# Patient Record
Sex: FEMALE | Race: WHITE | NOT HISPANIC OR LATINO | Employment: FULL TIME | URBAN - METROPOLITAN AREA
[De-identification: names, ages, dates, MRNs, and addresses within clinical notes are randomized per-mention and may not be internally consistent; named-entity substitution may affect disease eponyms.]

---

## 2017-01-05 ENCOUNTER — OFFICE VISIT (OUTPATIENT)
Dept: FAMILY MEDICINE | Facility: CLINIC | Age: 47
End: 2017-01-05
Payer: COMMERCIAL

## 2017-01-05 VITALS
TEMPERATURE: 98.9 F | DIASTOLIC BLOOD PRESSURE: 72 MMHG | SYSTOLIC BLOOD PRESSURE: 104 MMHG | HEIGHT: 63 IN | BODY MASS INDEX: 34.59 KG/M2 | WEIGHT: 195.2 LBS | HEART RATE: 80 BPM

## 2017-01-05 DIAGNOSIS — R11.0 NAUSEA: Primary | ICD-10-CM

## 2017-01-05 DIAGNOSIS — R53.83 FATIGUE, UNSPECIFIED TYPE: ICD-10-CM

## 2017-01-05 LAB
ALBUMIN SERPL-MCNC: 4.1 G/DL (ref 3.4–5)
ALP SERPL-CCNC: 54 U/L (ref 40–150)
ALT SERPL W P-5'-P-CCNC: 20 U/L (ref 0–50)
ANION GAP SERPL CALCULATED.3IONS-SCNC: 7 MMOL/L (ref 3–14)
AST SERPL W P-5'-P-CCNC: 16 U/L (ref 0–45)
BASOPHILS # BLD AUTO: 0 10E9/L (ref 0–0.2)
BASOPHILS NFR BLD AUTO: 0.5 %
BILIRUB SERPL-MCNC: 0.6 MG/DL (ref 0.2–1.3)
BUN SERPL-MCNC: 14 MG/DL (ref 7–30)
CALCIUM SERPL-MCNC: 8.8 MG/DL (ref 8.5–10.1)
CHLORIDE SERPL-SCNC: 102 MMOL/L (ref 94–109)
CO2 SERPL-SCNC: 28 MMOL/L (ref 20–32)
CREAT SERPL-MCNC: 0.77 MG/DL (ref 0.52–1.04)
DEPRECATED S PYO AG THROAT QL EIA: NORMAL
DIFFERENTIAL METHOD BLD: NORMAL
EOSINOPHIL # BLD AUTO: 0.1 10E9/L (ref 0–0.7)
EOSINOPHIL NFR BLD AUTO: 1.8 %
ERYTHROCYTE [DISTWIDTH] IN BLOOD BY AUTOMATED COUNT: 12.1 % (ref 10–15)
GFR SERPL CREATININE-BSD FRML MDRD: 82 ML/MIN/1.7M2
GLUCOSE SERPL-MCNC: 82 MG/DL (ref 70–99)
HCT VFR BLD AUTO: 40.2 % (ref 35–47)
HGB BLD-MCNC: 14 G/DL (ref 11.7–15.7)
LIPASE SERPL-CCNC: 138 U/L (ref 73–393)
LYMPHOCYTES # BLD AUTO: 1.2 10E9/L (ref 0.8–5.3)
LYMPHOCYTES NFR BLD AUTO: 20.2 %
MCH RBC QN AUTO: 33 PG (ref 26.5–33)
MCHC RBC AUTO-ENTMCNC: 34.8 G/DL (ref 31.5–36.5)
MCV RBC AUTO: 95 FL (ref 78–100)
MICRO REPORT STATUS: NORMAL
MONOCYTES # BLD AUTO: 0.6 10E9/L (ref 0–1.3)
MONOCYTES NFR BLD AUTO: 10.6 %
NEUTROPHILS # BLD AUTO: 3.8 10E9/L (ref 1.6–8.3)
NEUTROPHILS NFR BLD AUTO: 66.9 %
PLATELET # BLD AUTO: 279 10E9/L (ref 150–450)
POTASSIUM SERPL-SCNC: 4 MMOL/L (ref 3.4–5.3)
PROT SERPL-MCNC: 7.6 G/DL (ref 6.8–8.8)
RBC # BLD AUTO: 4.24 10E12/L (ref 3.8–5.2)
SODIUM SERPL-SCNC: 137 MMOL/L (ref 133–144)
SPECIMEN SOURCE: NORMAL
TSH SERPL DL<=0.005 MIU/L-ACNC: 1.59 MU/L (ref 0.4–4)
WBC # BLD AUTO: 5.7 10E9/L (ref 4–11)

## 2017-01-05 PROCEDURE — 83690 ASSAY OF LIPASE: CPT | Mod: 90 | Performed by: FAMILY MEDICINE

## 2017-01-05 PROCEDURE — 87081 CULTURE SCREEN ONLY: CPT | Mod: 90 | Performed by: FAMILY MEDICINE

## 2017-01-05 PROCEDURE — 85025 COMPLETE CBC W/AUTO DIFF WBC: CPT | Performed by: FAMILY MEDICINE

## 2017-01-05 PROCEDURE — 84443 ASSAY THYROID STIM HORMONE: CPT | Mod: 90 | Performed by: FAMILY MEDICINE

## 2017-01-05 PROCEDURE — 80053 COMPREHEN METABOLIC PANEL: CPT | Mod: 90 | Performed by: FAMILY MEDICINE

## 2017-01-05 PROCEDURE — 87880 STREP A ASSAY W/OPTIC: CPT | Performed by: FAMILY MEDICINE

## 2017-01-05 PROCEDURE — 36415 COLL VENOUS BLD VENIPUNCTURE: CPT | Performed by: FAMILY MEDICINE

## 2017-01-05 PROCEDURE — 99000 SPECIMEN HANDLING OFFICE-LAB: CPT | Performed by: FAMILY MEDICINE

## 2017-01-05 PROCEDURE — 99214 OFFICE O/P EST MOD 30 MIN: CPT | Performed by: FAMILY MEDICINE

## 2017-01-05 NOTE — Clinical Note
My Depression Action Plan  Name: Vanesa Owen   Date of Birth 1970  Date: 1/5/2017    My doctor: Crystal Goel   My clinic: [address] [address] [phone]          GREEN    ZONE   Good Control    What it looks like:     Things are going generally well. You have normal up s and down s. You may even feel depressed from time to time, but bad moods usually last less than a day.   What you need to do:  1. Continue to care for yourself (see self care plan)  2. Check your depression survival kit and update it as needed  3. Follow your physician s recommendations including any medication.  4. Do not stop taking medication unless you consult with your physician first.           YELLOW         ZONE Getting Worse    What it looks like:     Depression is starting to interfere with your life.     It may be hard to get out of bed; you may be starting to isolate yourself from others.    Symptoms of depression are starting to last most all day and this has happened for several days.     You may have suicidal thoughts but they are not constant.   What you need to do:     1. Call your care team, your response to treatment will improve if you keep your care team informed of your progress. Yellow periods are signs an adjustment may need to be made.     2. Continue your self-care, even if you have to fake it!    3. Talk to someone in your support network    4. Open up your depression survival kit           RED    ZONE Medical Alert - Get Help    What it looks like:     Depression is seriously interfering with your life.     You may experience these or other symptoms: You can t get out of bed most days, can t work or engage in other necessary activities, you have trouble taking care of basic hygiene, or basic responsibilities, thoughts of suicide or death that will not go away, self-injurious behavior.     What you need to do:  1. Call your care team  and request a same-day appointment. If they are not available (weekends or after hours) call your local crisis line, emergency room or 911.      Electronically signed by: Mely Rivera, January 5, 2017    Depression Self Care Plan / Survival Kit    Self-Care for Depression  Here s the deal. Your body and mind are really not as separate as most people think.  What you do and think affects how you feel and how you feel influences what you do and think. This means if you do things that people who feel good do, it will help you feel better.  Sometimes this is all it takes.  There is also a place for medication and therapy depending on how severe your depression is, so be sure to consult with your medical provider and/ or Behavioral Health Consultant if your symptoms are worsening or not improving.     In order to better manage my stress, I will:    Exercise  Get some form of exercise, every day. This will help reduce pain and release endorphins, the  feel good  chemicals in your brain. This is almost as good as taking antidepressants!  This is not the same as joining a gym and then never going! (they count on that by the way ) It can be as simple as just going for a walk or doing some gardening, anything that will get you moving.      Hygiene   Maintain good hygiene (Get out of bed in the morning, Make your bed, Brush your teeth, Take a shower, and Get dressed like you were going to work, even if you are unemployed).  If your clothes don't fit try to get ones that do.    Diet  I will strive to eat foods that are good for me, drink plenty of water, and avoid excessive sugar, caffeine, alcohol, and other mood-altering substances.  Some foods that are helpful in depression are: complex carbohydrates, B vitamins, flaxseed, fish or fish oil, fresh fruits and vegetables.    Psychotherapy  I agree to participate in Individual Therapy (if recommended).    Medication  If prescribed medications, I agree to take them.  Missing  doses can result in serious side effects.  I understand that drinking alcohol, or other illicit drug use, may cause potential side effects.  I will not stop my medication abruptly without first discussing it with my provider.    Staying Connected With Others  I will stay in touch with my friends, family members, and my primary care provider/team.    Use your imagination  Be creative.  We all have a creative side; it doesn t matter if it s oil painting, sand castles, or mud pies! This will also kick up the endorphins.    Witness Beauty  (AKA stop and smell the roses) Take a look outside, even in mid-winter. Notice colors, textures. Watch the squirrels and birds.     Service to others  Be of service to others.  There is always someone else in need.  By helping others we can  get out of ourselves  and remember the really important things.  This also provides opportunities for practicing all the other parts of the program.    Humor  Laugh and be silly!  Adjust your TV habits for less news and crime-drama and more comedy.    Control your stress  Try breathing deep, massage therapy, biofeedback, and meditation. Find time to relax each day.     My support system    Clinic Contact:  Phone number:    Contact 1:  Phone number:    Contact 2:  Phone number:    Jain/:  Phone number:    Therapist:  Phone number:    Local crisis center:    Phone number:    Other community support:  Phone number:

## 2017-01-05 NOTE — MR AVS SNAPSHOT
After Visit Summary   1/5/2017    Vanesa Owen    MRN: [id number]           Patient Information     Date Of Birth          1970        Visit Information        Provider Department      1/5/2017 1:20 PM Crystal Goel, DO [facility]        Today's Diagnoses     Nausea    -  1     Fatigue, unspecified type           Care Instructions    I suspect this is a viral illness.  We'll do some blood work today and I'll you know results when available.    I think you could try some over the counter medication like Pepto to see if that is helpful.    Let me know early next week how you ar feeling.                Follow-ups after your visit        Who to contact     Normal or non-critical lab and imaging results will be communicated to you by [employer], letter or phone within 4 business days after the clinic has received the results. If you do not hear from us within 7 days, please contact the clinic through [employer] or phone. If you have a critical or abnormal lab result, we will notify you by phone as soon as possible.  Submit refill requests through [employer] or call your pharmacy and they will forward the refill request to us. Please allow 3 business days for your refill to be completed.          If you need to speak with a  for additional information , please call: [phone]           Additional Information About Your Visit        [employer] Information     [employer] gives you secure access to your electronic health record. If you see a primary care provider, you can also send messages to your care team and make appointments. If you have questions, please call your primary care clinic.  If you do not have a primary care provider, please call [phone] and they will assist you.        Care EveryWhere ID     This is your Care EveryWhere ID. This could be used by other organizations to access your [place] medical records  [id number]        Your Vitals Were     Pulse  "Temperature Height BMI (Body Mass Index) Breastfeeding?       80 98.9  F (37.2  C) (Tympanic) 5' 3.25\" (1.607 m) 34.29 kg/m2 No        Blood Pressure from Last 3 Encounters:   01/05/17 104/72   08/30/16 122/84   08/23/16 98/68    Weight from Last 3 Encounters:   01/05/17 195 lb 3.2 oz (88.542 kg)   08/30/16 209 lb (94.802 kg)   08/23/16 207 lb (93.895 kg)              We Performed the Following     Beta strep group A culture     CBC with platelets and differential     Comprehensive metabolic panel     Lipase     Strep, Rapid Screen     TSH with free T4 reflex          Today's Medication Changes          These changes are accurate as of: 1/5/17  1:59 PM.  If you have any questions, ask your nurse or doctor.               These medicines have changed or have updated prescriptions.        Dose/Directions    omeprazole 40 MG capsule   Commonly known as:  priLOSEC   This may have changed:    - when to take this  - reasons to take this  - additional instructions   Used for:  Gastric hyperacidity, Esophageal reflux        Dose:  40 mg   Take 1 capsule (40 mg) by mouth daily Take 30-60 minutes before a meal.   Quantity:  90 capsule   Refills:  3       valACYclovir 500 MG tablet   Commonly known as:  VALTREX   This may have changed:  additional instructions   Used for:  Herpes simplex virus infection        Dose:  500 mg   Take 1 tablet (500 mg) by mouth daily   Quantity:  90 tablet   Refills:  3                Primary Care Provider Office Phone # Fax #    Crystal Goel [phone]       [address] [place] [address]        Thank you!     Thank you for choosing [facility]  for your care. Our goal is always to provide you with excellent care. Hearing back from our patients is one way we can continue to improve our services. Please take a few minutes to complete the written survey that you may receive in the mail after your visit with us. Thank you!           "   Your Updated Medication List - Protect others around you: Learn how to safely use, store and throw away your medicines at www.disposemymeds.org.          This list is accurate as of: 1/5/17  1:59 PM.  Always use your most recent med list.                   Brand Name Dispense Instructions for use    fluticasone 50 MCG/ACT spray    FLONASE    1 Package    Spray 2 sprays in nostril daily       MIRENA (52 MG) 20 MCG/24HR IUD   Generic drug:  levonorgestrel      1 each by Intrauterine route once       Multi-vitamin Tabs tablet   Generic drug:  multivitamin, therapeutic with minerals      1 TABLET DAILY       nystatin-triamcinolone cream    MYCOLOG II    30 g    Apply topically 2 times daily       omeprazole 40 MG capsule    priLOSEC    90 capsule    Take 1 capsule (40 mg) by mouth daily Take 30-60 minutes before a meal.       sertraline 100 MG tablet    ZOLOFT    180 tablet    Take 2 tablets (200 mg) by mouth daily       triamcinolone 0.1 % cream    KENALOG    30 g    Apply topically 3 times daily       valACYclovir 500 MG tablet    VALTREX    90 tablet    Take 1 tablet (500 mg) by mouth daily

## 2017-01-05 NOTE — PROGRESS NOTES
"  SUBJECTIVE:                                                    Vanesa Owen is a 46 year old female who presents to clinic today for the following health issues:    * nausea, loss of appetite, fatigue for the last 6 days.  She would like to be tested for strep, her son was positive 2 weeks ago.    Has not had any vomiting.  No diarrhea, stool has been normal.  Just feels tired and like her stomach is \"rumbling\".  Has been eating protein shakes and some bland food and seems to be tolerating that well.  Has no increase in her symptoms after eating.  No abd pain.    No real URI symptoms, no sore throat.  Concerned that her symptoms have been going on 5-6 days.    Has felt ill enough to miss work but has continued to try to keep up with her exercise.    No fevers or chills.  No rash.      Problem list and histories reviewed & adjusted, as indicated.  Additional history: as documented    ROS: Remainder of Constitutional, CV, Respiratory, GI,  negative with exception of that mentioned above    PE:  VS as above   Gen:  WN/WD/WH female in NAD   HEENT:  NC/AT, conjunctiva wnl, TM's wnl linda pearly gray   with good light reflex, oropharynx clear without    exudate/erythema   Neck:  supple, no LAD appreciated   Heart:  RRR without murmur, nl S1, S2, no rubs or gallops   Lungs CTA linda without rales/ronchi/wheezes   Abd: soft, postive bowel sounds, NT/ND, no HSM, no rebounding/guarding/ridigity    A/P:      ICD-10-CM    1. Nausea R11.0 Strep, Rapid Screen     Beta strep group A culture     Comprehensive metabolic panel     Lipase     TSH with free T4 reflex     CBC with platelets and differential   2. Fatigue, unspecified type R53.83 Comprehensive metabolic panel     Lipase     TSH with free T4 reflex     CBC with platelets and differential     Unclear etiology but suspect this will be self limited given the lack of objective findings in history or exam.  Possibly a viral etiology.  Offered watchful waiting and labs if " not improved by next week, pt prefers to do blood work today.    Patient Instructions   I suspect this is a viral illness.  We'll do some blood work today and I'll you know results when available.    I think you could try some over the counter medication like Pepto to see if that is helpful.    Let me know early next week how you ar feeling.              Reviewed reasons to seek additional care.

## 2017-01-05 NOTE — PATIENT INSTRUCTIONS
I suspect this is a viral illness.  We'll do some blood work today and I'll you know results when available.    I think you could try some over the counter medication like Pepto to see if that is helpful.    Let me know early next week how you ar feeling.

## 2017-01-06 ASSESSMENT — PATIENT HEALTH QUESTIONNAIRE - PHQ9: SUM OF ALL RESPONSES TO PHQ QUESTIONS 1-9: 5

## 2017-01-07 LAB
BACTERIA SPEC CULT: NORMAL
MICRO REPORT STATUS: NORMAL
SPECIMEN SOURCE: NORMAL

## 2017-03-23 ENCOUNTER — OFFICE VISIT (OUTPATIENT)
Dept: FAMILY MEDICINE | Facility: CLINIC | Age: 47
End: 2017-03-23
Payer: COMMERCIAL

## 2017-03-23 VITALS
TEMPERATURE: 98.9 F | SYSTOLIC BLOOD PRESSURE: 102 MMHG | WEIGHT: 196.8 LBS | OXYGEN SATURATION: 98 % | BODY MASS INDEX: 34.87 KG/M2 | DIASTOLIC BLOOD PRESSURE: 69 MMHG | HEIGHT: 63 IN | HEART RATE: 99 BPM

## 2017-03-23 DIAGNOSIS — J06.9 VIRAL UPPER RESPIRATORY TRACT INFECTION: ICD-10-CM

## 2017-03-23 DIAGNOSIS — J34.89 SINUS PRESSURE: ICD-10-CM

## 2017-03-23 DIAGNOSIS — Z20.828 EXPOSURE TO INFLUENZA: ICD-10-CM

## 2017-03-23 DIAGNOSIS — R07.0 THROAT PAIN: Primary | ICD-10-CM

## 2017-03-23 LAB
DEPRECATED S PYO AG THROAT QL EIA: NORMAL
FLUAV+FLUBV AG SPEC QL: NEGATIVE
FLUAV+FLUBV AG SPEC QL: NORMAL
MICRO REPORT STATUS: NORMAL
SPECIMEN SOURCE: NORMAL
SPECIMEN SOURCE: NORMAL

## 2017-03-23 PROCEDURE — 87880 STREP A ASSAY W/OPTIC: CPT | Performed by: PHYSICIAN ASSISTANT

## 2017-03-23 PROCEDURE — 99213 OFFICE O/P EST LOW 20 MIN: CPT | Performed by: PHYSICIAN ASSISTANT

## 2017-03-23 PROCEDURE — 87081 CULTURE SCREEN ONLY: CPT | Performed by: PHYSICIAN ASSISTANT

## 2017-03-23 PROCEDURE — 87804 INFLUENZA ASSAY W/OPTIC: CPT | Performed by: PHYSICIAN ASSISTANT

## 2017-03-23 RX ORDER — PREDNISONE 20 MG/1
TABLET ORAL
Qty: 11 TABLET | Refills: 0 | Status: SHIPPED | OUTPATIENT
Start: 2017-03-23 | End: 2017-04-18

## 2017-03-23 NOTE — PROGRESS NOTES
"  SUBJECTIVE:  Vanesa Owen is a 47 year old female who presents with the following concerns;              Symptoms: cc Present Absent Comment   Fever/Chills  x  Chills    Fatigue  x     Muscle Aches  x     Eye Irritation   x    Sneezing  x     Nasal Andres/Drg  x  Congested    Sinus Pressure/Pain  x     Loss of smell  x     Dental pain   x    Sore Throat  x  Coming and going    Swollen Glands   x    Ear Pain/Fullness   x    Cough   x    Wheeze   x    Chest Pain   x    Shortness of breath   x    Rash   x    Other  x  headache     Symptom duration:  x1 week   Sympom severity:  moderate   Treatments tried:  claritin d, flonase, water   Contacts:  strep        Medications updated and reviewed.  Past, family and surgical history is updated and reviewed in the record.    ROS:  Other than noted above, general, HEENT, respiratory, cardiac and gastrointestinal systems are negative.    OBJECTIVE:  /69  Pulse 99  Temp 98.9  F (37.2  C) (Oral)  Ht 5' 3.25\" (1.607 m)  Wt 196 lb 12.8 oz (89.3 kg)  SpO2 98%  BMI 34.59 kg/m2  GENERAL: Pleasant and interactive. No acute distress.  HEENT: Mild injection of conjunctiva. TMs clear. Oropharynx moist and clear.   NECK: supple and free of adenopathy or masses, the thyroid is normal without enlargement or nodules.  CHEST:  clear, no wheezing or rales. Normal symmetric air entry throughout both lung fields. No chest wall deformities or tenderness.  HEART:  S1 and S2 normal, no murmurs, clicks, gallops or rubs. Regular rate and rhythm.  SKIN:  Only benign skin findings. No unusual rashes or suspicious skin lesions noted. Nails appear normal.    RST - neg  Influenza - neg    Assessment:    Encounter Diagnoses   Name Primary?     Throat pain Yes     Exposure to influenza      Sinus pressure      Viral upper respiratory tract infection      Plan:   Orders Placed This Encounter     predniSONE (DELTASONE) 20 MG tablet         Supportive therapy also discussed. Follow up if " symptoms fail to improve or worsen.      The patient was in agreement with the plan today and had no questions or concerns prior to leaving the clinic.     Antoinette Frank PA-C

## 2017-03-23 NOTE — LETTER
[facility]  [address] [address] [facility] [place] [address]  Phone: [phone]    March 23, 2017        Vanesa Owen  [address] [place] [address]          To whom it may concern:    RE: Vanesa Owen    Patient was seen and treated today at our clinic and missed work. I anticipate her return to work on Monday, 3/27/17. She may return sooner if feeling better.    Please contact me for questions or concerns.      Sincerely,          Antoinette Frank PA-C

## 2017-03-23 NOTE — NURSING NOTE
"Chief Complaint   Patient presents with     Sinus Problem       Initial /69  Pulse 99  Temp 98.9  F (37.2  C) (Oral)  Ht 5' 3.25\" (1.607 m)  Wt 196 lb 12.8 oz (89.3 kg)  SpO2 98%  BMI 34.59 kg/m2 Estimated body mass index is 34.59 kg/(m^2) as calculated from the following:    Height as of this encounter: 5' 3.25\" (1.607 m).    Weight as of this encounter: 196 lb 12.8 oz (89.3 kg).  Medication Reconciliation: complete   Teri Bryant MA      "

## 2017-03-23 NOTE — PATIENT INSTRUCTIONS
For the next 2 days take the Claritin-D twice daily.  Start the prednisone.  Increase your water intake in order to keep the secretions/mucous in your upper respiratory tract thin. Get plenty of rest and wash your hands well. Follow up if symptoms fail to improve or worsen.

## 2017-03-25 LAB
BACTERIA SPEC CULT: NORMAL
MICRO REPORT STATUS: NORMAL
SPECIMEN SOURCE: NORMAL

## 2017-04-11 ENCOUNTER — TELEPHONE (OUTPATIENT)
Dept: FAMILY MEDICINE | Facility: CLINIC | Age: 47
End: 2017-04-11

## 2017-04-11 NOTE — TELEPHONE ENCOUNTER
Reason for Call:  Other call back    Detailed comments: She has an appt next Tuesday but she is having a lot on anxiety and on edge.  She is wondering if she should wait that ling?  Please advise.    Phone Number Patient can be reached at: Cell number on file:    Telephone Information:   Mobile [phone]       Best Time: any    Can we leave a detailed message on this number? YES    Call taken on 4/11/2017 at 1:28 PM by Lindsay Mendosa

## 2017-04-18 ENCOUNTER — OFFICE VISIT (OUTPATIENT)
Dept: FAMILY MEDICINE | Facility: CLINIC | Age: 47
End: 2017-04-18
Payer: COMMERCIAL

## 2017-04-18 VITALS
WEIGHT: 200.6 LBS | SYSTOLIC BLOOD PRESSURE: 114 MMHG | BODY MASS INDEX: 35.54 KG/M2 | DIASTOLIC BLOOD PRESSURE: 78 MMHG | TEMPERATURE: 98.7 F | HEIGHT: 63 IN | HEART RATE: 80 BPM

## 2017-04-18 DIAGNOSIS — F33.1 MAJOR DEPRESSIVE DISORDER, RECURRENT EPISODE, MODERATE (H): Primary | ICD-10-CM

## 2017-04-18 PROCEDURE — 99213 OFFICE O/P EST LOW 20 MIN: CPT | Performed by: FAMILY MEDICINE

## 2017-04-18 RX ORDER — BUPROPION HYDROCHLORIDE 150 MG/1
150 TABLET ORAL EVERY MORNING
COMMUNITY
End: 2017-05-18

## 2017-04-18 ASSESSMENT — ANXIETY QUESTIONNAIRES
5. BEING SO RESTLESS THAT IT IS HARD TO SIT STILL: NOT AT ALL
7. FEELING AFRAID AS IF SOMETHING AWFUL MIGHT HAPPEN: SEVERAL DAYS
3. WORRYING TOO MUCH ABOUT DIFFERENT THINGS: SEVERAL DAYS
1. FEELING NERVOUS, ANXIOUS, OR ON EDGE: SEVERAL DAYS
GAD7 TOTAL SCORE: 6
2. NOT BEING ABLE TO STOP OR CONTROL WORRYING: SEVERAL DAYS
6. BECOMING EASILY ANNOYED OR IRRITABLE: SEVERAL DAYS

## 2017-04-18 ASSESSMENT — PATIENT HEALTH QUESTIONNAIRE - PHQ9: 5. POOR APPETITE OR OVEREATING: SEVERAL DAYS

## 2017-04-18 NOTE — PATIENT INSTRUCTIONS
We'll start the wellbutrin for you for mood.  You can do this in addition to the sertraline.    I would recommend visiting with a counselor as well.  You should be getting a call in 1-2 days to get scheduled.    Let me know how things are going in the next 4 weeks or so.

## 2017-04-18 NOTE — NURSING NOTE
"Initial /78 (BP Location: Right arm, Cuff Size: Adult Large)  Pulse 80  Temp 98.7  F (37.1  C) (Tympanic)  Ht 5' 3\" (1.6 m)  Wt 200 lb 9.6 oz (91 kg)  Breastfeeding? No  BMI 35.53 kg/m2 Estimated body mass index is 35.53 kg/(m^2) as calculated from the following:    Height as of this encounter: 5' 3\" (1.6 m).    Weight as of this encounter: 200 lb 9.6 oz (91 kg). .      "

## 2017-04-18 NOTE — PROGRESS NOTES
SUBJECTIVE:                                                    Vanesa Owen is a 47 year old female who presents to clinic today for the following health issues:      Depression and Anxiety Follow-Up    Status since last visit: Worsened 2-3 weeks    Other associated symptoms:None    Complicating factors:     Significant life event: Yes-  Work and family stress     Current substance abuse: None    PHQ-9 SCORE 7/18/2016 1/5/2017 4/18/2017   Total Score - - -   Total Score MyChart - - -   Total Score 0 5 9     VIKASH-7 SCORE 4/18/2017   Total Score 6        PHQ-9  English      PHQ-9   Any Language     GAD7       Amount of exercise or physical activity: 2-3 days/week for an average of 30-45 minutes    Problems taking medications regularly: No    Medication side effects: none    Diet: regular (no restrictions)    Has been struglging more with mood over the last few months.  Feels like work is stressful and has been more tearful and more fatigued.    Dad broke his knee, in a wheelchair.  Work not improving with staffing and support.    Sleep has not been great at night but then feels like she wants to sleep all day.    Still exercising at least 2 times per week but would like it to be more    Problem list and histories reviewed & adjusted, as indicated.  Additional history: as documented    Reviewed and updated as needed this visit by clinical staff  Tobacco  Allergies  Meds  Med Hx  Surg Hx  Fam Hx  Soc Hx      Reviewed and updated as needed this visit by Provider  Tobacco  Med Hx  Surg Hx  Fam Hx  Soc Hx        ROS: Remainder of Constitutional, CV, Respiratory, GI,  negative with exception of that mentioned above    PE:  VS as above   Gen:  WN/WD/WH female in NAD   Psych: Alert and oriented times 3; coherent speech,normal   rate and volume, able to articulate logical thoughts, able   to abstract reason, no tangential thoughts, no hallucinations   or delusions  Her affect is mildly depressed    A/p:       ICD-10-CM    1. Major depressive disorder, recurrent episode, moderate (H) F33.1 MENTAL HEALTH REFERRAL     Patient Instructions   We'll start the wellbutrin for you for mood.  You can do this in addition to the sertraline.    I would recommend visiting with a counselor as well.  You should be getting a call in 1-2 days to get scheduled.    Let me know how things are going in the next 4 weeks or so.

## 2017-04-18 NOTE — MR AVS SNAPSHOT
After Visit Summary   4/18/2017    Vanesa Owen    MRN: [id number]           Patient Information     Date Of Birth          1970        Visit Information        Provider Department      4/18/2017 10:20 AM Crystal Goel DO [facility]        Today's Diagnoses     Major depressive disorder, recurrent episode, moderate (H)    -  1      Care Instructions    We'll start the wellbutrin for you for mood.  You can do this in addition to the sertraline.    I would recommend visiting with a counselor as well.  You should be getting a call in 1-2 days to get scheduled.    Let me know how things are going in the next 4 weeks or so.        Follow-ups after your visit        Additional Services     MENTAL HEALTH REFERRAL       Your provider has referred you to: [facility]: [facility] Access ([facility]) -  [phone]    All scheduling is subject to the client's specific insurance plan & benefits, provider/location availability, and provider clinical specialities.  Please arrive 15 minutes early for your first appointment and bring your completed paperwork.    Please be aware that coverage of these services is subject to the terms and limitations of your health insurance plan.  Call member services at your health plan with any benefit or coverage questions.                  Who to contact     Normal or non-critical lab and imaging results will be communicated to you by MyChart, letter or phone within 4 business days after the clinic has received the results. If you do not hear from us within 7 days, please contact the clinic through MyChart or phone. If you have a critical or abnormal lab result, we will notify you by phone as soon as possible.  Submit refill requests through [employer] or call your pharmacy and they will forward the refill request to us. Please allow 3 business days for your refill to be completed.          If you need to speak with a  for additional  "information , please call: [phone]           Additional Information About Your Visit        MyChart Information     imbookin ([employer]ee gives you secure access to your electronic health record. If you see a primary care provider, you can also send messages to your care team and make appointments. If you have questions, please call your primary care clinic.  If you do not have a primary care provider, please call [phone] and they will assist you.        Care EveryWhere ID     This is your Care EveryWhere ID. This could be used by other organizations to access your [place] medical records  [id number]        Your Vitals Were     Pulse Temperature Height Breastfeeding? BMI (Body Mass Index)       80 98.7  F (37.1  C) (Tympanic) 5' 3\" (1.6 m) No 35.53 kg/m2        Blood Pressure from Last 3 Encounters:   04/18/17 114/78   03/23/17 102/69   01/05/17 104/72    Weight from Last 3 Encounters:   04/18/17 200 lb 9.6 oz (91 kg)   03/23/17 196 lb 12.8 oz (89.3 kg)   01/05/17 195 lb 3.2 oz (88.5 kg)              We Performed the Following     MENTAL HEALTH REFERRAL          Today's Medication Changes          These changes are accurate as of: 4/18/17 11:15 AM.  If you have any questions, ask your nurse or doctor.               These medicines have changed or have updated prescriptions.        Dose/Directions    omeprazole 40 MG capsule   Commonly known as:  priLOSEC   This may have changed:    - when to take this  - reasons to take this  - additional instructions   Used for:  Gastric hyperacidity, Esophageal reflux        Dose:  40 mg   Take 1 capsule (40 mg) by mouth daily Take 30-60 minutes before a meal.   Quantity:  90 capsule   Refills:  3                Primary Care Provider Office Phone # Fax #    Crystal Goel [phone]       [address] [place] [address]        Thank you!     Thank you for choosing [facility]  for your care. Our goal is always to provide " you with excellent care. Hearing back from our patients is one way we can continue to improve our services. Please take a few minutes to complete the written survey that you may receive in the mail after your visit with us. Thank you!             Your Updated Medication List - Protect others around you: Learn how to safely use, store and throw away your medicines at www.disposemymeds.org.          This list is accurate as of: 4/18/17 11:15 AM.  Always use your most recent med list.                   Brand Name Dispense Instructions for use    fluticasone 50 MCG/ACT spray    FLONASE    1 Bottle    Spray 2 sprays in nostril daily       MIRENA (52 MG) 20 MCG/24HR IUD   Generic drug:  levonorgestrel      1 each by Intrauterine route once       Multi-vitamin Tabs tablet   Generic drug:  multivitamin, therapeutic with minerals      1 TABLET DAILY       nystatin-triamcinolone cream    MYCOLOG II    30 g    Apply topically 2 times daily       omeprazole 40 MG capsule    priLOSEC    90 capsule    Take 1 capsule (40 mg) by mouth daily Take 30-60 minutes before a meal.       sertraline 100 MG tablet    ZOLOFT    180 tablet    Take 2 tablets (200 mg) by mouth daily       triamcinolone 0.1 % cream    KENALOG    30 g    Apply topically 3 times daily       valACYclovir 500 MG tablet    VALTREX    90 tablet    Take 1 tablet (500 mg) by mouth daily

## 2017-04-19 ASSESSMENT — PATIENT HEALTH QUESTIONNAIRE - PHQ9: SUM OF ALL RESPONSES TO PHQ QUESTIONS 1-9: 9

## 2017-04-19 ASSESSMENT — ANXIETY QUESTIONNAIRES: GAD7 TOTAL SCORE: 6

## 2017-05-26 ENCOUNTER — MYC MEDICAL ADVICE (OUTPATIENT)
Dept: FAMILY MEDICINE | Facility: CLINIC | Age: 47
End: 2017-05-26

## 2017-05-26 DIAGNOSIS — L98.9 SKIN LESION: Primary | ICD-10-CM

## 2017-07-23 ENCOUNTER — MYC MEDICAL ADVICE (OUTPATIENT)
Dept: FAMILY MEDICINE | Facility: CLINIC | Age: 47
End: 2017-07-23

## 2017-07-23 ENCOUNTER — OFFICE VISIT (OUTPATIENT)
Dept: URGENT CARE | Facility: URGENT CARE | Age: 47
End: 2017-07-23
Payer: COMMERCIAL

## 2017-07-23 VITALS
BODY MASS INDEX: 35.16 KG/M2 | SYSTOLIC BLOOD PRESSURE: 108 MMHG | DIASTOLIC BLOOD PRESSURE: 76 MMHG | HEART RATE: 75 BPM | TEMPERATURE: 98.3 F | OXYGEN SATURATION: 98 % | WEIGHT: 198.5 LBS

## 2017-07-23 DIAGNOSIS — J01.90 ACUTE SINUSITIS WITH SYMPTOMS > 10 DAYS: Primary | ICD-10-CM

## 2017-07-23 DIAGNOSIS — R07.0 THROAT PAIN: ICD-10-CM

## 2017-07-23 LAB
DEPRECATED S PYO AG THROAT QL EIA: NORMAL
HETEROPH AB SER QL: NEGATIVE
MICRO REPORT STATUS: NORMAL
SPECIMEN SOURCE: NORMAL

## 2017-07-23 PROCEDURE — 36415 COLL VENOUS BLD VENIPUNCTURE: CPT | Performed by: PHYSICIAN ASSISTANT

## 2017-07-23 PROCEDURE — 99213 OFFICE O/P EST LOW 20 MIN: CPT | Performed by: PHYSICIAN ASSISTANT

## 2017-07-23 PROCEDURE — 87081 CULTURE SCREEN ONLY: CPT | Performed by: PHYSICIAN ASSISTANT

## 2017-07-23 PROCEDURE — 87880 STREP A ASSAY W/OPTIC: CPT | Performed by: PHYSICIAN ASSISTANT

## 2017-07-23 PROCEDURE — 86308 HETEROPHILE ANTIBODY SCREEN: CPT | Performed by: PHYSICIAN ASSISTANT

## 2017-07-23 RX ORDER — AMOXICILLIN 500 MG/1
500 CAPSULE ORAL 3 TIMES DAILY
Qty: 30 CAPSULE | Refills: 0 | Status: SHIPPED | OUTPATIENT
Start: 2017-07-23 | End: 2017-08-02

## 2017-07-23 NOTE — PROGRESS NOTES
SUBJECTIVE:                                                      HPI  Vanesa Owen is a 47 year old female who presents to clinic today for the following health issues:  ENT Symptoms           Symptoms: cc Present Absent Comment   Fever/Chills  x  Low grade fevers   Fatigue  x     Muscle Aches   x    Eye Irritation   x    Sneezing   x    Nasal Andres/Drg  x     Sinus Pressure/Pain  x  HA   Loss of smell   x    Dental pain   x    Sore Throat  x     Swollen Glands  x     Ear Pain/Fullness   x    Cough   x    Wheeze   x    Chest Pain   x    Shortness of breath   x    Rash   x    Other   x      Symptom duration:  >1week   Symptom severity:  mild- moderate    Treatments tried:  Advil, Claritin D   Contacts:  son and daughter        Reviewed PMH.  Patient Active Problem List   Diagnosis     Moderate major depression (H)     Presence of intrauterine contraceptive device     Vitamin D deficiency     Lumbar back pain     genital herpes, mainly cervical.      Gastric hyperacidity     CARDIOVASCULAR SCREENING; LDL GOAL LESS THAN 160     HCH     Obesity     Current Outpatient Prescriptions   Medication Sig Dispense Refill     buPROPion (WELLBUTRIN XL) 150 MG 24 hr tablet Take 1 tablet (150 mg) by mouth every morning 90 tablet 1     sertraline (ZOLOFT) 100 MG tablet Take 2 tablets (200 mg) by mouth daily 180 tablet 3     omeprazole (PRILOSEC) 40 MG capsule Take 1 capsule (40 mg) by mouth daily Take 30-60 minutes before a meal. (Patient taking differently: Take 40 mg by mouth daily as needed Take 30-60 minutes before a meal.) 90 capsule 3     levonorgestrel (MIRENA) 20 MCG/24HR IUD 1 each by Intrauterine route once       MULTI-VITAMIN OR TABS 1 TABLET DAILY       fluticasone (FLONASE) 50 MCG/ACT spray Spray 2 sprays in nostril daily (Patient not taking: Reported on 7/23/2017) 1 Bottle 0     triamcinolone (KENALOG) 0.1 % cream Apply topically 3 times daily (Patient not taking: Reported on 7/23/2017) 30 g 0     valACYclovir  (VALTREX) 500 MG tablet Take 1 tablet (500 mg) by mouth daily (Patient not taking: Reported on 4/18/2017) 90 tablet 3     nystatin-triamcinolone (MYCOLOG II) cream Apply topically 2 times daily (Patient not taking: Reported on 7/23/2017) 30 g 1     Allergies   Allergen Reactions     Sulfa Drugs Rash       ROS  All other ROS are negative.      Physical Exam   Constitutional: She is oriented to person, place, and time and well-developed, well-nourished, and in no distress. No distress.   HENT:   Head: Normocephalic and atraumatic.   Nose: Mucosal edema and rhinorrhea present. No nasal deformity or septal deviation.  No foreign bodies. Right sinus exhibits maxillary sinus tenderness. Right sinus exhibits no frontal sinus tenderness. Left sinus exhibits maxillary sinus tenderness. Left sinus exhibits no frontal sinus tenderness.   Mouth/Throat: Uvula is midline and mucous membranes are normal. No trismus in the jaw. No oropharyngeal exudate, posterior oropharyngeal edema, posterior oropharyngeal erythema or tonsillar abscesses.   TMs are intact without any erythema or bulging bilaterally.  Airway is patent.   Eyes: Conjunctivae and EOM are normal. Pupils are equal, round, and reactive to light. No scleral icterus.   Neck: Normal range of motion. Neck supple. No thyromegaly present.   Cardiovascular: Normal rate, regular rhythm, normal heart sounds and intact distal pulses.  Exam reveals no gallop and no friction rub.    No murmur heard.  Pulmonary/Chest: Effort normal and breath sounds normal. No respiratory distress. She has no wheezes. She has no rales.   Abdominal: Soft. Normal appearance, normal aorta and bowel sounds are normal. She exhibits no mass. There is no hepatosplenomegaly. There is no tenderness. There is no rebound and no guarding. No hernia.   Lymphadenopathy:        Head (right side): Tonsillar adenopathy present.        Head (left side): Tonsillar adenopathy present.     She has no cervical adenopathy.    Neurological: She is alert and oriented to person, place, and time.   Skin: Skin is warm and dry. No rash noted.   Psychiatric: Mood, memory, affect and judgment normal.   Nursing note and vitals reviewed.        Assessment/Plan:  Acute sinusitis with symptoms > 10 days:  Will treat with amoxicillin [id number].  Continue with allergy meds as well.  Recommend treatment with rest, fluids and chicken soup. Tylenol/ibuprofen prn fever/pain.  Recheck in clinic if symptoms worsen or if symptoms do not improve.    -     amoxicillin (AMOXIL) 500 MG capsule; Take 1 capsule (500 mg) by mouth 3 times daily for 10 days    Throat pain:  RST and mono are negative, will send for throat culture.  Recommend tylenol/ibuprofen prn pain/fever, warm salt water gargles, lozenges or cough drops.   -     Strep, Rapid Screen  -     Beta strep group A culture  -     Mononucleosis screen          Sandie Queen PA-C

## 2017-07-23 NOTE — MR AVS SNAPSHOT
After Visit Summary   7/23/2017    Vanesa Owen    MRN: [id number]           Patient Information     Date Of Birth          1970        Visit Information        Provider Department      7/23/2017 10:30 AM Sandie Queen PA-C [facility]        Today's Diagnoses     Acute sinusitis with symptoms > 10 days    -  1    Throat pain           Follow-ups after your visit        Who to contact     If you have questions or need follow up information about today's clinic visit or your schedule please contact [facility] directly at [phone].  Normal or non-critical lab and imaging results will be communicated to you by [employer], letter or phone within 4 business days after the clinic has received the results. If you do not hear from us within 7 days, please contact the clinic through [employer] or phone. If you have a critical or abnormal lab result, we will notify you by phone as soon as possible.  Submit refill requests through [employer] or call your pharmacy and they will forward the refill request to us. Please allow 3 business days for your refill to be completed.          Additional Information About Your Visit        MyChart Information     [employer] gives you secure access to your electronic health record. If you see a primary care provider, you can also send messages to your care team and make appointments. If you have questions, please call your primary care clinic.  If you do not have a primary care provider, please call [phone] and they will assist you.        Care EveryWhere ID     This is your Care EveryWhere ID. This could be used by other organizations to access your [place] medical records  [id number]        Your Vitals Were     Pulse Temperature Pulse Oximetry BMI (Body Mass Index)          75 98.3  F (36.8  C) (Oral) 98% 35.16 kg/m2         Blood Pressure from Last 3 Encounters:   07/23/17 108/76   04/18/17 114/78   03/23/17 102/69    Weight from Last 3  Encounters:   07/23/17 198 lb 8 oz (90 kg)   04/18/17 200 lb 9.6 oz (91 kg)   03/23/17 196 lb 12.8 oz (89.3 kg)              We Performed the Following     Beta strep group A culture     Mononucleosis screen     Strep, Rapid Screen          Today's Medication Changes          These changes are accurate as of: 7/23/17 11:30 AM.  If you have any questions, ask your nurse or doctor.               Start taking these medicines.        Dose/Directions    amoxicillin 500 MG capsule   Commonly known as:  AMOXIL   Used for:  Acute sinusitis with symptoms > 10 days   Started by:  Sandie Queen PA-C        Dose:  500 mg   Take 1 capsule (500 mg) by mouth 3 times daily for 10 days   Quantity:  30 capsule   Refills:  0         These medicines have changed or have updated prescriptions.        Dose/Directions    omeprazole 40 MG capsule   Commonly known as:  priLOSEC   This may have changed:    - when to take this  - reasons to take this  - additional instructions   Used for:  Gastric hyperacidity, Esophageal reflux        Dose:  40 mg   Take 1 capsule (40 mg) by mouth daily Take 30-60 minutes before a meal.   Quantity:  90 capsule   Refills:  3            Where to get your medicines      These medications were sent to [id number] [place] - [address]  [address] [place] [address]     Phone:  [phone]     amoxicillin 500 MG capsule                Primary Care Provider Office Phone # Fax #    Crystal Baez DO [place] [phone]       [place] [address] [place] [address]        Equal Access to Services     [facility] Center[facility]: Hadii aad ku hadasho Soomaali, waaxda luqadaha, qaybta kaalmada adeegyada, waxay kiley hayroni english. So [facility] [phone].    ATENCIÓN: Si habla español, tiene a waddell disposición servicios gratuitos de asistencia lingüística. Llame al [phone].    We comply with applicable federal civil rights laws and [place] laws. We do not  discriminate on the basis of race, color, national origin, age, disability sex, sexual orientation or gender identity.            Thank you!     [facility]. Our goal is always to provide you with excellent care. Hearing back from our patients is one way we can continue to improve our services. Please take a few minutes to complete the written survey that you may receive in the mail after your visit with us. Thank you!             Your Updated Medication List - Protect others around you: Learn how to safely use, store and throw away your medicines at www.disposemymeds.org.          This list is accurate as of: 7/23/17 11:30 AM.  Always use your most recent med list.                   Brand Name Dispense Instructions for use Diagnosis    amoxicillin 500 MG capsule    AMOXIL    30 capsule    Take 1 capsule (500 mg) by mouth 3 times daily for 10 days    Acute sinusitis with symptoms > 10 days       buPROPion 150 MG 24 hr tablet    WELLBUTRIN XL    90 tablet    Take 1 tablet (150 mg) by mouth every morning    Major depressive disorder, recurrent episode, moderate (H)       fluticasone 50 MCG/ACT spray    FLONASE    1 Bottle    Spray 2 sprays in nostril daily    Dysfunction of eustachian tube, left       MIRENA (52 MG) 20 MCG/24HR IUD   Generic drug:  levonorgestrel      1 each by Intrauterine route once        Multi-vitamin Tabs tablet   Generic drug:  multivitamin, therapeutic with minerals      1 TABLET DAILY        nystatin-triamcinolone cream    MYCOLOG II    30 g    Apply topically 2 times daily    Perineal irritation       omeprazole 40 MG capsule    priLOSEC    90 capsule    Take 1 capsule (40 mg) by mouth daily Take 30-60 minutes before a meal.    Gastric hyperacidity, Esophageal reflux       sertraline 100 MG tablet    ZOLOFT    180 tablet    Take 2 tablets (200 mg) by mouth daily    Dysthymia       triamcinolone 0.1 % cream    KENALOG    30 g    Apply  topically 3 times daily    Rash       valACYclovir 500 MG tablet    VALTREX    90 tablet    Take 1 tablet (500 mg) by mouth daily    Herpes simplex virus infection

## 2017-07-24 LAB
BACTERIA SPEC CULT: NORMAL
MICRO REPORT STATUS: NORMAL
SPECIMEN SOURCE: NORMAL

## 2017-08-30 ENCOUNTER — OFFICE VISIT (OUTPATIENT)
Dept: FAMILY MEDICINE | Facility: CLINIC | Age: 47
End: 2017-08-30
Payer: COMMERCIAL

## 2017-08-30 VITALS
HEART RATE: 95 BPM | TEMPERATURE: 99.4 F | HEIGHT: 63 IN | DIASTOLIC BLOOD PRESSURE: 81 MMHG | SYSTOLIC BLOOD PRESSURE: 119 MMHG | OXYGEN SATURATION: 98 % | BODY MASS INDEX: 35.61 KG/M2 | WEIGHT: 201 LBS

## 2017-08-30 DIAGNOSIS — R35.0 URINARY FREQUENCY: ICD-10-CM

## 2017-08-30 DIAGNOSIS — Z97.5 IUD (INTRAUTERINE DEVICE) IN PLACE: ICD-10-CM

## 2017-08-30 DIAGNOSIS — R30.0 DYSURIA: Primary | ICD-10-CM

## 2017-08-30 LAB
ALBUMIN UR-MCNC: NEGATIVE MG/DL
APPEARANCE UR: CLEAR
BACTERIA SPEC CULT: NORMAL
BETA HCG QUAL IFA URINE: NEGATIVE
BILIRUB UR QL STRIP: NEGATIVE
COLOR UR AUTO: YELLOW
GLUCOSE UR STRIP-MCNC: NEGATIVE MG/DL
HGB UR QL STRIP: NEGATIVE
KETONES UR STRIP-MCNC: NEGATIVE MG/DL
LEUKOCYTE ESTERASE UR QL STRIP: NEGATIVE
NITRATE UR QL: NEGATIVE
PH UR STRIP: 5.5 PH (ref 5–7)
SOURCE: NORMAL
SP GR UR STRIP: <=1.005 (ref 1–1.03)
SPECIMEN SOURCE: NORMAL
UROBILINOGEN UR STRIP-ACNC: 0.2 EU/DL (ref 0.2–1)

## 2017-08-30 PROCEDURE — 87086 URINE CULTURE/COLONY COUNT: CPT | Performed by: FAMILY MEDICINE

## 2017-08-30 PROCEDURE — 81003 URINALYSIS AUTO W/O SCOPE: CPT | Performed by: FAMILY MEDICINE

## 2017-08-30 PROCEDURE — 84703 CHORIONIC GONADOTROPIN ASSAY: CPT | Performed by: FAMILY MEDICINE

## 2017-08-30 PROCEDURE — 99213 OFFICE O/P EST LOW 20 MIN: CPT | Performed by: FAMILY MEDICINE

## 2017-08-30 ASSESSMENT — ANXIETY QUESTIONNAIRES

## 2017-08-30 ASSESSMENT — PATIENT HEALTH QUESTIONNAIRE - PHQ9
SUM OF ALL RESPONSES TO PHQ QUESTIONS 1-9: 0
5. POOR APPETITE OR OVEREATING: NOT AT ALL

## 2017-08-30 NOTE — NURSING NOTE
"Chief Complaint   Patient presents with     UTI       Initial /81  Pulse 95  Temp 99.4  F (37.4  C) (Tympanic)  Ht 5' 3\" (1.6 m)  Wt 201 lb (91.2 kg)  SpO2 98%  Breastfeeding? No  BMI 35.61 kg/m2 Estimated body mass index is 35.61 kg/(m^2) as calculated from the following:    Height as of this encounter: 5' 3\" (1.6 m).    Weight as of this encounter: 201 lb (91.2 kg).  Medication Reconciliation: complete     Hm: Pt is aware she is due for mammogram- mammogram order was extended though November.     Janette Horne MA      "

## 2017-08-30 NOTE — PROGRESS NOTES
SUBJECTIVE:   Vanesa Owen is a 47 year old female who presents to clinic today for the following health issues:      URINARY TRACT SYMPTOMS    Onset: this morning    Description:   Painful urination (Dysuria): YES- burning  Blood in urine (Hematuria): no   Delay in urine (Hesitency): YES- unable to fully empty bladder, and having frequency.    Intensity: moderate    Progression of Symptoms:  worsening    Accompanying Signs & Symptoms:  Fever/chills: YES- 99.4 current  Flank pain YES- ache   Nausea and vomiting: YES- nausea  Any vaginal symptoms: burning   Abdominal/Pelvic Pain: no     History:   History of frequent UTI's: YES  History of kidney stones: no   Sexually Active: YES  Possibility of pregnancy: No    Precipitating factors:   none    Therapies Tried and outcome: none      Has a mirena IUD in place. Denies having any vaginal discharge or bleeding.     Problem list and histories reviewed & adjusted, as indicated.  Additional history: as documented    Patient Active Problem List   Diagnosis     Moderate major depression (H)     Presence of intrauterine contraceptive device     Vitamin D deficiency     Lumbar back pain     genital herpes, mainly cervical.      Gastric hyperacidity     CARDIOVASCULAR SCREENING; LDL GOAL LESS THAN 160     HCH     Obesity     Past Surgical History:   Procedure Laterality Date     EXCIS VAGINAL CYST/TUMOR       SLING TRANSVAGINAL N/A 8/23/2016    Procedure: SLING TRANSVAGINAL;  Surgeon: Nam Sommers MD;  Location: [facility] OR     TONSILLECTOMY & ADENOIDECTOMY         Social History   Substance Use Topics     Smoking status: Never Smoker     Smokeless tobacco: Never Used     Alcohol use No     Family History   Problem Relation Age of Onset     DIABETES Maternal Grandmother      Type II      Hypertension Maternal Grandmother      Alcohol/Drug Maternal Grandmother      Asthma Mother      Depression Mother      Depression Father 58     CEREBROVASCULAR DISEASE Father       "Alcohol/Drug Maternal Grandfather      Circulatory Paternal Grandmother      Asthma Brother      Breast Cancer Maternal Aunt          Current Outpatient Prescriptions   Medication Sig Dispense Refill     phenazopyridine (AZO) 97.5 MG tablet Take 2 tablets (195 mg) by mouth 3 times daily 12 tablet 0     buPROPion (WELLBUTRIN XL) 150 MG 24 hr tablet Take 1 tablet (150 mg) by mouth every morning 90 tablet 1     fluticasone (FLONASE) 50 MCG/ACT spray Spray 2 sprays in nostril daily 1 Bottle 0     sertraline (ZOLOFT) 100 MG tablet Take 2 tablets (200 mg) by mouth daily 180 tablet 3     levonorgestrel (MIRENA) 20 MCG/24HR IUD 1 each by Intrauterine route once       MULTI-VITAMIN OR TABS 1 TABLET DAILY       triamcinolone (KENALOG) 0.1 % cream Apply topically 3 times daily (Patient not taking: Reported on 7/23/2017) 30 g 0     omeprazole (PRILOSEC) 40 MG capsule Take 1 capsule (40 mg) by mouth daily Take 30-60 minutes before a meal. (Patient not taking: Reported on 8/30/2017) 90 capsule 3     valACYclovir (VALTREX) 500 MG tablet Take 1 tablet (500 mg) by mouth daily (Patient not taking: Reported on 4/18/2017) 90 tablet 3     nystatin-triamcinolone (MYCOLOG II) cream Apply topically 2 times daily (Patient not taking: Reported on 8/30/2017) 30 g 1     Allergies   Allergen Reactions     Sulfa Drugs Rash         Reviewed and updated as needed this visit by clinical staff     Reviewed and updated as needed this visit by Provider         ROS:  Constitutional, HEENT, cardiovascular, pulmonary, gi and gu systems are negative, except as otherwise noted.      OBJECTIVE:   /81  Pulse 95  Temp 99.4  F (37.4  C) (Tympanic)  Ht 5' 3\" (1.6 m)  Wt 201 lb (91.2 kg)  SpO2 98%  Breastfeeding? No  BMI 35.61 kg/m2  Body mass index is 35.61 kg/(m^2).  GENERAL: healthy, alert and no distress  ABDOMEN: soft, nontender, no hepatosplenomegaly, no masses and bowel sounds normal    Diagnostic Test Results:  Reviewed and discussed with " patient prior to discharge.  Results for orders placed or performed in visit on 08/30/17   UA reflex to Microscopic and Culture   Result Value Ref Range    Color Urine Yellow     Appearance Urine Clear     Glucose Urine Negative NEG^Negative mg/dL    Bilirubin Urine Negative NEG^Negative    Ketones Urine Negative NEG^Negative mg/dL    Specific Gravity Urine <=1.005 1.003 - 1.035    Blood Urine Negative NEG^Negative    pH Urine 5.5 5.0 - 7.0 pH    Protein Albumin Urine Negative NEG^Negative mg/dL    Urobilinogen Urine 0.2 0.2 - 1.0 EU/dL    Nitrite Urine Negative NEG^Negative    Leukocyte Esterase Urine Negative NEG^Negative    Source Midstream Urine    Beta HCG qual IFA urine   Result Value Ref Range    Beta HCG Qual IFA Urine Negative NEG^Negative      Urine Culture Aerobic Bacterial   Result Value Ref Range    Specimen Description Midstream Urine     Culture Micro Test canceled - Lab  error          ASSESSMENT/PLAN:     Vanesa was seen today for uti.    Diagnoses and all orders for this visit:    Dysuria  -     UA reflex to Microscopic and Culture  -     Urine Culture Aerobic Bacterial  -     phenazopyridine (AZO) 97.5 MG tablet; Take 2 tablets (195 mg) by mouth 3 times daily  -     Urine Culture Aerobic Bacterial    Urinary frequency  -     Beta HCG qual IFA urine  -     Urine Culture Aerobic Bacterial    IUD (intrauterine device) in place, mirena      Will notify patient with urine culture results, in the interim instructed to increase fluid intake and take phenazopyridine as needed for the dysuria.        Follow up if symptoms fail to improve or worsen.      The patient was in agreement with the plan today and had no questions or concerns prior to leaving the clinic.      Chandrika Alcantar MD  [facility]

## 2017-08-30 NOTE — MR AVS SNAPSHOT
"              After Visit Summary   8/30/2017    Vanesa Owen    MRN: [id number]           Patient Information     Date Of Birth          1970        Visit Information        Provider Department      8/30/2017 3:00 PM Chandrika Alcantar MD [facility]        Today's Diagnoses     Dysuria    -  1    Urinary frequency        IUD (intrauterine device) in place          Care Instructions      Dysuria     Painful urination (dysuria) is often caused by a problem in the urinary tract.   Dysuria is pain felt during urination. It is often described as a burning. Learn more about this problem and how it can be treated.  What causes dysuria?  Possible causes include:    Infection with a bacteria or virus such as a urinary tract infection (UTI or a sexually transmitted infection (STI)    Sensitivity or allergy to chemicals such as those found in lotions and other products    Prostate or bladder problems    Radiation therapy to the pelvic area  How is dysuria diagnosed?  Your healthcare provider will examine you. He or she will ask about your symptoms and health. After talking with you and doing a physical exam, your healthcare provider may know what is causing your dysuria. He or she will usually request  a sample of your urine. Tests of your urine, or a \"urinalysis,\" are done. A urinalysis may include:    Looking at the urine sample (visual exam)    Checking for substances (chemical exam)    Looking at a small amount under a microscope (microscopic exam)  Some parts of the urinalysis may be done in the provider's office and some in a lab. And, the urine sample may be checked for bacteria and yeast (urine culture). Your healthcare provider will tell you more about these tests if they are needed.  How is dysuria treated?  Treatment depends on the cause. If you have a bacterial infection, you may need antibiotics. You may be given medicines to make it easier for you to urinate and help relieve pain. Your " healthcare provider can tell you more about your treatment options. Untreated, symptoms may get worse.  When to call your healthcare provider  Call the healthcare provider right away if you have any of the following:    Fever of 100.4 F (38 C) or higher     No improvement after three days of treatment    Trouble urinating because of pain    New or increased discharge from the vagina or penis    Rash or joint pain    Increased back or abdominal pain    Enlarged painful lymph nodes (lumps) in the groin   Date Last Reviewed: 1/1/2017 2000-2017 [employer]. [address] [place] [address]. All rights reserved. This information is not intended as a substitute for professional medical care. Always follow your healthcare professional's instructions.                Follow-ups after your visit        Follow-up notes from your care team     Return if symptoms worsen or fail to improve.      Who to contact     Normal or non-critical lab and imaging results will be communicated to you by [employer], letter or phone within 4 business days after the clinic has received the results. If you do not hear from us within 7 days, please contact the clinic through [employer] or phone. If you have a critical or abnormal lab result, we will notify you by phone as soon as possible.  Submit refill requests through [employer] or call your pharmacy and they will forward the refill request to us. Please allow 3 business days for your refill to be completed.          If you need to speak with a  for additional information , please call: [phone]             Additional Information About Your Visit        [employer] Information     [employer] gives you secure access to your electronic health record. If you see a primary care provider, you can also send messages to your care team and make appointments. If you have questions, please call your primary care clinic.  If you do not have a primary care provider, please  "call [phone] and they will assist you.        Care EveryWhere ID     This is your Care EveryWhere ID. This could be used by other organizations to access your [facility] medical records  [id number]        Your Vitals Were     Pulse Temperature Height Pulse Oximetry Breastfeeding? BMI (Body Mass Index)    95 99.4  F (37.4  C) (Tympanic) 5' 3\" (1.6 m) 98% No 35.61 kg/m2       Blood Pressure from Last 3 Encounters:   08/30/17 119/81   07/23/17 108/76   04/18/17 114/78    Weight from Last 3 Encounters:   08/30/17 201 lb (91.2 kg)   07/23/17 198 lb 8 oz (90 kg)   04/18/17 200 lb 9.6 oz (91 kg)              We Performed the Following     Beta HCG qual IFA urine     UA reflex to Microscopic and Culture     Urine Culture Aerobic Bacterial          Today's Medication Changes          These changes are accurate as of: 8/30/17  3:32 PM.  If you have any questions, ask your nurse or doctor.               Start taking these medicines.        Dose/Directions    phenazopyridine 97.5 MG tablet   Commonly known as:  AZO   Used for:  Dysuria   Started by:  Chandrika Alcantar MD        Dose:  195 mg   Take 2 tablets (195 mg) by mouth 3 times daily   Quantity:  12 tablet   Refills:  0            Where to get your medicines      These medications were sent to [facility] - [address]  [address] [address] [address]     Phone:  [phone]     phenazopyridine 97.5 MG tablet                Primary Care Provider Office Phone # Fax #    Crystal Sasha Goel [phone] [address] [place] [address]        Equal Access to Services     [facility] Center[facility]URJIT AH: Hadii karl Garcia, walarryda luqadaha, qaybta kaalmada david solorio. So [facility] [phone].    ATENCIÓN: Si habla español, tiene a waddell disposición servicios gratuitos de asistencia lingüística. Llame al [phone].    We comply with applicable federal civil rights laws " and [place] laws. We do not discriminate on the basis of race, color, national origin, age, disability sex, sexual orientation or gender identity.            Thank you!     Thank you for choosing [facility]  for your care. Our goal is always to provide you with excellent care. Hearing back from our patients is one way we can continue to improve our services. Please take a few minutes to complete the written survey that you may receive in the mail after your visit with us. Thank you!             Your Updated Medication List - Protect others around you: Learn how to safely use, store and throw away your medicines at www.disposemymeds.org.          This list is accurate as of: 8/30/17  3:32 PM.  Always use your most recent med list.                   Brand Name Dispense Instructions for use Diagnosis    buPROPion 150 MG 24 hr tablet    WELLBUTRIN XL    90 tablet    Take 1 tablet (150 mg) by mouth every morning    Major depressive disorder, recurrent episode, moderate (H)       fluticasone 50 MCG/ACT spray    FLONASE    1 Bottle    Spray 2 sprays in nostril daily    Dysfunction of eustachian tube, left       MIRENA (52 MG) 20 MCG/24HR IUD   Generic drug:  levonorgestrel      1 each by Intrauterine route once        Multi-vitamin Tabs tablet   Generic drug:  multivitamin, therapeutic with minerals      1 TABLET DAILY        nystatin-triamcinolone cream    MYCOLOG II    30 g    Apply topically 2 times daily    Perineal irritation       omeprazole 40 MG capsule    priLOSEC    90 capsule    Take 1 capsule (40 mg) by mouth daily Take 30-60 minutes before a meal.    Gastric hyperacidity, Esophageal reflux       phenazopyridine 97.5 MG tablet    AZO    12 tablet    Take 2 tablets (195 mg) by mouth 3 times daily    Dysuria       sertraline 100 MG tablet    ZOLOFT    180 tablet    Take 2 tablets (200 mg) by mouth daily    Dysthymia       triamcinolone 0.1 % cream    KENALOG    30 g    Apply topically 3 times daily     Rash       valACYclovir 500 MG tablet    VALTREX    90 tablet    Take 1 tablet (500 mg) by mouth daily    Herpes simplex virus infection

## 2017-08-30 NOTE — PATIENT INSTRUCTIONS

## 2017-08-31 LAB
BACTERIA SPEC CULT: NORMAL
SPECIMEN SOURCE: NORMAL

## 2017-08-31 ASSESSMENT — ANXIETY QUESTIONNAIRES: GAD7 TOTAL SCORE: 0

## 2017-09-30 ENCOUNTER — HEALTH MAINTENANCE LETTER (OUTPATIENT)
Age: 47
End: 2017-09-30

## 2017-10-16 ENCOUNTER — MYC MEDICAL ADVICE (OUTPATIENT)
Dept: FAMILY MEDICINE | Facility: CLINIC | Age: 47
End: 2017-10-16

## 2017-10-16 DIAGNOSIS — F32.1 MODERATE MAJOR DEPRESSION (H): Primary | ICD-10-CM

## 2017-10-16 DIAGNOSIS — F34.1 DYSTHYMIA: ICD-10-CM

## 2017-10-16 RX ORDER — SERTRALINE HYDROCHLORIDE 100 MG/1
200 TABLET, FILM COATED ORAL DAILY
Qty: 180 TABLET | Refills: 1 | Status: SHIPPED | OUTPATIENT
Start: 2017-10-16 | End: 2018-04-30

## 2017-10-16 NOTE — TELEPHONE ENCOUNTER
Zoloft     Last Written Prescription Date: 7/18/16  Last Fill Quantity: 180, # refills: 3  Last Office Visit with [facility] primary care provider:  8/30/17       Last PHQ-9 score on record=   PHQ-9 SCORE 8/30/2017   Total Score -   Total Score MyChart -   Total Score 0     Prescription approved per [facility] Refill Protocol.      Maci SERRANO RN

## 2017-11-06 ENCOUNTER — OFFICE VISIT (OUTPATIENT)
Dept: FAMILY MEDICINE | Facility: CLINIC | Age: 47
End: 2017-11-06
Payer: COMMERCIAL

## 2017-11-06 VITALS
TEMPERATURE: 98.8 F | DIASTOLIC BLOOD PRESSURE: 78 MMHG | WEIGHT: 204 LBS | HEIGHT: 63 IN | SYSTOLIC BLOOD PRESSURE: 120 MMHG | HEART RATE: 100 BPM | BODY MASS INDEX: 36.14 KG/M2

## 2017-11-06 DIAGNOSIS — M70.62 TROCHANTERIC BURSITIS OF LEFT HIP: Primary | ICD-10-CM

## 2017-11-06 DIAGNOSIS — M54.50 ACUTE LEFT-SIDED LOW BACK PAIN WITHOUT SCIATICA: ICD-10-CM

## 2017-11-06 PROCEDURE — 99213 OFFICE O/P EST LOW 20 MIN: CPT | Performed by: FAMILY MEDICINE

## 2017-11-06 RX ORDER — NAPROXEN 500 MG/1
500 TABLET ORAL 2 TIMES DAILY PRN
Qty: 30 TABLET | Refills: 0 | Status: SHIPPED | OUTPATIENT
Start: 2017-11-06 | End: 2018-06-25

## 2017-11-06 NOTE — PROGRESS NOTES
SUBJECTIVE:   Vanesa Owen is a 47 year old female who presents to clinic today for the following health issues:    Back Pain       Duration: 5 months, has gotten worse        Specific cause: none    Description:   Location of pain: low back left  Character of pain: sharp and intermittent  Pain radiation:radiates into the left buttocks, radiates into the left leg and radiates into the left foot  New numbness or weakness in legs, not attributed to pain:  no     Intensity: Currently 4/10, At its worst 6/10, moderate    History:   Pain interferes with job: No  History of back problems: recurrent self limited episodes of low back pain in the past  Any previous MRI or X-rays: None  Sees a specialist for back pain:  No  Therapies tried without relief: chiropractor, massage, NSAIDs and rest    Alleviating factors:   Improved by: none      Precipitating factors:  Worsened by: Laying on the left side and going up stairs    Functional and Psychosocial Screen (Marcell STarT Back):                Accompanying Signs & Symptoms:  Risk of Fracture:  None  Risk of Cauda Equina:  None  Risk of Infection:  None  Risk of Cancer:  None  Risk of Ankylosing Spondylitis:  Onset at age <35, male, AND morning back stiffness. no     SUBJECTIVE  Vanesa presents with a 5 month history of pain in her left lower back and left hip with shooting pain down the left leg down to the left big toe. The shooting pain is brought on by laying on the left side and is painful to the point that she wakes up in the middle of the night. She has been doing a drumming class for exercise which requires a semi-squatting position for a core workout. She's tried ibuprofen and a tens unit which has only given temporary relief.     Problem list and histories reviewed & adjusted, as indicated.  Additional history: as documented    Reviewed and updated as needed this visit by clinical staff  Tobacco  Allergies  Meds  Med Hx  Surg Hx  Fam Hx  Soc Hx       Reviewed and updated as needed this visit by Provider  Tobacco  Med Hx  Surg Hx  Fam Hx  Soc Hx        ROS: Remainder of Constitutional, CV, Respiratory, GI,  negative with exception of that mentioned above      PHYSICAL EXAM  General: well appearing woman, no acute distress  CV: RRR, no murmurs, normal S1 and S2  Lungs: Clear to auscultation bilaterally  MSK: Left lower  with palpation. NO tenderness with palpation along the spine. Full lumbar and lower extremity range of motion. Lower extremity strength equal bilaterally. No tenderness associated with sitting or supine straight leg raises. No tenderness associated with lumbar flexion or extension.   Pt is tender on palpation of L greater trochanter      ASSESSMENT/PLAN      ICD-10-CM    1. Trochanteric bursitis of left hip M70.62 naproxen (NAPROSYN) 500 MG tablet     [facility] PT, HAND, AND CHIROPRACTIC REFERRAL   2. Acute left-sided low back pain without sciatica M54.5 naproxen (NAPROSYN) 500 MG tablet     [facility] PT, HAND, AND CHIROPRACTIC REFERRAL       Sabina is a 46 yo woman who presents with 5 month history of left lower back and leg pain of a shooting quality. This is exacerbated by laying on the left side. Her presentation is most likely due to a trochanteric bursitis with perhaps a radicular pain component.    -PT referral    -If not better in 6 weeks or if weakness is present, consider imaging   -Start Naproxen prn    Patient Instructions   You can try the naproxen in place of ibuprofen as needed for pain.  Also heat or ice.    I would recommend therapy, please get in as soon as you are able.    If you are not seeing improvement or if things worsen please let me know.

## 2017-11-06 NOTE — NURSING NOTE
"Initial /78  Pulse 100  Temp 98.8  F (37.1  C) (Tympanic)  Ht 5' 3\" (1.6 m)  Wt 204 lb (92.5 kg)  Breastfeeding? No  BMI 36.14 kg/m2 Estimated body mass index is 36.14 kg/(m^2) as calculated from the following:    Height as of this encounter: 5' 3\" (1.6 m).    Weight as of this encounter: 204 lb (92.5 kg). .      "

## 2017-11-06 NOTE — MR AVS SNAPSHOT
After Visit Summary   11/6/2017    Vanesa Owen    MRN: [id number]           Patient Information     Date Of Birth          1970        Visit Information        Provider Department      11/6/2017 1:40 PM Crystal Goel DO [facility]        Today's Diagnoses     Trochanteric bursitis of left hip    -  1    Acute left-sided low back pain without sciatica          Care Instructions    You can try the naproxen in place of ibuprofen as needed for pain.  Also heat or ice.    I would recommend therapy, please get in as soon as you are able.    If you are not seeing improvement or if things worsen please let me know.          Follow-ups after your visit        Additional Services     [facility] PT, HAND, AND CHIROPRACTIC REFERRAL       **This order will print in the [facility] Scheduling Office**    Physical Therapy, Hand Therapy and Chiropractic Care are available through:    *[facility]  *[facility]  *[facility]    Call one number to schedule at any of the above locations: [phone].    Your provider has referred you to: Physical Therapy at [facility] or [facility]    Indication/Reason for Referral: Hip Pain and Low Back Pain  Onset of Illness: months  Therapy Orders: Evaluate and Treat  Special Programs: None  Special Request: None    Marcell Faith      Additional Comments for the Therapist or Chiropractor:     Please be aware that coverage of these services is subject to the terms and limitations of your health insurance plan.  Call member services at your health plan with any benefit or coverage questions.      Please bring the following to your appointment:    *Your personal calendar for scheduling future appointments  *Comfortable clothing                  Who to contact     Normal or non-critical lab and imaging results will be communicated to you by MyChart, letter or phone within 4 business days after the clinic has received the  "results. If you do not hear from us within 7 days, please contact the clinic through [employer] or phone. If you have a critical or abnormal lab result, we will notify you by phone as soon as possible.  Submit refill requests through [employer] or call your pharmacy and they will forward the refill request to us. Please allow 3 business days for your refill to be completed.          If you need to speak with a  for additional information , please call: [phone]           Additional Information About Your Visit        [employer] Information     [employer] gives you secure access to your electronic health record. If you see a primary care provider, you can also send messages to your care team and make appointments. If you have questions, please call your primary care clinic.  If you do not have a primary care provider, please call [phone] and they will assist you.        Care EveryWhere ID     This is your Care EveryWhere ID. This could be used by other organizations to access your [facility] medical records  [id number]        Your Vitals Were     Pulse Temperature Height Breastfeeding? BMI (Body Mass Index)       100 98.8  F (37.1  C) (Tympanic) 5' 3\" (1.6 m) No 36.14 kg/m2        Blood Pressure from Last 3 Encounters:   11/06/17 120/78   08/30/17 119/81   07/23/17 108/76    Weight from Last 3 Encounters:   11/06/17 204 lb (92.5 kg)   08/30/17 201 lb (91.2 kg)   07/23/17 198 lb 8 oz (90 kg)              We Performed the Following     [facility] PT, HAND, AND CHIROPRACTIC REFERRAL          Today's Medication Changes          These changes are accurate as of: 11/6/17  2:38 PM.  If you have any questions, ask your nurse or doctor.               Start taking these medicines.        Dose/Directions    naproxen 500 MG tablet   Commonly known as:  NAPROSYN   Used for:  Acute left-sided low back pain without sciatica, Trochanteric bursitis of left hip   Started by:  Crystal Goel DO        Dose:  500 mg   Take 1 " tablet (500 mg) by mouth 2 times daily as needed for moderate pain   Quantity:  30 tablet   Refills:  0         These medicines have changed or have updated prescriptions.        Dose/Directions    omeprazole 40 MG capsule   Commonly known as:  priLOSEC   This may have changed:    - when to take this  - reasons to take this  - additional instructions   Used for:  Gastric hyperacidity, Esophageal reflux        Dose:  40 mg   Take 1 capsule (40 mg) by mouth daily Take 30-60 minutes before a meal.   Quantity:  90 capsule   Refills:  3            Where to get your medicines      These medications were sent to [facility] [address]  [address], [place] [facility] [address]     Phone:  [phone]     naproxen 500 MG tablet                Primary Care Provider Office Phone # Fax #    Crystal Baez DO [place] [phone] [address]   [place] [address]        Equal Access to Services     BHANU SIEGEL : Hadii karl cruz Socoy, waaxda luqadaha, qaybta kaalmada adeegyada, david alvarez . So [facility] [phone].    ATENCIÓN: Si habla español, tiene a waddell disposición servicios gratuitos de asistencia lingüística. Llame al [phone].    We comply with applicable federal civil rights laws and [place] laws. We do not discriminate on the basis of race, color, national origin, age, disability, sex, sexual orientation, or gender identity.            Thank you!     Thank you for choosing [facility]  for your care. Our goal is always to provide you with excellent care. Hearing back from our patients is one way we can continue to improve our services. Please take a few minutes to complete the written survey that you may receive in the mail after your visit with us. Thank you!             Your Updated Medication List - Protect others around you: Learn how to safely use, store and throw away your medicines at  www.disposemymeds.org.          This list is accurate as of: 11/6/17  2:38 PM.  Always use your most recent med list.                   Brand Name Dispense Instructions for use Diagnosis    buPROPion 150 MG 24 hr tablet    WELLBUTRIN XL    90 tablet    Take 1 tablet (150 mg) by mouth every morning    Major depressive disorder, recurrent episode, moderate (H)       fluticasone 50 MCG/ACT spray    FLONASE    1 Bottle    Spray 2 sprays in nostril daily    Dysfunction of Eustachian tube, left       MIRENA (52 MG) 20 MCG/24HR IUD   Generic drug:  levonorgestrel      1 each by Intrauterine route once        Multi-vitamin Tabs tablet   Generic drug:  multivitamin, therapeutic with minerals      1 TABLET DAILY        naproxen 500 MG tablet    NAPROSYN    30 tablet    Take 1 tablet (500 mg) by mouth 2 times daily as needed for moderate pain    Acute left-sided low back pain without sciatica, Trochanteric bursitis of left hip       nystatin-triamcinolone cream    MYCOLOG II    30 g    Apply topically 2 times daily    Perineal irritation       omeprazole 40 MG capsule    priLOSEC    90 capsule    Take 1 capsule (40 mg) by mouth daily Take 30-60 minutes before a meal.    Gastric hyperacidity, Esophageal reflux       sertraline 100 MG tablet    ZOLOFT    180 tablet    Take 2 tablets (200 mg) by mouth daily    Dysthymia, Moderate major depression (H)       triamcinolone 0.1 % cream    KENALOG    30 g    Apply topically 3 times daily    Rash       valACYclovir 500 MG tablet    VALTREX    90 tablet    Take 1 tablet (500 mg) by mouth daily    Herpes simplex virus infection

## 2017-11-06 NOTE — PATIENT INSTRUCTIONS
You can try the naproxen in place of ibuprofen as needed for pain.  Also heat or ice.    I would recommend therapy, please get in as soon as you are able.    If you are not seeing improvement or if things worsen please let me know.

## 2017-11-15 ENCOUNTER — THERAPY VISIT (OUTPATIENT)
Dept: PHYSICAL THERAPY | Facility: CLINIC | Age: 47
End: 2017-11-15
Payer: COMMERCIAL

## 2017-11-15 DIAGNOSIS — M25.552 HIP PAIN, LEFT: Primary | ICD-10-CM

## 2017-11-15 PROCEDURE — 97110 THERAPEUTIC EXERCISES: CPT | Mod: GP | Performed by: PHYSICAL THERAPIST

## 2017-11-15 PROCEDURE — 97161 PT EVAL LOW COMPLEX 20 MIN: CPT | Mod: GP | Performed by: PHYSICAL THERAPIST

## 2017-11-15 NOTE — PROGRESS NOTES
Subjective:    Patient is a 47 year old female presenting with rehab back hpi.   Vanesa Owen is a 47 year old female with a lumbar condition.  Condition occurred with:  Repetition/overuse.  Condition occurred: at home.  This is a new condition  Vanesa reports today with complaints of L leg pain. She states that this has been an issue for the past few months. She has had pain down into her R big toe. She states that most of the pain is along the lateral aspect of her L leg. She reports that the pain is sharp and happens randomly sometimes at night and it wakes her. She reports she has had some back pain and has been having more pain around the lateral aspect of her knee. .    Patient reports pain:  Mid lumbar spine and SI joint left.  Radiates to:  Lower leg left, thigh left, knee left and gluteals left.  Pain is described as shooting, sharp, stabbing and aching and is intermittent and reported as 8/10 and 4/10.  Associated symptoms:  Loss of balance, loss of motion/stiffness and loss of motion. Pain is the same all the time.  Symptoms are exacerbated by lifting, sitting, standing, carrying, walking, twisting, bending and lying down and relieved by rest.  Since onset symptoms are gradually improving.  Special testing: none.  Previous treatment: none.  Improvement with previous treatment: none.  General health as reported by patient is good.  Pertinent medical history includes:  Overweight, depression and migraines.  Medical allergies: yes.  Other surgeries include:  Other.  Current medications:  Anti-depressants and pain medication.  Current occupation is RN.  Patient is working in normal job without restrictions.  Primary job tasks include:  Prolonged sitting, repetitive tasks, prolonged standing and lifting.    Barriers include:  None as reported by the patient.    Red flags:  Pain at rest/night and calf pain, swelling, warmth.                        Objective:  HIP:    PROM:   L  R   Flexion wnl wnl    Extension wnl wnl   Abduction wnl wnl   IR wnl wnl   ER wnl wnl     Strength:   L R   HIP     Flex 5/5 5/5   Ext 4/5 4/5   Abd 4-/5 pain 4/5   KNEE     Flex 5/5 5/5   Ext 5/5 5/5     Special tests:   L R   Sara's + -   Gumaro - -   DORIAN - -   FADIR - -     Other HS tightness    Palpation: very tender along L sacral border, L hip TFL, lat quad and ITB and L lat knee    Functional: pain with prolonged WB activities    Gait: wnl trandelenberg on L with increase in pain on L lat leg    (-) slump and SLR    System    Physical Exam    General     ROS    Assessment/Plan:      Patient is a 47 year old female with lumbar hip and leg complaints.    Patient has the following significant findings with corresponding treatment plan.                Diagnosis 1:  L leg pain  Pain -  hot/cold therapy, US, manual therapy, self management, education, directional preference exercise and home program  Decreased ROM/flexibility - manual therapy, therapeutic exercise, therapeutic activity and home program  Decreased joint mobility - manual therapy, therapeutic exercise, therapeutic activity and home program  Decreased strength - therapeutic exercise, therapeutic activities and home program  Impaired balance - neuro re-education, gait training, therapeutic activities and home program  Impaired gait - gait training and home program  Impaired muscle performance - neuro re-education and home program  Decreased function - therapeutic activities and home program    Therapy Evaluation Codes:   1) History comprised of:   Personal factors that impact the plan of care:      Time since onset of symptoms and Work status.    Comorbidity factors that impact the plan of care are:      Migraines/headaches, Numbness/tingling, Osteoarthritis and Overweight.     Medications impacting care: Anti-inflammatory and Pain.  2) Examination of Body Systems comprised of:   Body structures and functions that impact the plan of care:      Hip.   Activity  limitations that impact the plan of care are:      Driving, Lifting, Reading/Computer work, Running, Sitting, Squatting/kneeling, Standing, Walking, Working and Sleeping.  3) Clinical presentation characteristics are:   Stable/Uncomplicated.  4) Decision-Making    Low complexity using standardized patient assessment instrument and/or measureable assessment of functional outcome.  Cumulative Therapy Evaluation is: Low complexity.    Previous and current functional limitations:  (See Goal Flow Sheet for this information)    Short term and Long term goals: (See Goal Flow Sheet for this information)     Communication ability:  Patient appears to be able to clearly communicate and understand verbal and written communication and follow directions correctly.  Treatment Explanation - The following has been discussed with the patient:   RX ordered/plan of care  Anticipated outcomes  Possible risks and side effects  This patient would benefit from PT intervention to resume normal activities.   Rehab potential is good.    Frequency:  1 X week, once daily  Duration:  for 8 weeks  Discharge Plan:  Achieve all LTG.  Independent in home treatment program.  Reach maximal therapeutic benefit.    Please refer to the daily flowsheet for treatment today, total treatment time and time spent performing 1:1 timed codes.

## 2017-11-24 ENCOUNTER — THERAPY VISIT (OUTPATIENT)
Dept: PHYSICAL THERAPY | Facility: CLINIC | Age: 47
End: 2017-11-24
Payer: COMMERCIAL

## 2017-11-24 DIAGNOSIS — M25.552 HIP PAIN, LEFT: ICD-10-CM

## 2017-11-24 PROCEDURE — 97110 THERAPEUTIC EXERCISES: CPT | Mod: GP | Performed by: PHYSICAL THERAPY ASSISTANT

## 2017-11-24 PROCEDURE — 97140 MANUAL THERAPY 1/> REGIONS: CPT | Mod: GP | Performed by: PHYSICAL THERAPY ASSISTANT

## 2017-11-25 ENCOUNTER — MYC REFILL (OUTPATIENT)
Dept: FAMILY MEDICINE | Facility: CLINIC | Age: 47
End: 2017-11-25

## 2017-11-25 DIAGNOSIS — F33.1 MAJOR DEPRESSIVE DISORDER, RECURRENT EPISODE, MODERATE (H): ICD-10-CM

## 2017-11-27 RX ORDER — BUPROPION HYDROCHLORIDE 150 MG/1
150 TABLET ORAL EVERY MORNING
Qty: 90 TABLET | Refills: 0 | Status: SHIPPED | OUTPATIENT
Start: 2017-11-27 | End: 2018-01-25

## 2017-11-27 NOTE — TELEPHONE ENCOUNTER
Message from [employer] [facility] [employer] [facility]:  Original authorizing provider: DO Vanesa Lewis would like a refill of the following medications:  buPROPion (WELLBUTRIN XL) 150 MG 24 hr tablet [Crystal Goel DO]    Preferred pharmacy: [facility] [place] - [address]    Comment:  Can I please get a refill Dr. Goel. I will be out this Monday 11/27. Thank you Vanesa Owen

## 2017-11-27 NOTE — TELEPHONE ENCOUNTER
Prescription approved per [facility] Refill Protocol.  Marcy Kruse RN

## 2017-11-30 ENCOUNTER — THERAPY VISIT (OUTPATIENT)
Dept: PHYSICAL THERAPY | Facility: CLINIC | Age: 47
End: 2017-11-30
Payer: COMMERCIAL

## 2017-11-30 DIAGNOSIS — M25.552 HIP PAIN, LEFT: ICD-10-CM

## 2017-11-30 PROCEDURE — 97110 THERAPEUTIC EXERCISES: CPT | Mod: GP | Performed by: PHYSICAL THERAPIST

## 2017-11-30 PROCEDURE — 97140 MANUAL THERAPY 1/> REGIONS: CPT | Mod: GP | Performed by: PHYSICAL THERAPIST

## 2017-12-04 ENCOUNTER — THERAPY VISIT (OUTPATIENT)
Dept: PHYSICAL THERAPY | Facility: CLINIC | Age: 47
End: 2017-12-04
Payer: COMMERCIAL

## 2017-12-04 DIAGNOSIS — M25.552 HIP PAIN, LEFT: ICD-10-CM

## 2017-12-04 PROCEDURE — 97110 THERAPEUTIC EXERCISES: CPT | Mod: GP | Performed by: PHYSICAL THERAPY ASSISTANT

## 2017-12-19 DIAGNOSIS — H69.93 DYSFUNCTION OF BOTH EUSTACHIAN TUBES: Primary | ICD-10-CM

## 2017-12-19 RX ORDER — AMOXICILLIN 875 MG
875 TABLET ORAL 2 TIMES DAILY
Qty: 20 TABLET | Refills: 0 | Status: SHIPPED | OUTPATIENT
Start: 2017-12-19 | End: 2017-12-27

## 2017-12-19 RX ORDER — PREDNISONE 20 MG/1
20 TABLET ORAL 2 TIMES DAILY
Qty: 10 TABLET | Refills: 0 | Status: SHIPPED | OUTPATIENT
Start: 2017-12-19 | End: 2018-06-25

## 2017-12-27 ENCOUNTER — TELEPHONE (OUTPATIENT)
Dept: FAMILY MEDICINE | Facility: CLINIC | Age: 47
End: 2017-12-27

## 2017-12-27 ENCOUNTER — THERAPY VISIT (OUTPATIENT)
Dept: PHYSICAL THERAPY | Facility: CLINIC | Age: 47
End: 2017-12-27
Payer: COMMERCIAL

## 2017-12-27 DIAGNOSIS — R30.0 DYSURIA: Primary | ICD-10-CM

## 2017-12-27 DIAGNOSIS — M25.552 HIP PAIN, LEFT: ICD-10-CM

## 2017-12-27 DIAGNOSIS — R30.0 DYSURIA: ICD-10-CM

## 2017-12-27 LAB
ALBUMIN UR-MCNC: NEGATIVE MG/DL
APPEARANCE UR: CLEAR
BILIRUB UR QL STRIP: NEGATIVE
COLOR UR AUTO: YELLOW
GLUCOSE UR STRIP-MCNC: NEGATIVE MG/DL
HGB UR QL STRIP: NEGATIVE
KETONES UR STRIP-MCNC: NEGATIVE MG/DL
LEUKOCYTE ESTERASE UR QL STRIP: NEGATIVE
NITRATE UR QL: NEGATIVE
PH UR STRIP: 6.5 PH (ref 5–7)
SOURCE: NORMAL
SP GR UR STRIP: 1.02 (ref 1–1.03)
UROBILINOGEN UR STRIP-ACNC: 0.2 EU/DL (ref 0.2–1)

## 2017-12-27 PROCEDURE — 97110 THERAPEUTIC EXERCISES: CPT | Mod: GP | Performed by: PHYSICAL THERAPY ASSISTANT

## 2017-12-27 PROCEDURE — 97140 MANUAL THERAPY 1/> REGIONS: CPT | Mod: GP | Performed by: PHYSICAL THERAPY ASSISTANT

## 2017-12-27 PROCEDURE — 81003 URINALYSIS AUTO W/O SCOPE: CPT | Performed by: FAMILY MEDICINE

## 2017-12-27 NOTE — TELEPHONE ENCOUNTER
5 PM Patient  Present after PT appt  Requesting UA for possible UTI     URINARY TRACT SYMPTOMS      Duration: x 2 days     Description  frequency and urgency    Intensity:  mild    Accompanying signs and symptoms:  Fever/chills: YES- 99.0  Flank pain no   Nausea and vomiting: no   Vaginal symptoms: none  Abdominal/Pelvic Pain: no     History  History of frequent UTI's: YES  History of kidney stones: no   Sexually Active: YES  Possibility of pregnancy: No  iud    Precipitating or alleviating factors: None    Therapies tried and outcome: none         Her Mother had stroke 2 weeks ago -pretty bad one -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

## 2017-12-27 NOTE — LETTER
[facility]  [address] [place] [id number]  Phone: [phone]    December 28, 2017        Vanesa Owen  [address] [facility]  [place] [address]          To whom it may concern:    RE: Vanesa Owen    Patient was seen  yesterday  at our clinic,she became worse last night  And now has a fever.     Patient may return to work once her temperature is normal for 24 hours     Please contact me for questions or concerns.      Sincerely,        Crystal Goel, DO/pmh

## 2017-12-28 NOTE — TELEPHONE ENCOUNTER
ML on cellphone  UA negative to obs sx and report changes, also to increase water   [facility]  Clinic  RN/Sameer Espinal

## 2017-12-28 NOTE — TELEPHONE ENCOUNTER
Patient  Called last night she became ill, N/V all night, was not able to go to work today, temp still low grade, needs work slip,   Is aware of  UA - negative,   IMP viral illness   advised home care , tx sx, good hydration and call as need, has had illness in home, no FLU  Note sent through [employer] for work  CHERYL Ca  Clinic  RN/Sameer Espinal

## 2018-01-25 DIAGNOSIS — F33.1 MAJOR DEPRESSIVE DISORDER, RECURRENT EPISODE, MODERATE (H): ICD-10-CM

## 2018-01-26 NOTE — TELEPHONE ENCOUNTER
"Requested Prescriptions   Pending Prescriptions Disp Refills     buPROPion (WELLBUTRIN XL) 150 MG 24 hr tablet [Pharmacy Med Name: BUPROPION HCL ER (XL) 150MG TB24] 90 tablet 0     Sig: TAKE ONE TABLET BY MOUTH EVERY MORNING    SSRIs Protocol Passed    1/25/2018  5:09 PM       Passed - PHQ-9 score less than 5 in past 6 months    The PHQ-9 criteria is meant to fail. It requires a PHQ-9 score review         Passed - Medication is Bupropion    If the medication is Bupropion (Wellbutrin), and the patient is taking for smoking cessation; OK to refill.         Passed - Patient is age 18 or older       Passed - No active pregnancy on record       Passed - No positive pregnancy test in last 12 months       Passed - Recent (6 mo) or future visit with authorizing provider's specialty    Patient had office visit in the last 6 months or has a visit in the next 30 days with authorizing provider.  See \"Patient Info\" tab in inbasket, or \"Choose Columns\" in Meds & Orders section of the refill encounter.            Last Written Prescription Date:  11/27/17  Last Fill Quantity: 90,  # refills: 0   Last Office Visit with [facility] provider:  11/6/17   Future Office Visit:       "

## 2018-01-30 RX ORDER — BUPROPION HYDROCHLORIDE 150 MG/1
TABLET ORAL
Qty: 90 TABLET | Refills: 0 | Status: SHIPPED | OUTPATIENT
Start: 2018-01-30 | End: 2018-06-15

## 2018-01-30 NOTE — TELEPHONE ENCOUNTER
Prescription approved per [facility] Refill Protocol or patient Primary care provider (PCP)  CHERYL Wilson RN/Sameer Espinal

## 2018-04-29 ENCOUNTER — MYC MEDICAL ADVICE (OUTPATIENT)
Dept: FAMILY MEDICINE | Facility: CLINIC | Age: 48
End: 2018-04-29

## 2018-04-29 DIAGNOSIS — F34.1 DYSTHYMIA: ICD-10-CM

## 2018-04-29 DIAGNOSIS — F32.1 MODERATE MAJOR DEPRESSION (H): ICD-10-CM

## 2018-04-30 RX ORDER — SERTRALINE HYDROCHLORIDE 100 MG/1
200 TABLET, FILM COATED ORAL DAILY
Qty: 180 TABLET | Refills: 0 | Status: SHIPPED | OUTPATIENT
Start: 2018-04-30 | End: 2018-08-02

## 2018-06-14 ENCOUNTER — OFFICE VISIT (OUTPATIENT)
Dept: OBGYN | Facility: CLINIC | Age: 48
End: 2018-06-14
Payer: COMMERCIAL

## 2018-06-14 VITALS
WEIGHT: 206 LBS | RESPIRATION RATE: 18 BRPM | SYSTOLIC BLOOD PRESSURE: 104 MMHG | HEART RATE: 99 BPM | DIASTOLIC BLOOD PRESSURE: 68 MMHG | HEIGHT: 63 IN | BODY MASS INDEX: 36.5 KG/M2 | TEMPERATURE: 98.7 F

## 2018-06-14 DIAGNOSIS — Z30.430 ENCOUNTER FOR IUD INSERTION: Primary | ICD-10-CM

## 2018-06-14 DIAGNOSIS — Z30.432 ENCOUNTER FOR IUD REMOVAL: ICD-10-CM

## 2018-06-14 DIAGNOSIS — Z97.5 PRESENCE OF INTRAUTERINE CONTRACEPTIVE DEVICE: ICD-10-CM

## 2018-06-14 PROCEDURE — 58300 INSERT INTRAUTERINE DEVICE: CPT | Performed by: ADVANCED PRACTICE MIDWIFE

## 2018-06-14 PROCEDURE — 58301 REMOVE INTRAUTERINE DEVICE: CPT | Performed by: ADVANCED PRACTICE MIDWIFE

## 2018-06-14 NOTE — MR AVS SNAPSHOT
"              After Visit Summary   6/14/2018    Vanesa Owen    MRN: [id number]           Patient Information     Date Of Birth          1970        Visit Information        Provider Department      6/14/2018 1:00 PM Gabi Sandhu APRN CNM; [facility]        Today's Diagnoses     Encounter for IUD insertion    -  1    Encounter for IUD removal        Presence of intrauterine contraceptive device           Follow-ups after your visit        Who to contact     If you have questions or need follow up information about today's clinic visit or your schedule please contact [facility] directly at [phone].  Normal or non-critical lab and imaging results will be communicated to you by doUdealhart, letter or phone within 4 business days after the clinic has received the results. If you do not hear from us within 7 days, please contact the clinic through doUdealhart or phone. If you have a critical or abnormal lab result, we will notify you by phone as soon as possible.  Submit refill requests through [email or web address] or call your pharmacy and they will forward the refill request to us. Please allow 3 business days for your refill to be completed.          Additional Information About Your Visit        MyChart Information     [email or web address] gives you secure access to your electronic health record. If you see a primary care provider, you can also send messages to your care team and make appointments. If you have questions, please call your primary care clinic.  If you do not have a primary care provider, please call [phone] and they will assist you.        Care EveryWhere ID     This is your Care EveryWhere ID. This could be used by other organizations to access your [place] medical records  [id number]        Your Vitals Were     Pulse Temperature Respirations Height BMI (Body Mass Index)       99 98.7  F (37.1  C) (Tympanic) 18 5' 3\" (1.6 m) 36.49 kg/m2        Blood Pressure " from Last 3 Encounters:   06/14/18 104/68   11/06/17 120/78   08/30/17 119/81    Weight from Last 3 Encounters:   06/14/18 206 lb (93.4 kg)   11/06/17 204 lb (92.5 kg)   08/30/17 201 lb (91.2 kg)              We Performed the Following     HC LEVONORGESTREL IU 52MG 5 YR     INSERTION INTRAUTERINE DEVICE     REMOVE INTRAUTERINE DEVICE          Today's Medication Changes          These changes are accurate as of 6/14/18  1:29 PM.  If you have any questions, ask your nurse or doctor.               These medicines have changed or have updated prescriptions.        Dose/Directions    * MIRENA (52 MG) 20 MCG/24HR IUD   This may have changed:  Another medication with the same name was added. Make sure you understand how and when to take each.   Generic drug:  levonorgestrel   Changed by:  Gabi Sandhu APRN CNM        Dose:  1 each   1 each by Intrauterine route once   Refills:  0       * levonorgestrel 20 MCG/24HR IUD   Commonly known as:  MIRENA (52 MG)   This may have changed:  You were already taking a medication with the same name, and this prescription was added. Make sure you understand how and when to take each.   Used for:  Encounter for IUD insertion   Changed by:  Gabi Sandhu APRN CNM        Dose:  1 each   1 each (20 mcg) by Intrauterine route once for 1 dose   Quantity:  1 each   Refills:  0       omeprazole 40 MG capsule   Commonly known as:  priLOSEC   This may have changed:    - when to take this  - reasons to take this  - additional instructions   Used for:  Gastric hyperacidity, Esophageal reflux        Dose:  40 mg   Take 1 capsule (40 mg) by mouth daily Take 30-60 minutes before a meal.   Quantity:  90 capsule   Refills:  3       * Notice:  This list has 2 medication(s) that are the same as other medications prescribed for you. Read the directions carefully, and ask your doctor or other care provider to review them with you.         Where to get your medicines      Some of these will  need a paper prescription and others can be bought over the counter.  Ask your nurse if you have questions.     You don't need a prescription for these medications     levonorgestrel 20 MCG/24HR IUD                Primary Care Provider Office Phone # Fax #    Crystal Goel [phone] [address] [place] [address]        Equal Access to Services     [facility]: Hadii aad ku hadasho Soomaali, waaxda luqadaha, qaybta kaalmada adeegyada, waxay idiin hayaan adeeg kharash la'aan ah. So [facility] [phone].    ATENCIÓN: Si habla español, tiene a waddell disposición servicios gratuitos de asistencia lingüística. Ll[facility] [phone].    We comply with applicable federal civil rights laws and [place] laws. We do not discriminate on the basis of race, color, national origin, age, disability, sex, sexual orientation, or gender identity.            Thank you!     Thank you for choosing [facility]  for your care. Our goal is always to provide you with excellent care. Hearing back from our patients is one way we can continue to improve our services. Please take a few minutes to complete the written survey that you may receive in the mail after your visit with us. Thank you!             Your Updated Medication List - Protect others around you: Learn how to safely use, store and throw away your medicines at www.disposemymeds.org.          This list is accurate as of 6/14/18  1:29 PM.  Always use your most recent med list.                   Brand Name Dispense Instructions for use Diagnosis    buPROPion 150 MG 24 hr tablet    WELLBUTRIN XL    90 tablet    TAKE ONE TABLET BY MOUTH EVERY MORNING    Major depressive disorder, recurrent episode, moderate (H)       fluticasone 50 MCG/ACT spray    FLONASE    1 Bottle    Spray 2 sprays in nostril daily    Dysfunction of Eustachian tube, left       * MIRENA (52 MG) 20 MCG/24HR IUD   Generic drug:  levonorgestrel      1 each by Intrauterine route  once        * levonorgestrel 20 MCG/24HR IUD    MIRENA (52 MG)    1 each    1 each (20 mcg) by Intrauterine route once for 1 dose    Encounter for IUD insertion       Multi-vitamin Tabs tablet   Generic drug:  multivitamin, therapeutic with minerals      1 TABLET DAILY        naproxen 500 MG tablet    NAPROSYN    30 tablet    Take 1 tablet (500 mg) by mouth 2 times daily as needed for moderate pain    Acute left-sided low back pain without sciatica, Trochanteric bursitis of left hip       nystatin-triamcinolone cream    MYCOLOG II    30 g    Apply topically 2 times daily    Perineal irritation       omeprazole 40 MG capsule    priLOSEC    90 capsule    Take 1 capsule (40 mg) by mouth daily Take 30-60 minutes before a meal.    Gastric hyperacidity, Esophageal reflux       predniSONE 20 MG tablet    DELTASONE    10 tablet    Take 1 tablet (20 mg) by mouth 2 times daily    Dysfunction of both eustachian tubes       sertraline 100 MG tablet    ZOLOFT    180 tablet    Take 2 tablets (200 mg) by mouth daily    Dysthymia, Moderate major depression (H)       triamcinolone 0.1 % cream    KENALOG    30 g    Apply topically 3 times daily    Rash       valACYclovir 500 MG tablet    VALTREX    90 tablet    Take 1 tablet (500 mg) by mouth daily    Herpes simplex virus infection       * Notice:  This list has 2 medication(s) that are the same as other medications prescribed for you. Read the directions carefully, and ask your doctor or other care provider to review them with you.

## 2018-06-14 NOTE — PROGRESS NOTES
Chief Complaint/ History of Present Illness:Vanesa Owen is a 48 year old  female.   No LMP recorded. Patient is not currently having periods (Reason: IUD)..  Today's pregnancy test negative.  She is here for an IUD insertion.  Patient has been given written information.  I have reviewed the risks of the IUD including pregnancy, PID, life threatening infection, perforation, expulsion, cramping, changes in bleeding and ovarian cysts. Benefits of the IUD and alternative family planning methods have been discussed.  Patients questions are answered.  Patient has verbalized understanding of risks and benefits and has signed the consent form.    Allergies   Allergen Reactions     Sulfa Drugs Rash     Current Outpatient Prescriptions   Medication Sig Dispense Refill     buPROPion (WELLBUTRIN XL) 150 MG 24 hr tablet TAKE ONE TABLET BY MOUTH EVERY MORNING 90 tablet 0     fluticasone (FLONASE) 50 MCG/ACT spray Spray 2 sprays in nostril daily 1 Bottle 0     levonorgestrel (MIRENA) 20 MCG/24HR IUD 1 each by Intrauterine route once       MULTI-VITAMIN OR TABS 1 TABLET DAILY       naproxen (NAPROSYN) 500 MG tablet Take 1 tablet (500 mg) by mouth 2 times daily as needed for moderate pain (Patient not taking: Reported on 6/14/2018) 30 tablet 0     nystatin-triamcinolone (MYCOLOG II) cream Apply topically 2 times daily (Patient not taking: Reported on 8/30/2017) 30 g 1     omeprazole (PRILOSEC) 40 MG capsule Take 1 capsule (40 mg) by mouth daily Take 30-60 minutes before a meal. (Patient taking differently: Take 40 mg by mouth daily as needed Take 30-60 minutes before a meal.) 90 capsule 3     predniSONE (DELTASONE) 20 MG tablet Take 1 tablet (20 mg) by mouth 2 times daily (Patient not taking: Reported on 6/14/2018) 10 tablet 0     sertraline (ZOLOFT) 100 MG tablet Take 2 tablets (200 mg) by mouth daily 180 tablet 0     triamcinolone (KENALOG) 0.1 % cream Apply topically 3 times daily (Patient not taking: Reported on  7/23/2017) 30 g 0     valACYclovir (VALTREX) 500 MG tablet Take 1 tablet (500 mg) by mouth daily (Patient not taking: Reported on 4/18/2017) 90 tablet 3      Past Medical History:   Diagnosis Date     Anxiety      Depressive disorder      Herpes simplex with other ophthalmic complications      UTI (urinary tract infection)      Past Surgical History:   Procedure Laterality Date     EXCIS VAGINAL CYST/TUMOR       SLING TRANSVAGINAL N/A 8/23/2016    Procedure: SLING TRANSVAGINAL;  Surgeon: Nam Sommers MD;  Location: [facility] OR     TONSILLECTOMY & ADENOIDECTOMY         REVIEW OF SYSTEMS  CONSTITUTIONAL: Denies fever, chills and night sweats  BREASTS:  Denies discharge and lumps.    HEART/LUNGS: Denies dyspnea, wheezing, coughing and chest pain.  HEMATOLOGIC: Denies tendency to bruise and history of blood clots.  GENITOURINARY:  Denies urgency, dysuria and hematuria.  NEUROLOGIC:  Denies seizures, weakness and fainting.  PSYCHIATRIC: Negative for changes in mood or affect      EXAM:  There were no vitals taken for this visit.    Abdomen: soft, nontender, without hepatosplenomegaly or masses  : normal cervix, adnexae, and uterus without masses or discharge  IUD type: Mirena  Lot # [id number]    IUD removed today. Patient in lithotomy position. Speculum used to visualize strings of IUD. Ring forceps used to grab remove IUD without complications. Patient tolerated procedure well. Minimal to no bleeding during procedure. Discussed with patient possibility of having some spotting or bleeding with the IUD removal.     Procedure:  Uterus assessed for position and is Anteverted.  Speculum inserted.  Betadine prep of cervix done.  Tenaculum applied at 11 and 1 o'clock position and gentle traction was applied to elongate the cervical canal.  Uterus sounded to 8 cm's.  Cervical dilators were not used.   IUD inserted in the usual fashion without problems, ie: resistance, severe protracted pain or excessive bleeding.  Tenaculum  was removed with scant bleeding from the tenaculum site that was managed with some direct pressure using Alston swabs.  Strings trimmed to 3 cm's.  Patient tolerated the procedure well without any prolonged pain or syncopy.      ASSESSMENT/ PLAN:  (Z30.432) Encounter for IUD removal  (primary encounter diagnosis)    (Z30.430) Encounter for IUD insertion    GC/Chlamydia Screening:  PATIENT DECLINED      Instructions given to patient regarding checking IUD strings, returning to the clinic if pain or inability to check strings and/or irregular bleeding.    Pt to RTC in 4-6 weeks for IUD check.    Gabi Sandhu CNM

## 2018-06-15 DIAGNOSIS — F33.1 MAJOR DEPRESSIVE DISORDER, RECURRENT EPISODE, MODERATE (H): ICD-10-CM

## 2018-06-15 ASSESSMENT — PATIENT HEALTH QUESTIONNAIRE - PHQ9: SUM OF ALL RESPONSES TO PHQ QUESTIONS 1-9: 0

## 2018-06-18 RX ORDER — BUPROPION HYDROCHLORIDE 150 MG/1
TABLET ORAL
Qty: 90 TABLET | Refills: 0 | Status: SHIPPED | OUTPATIENT
Start: 2018-06-18 | End: 2018-09-19

## 2018-06-18 NOTE — TELEPHONE ENCOUNTER
"Requested Prescriptions   Pending Prescriptions Disp Refills     buPROPion (WELLBUTRIN XL) 150 MG 24 hr tablet [Pharmacy Med Name: BUPROPION HCL ER (XL) 150MG TB24] 90 tablet 0    Last Written Prescription Date:  01/30/2018 #90 x 0  Last filled 03/09/2018  Last office visit: 11/6/2017 SARA Goel   Future Office Visit: None   Sig: TAKE ONE TABLET BY MOUTH EVERY MORNING    SSRIs Protocol Failed    6/15/2018  4:58 PM  PHQ-9 SCORE 4/18/2017 8/30/2017 6/14/2018   Total Score - - -   Total Score MyChart - - -   Total Score 9 0 0       VIKASH-7 SCORE 4/18/2017 8/30/2017   Total Score 6 0              Failed - Recent (6 mo) or future (30 days) visit within the authorizing provider's specialty    Patient had office visit in the last 6 months or has a visit in the next 30 days with authorizing provider or within the authorizing provider's specialty.  See \"Patient Info\" tab in inbasket, or \"Choose Columns\" in Meds & Orders section of the refill encounter.           Passed - PHQ-9 score less than 5 in past 6 months    Please review last PHQ-9 score.          Passed - Medication is Bupropion    If the medication is Bupropion (Wellbutrin), and the patient is taking for smoking cessation; OK to refill.         Passed - Patient is age 18 or older       Passed - No active pregnancy on record       Passed - No positive pregnancy test in last 12 months          "

## 2018-06-25 ENCOUNTER — RADIANT APPOINTMENT (OUTPATIENT)
Dept: GENERAL RADIOLOGY | Facility: CLINIC | Age: 48
End: 2018-06-25
Attending: PHYSICIAN ASSISTANT
Payer: COMMERCIAL

## 2018-06-25 ENCOUNTER — OFFICE VISIT (OUTPATIENT)
Dept: FAMILY MEDICINE | Facility: CLINIC | Age: 48
End: 2018-06-25
Payer: COMMERCIAL

## 2018-06-25 VITALS
HEART RATE: 103 BPM | SYSTOLIC BLOOD PRESSURE: 130 MMHG | WEIGHT: 206 LBS | OXYGEN SATURATION: 100 % | DIASTOLIC BLOOD PRESSURE: 82 MMHG | TEMPERATURE: 99.1 F | RESPIRATION RATE: 18 BRPM | BODY MASS INDEX: 36.5 KG/M2 | HEIGHT: 63 IN

## 2018-06-25 DIAGNOSIS — J01.01 ACUTE RECURRENT MAXILLARY SINUSITIS: ICD-10-CM

## 2018-06-25 DIAGNOSIS — G44.209 ACUTE NON INTRACTABLE TENSION-TYPE HEADACHE: ICD-10-CM

## 2018-06-25 DIAGNOSIS — J30.81 ACUTE ALLERGIC RHINITIS DUE TO ANIMAL HAIR AND DANDER: ICD-10-CM

## 2018-06-25 DIAGNOSIS — M79.604 LOWER EXTREMITY PAIN, BILATERAL: ICD-10-CM

## 2018-06-25 DIAGNOSIS — M79.604 LOWER EXTREMITY PAIN, BILATERAL: Primary | ICD-10-CM

## 2018-06-25 DIAGNOSIS — M79.605 LOWER EXTREMITY PAIN, BILATERAL: ICD-10-CM

## 2018-06-25 DIAGNOSIS — M79.605 LOWER EXTREMITY PAIN, BILATERAL: Primary | ICD-10-CM

## 2018-06-25 PROCEDURE — 72100 X-RAY EXAM L-S SPINE 2/3 VWS: CPT | Mod: FY

## 2018-06-25 PROCEDURE — 99214 OFFICE O/P EST MOD 30 MIN: CPT | Performed by: PHYSICIAN ASSISTANT

## 2018-06-25 PROCEDURE — 70210 X-RAY EXAM OF SINUSES: CPT | Mod: FY

## 2018-06-25 RX ORDER — PREDNISONE 20 MG/1
20 TABLET ORAL 2 TIMES DAILY
Qty: 10 TABLET | Refills: 0 | Status: SHIPPED | OUTPATIENT
Start: 2018-06-25 | End: 2018-09-19

## 2018-06-25 RX ORDER — AZITHROMYCIN 250 MG/1
TABLET, FILM COATED ORAL
Qty: 6 TABLET | Refills: 0 | Status: SHIPPED | OUTPATIENT
Start: 2018-06-25 | End: 2018-09-19

## 2018-06-25 RX ORDER — GABAPENTIN 300 MG/1
CAPSULE ORAL
Qty: 90 CAPSULE | Refills: 0 | Status: SHIPPED | OUTPATIENT
Start: 2018-06-25 | End: 2018-09-19

## 2018-06-25 NOTE — PROGRESS NOTES
SUBJECTIVE:   Vanesa Owen is a 48 year old female who presents to clinic today for the following health issues:      RESPIRATORY SYMPTOMS      Duration: 3 days    Description  nasal congestion, facial pain/pressure, fever and chills    Severity: moderate    Accompanying signs and symptoms: None    History (predisposing factors):  none    Precipitating or alleviating factors: None    Therapies tried and outcome:  rest and fluids acetaminophen OTC NSAID    Some neck pain. Frontal pain.    Issues with chronic low back pain   Noting radicular pain into both lower extremities , especially at noc.. No paresthesias.       Problem list and histories reviewed & adjusted, as indicated.  Additional history: as documented    BP Readings from Last 3 Encounters:   06/25/18 130/82   06/14/18 104/68   11/06/17 120/78    Wt Readings from Last 3 Encounters:   06/25/18 206 lb (93.4 kg)   06/14/18 206 lb (93.4 kg)   11/06/17 204 lb (92.5 kg)                    Reviewed and updated as needed this visit by clinical staff  Tobacco  Allergies  Meds  Med Hx  Surg Hx  Fam Hx  Soc Hx      Reviewed and updated as needed this visit by Provider         All other systems negative except as outline above  OBJECTIVE:  ENT exam reveals - bilateral TM fluid noted, neck without nodes, throat normal without erythema or exudate, maxillary and frontal sinus tender, post nasal drip noted, nasal mucosa congested and nasal mucosa pale and congested.  CHEST:chest clear to IPPA, no tachypnea, retractions or cyanosis and S1, S2 normal, no murmur, no gallop, rate regular.  Lumbosacral spine area reveals no local tenderness or mass.  Full and painless lumbosacral range of motion is noted. Straight leg raise is negative at 90 degrees on both sides. DTR's, motor strength and sensation normal, including heel and toe gait.  Peripheral pulses are palpable. Hips and knees have full range of motion without pain. No abdominal tenderness, mass or  organomegaly.  No pain with palpation her her trochanteric bursa. Negative eduar's tests bilaterally.   Bilateral trigger points in her neck.     Vanesa was seen today for sinus problem.    Diagnoses and all orders for this visit:    Lower extremity pain, bilateral  -     XR Lumbar Spine 2/3 Views; Future  -     gabapentin (NEURONTIN) 300 MG capsule; Take 1 tablet at bedtime    Acute allergic rhinitis due to animal hair and dander  -     XR Sinus 1/2 Views; Future    Acute non intractable tension-type headache  -     XR Sinus 1/2 Views; Future    Acute recurrent maxillary sinusitis  -     predniSONE (DELTASONE) 20 MG tablet; Take 1 tablet (20 mg) by mouth 2 times daily  -     azithromycin (ZITHROMAX) 250 MG tablet; Two tablets first day, then one tablet daily for four days.    Other orders  -     DEPRESSION ACTION PLAN (DAP)      Advised supportive and symptomatic treatment.  Follow up with Provider - if condition persists or worsens.

## 2018-06-25 NOTE — MR AVS SNAPSHOT
"              After Visit Summary   6/25/2018    Vanesa Owen    MRN: [id number]           Patient Information     Date Of Birth          1970        Visit Information        Provider Department      6/25/2018 4:20 PM Darrick Kay PA-C [facility] [place]        Today's Diagnoses     Lower extremity pain, bilateral    -  1    Acute allergic rhinitis due to animal hair and dander        Acute non intractable tension-type headache        Acute recurrent maxillary sinusitis           Follow-ups after your visit        Your next 10 appointments already scheduled     Jul 26, 2018 11:15 AM CDT   MA SCREENING DIGITAL BILATERAL with WYMA2   [facility] ([facility])    [address] [facility] [place] [address]   [phone]           Do not use any powder, lotion or deodorant under your arms or on your breast. If you do, we will ask you to remove it before your exam.  Wear comfortable, two-piece clothing.  If you have any allergies, tell your care team.  Bring any previous mammograms from other facilities or have them mailed to the breast center. Three-dimensional (3D) mammograms are available at [place] locations in [facility], [place], [place], [facility], [place], [place], [place], and [place]. [facility] locations include [place] and [facility] in [place]. Benefits of 3D mammograms include: - Improved rate of cancer detection - Decreases your chance of having to go back for more tests, which means fewer: - \"False-positive\" results (This means that there is an abnormal area but it isn't cancer.) - Invasive testing procedures, such as a biopsy or surgery - Can provide clearer images of the breast if you have dense breast tissue. 3D mammography is an optional exam that anyone can have with a 2D mammogram. It doesn't replace or take the place of a 2D mammogram. 2D mammograms remain an effective screening test for all women.  Not all insurance " "companies cover the cost of a 3D mammogram. Check with your insurance.              Who to contact     Normal or non-critical lab and imaging results will be communicated to you by [employer], letter or phone within 4 business days after the clinic has received the results. If you do not hear from us within 7 days, please contact the clinic through [employer] or phone. If you have a critical or abnormal lab result, we will notify you by phone as soon as possible.  Submit refill requests through [employer] or call your pharmacy and they will forward the refill request to us. Please allow 3 business days for your refill to be completed.          If you need to speak with a  for additional information , please call: [phone]             Additional Information About Your Visit        [employer] Information     [employer] gives you secure access to your electronic health record. If you see a primary care provider, you can also send messages to your care team and make appointments. If you have questions, please call your primary care clinic.  If you do not have a primary care provider, please call [phone] and they will assist you.        Care EveryWhere ID     This is your Care EveryWhere ID. This could be used by other organizations to access your [place] medical records  [id number]        Your Vitals Were     Pulse Temperature Respirations Height Pulse Oximetry BMI (Body Mass Index)    103 99.1  F (37.3  C) (Tympanic) 18 5' 3\" (1.6 m) 100% 36.49 kg/m2       Blood Pressure from Last 3 Encounters:   06/25/18 130/82   06/14/18 104/68   11/06/17 120/78    Weight from Last 3 Encounters:   06/25/18 206 lb (93.4 kg)   06/14/18 206 lb (93.4 kg)   11/06/17 204 lb (92.5 kg)              We Performed the Following     DEPRESSION ACTION PLAN (DAP)          Today's Medication Changes          These changes are accurate as of 6/25/18  5:22 PM.  If you have any questions, ask your nurse or doctor.               Start " taking these medicines.        Dose/Directions    azithromycin 250 MG tablet   Commonly known as:  ZITHROMAX   Used for:  Acute recurrent maxillary sinusitis   Started by:  Darrick Kay PA-C        Two tablets first day, then one tablet daily for four days.   Quantity:  6 tablet   Refills:  0       gabapentin 300 MG capsule   Commonly known as:  NEURONTIN   Used for:  Lower extremity pain, bilateral   Started by:  Darrick Kay PA-C        Take 1 tablet at bedtime   Quantity:  90 capsule   Refills:  0         These medicines have changed or have updated prescriptions.        Dose/Directions    omeprazole 40 MG capsule   Commonly known as:  priLOSEC   This may have changed:    - when to take this  - reasons to take this  - additional instructions   Used for:  Gastric hyperacidity, Esophageal reflux        Dose:  40 mg   Take 1 capsule (40 mg) by mouth daily Take 30-60 minutes before a meal.   Quantity:  90 capsule   Refills:  3         Stop taking these medicines if you haven't already. Please contact your care team if you have questions.     naproxen 500 MG tablet   Commonly known as:  NAPROSYN   Stopped by:  Darrick Kay PA-C           nystatin-triamcinolone cream   Commonly known as:  MYCOLOG II   Stopped by:  Darrick Kay PA-C           valACYclovir 500 MG tablet   Commonly known as:  VALTREX   Stopped by:  Darrick Kay PA-C                Where to get your medicines      These medications were sent to [facility] [place]  [place] - [address]  [address] [address] [facility] [address]     Phone:  [phone]     azithromycin 250 MG tablet    gabapentin 300 MG capsule    predniSONE 20 MG tablet                Primary Care Provider Office Phone # Fax #    Crystal Goel [phone] [address] [place] [address]        Equal Access to Services     BHANU SIEGEL AH: Serafin Garcia, pauline gross, vlad sloan  david solorioumair hollyzeeshan realaan ah. Gloria [facility] [phone].    ATENCIÓN: Si nerila veronica, tiene a waddell disposición servicios gratuitos de asistencia lingüística. Leonardo al [phone].    We comply with applicable federal civil rights laws and [place] laws. We do not discriminate on the basis of race, color, national origin, age, disability, sex, sexual orientation, or gender identity.            Thank you!     Thank you for choosing [facility]  for your care. Our goal is always to provide you with excellent care. Hearing back from our patients is one way we can continue to improve our services. Please take a few minutes to complete the written survey that you may receive in the mail after your visit with us. Thank you!             Your Updated Medication List - Protect others around you: Learn how to safely use, store and throw away your medicines at www.disposemymeds.org.          This list is accurate as of 6/25/18  5:22 PM.  Always use your most recent med list.                   Brand Name Dispense Instructions for use Diagnosis    azithromycin 250 MG tablet    ZITHROMAX    6 tablet    Two tablets first day, then one tablet daily for four days.    Acute recurrent maxillary sinusitis       buPROPion 150 MG 24 hr tablet    WELLBUTRIN XL    90 tablet    TAKE ONE TABLET BY MOUTH EVERY MORNING    Major depressive disorder, recurrent episode, moderate (H)       fluticasone 50 MCG/ACT spray    FLONASE    1 Bottle    Spray 2 sprays in nostril daily    Dysfunction of Eustachian tube, left       gabapentin 300 MG capsule    NEURONTIN    90 capsule    Take 1 tablet at bedtime    Lower extremity pain, bilateral       MIRENA (52 MG) 20 MCG/24HR IUD   Generic drug:  levonorgestrel      1 each by Intrauterine route once        Multi-vitamin Tabs tablet   Generic drug:  multivitamin, therapeutic with minerals      1 TABLET DAILY        omeprazole 40 MG capsule    priLOSEC    90 capsule    Take 1  capsule (40 mg) by mouth daily Take 30-60 minutes before a meal.    Gastric hyperacidity, Esophageal reflux       predniSONE 20 MG tablet    DELTASONE    10 tablet    Take 1 tablet (20 mg) by mouth 2 times daily    Acute recurrent maxillary sinusitis       sertraline 100 MG tablet    ZOLOFT    180 tablet    Take 2 tablets (200 mg) by mouth daily    Dysthymia, Moderate major depression (H)       triamcinolone 0.1 % cream    KENALOG    30 g    Apply topically 3 times daily    Rash

## 2018-06-25 NOTE — LETTER
My Depression Action Plan  Name: Vanesa Owen   Date of Birth 1970  Date: 6/25/2018    My doctor: Crystal Goel   My clinic: [place] [id number] [facility]  [place] [id number] [phone]          GREEN    ZONE   Good Control    What it looks like:     Things are going generally well. You have normal up s and down s. You may even feel depressed from time to time, but bad moods usually last less than a day.   What you need to do:  1. Continue to care for yourself (see self care plan)  2. Check your depression survival kit and update it as needed  3. Follow your physician s recommendations including any medication.  4. Do not stop taking medication unless you consult with your physician first.           YELLOW         ZONE Getting Worse    What it looks like:     Depression is starting to interfere with your life.     It may be hard to get out of bed; you may be starting to isolate yourself from others.    Symptoms of depression are starting to last most all day and this has happened for several days.     You may have suicidal thoughts but they are not constant.   What you need to do:     1. Call your care team, your response to treatment will improve if you keep your care team informed of your progress. Yellow periods are signs an adjustment may need to be made.     2. Continue your self-care, even if you have to fake it!    3. Talk to someone in your support network    4. Open up your depression survival kit           RED    ZONE Medical Alert - Get Help    What it looks like:     Depression is seriously interfering with your life.     You may experience these or other symptoms: You can t get out of bed most days, can t work or engage in other necessary activities, you have trouble taking care of basic hygiene, or basic responsibilities, thoughts of suicide or death that will not go away, self-injurious behavior.     What you need to do:  1. Call your care team and  request a same-day appointment. If they are not available (weekends or after hours) call your local crisis line, emergency room or 911.            Depression Self Care Plan / Survival Kit    Self-Care for Depression  Here s the deal. Your body and mind are really not as separate as most people think.  What you do and think affects how you feel and how you feel influences what you do and think. This means if you do things that people who feel good do, it will help you feel better.  Sometimes this is all it takes.  There is also a place for medication and therapy depending on how severe your depression is, so be sure to consult with your medical provider and/ or Behavioral Health Consultant if your symptoms are worsening or not improving.     In order to better manage my stress, I will:    Exercise  Get some form of exercise, every day. This will help reduce pain and release endorphins, the  feel good  chemicals in your brain. This is almost as good as taking antidepressants!  This is not the same as joining a gym and then never going! (they count on that by the way ) It can be as simple as just going for a walk or doing some gardening, anything that will get you moving.      Hygiene   Maintain good hygiene (Get out of bed in the morning, Make your bed, Brush your teeth, Take a shower, and Get dressed like you were going to work, even if you are unemployed).  If your clothes don't fit try to get ones that do.    Diet  I will strive to eat foods that are good for me, drink plenty of water, and avoid excessive sugar, caffeine, alcohol, and other mood-altering substances.  Some foods that are helpful in depression are: complex carbohydrates, B vitamins, flaxseed, fish or fish oil, fresh fruits and vegetables.    Psychotherapy  I agree to participate in Individual Therapy (if recommended).    Medication  If prescribed medications, I agree to take them.  Missing doses can result in serious side effects.  I understand that  drinking alcohol, or other illicit drug use, may cause potential side effects.  I will not stop my medication abruptly without first discussing it with my provider.    Staying Connected With Others  I will stay in touch with my friends, family members, and my primary care provider/team.    Use your imagination  Be creative.  We all have a creative side; it doesn t matter if it s oil painting, sand castles, or mud pies! This will also kick up the endorphins.    Witness Beauty  (AKA stop and smell the roses) Take a look outside, even in mid-winter. Notice colors, textures. Watch the squirrels and birds.     Service to others  Be of service to others.  There is always someone else in need.  By helping others we can  get out of ourselves  and remember the really important things.  This also provides opportunities for practicing all the other parts of the program.    Humor  Laugh and be silly!  Adjust your TV habits for less news and crime-drama and more comedy.    Control your stress  Try breathing deep, massage therapy, biofeedback, and meditation. Find time to relax each day.     My support system    Clinic Contact:  Phone number:    Contact 1:  Phone number:    Contact 2:  Phone number:    Synagogue/:  Phone number:    Therapist:  Phone number:    Local crisis center:    Phone number:    Other community support:  Phone number:

## 2018-07-26 ENCOUNTER — HOSPITAL ENCOUNTER (OUTPATIENT)
Dept: MAMMOGRAPHY | Facility: CLINIC | Age: 48
Discharge: HOME | End: 2018-07-26
Attending: FAMILY MEDICINE | Admitting: FAMILY MEDICINE
Payer: COMMERCIAL

## 2018-07-26 DIAGNOSIS — Z12.31 VISIT FOR SCREENING MAMMOGRAM: ICD-10-CM

## 2018-07-26 PROCEDURE — 77067 SCR MAMMO BI INCL CAD: CPT

## 2018-08-02 DIAGNOSIS — F34.1 DYSTHYMIA: ICD-10-CM

## 2018-08-02 DIAGNOSIS — F32.1 MODERATE MAJOR DEPRESSION (H): ICD-10-CM

## 2018-08-02 NOTE — TELEPHONE ENCOUNTER
"Requested Prescriptions   Pending Prescriptions Disp Refills     sertraline (ZOLOFT) 100 MG tablet [Pharmacy Med Name: SERTRALINE HCL 100MG TABS] 180 tablet 0    Last Written Prescription Date:  04/30/2018 #180 x 0  Last filled 04/30/2018  Last office visit: 11/01/2017 SARA Goel   Future Office Visit:  None   Sig: TAKE TWO TABLETS BY MOUTH EVERY DAY    SSRIs Protocol Passed    8/2/2018  4:16 PM       Passed - PHQ-9 score less than 5 in past 6 months    Please review last PHQ-9 score.   PHQ-9 SCORE 4/18/2017 8/30/2017 6/14/2018   Total Score - - -   Total Score MyChart - - -   Total Score 9 0 0       VIKASH-7 SCORE 4/18/2017 8/30/2017   Total Score 6 0                Passed - Patient is age 18 or older       Passed - No active pregnancy on record       Passed - No positive pregnancy test in last 12 months       Passed - Recent (6 mo) or future (30 days) visit within the authorizing provider's specialty    Patient had office visit in the last 6 months or has a visit in the next 30 days with authorizing provider or within the authorizing provider's specialty.  See \"Patient Info\" tab in inbasket, or \"Choose Columns\" in Meds & Orders section of the refill encounter.              "

## 2018-08-03 RX ORDER — SERTRALINE HYDROCHLORIDE 100 MG/1
TABLET, FILM COATED ORAL
Qty: 180 TABLET | Refills: 0 | Status: SHIPPED | OUTPATIENT
Start: 2018-08-03 | End: 2018-09-19

## 2018-08-03 NOTE — TELEPHONE ENCOUNTER
Prescription approved per [facility] Refill Protocol.  Cherry CARR RN

## 2018-09-17 ENCOUNTER — MYC MEDICAL ADVICE (OUTPATIENT)
Dept: FAMILY MEDICINE | Facility: CLINIC | Age: 48
End: 2018-09-17

## 2018-09-19 ENCOUNTER — OFFICE VISIT (OUTPATIENT)
Dept: FAMILY MEDICINE | Facility: CLINIC | Age: 48
End: 2018-09-19
Payer: COMMERCIAL

## 2018-09-19 ENCOUNTER — RADIANT APPOINTMENT (OUTPATIENT)
Dept: GENERAL RADIOLOGY | Facility: CLINIC | Age: 48
End: 2018-09-19
Attending: PHYSICIAN ASSISTANT
Payer: COMMERCIAL

## 2018-09-19 VITALS
BODY MASS INDEX: 36.5 KG/M2 | TEMPERATURE: 98.1 F | OXYGEN SATURATION: 96 % | HEART RATE: 86 BPM | WEIGHT: 206 LBS | SYSTOLIC BLOOD PRESSURE: 116 MMHG | DIASTOLIC BLOOD PRESSURE: 79 MMHG | RESPIRATION RATE: 18 BRPM | HEIGHT: 63 IN

## 2018-09-19 DIAGNOSIS — M25.561 ACUTE PAIN OF RIGHT KNEE: ICD-10-CM

## 2018-09-19 DIAGNOSIS — F34.1 DYSTHYMIA: ICD-10-CM

## 2018-09-19 DIAGNOSIS — F33.1 MAJOR DEPRESSIVE DISORDER, RECURRENT EPISODE, MODERATE (H): ICD-10-CM

## 2018-09-19 DIAGNOSIS — E66.01 MORBID OBESITY (H): ICD-10-CM

## 2018-09-19 DIAGNOSIS — H69.92 DYSFUNCTION OF EUSTACHIAN TUBE, LEFT: ICD-10-CM

## 2018-09-19 DIAGNOSIS — M25.561 ACUTE PAIN OF RIGHT KNEE: Primary | ICD-10-CM

## 2018-09-19 DIAGNOSIS — K21.9 GASTROESOPHAGEAL REFLUX DISEASE WITHOUT ESOPHAGITIS: ICD-10-CM

## 2018-09-19 DIAGNOSIS — R21 RASH: ICD-10-CM

## 2018-09-19 DIAGNOSIS — F32.1 MODERATE MAJOR DEPRESSION (H): ICD-10-CM

## 2018-09-19 DIAGNOSIS — K31.89 GASTRIC HYPERACIDITY: ICD-10-CM

## 2018-09-19 PROCEDURE — 99214 OFFICE O/P EST MOD 30 MIN: CPT | Mod: 25 | Performed by: PHYSICIAN ASSISTANT

## 2018-09-19 PROCEDURE — 73560 X-RAY EXAM OF KNEE 1 OR 2: CPT | Mod: RT

## 2018-09-19 PROCEDURE — 20610 DRAIN/INJ JOINT/BURSA W/O US: CPT | Performed by: PHYSICIAN ASSISTANT

## 2018-09-19 RX ORDER — OMEPRAZOLE 40 MG/1
40 CAPSULE, DELAYED RELEASE ORAL DAILY
Qty: 90 CAPSULE | Refills: 3 | Status: SHIPPED | OUTPATIENT
Start: 2018-09-19 | End: 2019-11-04

## 2018-09-19 RX ORDER — TRIAMCINOLONE ACETONIDE 1 MG/G
CREAM TOPICAL 3 TIMES DAILY
Qty: 30 G | Refills: 1 | Status: SHIPPED | OUTPATIENT
Start: 2018-09-19 | End: 2020-02-07

## 2018-09-19 RX ORDER — BUPROPION HYDROCHLORIDE 150 MG/1
150 TABLET ORAL EVERY MORNING
Qty: 10 TABLET | Refills: 0 | Status: SHIPPED | OUTPATIENT
Start: 2018-09-19 | End: 2019-11-04

## 2018-09-19 RX ORDER — SERTRALINE HYDROCHLORIDE 100 MG/1
TABLET, FILM COATED ORAL
Qty: 180 TABLET | Refills: 3 | Status: SHIPPED | OUTPATIENT
Start: 2018-09-19 | End: 2019-11-04

## 2018-09-19 RX ORDER — FLUTICASONE PROPIONATE 50 MCG
2 SPRAY, SUSPENSION (ML) NASAL DAILY
Qty: 3 BOTTLE | Refills: 11 | Status: SHIPPED | OUTPATIENT
Start: 2018-09-19 | End: 2020-05-13

## 2018-09-19 NOTE — MR AVS SNAPSHOT
After Visit Summary   9/19/2018    Vanesa Owen    MRN: [id number]           Patient Information     Date Of Birth          1970        Visit Information        Provider Department      9/19/2018 11:20 AM Darrick Kay PA-C [facility]        Today's Diagnoses     Acute pain of right knee    -  1    Major depressive disorder, recurrent episode, moderate (H)        Gastric hyperacidity        Dysfunction of Eustachian tube, left        Dysthymia        Moderate major depression (H)        Rash        Gastroesophageal reflux disease without esophagitis        Morbid obesity (H)           Follow-ups after your visit        Your next 10 appointments already scheduled     Oct 08, 2018  4:00 PM CDT   [facility]   [facility]    [address] [facility] [address] [phone]              Who to contact     Normal or non-critical lab and imaging results will be communicated to you by [employer], letter or phone within 4 business days after the clinic has received the results. If you do not hear from us within 7 days, please contact the clinic through [employer] or phone. If you have a critical or abnormal lab result, we will notify you by phone as soon as possible.  Submit refill requests through [employer] or call your pharmacy and they will forward the refill request to us. Please allow 3 business days for your refill to be completed.          If you need to speak with a  for additional information , please call: [phone]             Additional Information About Your Visit        [employer] Information     [employer] gives you secure access to your electronic health record. If you see a primary care provider, you can also send messages to your care team and make appointments. If you have questions, please call your primary care clinic.  If you do not have a primary care provider, please call  "[phone] and they will assist you.        Care EveryWhere ID     This is your Care EveryWhere ID. This could be used by other organizations to access your [place] medical records  [id number]        Your Vitals Were     Pulse Temperature Respirations Height Pulse Oximetry BMI (Body Mass Index)    86 98.1  F (36.7  C) (Tympanic) 18 5' 3\" (1.6 m) 96% 36.49 kg/m2       Blood Pressure from Last 3 Encounters:   09/19/18 116/79   06/25/18 130/82   06/14/18 104/68    Weight from Last 3 Encounters:   09/19/18 206 lb (93.4 kg)   06/25/18 206 lb (93.4 kg)   06/14/18 206 lb (93.4 kg)              We Performed the Following     DEPO MEDROL 40 MG     DRAIN/INJECT LARGE JOINT/BURSA          Today's Medication Changes          These changes are accurate as of 9/19/18 12:14 PM.  If you have any questions, ask your nurse or doctor.               These medicines have changed or have updated prescriptions.        Dose/Directions    buPROPion 150 MG 24 hr tablet   Commonly known as:  WELLBUTRIN XL   This may have changed:  See the new instructions.   Used for:  Major depressive disorder, recurrent episode, moderate (H)   Changed by:  Darrick Kay PA-C        Dose:  150 mg   Take 1 tablet (150 mg) by mouth every morning   Quantity:  10 tablet   Refills:  0       omeprazole 40 MG capsule   Commonly known as:  priLOSEC   This may have changed:    - when to take this  - reasons to take this  - additional instructions   Used for:  Gastric hyperacidity        Dose:  40 mg   Take 1 capsule (40 mg) by mouth daily Take 30-60 minutes before a meal.   Quantity:  90 capsule   Refills:  3         Stop taking these medicines if you haven't already. Please contact your care team if you have questions.     azithromycin 250 MG tablet   Commonly known as:  ZITHROMAX   Stopped by:  Darrick Kay PA-C           gabapentin 300 MG capsule   Commonly known as:  NEURONTIN   Stopped by:  Darrick Kay PA-C           predniSONE 20 MG " tablet   Commonly known as:  DELTASONE   Stopped by:  Darrick Kay PA-C                Where to get your medicines      These medications were sent to [facility] - [address] [facility]  [address] [address] [place] [address]     Phone:  [phone]     buPROPion 150 MG 24 hr tablet    fluticasone 50 MCG/ACT spray    omeprazole 40 MG capsule    sertraline 100 MG tablet    triamcinolone 0.1 % cream                Primary Care Provider Office Phone # Fax #    Crystal Baez Amando  [phone] [address] [place] [address]        Equal Access to Services     [facility]: Hadii karl dawkins hadasho Socoy, waaxda luqadaha, qaybta kaalmada adeegyada, david alvarez . So [facility] [phone].    ATENCIÓN: Si habla español, tiene a waddell disposición servicios gratuitos de asistencia lingüística. [facility] [phone].    We comply with applicable federal civil rights laws and [place] laws. We do not discriminate on the basis of race, color, national origin, age, disability, sex, sexual orientation, or gender identity.            Thank you!     Thank you for choosing [facility]  for your care. Our goal is always to provide you with excellent care. Hearing back from our patients is one way we can continue to improve our services. Please take a few minutes to complete the written survey that you may receive in the mail after your visit with us. Thank you!             Your Updated Medication List - Protect others around you: Learn how to safely use, store and throw away your medicines at www.disposemymeds.org.          This list is accurate as of 9/19/18 12:14 PM.  Always use your most recent med list.                   Brand Name Dispense Instructions for use Diagnosis    buPROPion 150 MG 24 hr tablet    WELLBUTRIN XL    10 tablet    Take 1 tablet (150 mg) by mouth every morning    Major depressive disorder, recurrent episode,  moderate (H)       fluticasone 50 MCG/ACT spray    FLONASE    3 Bottle    Spray 2 sprays in nostril daily    Dysfunction of Eustachian tube, left       MIRENA (52 MG) 20 MCG/24HR IUD   Generic drug:  levonorgestrel      1 each by Intrauterine route once        Multi-vitamin Tabs tablet   Generic drug:  multivitamin, therapeutic with minerals      1 TABLET DAILY        omeprazole 40 MG capsule    priLOSEC    90 capsule    Take 1 capsule (40 mg) by mouth daily Take 30-60 minutes before a meal.    Gastric hyperacidity       sertraline 100 MG tablet    ZOLOFT    180 tablet    TAKE TWO TABLETS BY MOUTH EVERY DAY    Dysthymia, Moderate major depression (H)       triamcinolone 0.1 % cream    KENALOG    30 g    Apply topically 3 times daily    Rash

## 2018-09-19 NOTE — PROGRESS NOTES
SUBJECTIVE:   Vanesa Owen is a 48 year old female who presents to clinic today for the following health issues:      Depression Followup    Status since last visit: Stable refill medication today please     See PHQ-9 for current symptoms.  Other associated symptoms: None    Complicating factors:   Significant life event:  No   Current substance abuse:  None  Anxiety or Panic symptoms:  No    PHQ-9 4/18/2017 8/30/2017 6/14/2018   Total Score 9 0 0   Q9: Suicide Ideation Not at all Not at all Not at all     In the past two weeks have you had thoughts of suicide or self-harm?  No.    Do you have concerns about your personal safety or the safety of others?   No  PHQ-9  English  PHQ-9   Any Language  Suicide Assessment Five-step Evaluation and Treatment (SAFE-T)    Amount of exercise or physical activity: 2-3 days/week for an average of 30-45 minutes    Problems taking medications regularly: No    Medication side effects: none    Diet: low salt and low fat/cholesterol    Patient wants to stop her wellbutrin. Overall doing well.     Right anterolateral knee pain x 6 mos. No injury. Occasional catching. Slight swelling.     Problem list and histories reviewed & adjusted, as indicated.  Additional history: as documented    BP Readings from Last 3 Encounters:   09/19/18 116/79   06/25/18 130/82   06/14/18 104/68    Wt Readings from Last 3 Encounters:   09/19/18 206 lb (93.4 kg)   06/25/18 206 lb (93.4 kg)   06/14/18 206 lb (93.4 kg)                    Reviewed and updated as needed this visit by clinical staff  Tobacco  Allergies  Meds  Med Hx  Surg Hx  Fam Hx  Soc Hx      Reviewed and updated as needed this visit by Provider         All other systems negative except as outline above  OBJECTIVE:  Eye exam - right eye normal lid, conjunctiva, cornea, pupil and fundus, left eye normal lid, conjunctiva, cornea, pupil and fundus.  Thyroid not palpable, not enlarged, no nodules detected.  CHEST:chest clear to IPPA,  no tachypnea, retractions or cyanosis and S1, S2 normal, no murmur, no gallop, rate regular.  KNEE: The injured knee reveals soft tissue tenderness over the lateral joint line, negative drawer sign, collateral ligaments intact, negative Osmin sign.  The risks, benefits and potential complications (including but not limited to, bleeding, infection, pain, scar, damage to adjacent structures, atrophy or necrosis of soft tissue, skin blanching, failure to relieve symptoms) of injection were discussed with the patient. Questions were addressed and answered.The patient elected to proceed. Written informed consent was obtained. The correct procedural site was identified and confirmed. A Right Knee intraarticular injection was performed using 2mL Depo Medrol 40mg per mL and 4mL (0.25% marcaine) of local anesthetic after sterile prep, to the correct procedural site. Sterile bandaid applied. This was tolerated well by the patient. No apparent complications. Did also discuss that if diabetic, recommend close monitoring of blood sugars over the next week as cortisone injections can temporarily elevate blood sugars.        Vanesa was seen today for depression.    Diagnoses and all orders for this visit:    Acute pain of right knee  -     XR Knee Right 1/2 Views; Future    Major depressive disorder, recurrent episode, moderate (H)  -     buPROPion (WELLBUTRIN XL) 150 MG 24 hr tablet; Take 1 tablet (150 mg) by mouth every morning    Gastric hyperacidity  -     omeprazole (PRILOSEC) 40 MG capsule; Take 1 capsule (40 mg) by mouth daily Take 30-60 minutes before a meal.    Dysfunction of Eustachian tube, left  -     fluticasone (FLONASE) 50 MCG/ACT spray; Spray 2 sprays in nostril daily    Dysthymia  -     sertraline (ZOLOFT) 100 MG tablet; TAKE TWO TABLETS BY MOUTH EVERY DAY    Moderate major depression (H)  -     sertraline (ZOLOFT) 100 MG tablet; TAKE TWO TABLETS BY MOUTH EVERY DAY    Rash  -     triamcinolone (KENALOG) 0.1 %  cream; Apply topically 3 times daily    Gastroesophageal reflux disease without esophagitis      Advised supportive and symptomatic treatment.  Follow up with Provider - if condition persists or worsens.   work on lifestyle modification

## 2018-10-02 ENCOUNTER — TELEPHONE (OUTPATIENT)
Dept: FAMILY MEDICINE | Facility: CLINIC | Age: 48
End: 2018-10-02

## 2018-10-03 ENCOUNTER — OFFICE VISIT (OUTPATIENT)
Dept: FAMILY MEDICINE | Facility: CLINIC | Age: 48
End: 2018-10-03
Payer: COMMERCIAL

## 2018-10-03 VITALS
OXYGEN SATURATION: 94 % | RESPIRATION RATE: 16 BRPM | DIASTOLIC BLOOD PRESSURE: 88 MMHG | SYSTOLIC BLOOD PRESSURE: 128 MMHG | TEMPERATURE: 98.1 F | WEIGHT: 206 LBS | HEART RATE: 91 BPM | BODY MASS INDEX: 36.49 KG/M2

## 2018-10-03 DIAGNOSIS — R07.0 THROAT PAIN: ICD-10-CM

## 2018-10-03 DIAGNOSIS — J01.01 ACUTE RECURRENT MAXILLARY SINUSITIS: Primary | ICD-10-CM

## 2018-10-03 LAB
DEPRECATED S PYO AG THROAT QL EIA: NORMAL
SPECIMEN SOURCE: NORMAL

## 2018-10-03 PROCEDURE — 87081 CULTURE SCREEN ONLY: CPT | Performed by: PHYSICIAN ASSISTANT

## 2018-10-03 PROCEDURE — 87880 STREP A ASSAY W/OPTIC: CPT | Performed by: PHYSICIAN ASSISTANT

## 2018-10-03 PROCEDURE — 99213 OFFICE O/P EST LOW 20 MIN: CPT | Performed by: PHYSICIAN ASSISTANT

## 2018-10-03 RX ORDER — PREDNISONE 20 MG/1
20 TABLET ORAL 2 TIMES DAILY
Qty: 10 TABLET | Refills: 0 | Status: SHIPPED | OUTPATIENT
Start: 2018-10-03 | End: 2018-11-08

## 2018-10-03 RX ORDER — AZITHROMYCIN 250 MG/1
TABLET, FILM COATED ORAL
Qty: 6 TABLET | Refills: 0 | Status: SHIPPED | OUTPATIENT
Start: 2018-10-03 | End: 2018-11-08

## 2018-10-03 NOTE — MR AVS SNAPSHOT
After Visit Summary   10/3/2018    Vanesa Owen    MRN: [id number]           Patient Information     Date Of Birth          1970        Visit Information        Provider Department      10/3/2018 9:40 AM Darrick Kay PA-C [facility]        Today's Diagnoses     Acute recurrent maxillary sinusitis    -  1    Throat pain           Follow-ups after your visit        Your next 10 appointments already scheduled     Oct 08, 2018  4:00 PM CDT   [facility]    [address] [facility] [address] [phone]              Who to contact     Normal or non-critical lab and imaging results will be communicated to you by My-Appshart, letter or phone within 4 business days after the clinic has received the results. If you do not hear from us within 7 days, please contact the clinic through MyChart or phone. If you have a critical or abnormal lab result, we will notify you by phone as soon as possible.  Submit refill requests through [employer] or call your pharmacy and they will forward the refill request to us. Please allow 3 business days for your refill to be completed.          If you need to speak with a  for additional information , please call: [phone]             Additional Information About Your Visit        [employer] Information     [employer] gives you secure access to your electronic health record. If you see a primary care provider, you can also send messages to your care team and make appointments. If you have questions, please call your primary care clinic.  If you do not have a primary care provider, please call [phone] and they will assist you.        Care EveryWhere ID     This is your Care EveryWhere ID. This could be used by other organizations to access your [facility] medical records  [id number]        Your Vitals Were     Pulse Temperature Respirations Pulse  Oximetry BMI (Body Mass Index)       91 98.1  F (36.7  C) (Tympanic) 16 94% 36.49 kg/m2        Blood Pressure from Last 3 Encounters:   10/03/18 128/88   09/19/18 116/79   06/25/18 130/82    Weight from Last 3 Encounters:   10/03/18 206 lb (93.4 kg)   09/19/18 206 lb (93.4 kg)   06/25/18 206 lb (93.4 kg)              We Performed the Following     Strep, Rapid Screen          Today's Medication Changes          These changes are accurate as of 10/3/18  9:49 AM.  If you have any questions, ask your nurse or doctor.               Start taking these medicines.        Dose/Directions    azithromycin 250 MG tablet   Commonly known as:  ZITHROMAX   Used for:  Acute recurrent maxillary sinusitis   Started by:  Darrick Kay PA-C        Two tablets first day, then one tablet daily for four days.   Quantity:  6 tablet   Refills:  0       predniSONE 20 MG tablet   Commonly known as:  DELTASONE   Used for:  Acute recurrent maxillary sinusitis   Started by:  Darrick Kay PA-C        Dose:  20 mg   Take 1 tablet (20 mg) by mouth 2 times daily   Quantity:  10 tablet   Refills:  0            Where to get your medicines      These medications were sent to [facility] [place] [address] [facility] [place]  [address] [facility] [place] [address]     Phone:  [phone]     azithromycin 250 MG tablet    predniSONE 20 MG tablet                Primary Care Provider Office Phone # Fax #    Crystal Sasha Goel [phone] [address] [place] [address]        Equal Access to Services     [facility]: Hadii karl ku hadasho Soomaali, waaxda luqadaha, qaybta kaalmada adeegyaboris, david idimaria luz english. So [facility] [phone].    ATENCIÓN: Si habla español, tiene a waddell disposición servicios gratuitos de asistencia lingüística. Llame al [phone].    We comply with applicable federal civil rights laws and [place] laws. We do not discriminate on the basis of race, color,  national origin, age, disability, sex, sexual orientation, or gender identity.            Thank you!     Thank you for choosing [facility]  for your care. Our goal is always to provide you with excellent care. Hearing back from our patients is one way we can continue to improve our services. Please take a few minutes to complete the written survey that you may receive in the mail after your visit with us. Thank you!             Your Updated Medication List - Protect others around you: Learn how to safely use, store and throw away your medicines at www.disposemymeds.org.          This list is accurate as of 10/3/18  9:49 AM.  Always use your most recent med list.                   Brand Name Dispense Instructions for use Diagnosis    azithromycin 250 MG tablet    ZITHROMAX    6 tablet    Two tablets first day, then one tablet daily for four days.    Acute recurrent maxillary sinusitis       buPROPion 150 MG 24 hr tablet    WELLBUTRIN XL    10 tablet    Take 1 tablet (150 mg) by mouth every morning    Major depressive disorder, recurrent episode, moderate (H)       fluticasone 50 MCG/ACT spray    FLONASE    3 Bottle    Spray 2 sprays in nostril daily    Dysfunction of Eustachian tube, left       MIRENA (52 MG) 20 MCG/24HR IUD   Generic drug:  levonorgestrel      1 each by Intrauterine route once        Multi-vitamin Tabs tablet   Generic drug:  multivitamin, therapeutic with minerals      1 TABLET DAILY        omeprazole 40 MG capsule    priLOSEC    90 capsule    Take 1 capsule (40 mg) by mouth daily Take 30-60 minutes before a meal.    Gastric hyperacidity       predniSONE 20 MG tablet    DELTASONE    10 tablet    Take 1 tablet (20 mg) by mouth 2 times daily    Acute recurrent maxillary sinusitis       sertraline 100 MG tablet    ZOLOFT    180 tablet    TAKE TWO TABLETS BY MOUTH EVERY DAY    Dysthymia, Moderate major depression (H)       triamcinolone 0.1 % cream    KENALOG    30 g    Apply topically 3  times daily    Rash

## 2018-10-03 NOTE — TELEPHONE ENCOUNTER
Reason for call:  Other   Patient called regarding (reason for call): appointment  Additional comments: Patient has had sore throat, congestion and wants to check for strep, sinus infection or both. Can Darrick Kay work her in for an appointment tomorrow? She's had these symptoms for a week now.    Phone number to reach patient:  Cell number on file:    Telephone Information:   Mobile [phone]       Best Time:  Any time    Can we leave a detailed message on this number?  YES     Ginger LUCIO  Central Scheduler

## 2018-10-03 NOTE — TELEPHONE ENCOUNTER
Spoke with adama Sherman to double book patient at 940 am. Call to patient informed of appointment

## 2018-10-03 NOTE — PROGRESS NOTES
SUBJECTIVE:   Vanesa Owen is a 48 year old female who presents to clinic today for the following health issues:      RESPIRATORY SYMPTOMS      Duration: over the past week    Description  nasal congestion, rhinorrhea, sore throat, facial pain/pressure, cough, ear pain bilateral, headache, fatigue/malaise, hoarse voice and myalgias    Severity: moderate    Accompanying signs and symptoms: None    History (predisposing factors):  none    Precipitating or alleviating factors: None    Therapies tried and outcome:  none          Problem list and histories reviewed & adjusted, as indicated.  Additional history: as documented    BP Readings from Last 3 Encounters:   10/03/18 128/88   09/19/18 116/79   06/25/18 130/82    Wt Readings from Last 3 Encounters:   10/03/18 206 lb (93.4 kg)   09/19/18 206 lb (93.4 kg)   06/25/18 206 lb (93.4 kg)                    Reviewed and updated as needed this visit by clinical staff  Tobacco  Allergies  Meds       Reviewed and updated as needed this visit by Provider         All other systems negative except as outline above  OBJECTIVE:  Eye exam - right eye normal lid, conjunctiva, cornea, pupil and fundus, left eye normal lid, conjunctiva, cornea, pupil and fundus.  ENT exam reveals - bilateral TM fluid noted, neck without nodes, throat normal without erythema or exudate, left maxillary sinus tender, post nasal drip noted, nasal mucosa congested and nasal mucosa pale and congested.  CHEST:chest clear to IPPA, no tachypnea, retractions or cyanosis and S1, S2 normal, no murmur, no gallop, rate regular.    Vanesa was seen today for ent problem.    Diagnoses and all orders for this visit:    Acute recurrent maxillary sinusitis  -     azithromycin (ZITHROMAX) 250 MG tablet; Two tablets first day, then one tablet daily for four days.  -     predniSONE (DELTASONE) 20 MG tablet; Take 1 tablet (20 mg) by mouth 2 times daily    Throat pain  -     Strep, Rapid Screen      Advised  supportive and symptomatic treatment.  Follow up with Provider - if condition persists or worsens.

## 2018-10-04 LAB
BACTERIA SPEC CULT: NORMAL
SPECIMEN SOURCE: NORMAL

## 2018-10-08 ENCOUNTER — OFFICE VISIT (OUTPATIENT)
Dept: ORTHOPEDICS | Facility: CLINIC | Age: 48
End: 2018-10-08
Payer: COMMERCIAL

## 2018-10-08 ENCOUNTER — RADIANT APPOINTMENT (OUTPATIENT)
Dept: GENERAL RADIOLOGY | Facility: CLINIC | Age: 48
End: 2018-10-08
Attending: PEDIATRICS
Payer: COMMERCIAL

## 2018-10-08 VITALS
BODY MASS INDEX: 35.44 KG/M2 | WEIGHT: 200 LBS | DIASTOLIC BLOOD PRESSURE: 88 MMHG | SYSTOLIC BLOOD PRESSURE: 124 MMHG | HEIGHT: 63 IN

## 2018-10-08 DIAGNOSIS — S99.911A INJURY OF RIGHT ANKLE, INITIAL ENCOUNTER: Primary | ICD-10-CM

## 2018-10-08 DIAGNOSIS — S99.911A INJURY OF RIGHT ANKLE, INITIAL ENCOUNTER: ICD-10-CM

## 2018-10-08 DIAGNOSIS — S93.401A SPRAIN OF RIGHT ANKLE, UNSPECIFIED LIGAMENT, INITIAL ENCOUNTER: ICD-10-CM

## 2018-10-08 PROCEDURE — 99214 OFFICE O/P EST MOD 30 MIN: CPT | Performed by: PEDIATRICS

## 2018-10-08 PROCEDURE — 73610 X-RAY EXAM OF ANKLE: CPT | Mod: RT | Performed by: PEDIATRICS

## 2018-10-08 PROCEDURE — 73610 X-RAY EXAM OF ANKLE: CPT | Mod: RT

## 2018-10-08 NOTE — MR AVS SNAPSHOT
After Visit Summary   10/8/2018    Vanesa Owen    MRN: [id number]           Patient Information     Date Of Birth          1970        Visit Information        Provider Department      10/8/2018 2:40 PM Manfred Bass DO [facility] [place]        Today's Diagnoses     Injury of right ankle, initial encounter    -  1      Care Instructions      Use of CAM boot and crutches for ambulation.  Follow up in 7-10 days for repeat evaluation  Topical Treatments: Ice  Over the counter medication: Acetaminophen (Tylenol) 1000mg every 6 hours with food (Maximum of 3000mg/day)  Ibuprofen (Advil) maximum of 800mg three times a day with food              Follow-ups after your visit        Who to contact     If you have questions or need follow up information about today's clinic visit or your schedule please contact [facility] [place] directly at [phone].  Normal or non-critical lab and imaging results will be communicated to you by D1Ghart, letter or phone within 4 business days after the clinic has received the results. If you do not hear from us within 7 days, please contact the clinic through D1Ghart or phone. If you have a critical or abnormal lab result, we will notify you by phone as soon as possible.  Submit refill requests through WinView or call your pharmacy and they will forward the refill request to us. Please allow 3 business days for your refill to be completed.          Additional Information About Your Visit        MyChart Information     WinView gives you secure access to your electronic health record. If you see a primary care provider, you can also send messages to your care team and make appointments. If you have questions, please call your primary care clinic.  If you do not have a primary care provider, please call [phone] and they will assist you.        Care EveryWhere ID     This is your Care EveryWhere ID. This  "could be used by other organizations to access your [facility] medical records  [id number]        Your Vitals Were     Height BMI (Body Mass Index)                5' 3\" (1.6 m) 35.43 kg/m2           Blood Pressure from Last 3 Encounters:   10/08/18 124/88   10/03/18 128/88   09/19/18 116/79    Weight from Last 3 Encounters:   10/08/18 200 lb (90.7 kg)   10/03/18 206 lb (93.4 kg)   09/19/18 206 lb (93.4 kg)               Primary Care Provider Office Phone # Fax #    Crystal GoelDO [phone] [address] [place] [address]        Equal Access to Services     BHANU SIEGEL : Hadii karl hogano Socoy, waaxda luqadaha, qaybta kaalmada adeegyada, david alvarez . So [facility] [phone].    ATENCIÓN: Si habla español, tiene a waddell disposición servicios gratuitos de asistencia lingüística. [facility] [phone].    We comply with applicable federal civil rights laws and [place] laws. We do not discriminate on the basis of race, color, national origin, age, disability, sex, sexual orientation, or gender identity.            Thank you!     Thank you for choosing [facility]  for your care. Our goal is always to provide you with excellent care. Hearing back from our patients is one way we can continue to improve our services. Please take a few minutes to complete the written survey that you may receive in the mail after your visit with us. Thank you!             Your Updated Medication List - Protect others around you: Learn how to safely use, store and throw away your medicines at www.disposemymeds.org.          This list is accurate as of 10/8/18  4:17 PM.  Always use your most recent med list.                   Brand Name Dispense Instructions for use Diagnosis    azithromycin 250 MG tablet    ZITHROMAX    6 tablet    Two tablets first day, then one tablet daily for four days.    Acute recurrent maxillary sinusitis       buPROPion 150 " MG 24 hr tablet    WELLBUTRIN XL    10 tablet    Take 1 tablet (150 mg) by mouth every morning    Major depressive disorder, recurrent episode, moderate (H)       fluticasone 50 MCG/ACT spray    FLONASE    3 Bottle    Spray 2 sprays in nostril daily    Dysfunction of Eustachian tube, left       MIRENA (52 MG) 20 MCG/24HR IUD   Generic drug:  levonorgestrel      1 each by Intrauterine route once        Multi-vitamin Tabs tablet   Generic drug:  multivitamin, therapeutic with minerals      1 TABLET DAILY        omeprazole 40 MG capsule    priLOSEC    90 capsule    Take 1 capsule (40 mg) by mouth daily Take 30-60 minutes before a meal.    Gastric hyperacidity       predniSONE 20 MG tablet    DELTASONE    10 tablet    Take 1 tablet (20 mg) by mouth 2 times daily    Acute recurrent maxillary sinusitis       sertraline 100 MG tablet    ZOLOFT    180 tablet    TAKE TWO TABLETS BY MOUTH EVERY DAY    Dysthymia, Moderate major depression (H)       triamcinolone 0.1 % cream    KENALOG    30 g    Apply topically 3 times daily    Rash

## 2018-10-08 NOTE — LETTER
10/8/2018         RE: Vanesa Owen  [address] [facility]  [place] [address]        Dear Colleague,    Thank you for referring your patient, Vanesa Owen, to the [facility] [place]. Please see a copy of my visit note below.    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  Patient tripped over a branch just about 1 hour ago while at the [employer] parking lot. She states the injury occurred just as she was about to enter the door to the store. [place] and heard her ankle snap/crack. Immediate lateral pain and swelling. She denies ever having problems with this ankle in the past.     Injury: inversion ankle injury     Location of Pain: right lateral ankle   Duration of Pain: 1 hour  Rating of Pain at worst: 8/10  Rating of Pain Currently: 2-3/10 while seated   Symptoms are better with: Rest  Symptoms are worse with: weight bearing   Additional Features:   Positive: swelling   Negative: bruising, popping, grinding, catching, locking, instability, paresthesias, numbness, weakness, pain in other joints and systemic symptoms  Other evaluation and/or treatments so far consists of: Nothing  Prior History of related problems: HX of plantar fascitis     Social History: NICU nurse     Review of Systems  Musculoskeletal: as above  Remainder of review of systems is negative including constitutional, CV, pulmonary, GI, Skin and Neurologic except as noted in HPI or medical history.    This document serves as a record of the services and decisions personally performed and made by Manfred Bass DO, CAQ. It was created on his behalf by Antoinette Narvaez, a trained medical scribe. The creation of this document is based the provider's statements to the medical scribe.  Antoinette Narvaez October 8, 2018 3:53 PM       Past Medical History:   Diagnosis Date     Anxiety      Depressive  "disorder      Herpes simplex with other ophthalmic complications      UTI (urinary tract infection)      Past Surgical History:   Procedure Laterality Date     EXCIS VAGINAL CYST/TUMOR       SLING TRANSVAGINAL N/A 8/23/2016    Procedure: SLING TRANSVAGINAL;  Surgeon: Nam Sommers MD;  Location: [facility]     TONSILLECTOMY & ADENOIDECTOMY       Family History   Problem Relation Age of Onset     Diabetes Maternal Grandmother      Type II      Hypertension Maternal Grandmother      Alcohol/Drug Maternal Grandmother      Asthma Mother      Depression Mother      Depression Father 58     Cerebrovascular Disease Father      Alcohol/Drug Maternal Grandfather      Circulatory Paternal Grandmother      Asthma Brother      Breast Cancer Maternal Aunt      Social History     Social History     Marital status:      Spouse name: N/A     Number of children: N/A     Years of education: N/A     Occupational History     Not on file.     Social History Main Topics     Smoking status: Never Smoker     Smokeless tobacco: Never Used     Alcohol use No     Drug use: No     Sexual activity: Yes     Partners: Male     Birth control/ protection: IUD      Comment: mirena     Other Topics Concern     Parent/Sibling W/ Cabg, Mi Or Angioplasty Before 65f 55m? No     Social History Narrative       Objective  /88  Ht 1.6 m (5' 3\")  Wt 90.7 kg (200 lb)  BMI 35.43 kg/m2    GENERAL APPEARANCE: healthy, alert and no distress   GAIT: wheelchair  SKIN: no suspicious lesions or rashes  NEURO: Normal strength and tone, mentation intact and speech normal  PSYCH:  mentation appears normal and affect normal/bright  HEENT: no scleral icterus  CV: no extremity edema  RESP: nonlabored breathing    Right Ankle Exam:    Inspection:       no visible ecchymosis       edema noted in lateral ankle--over lateral malleolus    Foot inspection:       no deformity noted    ROM:        limited dorsiflexion        limited inversion with pain       " limited eversion with pain  Min limitation PF  Mod limitation otherwise    Tender:       lateral malleolus       lateral ankle ligaments  Mild medial malleolus    Non-Tender:       remainder of foot and ankle    Skin:       well perfused       capillary refill brisk    Special Tests:  Deferred with pain    Gait:       unable to bear weight due to pain    Sensation:  Grossly intact to light touch      Radiology:  Visualized radiographs of Right Ankle, and reviewed the images with the patient.  Impression: Three views of the right ankle demonstrate moderate lateral ankle soft tissue swelling. There is no clear acute osseous abnormality. Mortise appears intact. There is a plantar and posterior calcaneal spurring noted on lateral view.    Assessment:  1. Injury of right ankle, initial encounter    2. Sprain of right ankle, unspecified ligament, initial encounter        Plan:  Discussed the assessment with the patient. Consistent with sprain; x-ray without clear fracture, though occult osseous injury not excluded. Routine icing, elevation discussed. Discussed support options; boot provided. Crutches prn.    Radiologic images reviewed and discussed with patient today.   We discussed the following: symptom treatment, activity modification/rest, rehab, immobilization, medication, potential for improvement with time and support for the affected area. Following discussion, plan:  Provided work note for patient and will be fitted with a CAM boot today.   Over the counter medication: Patient's preferred OTC medication as directed on packaging.    Follow up: 7-10 days, sooner prn. If improving, likely wean to other support and start rehab. If persistent pain, bony tenderness, other concerns, repeat films of ankle at recheck.  Questions answered. The patient indicates understanding of these issues and agrees with the plan.    Manfred Bass, , CAQ        Disclaimer: This note consists of symbols derived from keyboarding,  dictation and/or voice recognition software. As a result, there may be errors in the script that have gone undetected. Please consider this when interpreting information found in this chart.    Again, thank you for allowing me to participate in the care of your patient.        Sincerely,        Manfred Bass, DO

## 2018-10-08 NOTE — PATIENT INSTRUCTIONS
Use of CAM boot and crutches for ambulation.  Follow up in 7-10 days for repeat evaluation  Topical Treatments: Ice  Over the counter medication: Acetaminophen (Tylenol) 1000mg every 6 hours with food (Maximum of 3000mg/day)  Ibuprofen (Advil) maximum of 800mg three times a day with food

## 2018-10-08 NOTE — PROGRESS NOTES
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  Patient tripped over a branch just about 1 hour ago while at the [employer] parking lot. She states the injury occurred just as she was about to enter the door to the store. [place] and heard her ankle snap/crack. Immediate lateral pain and swelling. She denies ever having problems with this ankle in the past.     Injury: inversion ankle injury     Location of Pain: right lateral ankle   Duration of Pain: 1 hour  Rating of Pain at worst: 8/10  Rating of Pain Currently: 2-3/10 while seated   Symptoms are better with: Rest  Symptoms are worse with: weight bearing   Additional Features:   Positive: swelling   Negative: bruising, popping, grinding, catching, locking, instability, paresthesias, numbness, weakness, pain in other joints and systemic symptoms  Other evaluation and/or treatments so far consists of: Nothing  Prior History of related problems: HX of plantar fascitis     Social History: NICU nurse     Review of Systems  Musculoskeletal: as above  Remainder of review of systems is negative including constitutional, CV, pulmonary, GI, Skin and Neurologic except as noted in HPI or medical history.    This document serves as a record of the services and decisions personally performed and made by Manfred Bass DO, CAQ. It was created on his behalf by Antoinette Narvaez, a trained medical scribe. The creation of this document is based the provider's statements to the medical scribe.  Antoinette Narvaez October 8, 2018 3:53 PM       Past Medical History:   Diagnosis Date     Anxiety      Depressive disorder      Herpes simplex with other ophthalmic complications      UTI (urinary tract infection)      Past Surgical History:   Procedure Laterality Date     EXCIS VAGINAL CYST/TUMOR       SLING TRANSVAGINAL N/A 8/23/2016    Procedure: SLING TRANSVAGINAL;  Surgeon:  "Nam Sommers MD;  Location: [facility] OR     TONSILLECTOMY & ADENOIDECTOMY       Family History   Problem Relation Age of Onset     Diabetes Maternal Grandmother      Type II      Hypertension Maternal Grandmother      Alcohol/Drug Maternal Grandmother      Asthma Mother      Depression Mother      Depression Father 58     Cerebrovascular Disease Father      Alcohol/Drug Maternal Grandfather      Circulatory Paternal Grandmother      Asthma Brother      Breast Cancer Maternal Aunt      Social History     Social History     Marital status:      Spouse name: N/A     Number of children: N/A     Years of education: N/A     Occupational History     Not on file.     Social History Main Topics     Smoking status: Never Smoker     Smokeless tobacco: Never Used     Alcohol use No     Drug use: No     Sexual activity: Yes     Partners: Male     Birth control/ protection: IUD      Comment: mirena     Other Topics Concern     Parent/Sibling W/ Cabg, Mi Or Angioplasty Before 65f 55m? No     Social History Narrative       Objective  /88  Ht 1.6 m (5' 3\")  Wt 90.7 kg (200 lb)  BMI 35.43 kg/m2    GENERAL APPEARANCE: healthy, alert and no distress   GAIT: wheelchair  SKIN: no suspicious lesions or rashes  NEURO: Normal strength and tone, mentation intact and speech normal  PSYCH:  mentation appears normal and affect normal/bright  HEENT: no scleral icterus  CV: no extremity edema  RESP: nonlabored breathing    Right Ankle Exam:    Inspection:       no visible ecchymosis       edema noted in lateral ankle--over lateral malleolus    Foot inspection:       no deformity noted    ROM:        limited dorsiflexion        limited inversion with pain       limited eversion with pain  Min limitation PF  Mod limitation otherwise    Tender:       lateral malleolus       lateral ankle ligaments  Mild medial malleolus    Non-Tender:       remainder of foot and ankle    Skin:       well perfused       capillary refill " brisk    Special Tests:  Deferred with pain    Gait:       unable to bear weight due to pain    Sensation:  Grossly intact to light touch      Radiology:  Visualized radiographs of Right Ankle, and reviewed the images with the patient.  Impression: Three views of the right ankle demonstrate moderate lateral ankle soft tissue swelling. There is no clear acute osseous abnormality. Mortise appears intact. There is a plantar and posterior calcaneal spurring noted on lateral view.    Assessment:  1. Injury of right ankle, initial encounter    2. Sprain of right ankle, unspecified ligament, initial encounter        Plan:  Discussed the assessment with the patient. Consistent with sprain; x-ray without clear fracture, though occult osseous injury not excluded. Routine icing, elevation discussed. Discussed support options; boot provided. Crutches prn.    Radiologic images reviewed and discussed with patient today.   We discussed the following: symptom treatment, activity modification/rest, rehab, immobilization, medication, potential for improvement with time and support for the affected area. Following discussion, plan:  Provided work note for patient and will be fitted with a CAM boot today.   Over the counter medication: Patient's preferred OTC medication as directed on packaging.    Follow up: 7-10 days, sooner prn. If improving, likely wean to other support and start rehab. If persistent pain, bony tenderness, other concerns, repeat films of ankle at recheck.  Questions answered. The patient indicates understanding of these issues and agrees with the plan.    Manfred Bass DO, CAQ        Disclaimer: This note consists of symbols derived from keyboarding, dictation and/or voice recognition software. As a result, there may be errors in the script that have gone undetected. Please consider this when interpreting information found in this chart.

## 2018-10-08 NOTE — LETTER
[facility] [place]  [address]  [place] [address]  Phone: [phone]  Fax: [phone]      October 8, 2018      RE: Vanesa Owen  [address] [facility]  [place] [address]        To whom it may concern:    Vanesa Owen is under my professional care. The employee is UNABLE to return to work until Wednesday, 10/10/18.    When the patient returns to work, the following restrictions apply:  Walk/Stand: avoid. Advise seated work is best. Anticipate use of ankle support. Ambulatory aid such as crutches.  Lift, carry, push, and pull: none if on feet.  Advise no driving with use of right lower extremity cam walker. If unable to find transportation to/from work, may be unable to return to work at this time.  Anticipate recheck 7-10 days if any persistent symptoms.      Sincerely,          Manfred WADDELL.

## 2018-10-17 ENCOUNTER — RADIANT APPOINTMENT (OUTPATIENT)
Dept: GENERAL RADIOLOGY | Facility: CLINIC | Age: 48
End: 2018-10-17
Attending: PEDIATRICS
Payer: COMMERCIAL

## 2018-10-17 ENCOUNTER — OFFICE VISIT (OUTPATIENT)
Dept: ORTHOPEDICS | Facility: CLINIC | Age: 48
End: 2018-10-17
Payer: COMMERCIAL

## 2018-10-17 VITALS
DIASTOLIC BLOOD PRESSURE: 72 MMHG | SYSTOLIC BLOOD PRESSURE: 106 MMHG | HEIGHT: 63 IN | BODY MASS INDEX: 36.86 KG/M2 | WEIGHT: 208 LBS

## 2018-10-17 DIAGNOSIS — S99.911D INJURY OF RIGHT ANKLE, SUBSEQUENT ENCOUNTER: Primary | ICD-10-CM

## 2018-10-17 DIAGNOSIS — S93.401D SPRAIN OF RIGHT ANKLE, UNSPECIFIED LIGAMENT, SUBSEQUENT ENCOUNTER: ICD-10-CM

## 2018-10-17 DIAGNOSIS — S99.911D INJURY OF RIGHT ANKLE, SUBSEQUENT ENCOUNTER: ICD-10-CM

## 2018-10-17 PROCEDURE — 73610 X-RAY EXAM OF ANKLE: CPT | Mod: RT | Performed by: PEDIATRICS

## 2018-10-17 PROCEDURE — 99213 OFFICE O/P EST LOW 20 MIN: CPT | Performed by: PEDIATRICS

## 2018-10-17 NOTE — PROGRESS NOTES
"Sports Medicine Clinic Visit    PCP: Crystal Goel    Vanesa Owen is a 48 year old female who is seen in f/u up for    Injury of right ankle, subsequent encounter  Sprain of right ankle, unspecified ligament, subsequent encounter. Since last visit on 10/8/2018 patient has had improvement in her ankle.  Continues to have swelling.  Has been using the boot.   Did attempt to wb without the boot yesterday and did feel some pulling of her ankle.      **  Improved since last visit. No pain at rest. Aggravated by stairs without boot. Diffuse ankle pain.       Review of Systems  All other systems reviewed and are negative unless noted above.    Past Medical History:   Diagnosis Date     Anxiety      Depressive disorder      Herpes simplex with other ophthalmic complications      UTI (urinary tract infection)      Past Surgical History:   Procedure Laterality Date     EXCIS VAGINAL CYST/TUMOR       SLING TRANSVAGINAL N/A 8/23/2016    Procedure: SLING TRANSVAGINAL;  Surgeon: Nam Sommers MD;  Location: [facility]     TONSILLECTOMY & ADENOIDECTOMY       This document serves as a record of the services and decisions personally performed and made by DO SANDEEP Hammond. It was created on their behalf by Kenroy Muniz, a trained medical scribe. The creation of this document is based the provider's statements to the medical scribe.  Kenroy Muniz October 17, 2018 1:15 PM     Objective  /72  Ht 5' 3\" (1.6 m)  Wt 208 lb (94.3 kg)  BMI 36.85 kg/m2    GENERAL APPEARANCE: overweight, alert and no distress   GAIT: CAM boot  SKIN: no suspicious lesions or rashes  NEURO: Normal strength and tone, mentation intact and speech normal  PSYCH:  mentation appears normal and affect normal/bright  HEENT: no scleral icterus  CV: no extremity edema  RESP: nonlabored breathing      Right Ankle Exam:    Inspection:       ecchymosis noted laterally, forefoot, and medially       Diffuse swelling around ankle, improving    Foot " inspection:       no deformity noted    ROM:        limited inversion        limited eversion        Dorsiflexion moderately limited, past neutral       Shoe off plantarflexion symmetric       Digit motion fully intact    Tender:       lateral malleolus       Diffusely over lateral ankle ligaments       medial malleolus    Non-Tender:       remainder of foot and ankle    Skin:       well perfused       capillary refill brisk    Special Tests:       neg (-) anterior drawer        positive (+) talar tilt with lateral pain       neg (-) external rotation testing     Gait:       CAM walker; able to bear weight fully out of boot, some discomfort    Sensation:  Grossly intact to light touch    Regional pulses:  Normal            Radiology  Visualized radiographs of right ankle taken 10/17/2018, and reviewed the images with the patient.  Impression: Three views of the right ankle demonstrate no clear acute osseous abnormality. Mortise appears intact. Soft tissue swelling remains at the ankle. Otherwise, there has been no significant change when compared to prior films from 10/8/18.      Assessment:  1. Injury of right ankle, subsequent encounter    2. Sprain of right ankle, unspecified ligament, subsequent encounter        Plan:  Discussed the assessment with the patient. Improving. Wean boot, into brace. Start PT. Discussed potential for additional imaging, not required currently, monitor course with PT.    Radiologic images reviewed and discussed with patient today  Topical Treatments: Ice as needed   Elevate prn swelling.  Over the counter medication: Patient's preferred OTC medication as needed   Activity Modification: as discussed  Rehab: Physical Therapy: Referral placed  Ankle brace given. Reduce CAM walker usage.    Wean boot.  Discussed potential letter for work.    Form completed. Work restrictions.  Follow up: 3 weeks, or as needed  Questions answered. The patient indicates understanding of these issues and  agrees with the plan.    Manfred Bass, , CAQ          Disclaimer: This note consists of symbols derived from keyboarding, dictation and/or voice recognition software. As a result, there may be errors in the script that have gone undetected. Please consider this when interpreting information found in this chart.    The information in this document, created by the medical scribe for me, accurately reflects the services I personally performed and the decisions made by me. I have reviewed and approved this document for accuracy prior to leaving the patient care area.

## 2018-10-17 NOTE — LETTER
"    10/17/2018         RE: Vanesa Owen  [address]  [place] [address]        Dear Colleague,    Thank you for referring your patient, Vanesa Owen, to the [facility] [place]. Please see a copy of my visit note below.    Sports Medicine Clinic Visit    PCP: Crystal Goel    Vanesa Owen is a 48 year old female who is seen in f/u up for    Injury of right ankle, subsequent encounter  Sprain of right ankle, unspecified ligament, subsequent encounter. Since last visit on 10/8/2018 patient has had improvement in her ankle.  Continues to have swelling.  Has been using the boot.   Did attempt to wb without the boot yesterday and did feel some pulling of her ankle.      **  Improved since last visit. No pain at rest. Aggravated by stairs without boot. Diffuse ankle pain.       Review of Systems  All other systems reviewed and are negative unless noted above.    Past Medical History:   Diagnosis Date     Anxiety      Depressive disorder      Herpes simplex with other ophthalmic complications      UTI (urinary tract infection)      Past Surgical History:   Procedure Laterality Date     EXCIS VAGINAL CYST/TUMOR       SLING TRANSVAGINAL N/A 8/23/2016    Procedure: SLING TRANSVAGINAL;  Surgeon: Nam Sommers MD;  Location: [facility] OR     TONSILLECTOMY & ADENOIDECTOMY       This document serves as a record of the services and decisions personally performed and made by DO SANDEEP Hammond. It was created on their behalf by Kenroy Muniz, a trained medical scribe. The creation of this document is based the provider's statements to the medical scribe.  Kenroy Muniz October 17, 2018 1:15 PM     Objective  /72  Ht 5' 3\" (1.6 m)  Wt 208 lb (94.3 kg)  BMI 36.85 kg/m2    GENERAL APPEARANCE: overweight, alert and no distress   GAIT: CAM boot  SKIN: no suspicious lesions or rashes  NEURO: Normal strength and tone, mentation intact and speech normal  PSYCH:  mentation " appears normal and affect normal/bright  HEENT: no scleral icterus  CV: no extremity edema  RESP: nonlabored breathing      Right Ankle Exam:    Inspection:       ecchymosis noted laterally, forefoot, and medially       Diffuse swelling around ankle, improving    Foot inspection:       no deformity noted    ROM:        limited inversion        limited eversion        Dorsiflexion moderately limited, past neutral       Shoe off plantarflexion symmetric       Digit motion fully intact    Tender:       lateral malleolus       Diffusely over lateral ankle ligaments       medial malleolus    Non-Tender:       remainder of foot and ankle    Skin:       well perfused       capillary refill brisk    Special Tests:       neg (-) anterior drawer        positive (+) talar tilt with lateral pain       neg (-) external rotation testing     Gait:       CAM walker; able to bear weight fully out of boot, some discomfort    Sensation:  Grossly intact to light touch    Regional pulses:  Normal            Radiology  Visualized radiographs of right ankle taken 10/17/2018, and reviewed the images with the patient.  Impression: Three views of the right ankle demonstrate no clear acute osseous abnormality. Mortise appears intact. Soft tissue swelling remains at the ankle. Otherwise, there has been no significant change when compared to prior films from 10/8/18.      Assessment:  1. Injury of right ankle, subsequent encounter    2. Sprain of right ankle, unspecified ligament, subsequent encounter        Plan:  Discussed the assessment with the patient. Improving. Wean boot, into brace. Start PT. Discussed potential for additional imaging, not required currently, monitor course with PT.    Radiologic images reviewed and discussed with patient today  Topical Treatments: Ice as needed   Elevate prn swelling.  Over the counter medication: Patient's preferred OTC medication as needed   Activity Modification: as discussed  Rehab: Physical  Therapy: Referral placed  Ankle brace given. Reduce CAM walker usage.    Wean boot.  Discussed potential letter for work.    Form completed. Work restrictions.  Follow up: 3 weeks, or as needed  Questions answered. The patient indicates understanding of these issues and agrees with the plan.    Manfred Bass DO, CAQ          Disclaimer: This note consists of symbols derived from keyboarding, dictation and/or voice recognition software. As a result, there may be errors in the script that have gone undetected. Please consider this when interpreting information found in this chart.    The information in this document, created by the medical scribe for me, accurately reflects the services I personally performed and the decisions made by me. I have reviewed and approved this document for accuracy prior to leaving the patient care area.    Again, thank you for allowing me to participate in the care of your patient.        Sincerely,        Manfred Bass DO

## 2018-10-17 NOTE — MR AVS SNAPSHOT
After Visit Summary   10/17/2018    Vanesa Owen    MRN: [id number]           Patient Information     Date Of Birth          1970        Visit Information        Provider Department      10/17/2018 1:00 PM Manfred Bass,  [facility] [place]        Today's Diagnoses     Injury of right ankle, subsequent encounter    -  1    Sprain of right ankle, unspecified ligament, subsequent encounter          Care Instructions    *Reduce CAM walker usage with brace    *Letter written for work          Follow-ups after your visit        Additional Services     [facility] PT, HAND, AND CHIROPRACTIC REFERRAL       Physical Therapy, Hand Therapy and Chiropractic Care are available through:  *[facility]  *Hand Therapy (Occupational Therapy or Physical Therapy)  *[facility]    Call one number to schedule at any of the above locations: [phone].    Physical therapy, Hand therapy and/or Chiropractic care has been recommended by your physician as an excellent treatment option to reduce pain and help people return to normal activities, including sports.  Therapy and/or chiropractic care services are a great complement or alternative to expensive and invasive surgery, injections, or long-term use of prescription medications. The primary goal is to identify the underlying problem and provide you the tools to manage your condition on your own.     Please be aware that coverage of these services is subject to the terms and limitations of your health insurance plan.  Call member services at your health plan with any benefit or coverage questions.      Please bring the following to your appointment:  *Your personal calendar for scheduling future appointments  *Comfortable clothing                  Follow-up notes from your care team     Return in about 3 weeks (around 11/7/2018), or if symptoms worsen or fail to improve.      Future tests  "that were ordered for you today     Open Future Orders        Priority Expected Expires Ordered    [facility] PT, HAND, AND CHIROPRACTIC REFERRAL Routine  10/17/2019 10/17/2018            Who to contact     If you have questions or need follow up information about today's clinic visit or your schedule please contact [facility] directly at [phone].  Normal or non-critical lab and imaging results will be communicated to you by MyChart, letter or phone within 4 business days after the clinic has received the results. If you do not hear from us within 7 days, please contact the clinic through [facility] or phone. If you have a critical or abnormal lab result, we will notify you by phone as soon as possible.  Submit refill requests through [address] or call your pharmacy and they will forward the refill request to us. Please allow 3 business days for your refill to be completed.          Additional Information About Your Visit        [facility] Information     [address] gives you secure access to your electronic health record. If you see a primary care provider, you can also send messages to your care team and make appointments. If you have questions, please call your primary care clinic.  If you do not have a primary care provider, please call [phone] and they will assist you.        Care EveryWhere ID     This is your Care EveryWhere ID. This could be used by other organizations to access your [facility] medical records  [id number]        Your Vitals Were     Height BMI (Body Mass Index)                5' 3\" (1.6 m) 36.85 kg/m2           Blood Pressure from Last 3 Encounters:   10/17/18 106/72   10/08/18 124/88   10/03/18 128/88    Weight from Last 3 Encounters:   10/17/18 208 lb (94.3 kg)   10/08/18 200 lb (90.7 kg)   10/03/18 206 lb (93.4 kg)               Primary Care Provider Office Phone # Fax #    Crystal Goel [phone] [address] [place] [address]   "      Equal Access to Services     [facility]: Hadii aad ku hadjoserex Harryali, walarryda luqadaha, qaybta kamaria edavid montaño. So [facility] [phone].    ATENCIÓN: Si habla español, tiene a waddell disposición servicios gratuitos de asistencia lingüística. Melissaame al [phone].    We comply with applicable federal civil rights laws and [place] laws. We do not discriminate on the basis of race, color, national origin, age, disability, sex, sexual orientation, or gender identity.            Thank you!     Thank you for choosing [facility]  for your care. Our goal is always to provide you with excellent care. Hearing back from our patients is one way we can continue to improve our services. Please take a few minutes to complete the written survey that you may receive in the mail after your visit with us. Thank you!             Your Updated Medication List - Protect others around you: Learn how to safely use, store and throw away your medicines at www.disposemymeds.org.          This list is accurate as of 10/17/18  1:53 PM.  Always use your most recent med list.                   Brand Name Dispense Instructions for use Diagnosis    azithromycin 250 MG tablet    ZITHROMAX    6 tablet    Two tablets first day, then one tablet daily for four days.    Acute recurrent maxillary sinusitis       buPROPion 150 MG 24 hr tablet    WELLBUTRIN XL    10 tablet    Take 1 tablet (150 mg) by mouth every morning    Major depressive disorder, recurrent episode, moderate (H)       fluticasone 50 MCG/ACT spray    FLONASE    3 Bottle    Spray 2 sprays in nostril daily    Dysfunction of Eustachian tube, left       MIRENA (52 MG) 20 MCG/24HR IUD   Generic drug:  levonorgestrel      1 each by Intrauterine route once        Multi-vitamin Tabs tablet   Generic drug:  multivitamin, therapeutic with minerals      1 TABLET DAILY        omeprazole 40 MG capsule    priLOSEC    90  capsule    Take 1 capsule (40 mg) by mouth daily Take 30-60 minutes before a meal.    Gastric hyperacidity       order for DME     1 Device    Equipment being ordered: Crutches, CAM boot, small, tall    Injury of right ankle, initial encounter       predniSONE 20 MG tablet    DELTASONE    10 tablet    Take 1 tablet (20 mg) by mouth 2 times daily    Acute recurrent maxillary sinusitis       sertraline 100 MG tablet    ZOLOFT    180 tablet    TAKE TWO TABLETS BY MOUTH EVERY DAY    Dysthymia, Moderate major depression (H)       triamcinolone 0.1 % cream    KENALOG    30 g    Apply topically 3 times daily    Rash

## 2018-10-23 ENCOUNTER — THERAPY VISIT (OUTPATIENT)
Dept: PHYSICAL THERAPY | Facility: CLINIC | Age: 48
End: 2018-10-23
Payer: COMMERCIAL

## 2018-10-23 DIAGNOSIS — S93.401D SPRAIN OF RIGHT ANKLE, UNSPECIFIED LIGAMENT, SUBSEQUENT ENCOUNTER: ICD-10-CM

## 2018-10-23 DIAGNOSIS — S99.911D INJURY OF RIGHT ANKLE, SUBSEQUENT ENCOUNTER: ICD-10-CM

## 2018-10-23 DIAGNOSIS — M25.571 PAIN IN JOINT, ANKLE AND FOOT, RIGHT: Primary | ICD-10-CM

## 2018-10-23 PROCEDURE — 97161 PT EVAL LOW COMPLEX 20 MIN: CPT | Mod: GP | Performed by: PHYSICAL THERAPIST

## 2018-10-23 PROCEDURE — 97110 THERAPEUTIC EXERCISES: CPT | Mod: GP | Performed by: PHYSICAL THERAPIST

## 2018-10-23 NOTE — PROGRESS NOTES
"[facility] Initial Evaluation  Subjective:  Patient is a 48 year old female presenting with rehab left ankle/foot hpi. The history is provided by the patient.   Vanesa Owen is a 48 year old female with a right ankle condition.  Condition occurred with:  A fall/slip (Rolled ankle on 10/8/18).  Condition occurred: in the community.  This is a new condition  Pt inversion sprain of R ankle on 10/8/18. X-ray showed no fractures. Currently wearing ankle brace to protect ankle, was wearing walking boot for the first few weeks. Pain located on lateal ankle and notes \"pulling\" when going up/down steps and walking. .    Patient reports pain:  Lateral.    Pain is described as aching and sharp and is constant and reported as 3/10.  Associated symptoms:  Loss of motion/stiffness, loss of strength and edema.   Symptoms are exacerbated by ascending stairs, descending stairs, walking and standing (Standin hour to sxs, walking: limiting distances lately, stairs: 3/10) and relieved by ice, NSAID's and bracing/immobilizing.  Since onset symptoms are gradually improving.  Special tests:  X-ray.      General health as reported by patient is good.  Pertinent medical history includes:  Depression, incontinence, migraines/headaches, numbness/tingling and overweight.  Medical allergies: yes (Sulfas).  Surgical history: Bladder sling placement.  Current medications:  Anti-depressants and pain medication.  Current occupation is RN in NICU.  Patient is working in normal job with restrictions (Light duty).  Primary job tasks include:  Prolonged standing and repetitive tasks.                                Objective:  System    Ankle/Foot Evaluation  ROM:    AROM:    Dorsiflexion:  Left:   12  Right:   0  Plantarflexion:  Left:  60    Right:  40            Strength is normal (Pain with inversion testing on R LE).        EDEMA:   Left ankle edema present at: 49 cmForefoot left ankle: 49 cm.  Right ankle edema " present at:  51 cm      Figure 8 left: 49 cmFigure 8 right: 51 cm    FUNCTIONAL TESTS: Functional test ankle: Stands on toes and heels.                                                                  General Evaluation:              Sensation:  normal            Balance:  Balance wnl general: 5 sec R LE single leg stance, 30 sec L LE single leg stance.                                                       ROS    Assessment/Plan:    Patient is a 48 year old female with right side wrist/hand complaints.    Patient has the following significant findings with corresponding treatment plan.                Diagnosis 1:  R ankle pain  Pain -  hot/cold therapy, US, electric stimulation, mechanical traction, manual therapy, splint/taping/bracing/orthotics, self management, education, directional preference exercise and home program  Decreased ROM/flexibility - manual therapy, therapeutic exercise and home program  Decreased joint mobility - manual therapy, therapeutic exercise and home program  Decreased strength - therapeutic exercise, therapeutic activities and home program  Impaired balance - neuro re-education, therapeutic activities and home program  Edema - vasopneumatics, electric stimulation, cold therapy, cryocuff and self management/home program  Impaired gait - gait training and home program  Impaired muscle performance - neuro re-education and home program  Decreased function - therapeutic activities and home program    Therapy Evaluation Codes:   1) History comprised of:   Personal factors that impact the plan of care:      None.    Comorbidity factors that impact the plan of care are:      Depression, Migraines/headaches, Numbness/tingling and Overweight.     Medications impacting care: Anti-depressant and Pain.  2) Examination of Body Systems comprised of:   Body structures and functions that impact the plan of care:      Ankle.   Activity limitations that impact the plan of care are:      Cooking, Lifting,  Squatting/kneeling, Stairs, Standing and Walking.  3) Clinical presentation characteristics are:   Stable/Uncomplicated.  4) Decision-Making    Low complexity using standardized patient assessment instrument and/or measureable assessment of functional outcome.  Cumulative Therapy Evaluation is: Low complexity.    Previous and current functional limitations:  (See Goal Flow Sheet for this information)    Short term and Long term goals: (See Goal Flow Sheet for this information)     Communication ability:  Patient appears to be able to clearly communicate and understand verbal and written communication and follow directions correctly.  Treatment Explanation - The following has been discussed with the patient:   RX ordered/plan of care  Anticipated outcomes  Possible risks and side effects  This patient would benefit from PT intervention to resume normal activities.   Rehab potential is excellent.    Frequency:  1 X week, once daily  Duration:  for 6 weeks  Discharge Plan:  Achieve all LTG.  Independent in home treatment program.  Reach maximal therapeutic benefit.    Please refer to the daily flowsheet for treatment today, total treatment time and time spent performing 1:1 timed codes.

## 2018-10-30 ENCOUNTER — MYC MEDICAL ADVICE (OUTPATIENT)
Dept: ORTHOPEDICS | Facility: CLINIC | Age: 48
End: 2018-10-30

## 2018-10-30 ENCOUNTER — THERAPY VISIT (OUTPATIENT)
Dept: PHYSICAL THERAPY | Facility: CLINIC | Age: 48
End: 2018-10-30
Payer: COMMERCIAL

## 2018-10-30 DIAGNOSIS — M25.571 PAIN IN JOINT, ANKLE AND FOOT, RIGHT: ICD-10-CM

## 2018-10-30 PROCEDURE — 97110 THERAPEUTIC EXERCISES: CPT | Mod: GP | Performed by: PHYSICAL THERAPY ASSISTANT

## 2018-10-30 PROCEDURE — 97112 NEUROMUSCULAR REEDUCATION: CPT | Mod: GP | Performed by: PHYSICAL THERAPY ASSISTANT

## 2018-11-06 ENCOUNTER — THERAPY VISIT (OUTPATIENT)
Dept: PHYSICAL THERAPY | Facility: CLINIC | Age: 48
End: 2018-11-06
Payer: COMMERCIAL

## 2018-11-06 DIAGNOSIS — M25.571 PAIN IN JOINT, ANKLE AND FOOT, RIGHT: ICD-10-CM

## 2018-11-06 PROCEDURE — 97110 THERAPEUTIC EXERCISES: CPT | Mod: GP | Performed by: PHYSICAL THERAPY ASSISTANT

## 2018-11-06 PROCEDURE — 97112 NEUROMUSCULAR REEDUCATION: CPT | Mod: GP | Performed by: PHYSICAL THERAPY ASSISTANT

## 2018-11-08 ENCOUNTER — OFFICE VISIT (OUTPATIENT)
Dept: FAMILY MEDICINE | Facility: CLINIC | Age: 48
End: 2018-11-08
Payer: COMMERCIAL

## 2018-11-08 VITALS
HEART RATE: 117 BPM | OXYGEN SATURATION: 97 % | DIASTOLIC BLOOD PRESSURE: 77 MMHG | SYSTOLIC BLOOD PRESSURE: 117 MMHG | WEIGHT: 208 LBS | RESPIRATION RATE: 16 BRPM | BODY MASS INDEX: 36.85 KG/M2 | TEMPERATURE: 98.3 F

## 2018-11-08 DIAGNOSIS — J01.01 ACUTE RECURRENT MAXILLARY SINUSITIS: Primary | ICD-10-CM

## 2018-11-08 PROCEDURE — 99213 OFFICE O/P EST LOW 20 MIN: CPT | Performed by: PHYSICIAN ASSISTANT

## 2018-11-08 RX ORDER — PREDNISONE 20 MG/1
20 TABLET ORAL 2 TIMES DAILY
Qty: 10 TABLET | Refills: 0 | Status: SHIPPED | OUTPATIENT
Start: 2018-11-08 | End: 2019-11-04

## 2018-11-08 RX ORDER — AZITHROMYCIN 250 MG/1
TABLET, FILM COATED ORAL
Qty: 6 TABLET | Refills: 0 | Status: SHIPPED | OUTPATIENT
Start: 2018-11-08 | End: 2019-11-04

## 2018-11-08 NOTE — PROGRESS NOTES
SUBJECTIVE:   Vanesa Owen is a 48 year old female who presents to clinic today for the following health issues:      RESPIRATORY SYMPTOMS      Duration: 1 week    Description  nasal congestion, facial pain/pressure, fever, chills, fatigue/malaise and nausea    Severity: moderate    Accompanying signs and symptoms: None    History (predisposing factors):  none    Precipitating or alleviating factors: None    Therapies tried and outcome:  Rest and fluids, otc meds          Problem list and histories reviewed & adjusted, as indicated.  Additional history: as documented    BP Readings from Last 3 Encounters:   11/08/18 117/77   10/17/18 106/72   10/08/18 124/88    Wt Readings from Last 3 Encounters:   11/08/18 208 lb (94.3 kg)   10/17/18 208 lb (94.3 kg)   10/08/18 200 lb (90.7 kg)                    Reviewed and updated as needed this visit by clinical staff  Tobacco  Allergies  Meds       Reviewed and updated as needed this visit by Provider         All other systems negative except as outline above  OBJECTIVE:  Eye exam - right eye normal lid, conjunctiva, cornea, pupil and fundus, left eye normal lid, conjunctiva, cornea, pupil and fundus.  ENT exam reveals - bilateral TM fluid noted, neck without nodes, throat normal without erythema or exudate, maxillary sinus tender, post nasal drip noted, nasal mucosa congested and nasal mucosa pale and congested.  CHEST:chest clear to IPPA, no tachypnea, retractions or cyanosis and S1, S2 normal, no murmur, no gallop, rate regular.    Vanesa was seen today for sinus problem.    Diagnoses and all orders for this visit:    Acute recurrent maxillary sinusitis  -     azithromycin (ZITHROMAX) 250 MG tablet; Two tablets first day, then one tablet daily for four days.  -     predniSONE (DELTASONE) 20 MG tablet; Take 1 tablet (20 mg) by mouth 2 times daily      Advised supportive and symptomatic treatment.  Follow up with Provider - if condition persists or worsens.

## 2018-11-08 NOTE — MR AVS SNAPSHOT
After Visit Summary   11/8/2018    Vanesa Owen    MRN: [id number]           Patient Information     Date Of Birth          1970        Visit Information        Provider Department      11/8/2018 11:20 AM Darrick Kay PA-C [facility]        Today's Diagnoses     Acute recurrent maxillary sinusitis    -  1       Follow-ups after your visit        Your next 10 appointments already scheduled     Nov 13, 2018  4:25 PM CST   [facility] Extremity with Em Vanegas, PTA   [facility]  )    [address] [address]   [phone]            Nov 21, 2018  4:25 PM CST   TERI Extremity with Em Vanegas, PTA   [facility] ([facility]  )    [address] [address] [phone]              Who to contact     Normal or non-critical lab and imaging results will be communicated to you by [employer], letter or phone within 4 business days after the clinic has received the results. If you do not hear from us within 7 days, please contact the clinic through [employer] or phone. If you have a critical or abnormal lab result, we will notify you by phone as soon as possible.  Submit refill requests through [employer] or call your pharmacy and they will forward the refill request to us. Please allow 3 business days for your refill to be completed.          If you need to speak with a  for additional information , please call: [phone]             Additional Information About Your Visit        VIAP Information     VIAP gives you secure access to your electronic health record. If you see a primary care provider, you can also send messages to your care team and make appointments. If you have questions, please call your primary care clinic.  If you do not have a primary care provider, please call [phone] and they will assist you.        Care EveryWhere ID     This is your Care  EveryWhere ID. This could be used by other organizations to access your [facility] medical records  [id number]        Your Vitals Were     Pulse Temperature Respirations Pulse Oximetry BMI (Body Mass Index)       117 98.3  F (36.8  C) (Tympanic) 16 97% 36.85 kg/m2        Blood Pressure from Last 3 Encounters:   11/08/18 117/77   10/17/18 106/72   10/08/18 124/88    Weight from Last 3 Encounters:   11/08/18 208 lb (94.3 kg)   10/17/18 208 lb (94.3 kg)   10/08/18 200 lb (90.7 kg)              Today, you had the following     No orders found for display         Where to get your medicines      These medications were sent to [facility] - [address]  [address] [place] [address]     Phone:  [phone]     azithromycin 250 MG tablet    predniSONE 20 MG tablet          Primary Care Provider Office Phone # Fax #    Crystal Sandovalfabiana Goel [phone] [address] [place] [address]        Equal Access to Services     [facility]: Hadii aad ku hadasho Somaria cali, waaxda luqadaha, qaybta kaalmada adenina, david alvarez . [facility] [phone].    ATENCIÓN: Si habla español, tiene a waddell disposición servicios gratuitos de asistencia lingüística. [facility] [phone].    We comply with applicable federal civil rights laws and [place] laws. We do not discriminate on the basis of race, color, national origin, age, disability, sex, sexual orientation, or gender identity.            Thank you!     Thank you for choosing [facility]  for your care. Our goal is always to provide you with excellent care. Hearing back from our patients is one way we can continue to improve our services. Please take a few minutes to complete the written survey that you may receive in the mail after your visit with us. Thank you!             Your Updated Medication List - Protect others around you: Learn how to safely use, store and throw  away your medicines at www.disposemymeds.org.          This list is accurate as of 11/8/18 11:42 AM.  Always use your most recent med list.                   Brand Name Dispense Instructions for use Diagnosis    azithromycin 250 MG tablet    ZITHROMAX    6 tablet    Two tablets first day, then one tablet daily for four days.    Acute recurrent maxillary sinusitis       buPROPion 150 MG 24 hr tablet    WELLBUTRIN XL    10 tablet    Take 1 tablet (150 mg) by mouth every morning    Major depressive disorder, recurrent episode, moderate (H)       fluticasone 50 MCG/ACT spray    FLONASE    3 Bottle    Spray 2 sprays in nostril daily    Dysfunction of Eustachian tube, left       MIRENA (52 MG) 20 MCG/24HR IUD   Generic drug:  levonorgestrel      1 each by Intrauterine route once        Multi-vitamin Tabs tablet   Generic drug:  multivitamin, therapeutic with minerals      1 TABLET DAILY        omeprazole 40 MG capsule    priLOSEC    90 capsule    Take 1 capsule (40 mg) by mouth daily Take 30-60 minutes before a meal.    Gastric hyperacidity       order for DME     1 Device    Equipment being ordered: Crutches, CAM boot, small, tall    Injury of right ankle, initial encounter       order for DME     1 Device    Equipment being ordered: lace up ankle brace, small    Injury of right ankle, subsequent encounter, Sprain of right ankle, unspecified ligament, subsequent encounter       predniSONE 20 MG tablet    DELTASONE    10 tablet    Take 1 tablet (20 mg) by mouth 2 times daily    Acute recurrent maxillary sinusitis       sertraline 100 MG tablet    ZOLOFT    180 tablet    TAKE TWO TABLETS BY MOUTH EVERY DAY    Dysthymia, Moderate major depression (H)       triamcinolone 0.1 % cream    KENALOG    30 g    Apply topically 3 times daily    Rash

## 2018-11-13 ENCOUNTER — THERAPY VISIT (OUTPATIENT)
Dept: PHYSICAL THERAPY | Facility: CLINIC | Age: 48
End: 2018-11-13
Payer: COMMERCIAL

## 2018-11-13 DIAGNOSIS — M25.571 PAIN IN JOINT, ANKLE AND FOOT, RIGHT: ICD-10-CM

## 2018-11-13 PROCEDURE — 97110 THERAPEUTIC EXERCISES: CPT | Mod: GP | Performed by: PHYSICAL THERAPY ASSISTANT

## 2018-11-13 PROCEDURE — 97112 NEUROMUSCULAR REEDUCATION: CPT | Mod: GP | Performed by: PHYSICAL THERAPY ASSISTANT

## 2018-11-21 ENCOUNTER — THERAPY VISIT (OUTPATIENT)
Dept: PHYSICAL THERAPY | Facility: CLINIC | Age: 48
End: 2018-11-21
Payer: COMMERCIAL

## 2018-11-21 DIAGNOSIS — M25.571 PAIN IN JOINT, ANKLE AND FOOT, RIGHT: ICD-10-CM

## 2018-11-21 PROCEDURE — 97112 NEUROMUSCULAR REEDUCATION: CPT | Mod: GP | Performed by: PHYSICAL THERAPY ASSISTANT

## 2018-11-21 PROCEDURE — 97110 THERAPEUTIC EXERCISES: CPT | Mod: GP | Performed by: PHYSICAL THERAPY ASSISTANT

## 2019-02-15 DIAGNOSIS — R30.0 DYSURIA: ICD-10-CM

## 2019-02-15 LAB
ALBUMIN UR-MCNC: NEGATIVE MG/DL
APPEARANCE UR: CLEAR
BACTERIA #/AREA URNS HPF: ABNORMAL /HPF
BILIRUB UR QL STRIP: NEGATIVE
COLOR UR AUTO: YELLOW
GLUCOSE UR STRIP-MCNC: NEGATIVE MG/DL
HGB UR QL STRIP: NEGATIVE
KETONES UR STRIP-MCNC: NEGATIVE MG/DL
LEUKOCYTE ESTERASE UR QL STRIP: ABNORMAL
NITRATE UR QL: NEGATIVE
PH UR STRIP: 7 PH (ref 5–7)
RBC #/AREA URNS AUTO: ABNORMAL /HPF
SOURCE: ABNORMAL
SP GR UR STRIP: 1.01 (ref 1–1.03)
UROBILINOGEN UR STRIP-ACNC: 0.2 EU/DL (ref 0.2–1)
WBC #/AREA URNS AUTO: ABNORMAL /HPF

## 2019-02-15 PROCEDURE — 81001 URINALYSIS AUTO W/SCOPE: CPT | Performed by: PHYSICIAN ASSISTANT

## 2019-07-15 ENCOUNTER — E-VISIT (OUTPATIENT)
Dept: FAMILY MEDICINE | Facility: CLINIC | Age: 49
End: 2019-07-15
Payer: COMMERCIAL

## 2019-07-15 ENCOUNTER — TELEPHONE (OUTPATIENT)
Dept: FAMILY MEDICINE | Facility: CLINIC | Age: 49
End: 2019-07-15

## 2019-07-15 DIAGNOSIS — J01.00 ACUTE NON-RECURRENT MAXILLARY SINUSITIS: Primary | ICD-10-CM

## 2019-07-15 PROCEDURE — 99444 ZZC PHYSICIAN ONLINE EVALUATION & MANAGEMENT SERVICE: CPT | Performed by: PHYSICIAN ASSISTANT

## 2019-07-15 RX ORDER — PREDNISONE 20 MG/1
20 TABLET ORAL 2 TIMES DAILY
Qty: 10 TABLET | Refills: 0 | Status: SHIPPED | OUTPATIENT
Start: 2019-07-15 | End: 2019-11-04

## 2019-07-15 RX ORDER — AZITHROMYCIN 250 MG/1
TABLET, FILM COATED ORAL
Qty: 6 TABLET | Refills: 0 | Status: SHIPPED | OUTPATIENT
Start: 2019-07-15 | End: 2019-11-04

## 2019-07-15 NOTE — TELEPHONE ENCOUNTER
"Patient called on son and adivsed she was also having symptoms of sinus infection.   Per patient this is a \"chornic\" ongoing issue and patient feels all the symptoms that she normally does when it is a sinus infection.     RN advised E-visit - patient verbalized understanding and will complete E-visit.     SlideBatch message sent to patient and patients son via SlideBatch advising E-visit.     No further questions.     Nessa Black RN, BSN, PHN    "

## 2019-08-07 DIAGNOSIS — M79.605 LOWER EXTREMITY PAIN, BILATERAL: ICD-10-CM

## 2019-08-07 DIAGNOSIS — M79.604 LOWER EXTREMITY PAIN, BILATERAL: ICD-10-CM

## 2019-08-07 NOTE — TELEPHONE ENCOUNTER
GABAPENTIN 300MG CAPS      Last Written Prescription Date:  05/08/19  Last Fill Quantity: 90,   # refills: 0  Last Office Visit: 11/08/18  LEANDER Kay  Future Office visit:       Routing refill request to provider for review/approval because:  Drug not on the [facility], [facility] or [facility] refill protocol or controlled substance

## 2019-08-11 RX ORDER — GABAPENTIN 300 MG/1
CAPSULE ORAL
Qty: 90 CAPSULE | Refills: 0 | Status: SHIPPED | OUTPATIENT
Start: 2019-08-11 | End: 2019-11-04

## 2019-11-03 ENCOUNTER — TELEPHONE (OUTPATIENT)
Dept: FAMILY MEDICINE | Facility: CLINIC | Age: 49
End: 2019-11-03

## 2019-11-03 NOTE — TELEPHONE ENCOUNTER
Reason for Call:  Other appointment    Detailed comments: Patient is calling to request an appointment with Darrick Kay for Monday. Patient states she may have bronchitis and already missed a week from work. Please follow up with patient.     Phone Number Patient can be reached at: Cell number on file:    Telephone Information:   Mobile [phone]       Best Time: anytime     Can we leave a detailed message on this number? YES    Call taken on 11/3/2019 at 11:27 AM by Rosy Mccann

## 2019-11-04 ENCOUNTER — HEALTH MAINTENANCE LETTER (OUTPATIENT)
Age: 49
End: 2019-11-04

## 2019-11-04 ENCOUNTER — OFFICE VISIT (OUTPATIENT)
Dept: FAMILY MEDICINE | Facility: CLINIC | Age: 49
End: 2019-11-04
Payer: COMMERCIAL

## 2019-11-04 VITALS
TEMPERATURE: 99.3 F | HEART RATE: 95 BPM | OXYGEN SATURATION: 97 % | WEIGHT: 208 LBS | DIASTOLIC BLOOD PRESSURE: 82 MMHG | HEIGHT: 63 IN | SYSTOLIC BLOOD PRESSURE: 118 MMHG | RESPIRATION RATE: 19 BRPM | BODY MASS INDEX: 36.86 KG/M2

## 2019-11-04 DIAGNOSIS — Z12.31 ENCOUNTER FOR SCREENING MAMMOGRAM FOR BREAST CANCER: ICD-10-CM

## 2019-11-04 DIAGNOSIS — J01.00 ACUTE NON-RECURRENT MAXILLARY SINUSITIS: Primary | ICD-10-CM

## 2019-11-04 DIAGNOSIS — F34.1 DYSTHYMIA: ICD-10-CM

## 2019-11-04 DIAGNOSIS — K31.89 GASTRIC HYPERACIDITY: ICD-10-CM

## 2019-11-04 DIAGNOSIS — F33.1 MAJOR DEPRESSIVE DISORDER, RECURRENT EPISODE, MODERATE (H): ICD-10-CM

## 2019-11-04 DIAGNOSIS — M79.605 LOWER EXTREMITY PAIN, BILATERAL: ICD-10-CM

## 2019-11-04 DIAGNOSIS — M79.604 LOWER EXTREMITY PAIN, BILATERAL: ICD-10-CM

## 2019-11-04 DIAGNOSIS — R05.9 COUGH: ICD-10-CM

## 2019-11-04 DIAGNOSIS — F32.1 MODERATE MAJOR DEPRESSION (H): ICD-10-CM

## 2019-11-04 PROCEDURE — 99214 OFFICE O/P EST MOD 30 MIN: CPT | Performed by: PHYSICIAN ASSISTANT

## 2019-11-04 RX ORDER — SERTRALINE HYDROCHLORIDE 100 MG/1
TABLET, FILM COATED ORAL
Qty: 180 TABLET | Refills: 3 | Status: SHIPPED | OUTPATIENT
Start: 2019-11-04 | End: 2020-11-06

## 2019-11-04 RX ORDER — GABAPENTIN 300 MG/1
CAPSULE ORAL
Qty: 90 CAPSULE | Refills: 1 | Status: SHIPPED | OUTPATIENT
Start: 2019-11-04 | End: 2020-05-12

## 2019-11-04 RX ORDER — CODEINE PHOSPHATE AND GUAIFENESIN 10; 100 MG/5ML; MG/5ML
2 SOLUTION ORAL
Qty: 120 ML | Refills: 0 | Status: SHIPPED | OUTPATIENT
Start: 2019-11-04 | End: 2019-11-18

## 2019-11-04 RX ORDER — PREDNISONE 20 MG/1
20 TABLET ORAL 2 TIMES DAILY
Qty: 10 TABLET | Refills: 0 | Status: SHIPPED | OUTPATIENT
Start: 2019-11-04 | End: 2019-11-09

## 2019-11-04 RX ORDER — OMEPRAZOLE 40 MG/1
40 CAPSULE, DELAYED RELEASE ORAL DAILY
Qty: 90 CAPSULE | Refills: 3 | Status: SHIPPED | OUTPATIENT
Start: 2019-11-04 | End: 2020-11-06

## 2019-11-04 RX ORDER — BUPROPION HYDROCHLORIDE 150 MG/1
150 TABLET ORAL EVERY MORNING
Qty: 90 TABLET | Refills: 1 | Status: SHIPPED | OUTPATIENT
Start: 2019-11-04 | End: 2020-01-28

## 2019-11-04 RX ORDER — AZITHROMYCIN 250 MG/1
TABLET, FILM COATED ORAL
Qty: 6 TABLET | Refills: 0 | Status: SHIPPED | OUTPATIENT
Start: 2019-11-04 | End: 2019-11-18

## 2019-11-04 ASSESSMENT — PATIENT HEALTH QUESTIONNAIRE - PHQ9
SUM OF ALL RESPONSES TO PHQ QUESTIONS 1-9: 0
5. POOR APPETITE OR OVEREATING: NOT AT ALL

## 2019-11-04 ASSESSMENT — ANXIETY QUESTIONNAIRES
5. BEING SO RESTLESS THAT IT IS HARD TO SIT STILL: NOT AT ALL
7. FEELING AFRAID AS IF SOMETHING AWFUL MIGHT HAPPEN: NOT AT ALL
GAD7 TOTAL SCORE: 0
2. NOT BEING ABLE TO STOP OR CONTROL WORRYING: NOT AT ALL
3. WORRYING TOO MUCH ABOUT DIFFERENT THINGS: NOT AT ALL
1. FEELING NERVOUS, ANXIOUS, OR ON EDGE: NOT AT ALL
IF YOU CHECKED OFF ANY PROBLEMS ON THIS QUESTIONNAIRE, HOW DIFFICULT HAVE THESE PROBLEMS MADE IT FOR YOU TO DO YOUR WORK, TAKE CARE OF THINGS AT HOME, OR GET ALONG WITH OTHER PEOPLE: NOT DIFFICULT AT ALL
6. BECOMING EASILY ANNOYED OR IRRITABLE: NOT AT ALL

## 2019-11-04 ASSESSMENT — MIFFLIN-ST. JEOR: SCORE: 1547.61

## 2019-11-04 NOTE — PROGRESS NOTES
Subjective     Vanesa Owen is a 49 year old female who presents to clinic today for the following health issues:    HPI   RESPIRATORY SYMPTOMS      Duration: 8 days    Description  nasal congestion, facial pain/pressure, cough, wheezing, headache and fatigue/malaise    Severity: moderate    Accompanying signs and symptoms: None    History (predisposing factors):  none    Precipitating or alleviating factors: None    Therapies tried and outcome:  Zinc and thera-flu with no relief    Noting facial pain. Nocturnal coughing.       Recheck of depression and anxiety. Doing well.     Patient Active Problem List   Diagnosis     Moderate major depression (H)     Presence of intrauterine contraceptive device     Vitamin D deficiency     Lumbar back pain     genital herpes, mainly cervical.      Gastric hyperacidity     CARDIOVASCULAR SCREENING; LDL GOAL LESS THAN 160     HCH     Obesity     Hip pain, left     Gastroesophageal reflux disease without esophagitis     Obesity (BMI 35.0-39.9) with comorbidity (H)     Pain in joint, ankle and foot, right     Past Surgical History:   Procedure Laterality Date     EXCIS VAGINAL CYST/TUMOR       SLING TRANSVAGINAL N/A 8/23/2016    Procedure: SLING TRANSVAGINAL;  Surgeon: Nam Sommers MD;  Location: [facility]     TONSILLECTOMY & ADENOIDECTOMY         Social History     Tobacco Use     Smoking status: Never Smoker     Smokeless tobacco: Never Used   Substance Use Topics     Alcohol use: No     Family History   Problem Relation Age of Onset     Diabetes Maternal Grandmother         Type II      Hypertension Maternal Grandmother      Alcohol/Drug Maternal Grandmother      Asthma Mother      Depression Mother      Depression Father 58     Cerebrovascular Disease Father      Alcohol/Drug Maternal Grandfather      Circulatory Paternal Grandmother      Asthma Brother      Breast Cancer Maternal Aunt          Current Outpatient Medications   Medication Sig Dispense Refill      "azithromycin (ZITHROMAX) 250 MG tablet Two tablets first day, then one tablet daily for four days. 6 tablet 0     buPROPion (WELLBUTRIN XL) 150 MG 24 hr tablet Take 1 tablet (150 mg) by mouth every morning 90 tablet 1     fluticasone (FLONASE) 50 MCG/ACT spray Spray 2 sprays in nostril daily 3 Bottle 11     gabapentin (NEURONTIN) 300 MG capsule TAKE ONE CAPSULE BY MOUTH EVERY NIGHT AT BEDTIME 90 capsule 1     guaiFENesin-codeine (ROBITUSSIN AC) 100-10 MG/5ML solution Take 10 mLs by mouth nightly as needed 120 mL 0     levonorgestrel (MIRENA) 20 MCG/24HR IUD 1 each by Intrauterine route once       MULTI-VITAMIN OR TABS 1 TABLET DAILY       omeprazole (PRILOSEC) 40 MG DR capsule Take 1 capsule (40 mg) by mouth daily Take 30-60 minutes before a meal. 90 capsule 3     order for DME Equipment being ordered: lace up ankle brace, small 1 Device 0     order for DME Equipment being ordered: Crutches, CAM boot, small, tall 1 Device 0     predniSONE (DELTASONE) 20 MG tablet Take 1 tablet (20 mg) by mouth 2 times daily for 5 days 10 tablet 0     sertraline (ZOLOFT) 100 MG tablet TAKE TWO TABLETS BY MOUTH EVERY  tablet 3     triamcinolone (KENALOG) 0.1 % cream Apply topically 3 times daily 30 g 1     Allergies   Allergen Reactions     Sulfa Drugs Rash     BP Readings from Last 3 Encounters:   11/04/19 118/82   11/08/18 117/77   10/17/18 106/72    Wt Readings from Last 3 Encounters:   11/04/19 94.3 kg (208 lb)   11/08/18 94.3 kg (208 lb)   10/17/18 94.3 kg (208 lb)                      Reviewed and updated as needed this visit by Provider         Review of Systems   ROS COMP: Constitutional, HEENT, cardiovascular, pulmonary, GI, , musculoskeletal, neuro, skin, endocrine and psych systems are negative, except as otherwise noted.      Objective    /82   Pulse 95   Temp 99.3  F (37.4  C) (Tympanic)   Resp 19   Ht 1.6 m (5' 3\")   Wt 94.3 kg (208 lb)   SpO2 97%   BMI 36.85 kg/m    Body mass index is 36.85 " kg/m .  Physical Exam     Eye exam - right eye normal lid, conjunctiva, cornea, pupil and fundus, left eye normal lid, conjunctiva, cornea, pupil and fundus.  Thyroid not palpable, not enlarged, no nodules detected.  CHEST:chest clear to IPPA, no tachypnea, retractions or cyanosis and S1, S2 normal, no murmur, no gallop, rate regular.  ENT exam reveals - right TM fluid noted, neck without nodes, throat normal without erythema or exudate, maxillary sinus tender, post nasal drip noted, nasal mucosa congested and nasal mucosa pale and congested.      Vanesa was seen today for cough.    Diagnoses and all orders for this visit:    Acute non-recurrent maxillary sinusitis  -     azithromycin (ZITHROMAX) 250 MG tablet; Two tablets first day, then one tablet daily for four days.  -     predniSONE (DELTASONE) 20 MG tablet; Take 1 tablet (20 mg) by mouth 2 times daily for 5 days    Major depressive disorder, recurrent episode, moderate (H)  -     buPROPion (WELLBUTRIN XL) 150 MG 24 hr tablet; Take 1 tablet (150 mg) by mouth every morning    Lower extremity pain, bilateral  -     gabapentin (NEURONTIN) 300 MG capsule; TAKE ONE CAPSULE BY MOUTH EVERY NIGHT AT BEDTIME    Dysthymia  -     sertraline (ZOLOFT) 100 MG tablet; TAKE TWO TABLETS BY MOUTH EVERY DAY    Moderate major depression (H)  -     sertraline (ZOLOFT) 100 MG tablet; TAKE TWO TABLETS BY MOUTH EVERY DAY    Gastric hyperacidity  -     omeprazole (PRILOSEC) 40 MG DR capsule; Take 1 capsule (40 mg) by mouth daily Take 30-60 minutes before a meal.    Encounter for screening mammogram for breast cancer  -     *MA Screening Digital Bilateral; Future    Cough  -     guaiFENesin-codeine (ROBITUSSIN AC) 100-10 MG/5ML solution; Take 10 mLs by mouth nightly as needed      work on lifestyle modification  Advised supportive and symptomatic treatment.  Follow up with Provider - if condition persists or worsens.

## 2019-11-05 ASSESSMENT — ANXIETY QUESTIONNAIRES: GAD7 TOTAL SCORE: 0

## 2019-11-18 ENCOUNTER — OFFICE VISIT (OUTPATIENT)
Dept: FAMILY MEDICINE | Facility: CLINIC | Age: 49
End: 2019-11-18
Payer: COMMERCIAL

## 2019-11-18 ENCOUNTER — ANCILLARY PROCEDURE (OUTPATIENT)
Dept: GENERAL RADIOLOGY | Facility: CLINIC | Age: 49
End: 2019-11-18
Attending: PHYSICIAN ASSISTANT
Payer: COMMERCIAL

## 2019-11-18 VITALS
HEIGHT: 63 IN | OXYGEN SATURATION: 97 % | TEMPERATURE: 99 F | WEIGHT: 208 LBS | DIASTOLIC BLOOD PRESSURE: 80 MMHG | SYSTOLIC BLOOD PRESSURE: 126 MMHG | BODY MASS INDEX: 36.86 KG/M2 | RESPIRATION RATE: 18 BRPM | HEART RATE: 109 BPM

## 2019-11-18 DIAGNOSIS — R05.8 POST-VIRAL COUGH SYNDROME: Primary | ICD-10-CM

## 2019-11-18 PROCEDURE — 99213 OFFICE O/P EST LOW 20 MIN: CPT | Performed by: PHYSICIAN ASSISTANT

## 2019-11-18 PROCEDURE — 71046 X-RAY EXAM CHEST 2 VIEWS: CPT

## 2019-11-18 RX ORDER — PREDNISONE 20 MG/1
20 TABLET ORAL 2 TIMES DAILY
Qty: 10 TABLET | Refills: 0 | Status: SHIPPED | OUTPATIENT
Start: 2019-11-18 | End: 2019-11-23

## 2019-11-18 RX ORDER — BENZONATATE 200 MG/1
200 CAPSULE ORAL 3 TIMES DAILY PRN
Qty: 30 CAPSULE | Refills: 1 | Status: SHIPPED | OUTPATIENT
Start: 2019-11-18 | End: 2020-02-07

## 2019-11-18 RX ORDER — CODEINE PHOSPHATE AND GUAIFENESIN 10; 100 MG/5ML; MG/5ML
2 SOLUTION ORAL
Qty: 120 ML | Refills: 0 | Status: SHIPPED | OUTPATIENT
Start: 2019-11-18 | End: 2020-01-28

## 2019-11-18 ASSESSMENT — MIFFLIN-ST. JEOR: SCORE: 1547.61

## 2019-11-18 NOTE — PROGRESS NOTES
Subjective     Vanesa Owen is a 49 year old female who presents to clinic today for the following health issues:    HPI   RESPIRATORY SYMPTOMS      Duration: 1 month    Description  nasal congestion and cough    Severity: moderate    Accompanying signs and symptoms: None    History (predisposing factors):  none    Precipitating or alleviating factors: None    Therapies tried and outcome:  zithromax and robitussin with no relief        Patient Active Problem List   Diagnosis     Moderate major depression (H)     Presence of intrauterine contraceptive device     Vitamin D deficiency     Lumbar back pain     genital herpes, mainly cervical.      Gastric hyperacidity     CARDIOVASCULAR SCREENING; LDL GOAL LESS THAN 160     HCH     Obesity     Hip pain, left     Gastroesophageal reflux disease without esophagitis     Obesity (BMI 35.0-39.9) with comorbidity (H)     Pain in joint, ankle and foot, right     Past Surgical History:   Procedure Laterality Date     EXCIS VAGINAL CYST/TUMOR       SLING TRANSVAGINAL N/A 8/23/2016    Procedure: SLING TRANSVAGINAL;  Surgeon: Nam Sommers MD;  Location: [facility]     TONSILLECTOMY & ADENOIDECTOMY         Social History     Tobacco Use     Smoking status: Never Smoker     Smokeless tobacco: Never Used   Substance Use Topics     Alcohol use: No     Family History   Problem Relation Age of Onset     Diabetes Maternal Grandmother         Type II      Hypertension Maternal Grandmother      Alcohol/Drug Maternal Grandmother      Asthma Mother      Depression Mother      Depression Father 58     Cerebrovascular Disease Father      Alcohol/Drug Maternal Grandfather      Circulatory Paternal Grandmother      Asthma Brother      Breast Cancer Maternal Aunt          Current Outpatient Medications   Medication Sig Dispense Refill     azithromycin (ZITHROMAX) 250 MG tablet Two tablets first day, then one tablet daily for four days. 6 tablet 0     buPROPion (WELLBUTRIN XL) 150 MG 24  "hr tablet Take 1 tablet (150 mg) by mouth every morning 90 tablet 1     fluticasone (FLONASE) 50 MCG/ACT spray Spray 2 sprays in nostril daily 3 Bottle 11     gabapentin (NEURONTIN) 300 MG capsule TAKE ONE CAPSULE BY MOUTH EVERY NIGHT AT BEDTIME 90 capsule 1     guaiFENesin-codeine (ROBITUSSIN AC) 100-10 MG/5ML solution Take 10 mLs by mouth nightly as needed 120 mL 0     levonorgestrel (MIRENA) 20 MCG/24HR IUD 1 each by Intrauterine route once       MULTI-VITAMIN OR TABS 1 TABLET DAILY       omeprazole (PRILOSEC) 40 MG DR capsule Take 1 capsule (40 mg) by mouth daily Take 30-60 minutes before a meal. 90 capsule 3     order for DME Equipment being ordered: lace up ankle brace, small 1 Device 0     order for DME Equipment being ordered: Crutches, CAM boot, small, tall 1 Device 0     sertraline (ZOLOFT) 100 MG tablet TAKE TWO TABLETS BY MOUTH EVERY  tablet 3     triamcinolone (KENALOG) 0.1 % cream Apply topically 3 times daily 30 g 1     Allergies   Allergen Reactions     Sulfa Drugs Rash     BP Readings from Last 3 Encounters:   11/18/19 126/80   11/04/19 118/82   11/08/18 117/77    Wt Readings from Last 3 Encounters:   11/18/19 94.3 kg (208 lb)   11/04/19 94.3 kg (208 lb)   11/08/18 94.3 kg (208 lb)                      Reviewed and updated as needed this visit by Provider         Review of Systems   ROS COMP: Constitutional, HEENT, cardiovascular, pulmonary, GI, , musculoskeletal, neuro, skin, endocrine and psych systems are negative, except as otherwise noted.      Objective    /80   Pulse 109   Temp 99  F (37.2  C) (Tympanic)   Resp 18   Ht 1.6 m (5' 3\")   Wt 94.3 kg (208 lb)   SpO2 97%   BMI 36.85 kg/m    Body mass index is 36.85 kg/m .  Physical Exam   ENT exam reveals - bilateral TM fluid noted, neck without nodes, throat normal without erythema or exudate, sinuses nontender, post nasal drip noted, nasal mucosa congested and nasal mucosa pale and congested.  CHEST:chest clear to IPPA, no " tachypnea, retractions or cyanosis and S1, S2 normal, no murmur, no gallop, rate regular.      Vanesa was seen today for cough.    Diagnoses and all orders for this visit:    Post-viral cough syndrome  -     guaiFENesin-codeine (ROBITUSSIN AC) 100-10 MG/5ML solution; Take 10 mLs by mouth nightly as needed  -     benzonatate (TESSALON) 200 MG capsule; Take 1 capsule (200 mg) by mouth 3 times daily as needed for cough  -     predniSONE (DELTASONE) 20 MG tablet; Take 1 tablet (20 mg) by mouth 2 times daily for 5 days  -     XR Chest 2 Views      Advised supportive and symptomatic treatment.  Follow up with Provider - if condition persists or worsens.

## 2019-11-22 ENCOUNTER — ANCILLARY PROCEDURE (OUTPATIENT)
Dept: MAMMOGRAPHY | Facility: CLINIC | Age: 49
End: 2019-11-22
Attending: PHYSICIAN ASSISTANT
Payer: COMMERCIAL

## 2019-11-22 DIAGNOSIS — Z12.31 ENCOUNTER FOR SCREENING MAMMOGRAM FOR BREAST CANCER: ICD-10-CM

## 2019-11-22 PROCEDURE — 77067 SCR MAMMO BI INCL CAD: CPT | Performed by: RADIOLOGY

## 2019-12-16 ENCOUNTER — TRANSFERRED RECORDS (OUTPATIENT)
Dept: HEALTH INFORMATION MANAGEMENT | Facility: CLINIC | Age: 49
End: 2019-12-16

## 2019-12-27 ENCOUNTER — TRANSFERRED RECORDS (OUTPATIENT)
Dept: HEALTH INFORMATION MANAGEMENT | Facility: CLINIC | Age: 49
End: 2019-12-27

## 2020-01-28 ENCOUNTER — OFFICE VISIT (OUTPATIENT)
Dept: FAMILY MEDICINE | Facility: CLINIC | Age: 50
End: 2020-01-28
Payer: COMMERCIAL

## 2020-01-28 VITALS
OXYGEN SATURATION: 96 % | HEART RATE: 88 BPM | WEIGHT: 220 LBS | SYSTOLIC BLOOD PRESSURE: 124 MMHG | TEMPERATURE: 98 F | BODY MASS INDEX: 38.97 KG/M2 | RESPIRATION RATE: 16 BRPM | DIASTOLIC BLOOD PRESSURE: 79 MMHG

## 2020-01-28 DIAGNOSIS — R30.0 DYSURIA: ICD-10-CM

## 2020-01-28 DIAGNOSIS — T36.95XA ANTIBIOTIC-INDUCED YEAST INFECTION: Primary | ICD-10-CM

## 2020-01-28 DIAGNOSIS — B37.9 ANTIBIOTIC-INDUCED YEAST INFECTION: Primary | ICD-10-CM

## 2020-01-28 LAB
ALBUMIN UR-MCNC: NEGATIVE MG/DL
APPEARANCE UR: CLEAR
BILIRUB UR QL STRIP: NEGATIVE
COLOR UR AUTO: YELLOW
GLUCOSE UR STRIP-MCNC: NEGATIVE MG/DL
HGB UR QL STRIP: NEGATIVE
KETONES UR STRIP-MCNC: NEGATIVE MG/DL
LEUKOCYTE ESTERASE UR QL STRIP: NEGATIVE
NITRATE UR QL: NEGATIVE
PH UR STRIP: 7 PH (ref 5–7)
SOURCE: NORMAL
SP GR UR STRIP: 1.02 (ref 1–1.03)
UROBILINOGEN UR STRIP-ACNC: 0.2 EU/DL (ref 0.2–1)

## 2020-01-28 PROCEDURE — 99213 OFFICE O/P EST LOW 20 MIN: CPT | Performed by: FAMILY MEDICINE

## 2020-01-28 PROCEDURE — 81003 URINALYSIS AUTO W/O SCOPE: CPT | Performed by: FAMILY MEDICINE

## 2020-01-28 RX ORDER — TRIAMCINOLONE ACETONIDE 1 MG/G
CREAM TOPICAL 3 TIMES DAILY
Qty: 30 G | Refills: 1 | Status: CANCELLED | OUTPATIENT
Start: 2020-01-28

## 2020-01-28 RX ORDER — FLUCONAZOLE 150 MG/1
150 TABLET ORAL ONCE
Qty: 1 TABLET | Refills: 0 | Status: SHIPPED | OUTPATIENT
Start: 2020-01-28 | End: 2020-07-22

## 2020-01-28 NOTE — PROGRESS NOTES
Subjective     Vanesa Owen is a 49 year old female who presents to clinic today for the following health issues:    HPI   URINARY TRACT SYMPTOMS      Duration: 2-3 weeks    Description  urgency, back pain, vaginal discharge and some abdominal pain    Intensity:  mild    Accompanying signs and symptoms:  Fever/chills: no   Flank pain no   Nausea and vomiting: YES- nausea and bloating  Vaginal symptoms: discharge  Abdominal/Pelvic Pain: no     History  History of frequent UTI's: YES- many years ago  History of kidney stones: no   Sexually Active: no   Possibility of pregnancy: No    Precipitating or alleviating factors: None    Therapies tried and outcome: cranberry juice    Outcome:     Vaginal Symptoms  Seemed to start after she started antibiotics from her dermatologist recently.       Duration: x 3 weeks    Description  vaginal discharge - white, itching, burning and pain with intercourse    Intensity:  moderate    Accompanying signs and symptoms (fever/dysuria/abdominal or back pain): slight back pain and some abdominal pain    History  Sexually active: yes, single partner, contraception - Mirena  Possibility of pregnancy: No  Recent antibiotic use: YES- doxycycline    Precipitating or alleviating factors: None    Therapies tried and outcome: Vagisil wipes  Outcome: did not do much      Patient Active Problem List   Diagnosis     Moderate major depression (H)     Presence of intrauterine contraceptive device     Vitamin D deficiency     Lumbar back pain     genital herpes, mainly cervical.      Gastric hyperacidity     CARDIOVASCULAR SCREENING; LDL GOAL LESS THAN 160     HCH     Obesity     Hip pain, left     Gastroesophageal reflux disease without esophagitis     Obesity (BMI 35.0-39.9) with comorbidity (H)     Pain in joint, ankle and foot, right     Past Surgical History:   Procedure Laterality Date     EXCIS VAGINAL CYST/TUMOR       SLING TRANSVAGINAL N/A 8/23/2016    Procedure: SLING TRANSVAGINAL;   Surgeon: Nam Sommers MD;  Location: [facility]     TONSILLECTOMY & ADENOIDECTOMY         Social History     Tobacco Use     Smoking status: Never Smoker     Smokeless tobacco: Never Used   Substance Use Topics     Alcohol use: No     Family History   Problem Relation Age of Onset     Diabetes Maternal Grandmother         Type II      Hypertension Maternal Grandmother      Alcohol/Drug Maternal Grandmother      Asthma Mother      Depression Mother      Depression Father 58     Cerebrovascular Disease Father      Alcohol/Drug Maternal Grandfather      Circulatory Paternal Grandmother      Asthma Brother      Breast Cancer Maternal Aunt          Current Outpatient Medications   Medication Sig Dispense Refill     fluconazole (DIFLUCAN) 150 MG tablet Take 1 tablet (150 mg) by mouth once for 1 dose 1 tablet 0     fluticasone (FLONASE) 50 MCG/ACT spray Spray 2 sprays in nostril daily 3 Bottle 11     gabapentin (NEURONTIN) 300 MG capsule TAKE ONE CAPSULE BY MOUTH EVERY NIGHT AT BEDTIME 90 capsule 1     levonorgestrel (MIRENA) 20 MCG/24HR IUD 1 each by Intrauterine route once       MULTI-VITAMIN OR TABS 1 TABLET DAILY       omeprazole (PRILOSEC) 40 MG DR capsule Take 1 capsule (40 mg) by mouth daily Take 30-60 minutes before a meal. 90 capsule 3     sertraline (ZOLOFT) 100 MG tablet TAKE TWO TABLETS BY MOUTH EVERY  tablet 3     triamcinolone (KENALOG) 0.1 % cream Apply topically 3 times daily 30 g 1     benzonatate (TESSALON) 200 MG capsule Take 1 capsule (200 mg) by mouth 3 times daily as needed for cough (Patient not taking: Reported on 1/28/2020) 30 capsule 1     Allergies   Allergen Reactions     Sulfa Drugs Rash         Reviewed and updated as needed this visit by Provider         Review of Systems   ROS COMP: Constitutional, HEENT, cardiovascular, pulmonary, gi and gu systems are negative, except as otherwise noted.      Objective    /79   Pulse 88   Temp 98  F (36.7  C) (Tympanic)   Resp 16   Wt  99.8 kg (220 lb)   SpO2 96%   BMI 38.97 kg/m    Body mass index is 38.97 kg/m .  Physical Exam   GENERAL: healthy, alert and no distress  PSYCH: mentation appears normal, affect normal/bright    Diagnostic Test Results:  Labs reviewed in Epic  Results for orders placed or performed in visit on 01/28/20   *UA reflex to Microscopic and Culture ([facility] and [facility] (except [place] and [place])     Status: None   Result Value Ref Range    Color Urine Yellow     Appearance Urine Clear     Glucose Urine Negative NEG^Negative mg/dL    Bilirubin Urine Negative NEG^Negative    Ketones Urine Negative NEG^Negative mg/dL    Specific Gravity Urine 1.020 1.003 - 1.035    Blood Urine Negative NEG^Negative    pH Urine 7.0 5.0 - 7.0 pH    Protein Albumin Urine Negative NEG^Negative mg/dL    Urobilinogen Urine 0.2 0.2 - 1.0 EU/dL    Nitrite Urine Negative NEG^Negative    Leukocyte Esterase Urine Negative NEG^Negative    Source Midstream Urine              Assessment & Plan     Vanesa was seen today for uti and vaginal problem.    Diagnoses and all orders for this visit:    Antibiotic-induced yeast infection  -     fluconazole (DIFLUCAN) 150 MG tablet; Take 1 tablet (150 mg) by mouth once for 1 dose    Dysuria  -     *UA reflex to Microscopic and Culture ([facility] and [facility] (except [place] and [place])        Return in about 1 week (around 2/4/2020), or if symptoms worsen or fail to improve, for a Physical Exam with a Pap at your earliest convenience.    Chandrika Alcantar MD  [facility]

## 2020-02-07 ENCOUNTER — HOSPITAL ENCOUNTER (EMERGENCY)
Facility: CLINIC | Age: 50
Discharge: HOME | End: 2020-02-07
Attending: EMERGENCY MEDICINE | Admitting: EMERGENCY MEDICINE
Payer: OTHER MISCELLANEOUS

## 2020-02-07 VITALS
SYSTOLIC BLOOD PRESSURE: 125 MMHG | HEART RATE: 78 BPM | DIASTOLIC BLOOD PRESSURE: 80 MMHG | RESPIRATION RATE: 18 BRPM | HEIGHT: 63 IN | BODY MASS INDEX: 38.98 KG/M2 | WEIGHT: 220 LBS | OXYGEN SATURATION: 98 %

## 2020-02-07 DIAGNOSIS — S93.401A SPRAIN OF RIGHT ANKLE, UNSPECIFIED LIGAMENT, INITIAL ENCOUNTER: Primary | ICD-10-CM

## 2020-02-07 DIAGNOSIS — S80.01XA CONTUSION OF RIGHT KNEE, INITIAL ENCOUNTER: ICD-10-CM

## 2020-02-07 PROCEDURE — 99283 EMERGENCY DEPT VISIT LOW MDM: CPT | Mod: Z6 | Performed by: EMERGENCY MEDICINE

## 2020-02-07 PROCEDURE — 99284 EMERGENCY DEPT VISIT MOD MDM: CPT | Performed by: EMERGENCY MEDICINE

## 2020-02-07 ASSESSMENT — ENCOUNTER SYMPTOMS
ARTHRALGIAS: 0
ABDOMINAL PAIN: 0
COLOR CHANGE: 0
SHORTNESS OF BREATH: 0
DIFFICULTY URINATING: 0
FEVER: 0
EYE REDNESS: 0
NECK STIFFNESS: 0
CONFUSION: 0
HEADACHES: 0

## 2020-02-07 ASSESSMENT — MIFFLIN-ST. JEOR: SCORE: 1602.04

## 2020-02-07 NOTE — LETTER
February 7, 2020      To Whom It May Concern:      Vanesa Owen was seen in our Emergency Department today, 02/07/20.  I expect her condition to improve over the next days.  She may return to work when improved.    Sincerely,        Nam Og MD

## 2020-02-07 NOTE — ED AVS SNAPSHOT
[facility], [place], Emergency Department  [address]  [facility] [address]  Phone:  [phone]  Fax:  [phone]                                    Vanesa Owen   MRN: [id number]    Department:  [facility], Emergency Department   Date of Visit:  2/7/2020           After Visit Summary Signature Page    I have received my discharge instructions, and my questions have been answered. I have discussed any challenges I see with this plan with the nurse or doctor.    ..........................................................................................................................................  Patient/Patient Representative Signature      ..........................................................................................................................................  Patient Representative Print Name and Relationship to Patient    ..................................................               ................................................  Date                                   Time    ..........................................................................................................................................  Reviewed by Signature/Title    ...................................................              ..............................................  Date                                               Time          22EPIC Rev 08/18

## 2020-02-07 NOTE — ED PROVIDER NOTES
"  History     Chief Complaint   Patient presents with     Fall     slipped on newly waxed floor, twisted right ankle      HPI  Vanesa Owen is a 49 year old female presents for evaluation of right ankle pain.  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  She states that at 6:58 AM this morning she was hurrying to work when she fell on a newly waxed floor inverting her ankle and landing on her right knee.  She went to [facility] and was given ibuprofen and sent here for further evaluation and management.  She notes minimal swelling and states that she can walk on the ankle without significant discomfort.  She does state that a year ago she previously sprained the same ankle which required physical therapy.    I have reviewed the Medications, Allergies, Past Medical and Surgical History, and Social History in the Epic system.    Review of Systems   Constitutional: Negative for fever.   HENT: Negative for congestion.    Eyes: Negative for redness.   Respiratory: Negative for shortness of breath.    Cardiovascular: Negative for chest pain.   Gastrointestinal: Negative for abdominal pain.   Genitourinary: Negative for difficulty urinating.   Musculoskeletal: Negative for arthralgias and neck stiffness.   Skin: Negative for color change.   Neurological: Negative for headaches.   Psychiatric/Behavioral: Negative for confusion.       Physical Exam   BP: 125/80  Pulse: 78  Resp: 18  Height: 160 cm (5' 3\")  Weight: 99.8 kg (220 lb)  SpO2: 98 %      Physical Exam  Musculoskeletal:      Right knee: She exhibits erythema. She exhibits normal range of motion, no swelling, no effusion, no ecchymosis, no deformity and no laceration. Tenderness found. Patellar tendon tenderness noted.      Right ankle: She exhibits swelling ( Minimal over lateral malleolus). She exhibits normal range of motion, no ecchymosis, no deformity, no laceration and normal pulse. Tenderness. CF ligament tenderness " found. Achilles tendon normal.        Legs:          ED Course        Procedures               Labs Ordered and Resulted from Time of ED Arrival Up to the Time of Departure from the ED - No data to display         Assessments & Plan (with Medical Decision Making)   49-year-old female presents for evaluation of her right ankle pain status post fall.  She has no bony tenderness and minimal tenderness in the calcaneofibular ligament with scant swelling.  Signs and symptoms are most consistent with a sprain.  Patient was also noted to have findings consistent with right knee contusion.  Patient will be discharged with instructions on weightbearing as tolerated crutches as necessary, ibuprofen, elevation, ice and follow-up.    I have reviewed the nursing notes.    I have reviewed the findings, diagnosis, plan and need for follow up with the patient.    New Prescriptions    No medications on file       Final diagnoses:   Sprain of right ankle, unspecified ligament, initial encounter   Contusion of right knee, initial encounter       2/7/2020   [facility], [place], EMERGENCY DEPARTMENT     Nam Og MD  02/07/20 0818

## 2020-02-10 ENCOUNTER — TRANSFERRED RECORDS (OUTPATIENT)
Dept: HEALTH INFORMATION MANAGEMENT | Facility: CLINIC | Age: 50
End: 2020-02-10

## 2020-02-16 ENCOUNTER — HEALTH MAINTENANCE LETTER (OUTPATIENT)
Age: 50
End: 2020-02-16

## 2020-03-19 ENCOUNTER — E-VISIT (OUTPATIENT)
Dept: FAMILY MEDICINE | Facility: CLINIC | Age: 50
End: 2020-03-19
Payer: COMMERCIAL

## 2020-03-19 DIAGNOSIS — H10.33 ACUTE CONJUNCTIVITIS OF BOTH EYES, UNSPECIFIED ACUTE CONJUNCTIVITIS TYPE: Primary | ICD-10-CM

## 2020-03-19 PROCEDURE — 99421 OL DIG E/M SVC 5-10 MIN: CPT | Performed by: PHYSICIAN ASSISTANT

## 2020-03-19 RX ORDER — NEOMYCIN POLYMYXIN B SULFATES AND DEXAMETHASONE 3.5; 10000; 1 MG/ML; [USP'U]/ML; MG/ML
1 SUSPENSION/ DROPS OPHTHALMIC 3 TIMES DAILY
Qty: 5 ML | Refills: 0 | Status: SHIPPED | OUTPATIENT
Start: 2020-03-19 | End: 2020-07-22

## 2020-03-19 NOTE — PATIENT INSTRUCTIONS
Bacterial Conjunctivitis    You have an infection in the membranes covering the white part of the eye. This part of the eye is called the conjunctiva. The infection is called conjunctivitis. The most common symptoms of conjunctivitis include a thick, pus-like discharge from the eye, swollen eyelids, redness, eyelids sticking together upon awakening, and a gritty or scratchy feeling in the eye. Your infection was caused by bacteria. It may be treated with medicine. With treatment, the infection takes about 7 to 10 days to resolve.  Home care    Use prescribed antibiotic eye drops or ointment as directed to treat the infection.    Apply a warm compress (towel soaked in warm water) to the affected eye 3 to 4 times a day. Do this just before applying medicine to the eye.    Use a warm, wet cloth to wipe away crusting of the eyelids in the morning. This is caused by mucus drainage during the night. You may also use saline irrigating solution or artificial tears to rinse away mucus in the eye. Do not put a patch over the eye.    Wash your hands before and after touching the infected eye. This is to prevent spreading the infection to the other eye, and to other people. Don't share your towels or washcloths with others.    You may use acetaminophen or ibuprofen to control pain, unless another medicine was prescribed. (Note: If you have chronic liver or kidney disease or have ever had a stomach ulcer or gastrointestinal bleeding, talk with your doctor before using these medicines.)    Don't wear contact lenses until your eyes have healed and all symptoms are gone.  Follow-up care  Follow up with your healthcare provider, or as advised.  When to seek medical advice  Call your healthcare provider right away if any of these occur:    Worsening vision    Increasing pain in the eye    Increasing swelling or redness of the eyelid    Redness spreading around the eye  Date Last Reviewed: 7/1/2017 2000-2019 [employer]. [address] [place] [address]. All rights reserved. This information is not intended as a substitute for professional medical care. Always follow your healthcare professional's instructions.          Conjunctivitis, Allergic    Conjunctivitis is an irritation of a thin membrane in the eye. This membrane is called the conjunctiva. It covers the white of the eye and the inside of the eyelid. The condition is often called pink eye or red eye because the eye looks pink or red. The eye can also be swollen. A thick fluid may leak from the eyelid. The eye may itch and burn, and feel gritty or scratchy.  Allergic conjunctivitis is caused by an allergen. Allergens are substances that cause the body to react with certain symptoms. Allergens that cause eye irritation include things such as house dust or pollen in the air. This can occur seasonally, most often in the spring.  Home care    Eye drops may be prescribed to reduce itching and redness. Use these as directed. Otherwise, over-the-counter decongestant eye drops may be used.    Apply a cool compress (towel soaked in cool water) to the affected eye 3 to 4 times a day to reduce swelling and itching.    It is common to have mucus drainage during the night, causing the eyelids to become crusted by morning. Use a warm, wet cloth to wipe this away. You may also use saline irrigating solution or artificial tears to rinse away mucus in the eye. Don't patch the eye.    You may use acetaminophen or ibuprofen to control pain, unless another medicine was prescribed. (Note: If you have chronic liver or kidney disease, or if you have ever had a stomach ulcer or gastrointestinal bleeding, talk with your healthcare provider before using these medicines.)    Don't wear contact lenses until your eyes have healed and all symptoms are gone.  Follow-up care  Follow up with your healthcare provider, or as advised.  When to seek medical advice  Call your healthcare  provider right away if any of these occur:    Increased eyelid swelling    New or worsening drainage from the eye    Increasing redness around the eye    Facial swelling  Date Last Reviewed: 7/1/2017 2000-2019 [employer]. [address] [place] [address], [place] [address]. All rights reserved. This information is not intended as a substitute for professional medical care. Always follow your healthcare professional's instructions.

## 2020-03-19 NOTE — TELEPHONE ENCOUNTER
Provider E-Visit time total (minutes): 6    SUBJECTIVE:   50 year old female with itching and mild irritation, redness, discharge and mattering in both eyes for 2 days.  No other symptoms.  No significant prior ophthalmological history. No change in visual acuity, no photophobia, no severe eye pain.    Diagnoses and all orders for this visit:    Acute conjunctivitis of both eyes, unspecified acute conjunctivitis type  -     neomycin-polymixin-dexamethasone (MAXITROL) 0.1 % ophthalmic suspension; Place 1 drop into both eyes 3 times daily for 7 days        PLAN:   Antibiotic drops per order. Hygiene discussed. If other family members develop same condition, may use same medication for them if they are not known to be allergic to it. Call prn.

## 2020-03-20 ENCOUNTER — MYC MEDICAL ADVICE (OUTPATIENT)
Dept: FAMILY MEDICINE | Facility: CLINIC | Age: 50
End: 2020-03-20

## 2020-03-20 ENCOUNTER — E-VISIT (OUTPATIENT)
Dept: FAMILY MEDICINE | Facility: CLINIC | Age: 50
End: 2020-03-20
Payer: COMMERCIAL

## 2020-03-20 DIAGNOSIS — J01.90 ACUTE SINUSITIS WITH SYMPTOMS > 10 DAYS: Primary | ICD-10-CM

## 2020-03-20 PROCEDURE — 99421 OL DIG E/M SVC 5-10 MIN: CPT | Performed by: PHYSICIAN ASSISTANT

## 2020-03-20 RX ORDER — AMOXICILLIN 875 MG
875 TABLET ORAL 2 TIMES DAILY
Qty: 20 TABLET | Refills: 0 | Status: SHIPPED | OUTPATIENT
Start: 2020-03-20 | End: 2020-07-22

## 2020-03-20 NOTE — PATIENT INSTRUCTIONS
Thank you for choosing us for your care. I have placed an order for a prescription so that you can start treatment. View your full visit summary for details by clicking on the link below. Your pharmacist will able to address any questions you may have about the medication.     If you're not feeling better within 5-7 days, please schedule an appointment.  You can schedule an appointment right here in [facility], or call [phone]  If the visit is for the same symptoms as your e-visit, we'll refund the cost of your e-visit if seen within seven days.    You may want to try a nasal lavage (also known as nasal irrigation). You can find over-the-counter products, such as Neti-Pot, at retail locations or make your own at home. Instructions for homemade nasal lavage and more information on the process are available online at http://www.aafp.org/afp/2009/1115/p1121.html.      Sinusitis (Antibiotic Treatment)    The sinuses are air-filled spaces within the bones of the face. They connect to the inside of the nose. Sinusitis is an inflammation of the tissue that lines the sinuses. Sinusitis can occur during a cold. It can also happen due to allergies to pollens and other particles in the air. Sinusitis can cause symptoms of sinus congestion and a feeling of fullness. A sinus infection causes fever, headache, and facial pain. There is often green or yellow fluid draining from the nose or into the back of the throat (post-nasal drip). You have been given antibiotics to treat this condition.  Home care    Take the full course of antibiotics as instructed. Do not stop taking them, even when you feel better.    Drink plenty of water, hot tea, and other liquids. This may help thin nasal mucus. It also may help your sinuses drain fluids.    Heat may help soothe painful areas of your face. Use a towel soaked in hot water. Or,  the shower and direct the warm spray onto your face. Using a vaporizer along with a menthol rub at  night may also help soothe symptoms.     An expectorant with guaifenesin may help thin nasal mucus and help your sinuses drain fluids.    You can use an over-the-counter decongestant, unless a similar medicine was prescribed to you. Nasal sprays work the fastest. Use one that contains phenylephrine or oxymetazoline. First blow your nose gently. Then use the spray. Do not use these medicines more often than directed on the label. If you do, your symptoms may get worse. You may also take pills that contain pseudoephedrine. Don t use products that combine multiple medicines. This is because side effects may be increased. Read labels. You can also ask the pharmacist for help. (People with high blood pressure should not use decongestants. They can raise blood pressure.)    Over-the-counter antihistamines may help if allergies contributed to your sinusitis.      Do not use nasal rinses or irrigation during an acute sinus infection, unless your healthcare provider tells you to. Rinsing may spread the infection to other areas in your sinuses.    Use acetaminophen or ibuprofen to control pain, unless another pain medicine was prescribed to you. If you have chronic liver or kidney disease or ever had a stomach ulcer, talk with your healthcare provider before using these medicines. (Aspirin should never be taken by anyone under age 18 who is ill with a fever. It may cause severe liver damage.)    Don't smoke. This can make symptoms worse.  Follow-up care  Follow up with your healthcare provider or our staff if you are not better in 1 week.  When to seek medical advice  Call your healthcare provider if any of these occur:    Facial pain or headache that gets worse    Stiff neck    Unusual drowsiness or confusion    Swelling of your forehead or eyelids    Vision problems, such as blurred or double vision    Fever of 100.4 F (38 C) or higher, or as directed by your healthcare provider    Seizure    Breathing problems    Symptoms  don't go away in 10 days  Prevention  Here are steps you can take to help prevent an infection:    Keep good hand washing habits.    Don t have close contact with people who have sore throats, colds, or other upper respiratory infections.    Don t smoke, and stay away from secondhand smoke.    Stay up to date with of your vaccines.  Date Last Reviewed: 11/1/2017 2000-2019 [employer]. [address] [place] [address], [place] [id number]. All rights reserved. This information is not intended as a substitute for professional medical care. Always follow your healthcare professional's instructions.

## 2020-03-20 NOTE — TELEPHONE ENCOUNTER
Provider E-Visit time total (minutes): 6    SUBJECTIVE:   Vanesa Owen is a 50 year old female who complains of nasal congestion, nasal blockage, runny nose, facial pressure and bilateral sinus pain for 14 days. She denies a history of no other unusual symptoms     Vanesa was seen today for sinus problem.    Diagnoses and all orders for this visit:    Acute sinusitis with symptoms > 10 days  -     amoxicillin (AMOXIL) 875 MG tablet; Take 1 tablet (875 mg) by mouth 2 times daily for 10 days          PLAN:  Symptomatic therapy suggested: push fluids, use vaporizer or mist needed  and use ibuprofen, aleve, decongestant of choice, antihistamine-decongestant of choice as needed. Call or return to clinic prn if these symptoms worsen or fail to improve as anticipated.

## 2020-03-22 RX ORDER — AZITHROMYCIN 250 MG/1
TABLET, FILM COATED ORAL
Qty: 6 TABLET | Refills: 0 | Status: SHIPPED | OUTPATIENT
Start: 2020-03-22 | End: 2020-07-22

## 2020-05-08 DIAGNOSIS — H69.92 DYSFUNCTION OF EUSTACHIAN TUBE, LEFT: ICD-10-CM

## 2020-05-12 NOTE — TELEPHONE ENCOUNTER
Routing refill request to provider for review/approval because:  Script . Pended for approval

## 2020-05-13 RX ORDER — FLUTICASONE PROPIONATE 50 MCG
SPRAY, SUSPENSION (ML) NASAL
Qty: 48 G | Refills: 11 | Status: SHIPPED | OUTPATIENT
Start: 2020-05-13 | End: 2021-08-23

## 2020-07-17 ENCOUNTER — OFFICE VISIT (OUTPATIENT)
Dept: OPTOMETRY | Facility: CLINIC | Age: 50
End: 2020-07-17
Payer: COMMERCIAL

## 2020-07-17 DIAGNOSIS — H52.4 PRESBYOPIA: ICD-10-CM

## 2020-07-17 DIAGNOSIS — D31.41 IRIS NEVUS, RIGHT: ICD-10-CM

## 2020-07-17 DIAGNOSIS — H52.223 REGULAR ASTIGMATISM OF BOTH EYES: Primary | ICD-10-CM

## 2020-07-17 PROCEDURE — 92015 DETERMINE REFRACTIVE STATE: CPT | Performed by: OPTOMETRIST

## 2020-07-17 PROCEDURE — 92004 COMPRE OPH EXAM NEW PT 1/>: CPT | Performed by: OPTOMETRIST

## 2020-07-17 RX ORDER — DOXYCYCLINE HYCLATE 75 MG/1
TABLET ORAL
COMMUNITY
Start: 2020-05-08 | End: 2020-11-18 | Stop reason: ALTCHOICE

## 2020-07-17 ASSESSMENT — EXTERNAL EXAM - RIGHT EYE: OD_EXAM: NORMAL

## 2020-07-17 ASSESSMENT — TONOMETRY
OD_IOP_MMHG: 14
OS_IOP_MMHG: 14
IOP_METHOD: APPLANATION

## 2020-07-17 ASSESSMENT — REFRACTION_MANIFEST
OS_ADD: +1.75
OS_CYLINDER: +1.25
OS_AXIS: 165
OS_AXIS: 165
OD_AXIS: 160
METHOD_AUTOREFRACTION: 1
OD_SPHERE: -1.50
OS_CYLINDER: +1.25
OS_SPHERE: -0.25
OD_ADD: +1.75
OD_AXIS: 160
OS_SPHERE: -0.50
OD_CYLINDER: +1.50
OD_CYLINDER: +1.25
OD_SPHERE: -1.50

## 2020-07-17 ASSESSMENT — VISUAL ACUITY
CORRECTION_TYPE: GLASSES
OD_CC+: -1
OS_CC+: -1
OS_CC: 20/40
OD_CC: 20/20
OD_CC: 20/30
OS_CC: 20/30
METHOD: SNELLEN - LINEAR

## 2020-07-17 ASSESSMENT — CONF VISUAL FIELD
METHOD: COUNTING FINGERS
OD_NORMAL: 1
OS_NORMAL: 1

## 2020-07-17 ASSESSMENT — SLIT LAMP EXAM - LIDS
COMMENTS: NORMAL
COMMENTS: NORMAL

## 2020-07-17 ASSESSMENT — REFRACTION_WEARINGRX
OD_AXIS: 160
OD_SPHERE: -1.00
OS_AXIS: 165
SPECS_TYPE: PAL
OS_SPHERE: -0.50
OS_ADD: +1.50
OD_CYLINDER: +1.25
OS_CYLINDER: +0.75
OD_ADD: +1.50

## 2020-07-17 ASSESSMENT — KERATOMETRY
OS_K2POWER_DIOPTERS: 43.25
OD_AXISANGLE2_DEGREES: 142
OD_K2POWER_DIOPTERS: 43.50
OS_K1POWER_DIOPTERS: 43.75
OS_AXISANGLE2_DEGREES: 166
OD_K1POWER_DIOPTERS: 44.25

## 2020-07-17 ASSESSMENT — EXTERNAL EXAM - LEFT EYE: OS_EXAM: NORMAL

## 2020-07-17 ASSESSMENT — CUP TO DISC RATIO
OS_RATIO: 0.2
OD_RATIO: 0.2

## 2020-07-17 NOTE — PATIENT INSTRUCTIONS
Patient was advised of today's exam findings.  Fill glasses prescription  Allow 2 weeks to adapt to change in glasses  Have iris nevi monitored yearly   Return in 1 year for eye exam    Lindsay Baca O.D.  [facility]   [address] [place], [place] [id number] [phone]

## 2020-07-17 NOTE — PROGRESS NOTES
Chief Complaint   Patient presents with     Annual Eye Exam      Accompanied by son, he's also having an eye exam today   Last Eye Exam: 12 years ago, she thinks that it was at [facility], [place] Previously: Yes    What are you currently using to see?  Glasses, at work for sure and sometimes at home for certain tasks        Distance Vision Acuity: Satisfied with vision, thinks that her vision is about the same    Near Vision Acuity: Not satisfied, trouble seeing small print      Eye Comfort: good and dry at times and then they water, also mentioned that sometimes she sees little black floaters, she thinks that they are in both eyes. Also has a nevous on her right eye that she want to continue to monitor    Do you use eye drops? : Yes: On occasion   Occupation or Hobbies: RN for [employer]           Medical, surgical and family histories reviewed and updated 7/17/2020.       OBJECTIVE: See Ophthalmology exam    ASSESSMENT:    ICD-10-CM    1. Regular astigmatism of both eyes  H52.223    2. Presbyopia  H52.4    3. 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  Fill glasses prescription  Allow 2 weeks to adapt to change in glasses  Have iris nevi monitored yearly   Return in 1 year for eye exam    Lindsay Baca O.D.  [facility]   [address] [place] [address] [phone]

## 2020-07-22 ENCOUNTER — OFFICE VISIT (OUTPATIENT)
Dept: OBGYN | Facility: CLINIC | Age: 50
End: 2020-07-22
Payer: COMMERCIAL

## 2020-07-22 VITALS
DIASTOLIC BLOOD PRESSURE: 84 MMHG | SYSTOLIC BLOOD PRESSURE: 126 MMHG | WEIGHT: 223.4 LBS | BODY MASS INDEX: 39.58 KG/M2 | TEMPERATURE: 98.9 F | RESPIRATION RATE: 18 BRPM | HEIGHT: 63 IN | HEART RATE: 87 BPM

## 2020-07-22 DIAGNOSIS — Z00.00 ROUTINE GENERAL MEDICAL EXAMINATION AT A HEALTH CARE FACILITY: Primary | ICD-10-CM

## 2020-07-22 DIAGNOSIS — N76.0 BACTERIAL VAGINOSIS: ICD-10-CM

## 2020-07-22 DIAGNOSIS — M79.661 RIGHT CALF PAIN: ICD-10-CM

## 2020-07-22 DIAGNOSIS — B96.89 BACTERIAL VAGINOSIS: ICD-10-CM

## 2020-07-22 DIAGNOSIS — L70.9 ACNE, UNSPECIFIED ACNE TYPE: ICD-10-CM

## 2020-07-22 DIAGNOSIS — N89.8 VAGINAL IRRITATION: ICD-10-CM

## 2020-07-22 LAB
ALBUMIN SERPL-MCNC: 4.1 G/DL (ref 3.4–5)
ALP SERPL-CCNC: 75 U/L (ref 40–150)
ALT SERPL W P-5'-P-CCNC: 22 U/L (ref 0–50)
ANION GAP SERPL CALCULATED.3IONS-SCNC: 4 MMOL/L (ref 3–14)
AST SERPL W P-5'-P-CCNC: 16 U/L (ref 0–45)
BILIRUB SERPL-MCNC: 0.5 MG/DL (ref 0.2–1.3)
BUN SERPL-MCNC: 13 MG/DL (ref 7–30)
CALCIUM SERPL-MCNC: 8.9 MG/DL (ref 8.5–10.1)
CHLORIDE SERPL-SCNC: 103 MMOL/L (ref 94–109)
CHOLEST SERPL-MCNC: 214 MG/DL
CO2 SERPL-SCNC: 29 MMOL/L (ref 20–32)
CREAT SERPL-MCNC: 0.7 MG/DL (ref 0.52–1.04)
ERYTHROCYTE [DISTWIDTH] IN BLOOD BY AUTOMATED COUNT: 12.3 % (ref 10–15)
FSH SERPL-ACNC: 25.7 IU/L
GFR SERPL CREATININE-BSD FRML MDRD: >90 ML/MIN/{1.73_M2}
GLUCOSE SERPL-MCNC: 88 MG/DL (ref 70–99)
HCT VFR BLD AUTO: 39.5 % (ref 35–47)
HDLC SERPL-MCNC: 68 MG/DL
HGB BLD-MCNC: 13.7 G/DL (ref 11.7–15.7)
LDLC SERPL CALC-MCNC: 112 MG/DL
MCH RBC QN AUTO: 32.9 PG (ref 26.5–33)
MCHC RBC AUTO-ENTMCNC: 34.7 G/DL (ref 31.5–36.5)
MCV RBC AUTO: 95 FL (ref 78–100)
NONHDLC SERPL-MCNC: 146 MG/DL
PLATELET # BLD AUTO: 269 10E9/L (ref 150–450)
POTASSIUM SERPL-SCNC: 4.1 MMOL/L (ref 3.4–5.3)
PROT SERPL-MCNC: 7.5 G/DL (ref 6.8–8.8)
RBC # BLD AUTO: 4.16 10E12/L (ref 3.8–5.2)
SODIUM SERPL-SCNC: 136 MMOL/L (ref 133–144)
SPECIMEN SOURCE: ABNORMAL
TRIGL SERPL-MCNC: 168 MG/DL
WBC # BLD AUTO: 5 10E9/L (ref 4–11)
WET PREP SPEC: ABNORMAL

## 2020-07-22 PROCEDURE — 85027 COMPLETE CBC AUTOMATED: CPT | Performed by: PHYSICIAN ASSISTANT

## 2020-07-22 PROCEDURE — 83001 ASSAY OF GONADOTROPIN (FSH): CPT | Performed by: PHYSICIAN ASSISTANT

## 2020-07-22 PROCEDURE — 99213 OFFICE O/P EST LOW 20 MIN: CPT | Mod: 25 | Performed by: PHYSICIAN ASSISTANT

## 2020-07-22 PROCEDURE — 80053 COMPREHEN METABOLIC PANEL: CPT | Performed by: PHYSICIAN ASSISTANT

## 2020-07-22 PROCEDURE — G0145 SCR C/V CYTO,THINLAYER,RESCR: HCPCS | Performed by: PHYSICIAN ASSISTANT

## 2020-07-22 PROCEDURE — 99396 PREV VISIT EST AGE 40-64: CPT | Mod: 25 | Performed by: PHYSICIAN ASSISTANT

## 2020-07-22 PROCEDURE — 90715 TDAP VACCINE 7 YRS/> IM: CPT | Performed by: PHYSICIAN ASSISTANT

## 2020-07-22 PROCEDURE — 87210 SMEAR WET MOUNT SALINE/INK: CPT | Performed by: PHYSICIAN ASSISTANT

## 2020-07-22 PROCEDURE — 90471 IMMUNIZATION ADMIN: CPT | Performed by: PHYSICIAN ASSISTANT

## 2020-07-22 PROCEDURE — 87624 HPV HI-RISK TYP POOLED RSLT: CPT | Performed by: PHYSICIAN ASSISTANT

## 2020-07-22 PROCEDURE — 80061 LIPID PANEL: CPT | Performed by: PHYSICIAN ASSISTANT

## 2020-07-22 PROCEDURE — 36415 COLL VENOUS BLD VENIPUNCTURE: CPT | Performed by: PHYSICIAN ASSISTANT

## 2020-07-22 RX ORDER — METRONIDAZOLE 500 MG/1
500 TABLET ORAL 2 TIMES DAILY
Qty: 14 TABLET | Refills: 0 | Status: SHIPPED | OUTPATIENT
Start: 2020-07-22 | End: 2020-08-07

## 2020-07-22 RX ORDER — FLUCONAZOLE 150 MG/1
150 TABLET ORAL ONCE
Qty: 1 TABLET | Refills: 0 | Status: SHIPPED | OUTPATIENT
Start: 2020-07-22 | End: 2020-08-07

## 2020-07-22 ASSESSMENT — ANXIETY QUESTIONNAIRES
6. BECOMING EASILY ANNOYED OR IRRITABLE: SEVERAL DAYS
5. BEING SO RESTLESS THAT IT IS HARD TO SIT STILL: NOT AT ALL
7. FEELING AFRAID AS IF SOMETHING AWFUL MIGHT HAPPEN: NOT AT ALL
1. FEELING NERVOUS, ANXIOUS, OR ON EDGE: SEVERAL DAYS
GAD7 TOTAL SCORE: 2
2. NOT BEING ABLE TO STOP OR CONTROL WORRYING: NOT AT ALL
3. WORRYING TOO MUCH ABOUT DIFFERENT THINGS: NOT AT ALL
IF YOU CHECKED OFF ANY PROBLEMS ON THIS QUESTIONNAIRE, HOW DIFFICULT HAVE THESE PROBLEMS MADE IT FOR YOU TO DO YOUR WORK, TAKE CARE OF THINGS AT HOME, OR GET ALONG WITH OTHER PEOPLE: NOT DIFFICULT AT ALL

## 2020-07-22 ASSESSMENT — PATIENT HEALTH QUESTIONNAIRE - PHQ9
5. POOR APPETITE OR OVEREATING: NOT AT ALL
SUM OF ALL RESPONSES TO PHQ QUESTIONS 1-9: 5

## 2020-07-22 ASSESSMENT — MIFFLIN-ST. JEOR: SCORE: 1612.47

## 2020-07-22 NOTE — NURSING NOTE
"Initial /84 (BP Location: Right arm, Patient Position: Chair, Cuff Size: Adult Regular)   Pulse 87   Temp 98.9  F (37.2  C) (Tympanic)   Resp 18   Ht 1.6 m (5' 3\")   Wt 101.3 kg (223 lb 6.4 oz)   Breastfeeding No   BMI 39.57 kg/m   Estimated body mass index is 39.57 kg/m  as calculated from the following:    Height as of this encounter: 1.6 m (5' 3\").    Weight as of this encounter: 101.3 kg (223 lb 6.4 oz). .    Alexandra Ortega, RADHA    "

## 2020-07-22 NOTE — PROGRESS NOTES
Prior to immunization administration, verified patients identity using patient s name and date of birth. Please see Immunization Activity for additional information.     Screening Questionnaire for Adult Immunization    Are you sick today?   No   Do you have allergies to medications, food, a vaccine component or latex? Yes- sulfa drugs   Have you ever had a serious reaction after receiving a vaccination?   No   Do you have a long-term health problem with heart, lung, kidney, or metabolic disease (e.g., diabetes), asthma, a blood disorder, no spleen, complement component deficiency, a cochlear implant, or a spinal fluid leak?  Are you on long-term aspirin therapy?   No   Do you have cancer, leukemia, HIV/AIDS, or any other immune system problem?   No   Do you have a parent, brother, or sister with an immune system problem?   No   In the past 3 months, have you taken medications that affect  your immune system, such as prednisone, other steroids, or anticancer drugs; drugs for the treatment of rheumatoid arthritis, Crohn s disease, or psoriasis; or have you had radiation treatments?   No   Have you had a seizure, or a brain or other nervous system problem?   No   During the past year, have you received a transfusion of blood or blood    products, or been given immune (gamma) globulin or antiviral drug?   No   For women: Are you pregnant or is there a chance you could become       pregnant during the next month?   No   Have you received any vaccinations in the past 4 weeks?   No     Immunization questionnaire answers were all negative.        Per orders of Yamilka Nguyen PA-C, injection of TDAP given by Alexandra Ortega. Patient instructed to remain in clinic for 15 minutes afterwards, and to report any adverse reaction to me immediately.       Screening performed by Alexandra Ortega on 7/22/2020 at 11:12 AM.   SUBJECTIVE:   CC: Vanesa Owen is an 50 year old woman who presents for preventive health visit.      Healthy Habits:    Do you get at least three servings of calcium containing foods daily (dairy, green leafy vegetables, etc.)? yes    Amount of exercise or daily activities, outside of work: 2-3 day(s) per week    Problems taking medications regularly No    Medication side effects: Yes     Have you had an eye exam in the past two years? yes    Do you see a dentist twice per year? no    Do you have sleep apnea, excessive snoring or daytime drowsiness?no    Patient presents for her annual exam and states she has been dealing with increase in her acne over the past several months. Patient has been seeing a dermatologist and has been on a few rounds of antibiotics and other treatments for acne with minimal relief. Patient states she has noticed some vaginal irritation with antibiotic use and she is wondering if she could hanh her hormone level checked to see if this could be causing her increase in acne. She does not get her menses with the Mirena. Patient would also like her fasting lipids, TSH, and glucose checked. Patient is not due for her mammogram until November, but order will be placed for patient to get this done when it is due. Patient also states she has had right calf pain on and off for several years and notes that her grandmother had blood clots in the past. Patient denies calf pain currently and states when pain occurs she does not have any calf swelling, lump/bump in calf, or redness/warmth. Patient wondering if this is a clot or something else. Patient denies any symptoms today and states that the pain is not related to long distance travel, activity, dehydration, injury, or when she is at work standing for extended periods of time. Patient denies history of personal blood clots.   -------------------------------------    Today's PHQ-2 Score:   PHQ-2 ( 1999 Pfizer) 7/22/2020 7/21/2020   Q1: Little interest or pleasure in doing things 1 0   Q2: Feeling down, depressed or hopeless 1 1   PHQ-2 Score 2 1    Q1: Little interest or pleasure in doing things - Not at all   Q2: Feeling down, depressed or hopeless - Several days   PHQ-2 Score - 1       Abuse: Current or Past(Physical, Sexual or Emotional)- No  Do you feel safe in your environment? Yes        Social History     Tobacco Use     Smoking status: Never Smoker     Smokeless tobacco: Never Used   Substance Use Topics     Alcohol use: Yes     Comment: rare     If you drink alcohol do you typically have >3 drinks per day or >7 drinks per week? No                     Reviewed orders with patient.  Reviewed health maintenance and updated orders accordingly - Yes  Lab work is in process  BP Readings from Last 3 Encounters:   07/22/20 126/84   02/07/20 125/80   01/28/20 124/79    Wt Readings from Last 3 Encounters:   07/22/20 101.3 kg (223 lb 6.4 oz)   02/07/20 99.8 kg (220 lb)   01/28/20 99.8 kg (220 lb)                  Patient Active Problem List   Diagnosis     Moderate major depression (H)     Presence of intrauterine contraceptive device     Vitamin D deficiency     Lumbar back pain     genital herpes, mainly cervical.      Gastric hyperacidity     CARDIOVASCULAR SCREENING; LDL GOAL LESS THAN 160     HCH     Obesity     Hip pain, left     Gastroesophageal reflux disease without esophagitis     Obesity (BMI 35.0-39.9) with comorbidity (H)     Pain in joint, ankle and foot, right     Iris nevus, right     Past Surgical History:   Procedure Laterality Date     EXCIS VAGINAL CYST/TUMOR       SLING TRANSVAGINAL N/A 8/23/2016    Procedure: SLING TRANSVAGINAL;  Surgeon: Nam Sommers MD;  Location: [facility]     TONSILLECTOMY & ADENOIDECTOMY         Social History     Tobacco Use     Smoking status: Never Smoker     Smokeless tobacco: Never Used   Substance Use Topics     Alcohol use: Yes     Comment: rare     Family History   Problem Relation Age of Onset     Diabetes Maternal Grandmother         Type II      Hypertension Maternal Grandmother      Alcohol/Drug  Maternal Grandmother      Asthma Mother      Depression Mother      Glaucoma Mother      Depression Father 58     Cerebrovascular Disease Father      Alcohol/Drug Maternal Grandfather      Circulatory Paternal Grandmother      Glaucoma Paternal Grandmother      Asthma Brother      Breast Cancer Maternal Aunt      Macular Degeneration No family hx of          Current Outpatient Medications   Medication Sig Dispense Refill     Doxycycline Hyclate 75 MG TABS        fluconazole (DIFLUCAN) 150 MG tablet Take 1 tablet (150 mg) by mouth once for 1 dose 1 tablet 0     fluticasone (FLONASE) 50 MCG/ACT nasal spray USE 2 SPRAYS INTO THE NOSTRIL DAILY 48 g 11     gabapentin (NEURONTIN) 300 MG capsule TAKE ONE CAPSULE BY MOUTH EVERY NIGHT AT BEDTIME 90 capsule 0     levonorgestrel (MIRENA) 20 MCG/24HR IUD 1 each by Intrauterine route once       metroNIDAZOLE (FLAGYL) 500 MG tablet Take 1 tablet (500 mg) by mouth 2 times daily for 7 days 14 tablet 0     MULTI-VITAMIN OR TABS 1 TABLET DAILY       omeprazole (PRILOSEC) 40 MG DR capsule Take 1 capsule (40 mg) by mouth daily Take 30-60 minutes before a meal. 90 capsule 3     sertraline (ZOLOFT) 100 MG tablet TAKE TWO TABLETS BY MOUTH EVERY  tablet 3     Allergies   Allergen Reactions     Sulfa Drugs Rash     Recent Labs   Lab Test 01/05/17  1401 07/21/16  0942 05/22/14  1052  12/03/12  1000   LDL  --   --  117  --  118   HDL  --   --  46*  --  47*   TRIG  --   --  170*  --  157*   ALT 20 22 23   < >  --    CR 0.77 0.60 0.66   < >  --    GFRESTIMATED 82 >90  Non  GFR Calc   >90   < >  --    GFRESTBLACK >90   GFR Calc   >90   GFR Calc   >90   < >  --    POTASSIUM 4.0 4.0 4.2   < >  --    TSH 1.59  --  1.00  --   --     < > = values in this interval not displayed.        Mammogram Screening: Patient under age 50, mutual decision reflected in health maintenance. Mammogram ordered (patient due in November)       Pertinent mammograms  are reviewed under the imaging tab.  History of abnormal Pap smear: NO - age 30- 65 PAP every 3 years recommended  PAP / HPV Latest Ref Rng & Units 2016 2013 9/15/2010   PAP - NIL NIL NIL   HPV 16 DNA NEG Negative - -   HPV 18 DNA NEG Negative - -   OTHER HR HPV NEG Negative - -     Reviewed and updated as needed this visit by clinical staff  Tobacco  Allergies  Meds  Med Hx  Surg Hx  Fam Hx  Soc Hx        Reviewed and updated as needed this visit by Provider        Past Medical History:   Diagnosis Date     Anxiety      Depressive disorder      Herpes simplex with other ophthalmic complications      UTI (urinary tract infection)       Past Surgical History:   Procedure Laterality Date     EXCIS VAGINAL CYST/TUMOR       SLING TRANSVAGINAL N/A 2016    Procedure: SLING TRANSVAGINAL;  Surgeon: Nam Sommers MD;  Location: [facility]     TONSILLECTOMY & ADENOIDECTOMY       OB History    Para Term  AB Living   2 2 2 0 0 2   SAB TAB Ectopic Multiple Live Births   0 0 0 0 1      # Outcome Date GA Lbr Klever/2nd Weight Sex Delivery Anes PTL Lv   2 Term 08 40w4d 02:08 / 09:00 3.912 kg (8 lb 10 oz) M          Apgar1: 9  Apgar5: 9   1 Term 95 40w0d 07:00 3.487 kg (7 lb 11 oz) F    CORIE       ROS:  CONSTITUTIONAL: NEGATIVE for fever, chills, change in weight  INTEGUMENTARU/SKIN: NEGATIVE for worrisome rashes, moles or lesions  EYES: NEGATIVE for vision changes or irritation  ENT: NEGATIVE for ear, mouth and throat problems  RESP: NEGATIVE for significant cough or SOB  BREAST: NEGATIVE for masses, tenderness or discharge  CV: NEGATIVE for chest pain, palpitations or peripheral edema  GI: NEGATIVE for nausea, abdominal pain, heartburn, or change in bowel habits  : NEGATIVE for unusual urinary or vaginal symptoms. Periods are regular.  MUSCULOSKELETAL: NEGATIVE for significant arthralgias or myalgia  NEURO: NEGATIVE for weakness, dizziness or paresthesias  PSYCHIATRIC: NEGATIVE  "for changes in mood or affect  Extremities: right leg pain on and off     OBJECTIVE:   /84 (BP Location: Right arm, Patient Position: Chair, Cuff Size: Adult Regular)   Pulse 87   Temp 98.9  F (37.2  C) (Tympanic)   Resp 18   Ht 1.6 m (5' 3\")   Wt 101.3 kg (223 lb 6.4 oz)   Breastfeeding No   BMI 39.57 kg/m    EXAM:  GENERAL: healthy, alert and no distress  EYES: Eyes grossly normal to inspection, PERRL and conjunctivae and sclerae normal  HENT: ear canals and TM's normal, nose and mouth without ulcers or lesions  NECK: no adenopathy, no asymmetry, masses, or scars and thyroid normal to palpation  RESP: lungs clear to auscultation - no rales, rhonchi or wheezes  BREAST: normal without masses, tenderness or nipple discharge and no palpable axillary masses or adenopathy  CV: regular rate and rhythm, normal S1 S2, no S3 or S4, no murmur, click or rub, no peripheral edema and peripheral pulses strong  ABDOMEN: soft, nontender, no hepatosplenomegaly, no masses and bowel sounds normal   (female): normal female external genitalia, normal urethral meatus, vaginal mucosa pink, moist, well rugated, and normal cervix/adnexa/uterus without masses or discharge  MS: no gross musculoskeletal defects noted, no edema  SKIN: no suspicious lesions or rashes  NEURO: Normal strength and tone, mentation intact and speech normal  PSYCH: mentation appears normal, affect normal/bright  Extremities: no calf tenderness, swelling, erythema, rash, or palpable mass noted to bilateral lower extremities.     Diagnostic Test Results:  Labs reviewed in Epic    ASSESSMENT/PLAN:   1. Routine general medical examination at a health care facility  Patient to follow up with primary care provider if abnormal lipids, glucose, or TSH. Mammogram to have done in November.   - Lipid Profile (Chol, Trig, HDL, LDL calc)  - *MA Screening Digital Bilateral; Future  - Pap imaged thin layer screen with HPV - recommended age 30 - 65 years (select HPV " "order below)  - HPV High Risk Types DNA Cervical  - CBC with platelets  - Comprehensive metabolic panel (BMP + Alb, Alk Phos, ALT, AST, Total. Bili, TP)    2. Right calf pain  On and off for years with grandmother having history of DVT. Discussed referral to vascular surgery vs ordering a venous doppler today. After shared decision making with patient and patient not having any symptoms currently referral to vascular given to patient. Patient to follow up with primary care provider or go to Emergency Room if calf pain, swelling, redness, warmth, or palpable lump occur.   - VASCULAR SURGERY REFERRAL; Future    3. Vaginal irritation  Due to recent antibiotics for acne  - Wet prep  - fluconazole (DIFLUCAN) 150 MG tablet; Take 1 tablet (150 mg) by mouth once for 1 dose  Dispense: 1 tablet; Refill: 0    4. Acne, unspecified acne type  Patient requesting hormone level to see if this is contributing to her increase in acne.   - Follicle stimulating hormone    5. Bacterial vaginosis  Wet prep positive for clue cells; side effects and cautions discussed with patient regarding flagyl. Patient also given prescription for diflucan in case she gets a vaginal yeast infection from being on the flagyl.   - metroNIDAZOLE (FLAGYL) 500 MG tablet; Take 1 tablet (500 mg) by mouth 2 times daily for 7 days  Dispense: 14 tablet; Refill: 0    COUNSELING:   Reviewed preventive health counseling, as reflected in patient instructions       Regular exercise       Healthy diet/nutrition       Vision screening    Estimated body mass index is 39.57 kg/m  as calculated from the following:    Height as of this encounter: 1.6 m (5' 3\").    Weight as of this encounter: 101.3 kg (223 lb 6.4 oz).    Weight management plan: Discussed healthy diet and exercise guidelines     reports that she has never smoked. She has never used smokeless tobacco.      Counseling Resources:  ATP IV Guidelines  Pooled Cohorts Equation Calculator  Breast Cancer Risk " Calculator  FRAX Risk Assessment  ICSI Preventive Guidelines  Dietary Guidelines for Americans, 2010  USDA's MyPlate  ASA Prophylaxis  Lung CA Screening    Yamilka Nguyen PA-C  [facility]

## 2020-07-23 ASSESSMENT — ANXIETY QUESTIONNAIRES: GAD7 TOTAL SCORE: 2

## 2020-07-24 LAB
COPATH REPORT: NORMAL
PAP: NORMAL

## 2020-07-27 NOTE — RESULT ENCOUNTER NOTE
Sebastian Alexis,     Your cholesterol is elevated and should be discussed with your primary care provider regarding treatment and management. In the mean time eating a healthy mediterranean diet and exercise will also help with getting these numbers down. Your comprehensive metabolic panel and complete blood count are within normal limits. Your FSH level shows that at this time you are either mid cycle or post menopausal. Due to having the Mirena and not getting menses with this it is hard to distinguish which category you are in. If you have any questions please don't hesitate to ask.     Take Care,   Yamilka Nguyen PA-C

## 2020-07-28 LAB
FINAL DIAGNOSIS: NORMAL
HPV HR 12 DNA CVX QL NAA+PROBE: NEGATIVE
HPV16 DNA SPEC QL NAA+PROBE: NEGATIVE
HPV18 DNA SPEC QL NAA+PROBE: NEGATIVE
SPECIMEN DESCRIPTION: NORMAL
SPECIMEN SOURCE CVX/VAG CYTO: NORMAL

## 2020-08-06 DIAGNOSIS — M79.604 LOWER EXTREMITY PAIN, BILATERAL: ICD-10-CM

## 2020-08-06 DIAGNOSIS — M79.605 LOWER EXTREMITY PAIN, BILATERAL: ICD-10-CM

## 2020-08-07 ENCOUNTER — OFFICE VISIT (OUTPATIENT)
Dept: FAMILY MEDICINE | Facility: CLINIC | Age: 50
End: 2020-08-07
Payer: COMMERCIAL

## 2020-08-07 VITALS
TEMPERATURE: 98.7 F | DIASTOLIC BLOOD PRESSURE: 78 MMHG | SYSTOLIC BLOOD PRESSURE: 111 MMHG | HEART RATE: 87 BPM | WEIGHT: 226.4 LBS | BODY MASS INDEX: 40.1 KG/M2

## 2020-08-07 DIAGNOSIS — R30.0 DYSURIA: Primary | ICD-10-CM

## 2020-08-07 DIAGNOSIS — N89.8 VAGINAL IRRITATION: ICD-10-CM

## 2020-08-07 LAB
ALBUMIN UR-MCNC: NEGATIVE MG/DL
APPEARANCE UR: NORMAL
BACTERIA #/AREA URNS HPF: ABNORMAL /HPF
BILIRUB UR QL STRIP: NEGATIVE
COLOR UR AUTO: YELLOW
GLUCOSE UR STRIP-MCNC: NEGATIVE MG/DL
HGB UR QL STRIP: NEGATIVE
KETONES UR STRIP-MCNC: NEGATIVE MG/DL
LEUKOCYTE ESTERASE UR QL STRIP: NEGATIVE
NITRATE UR QL: NEGATIVE
NON-SQ EPI CELLS #/AREA URNS LPF: ABNORMAL /LPF
PH UR STRIP: 6.5 PH (ref 5–7)
RBC #/AREA URNS AUTO: ABNORMAL /HPF
SOURCE: NORMAL
SP GR UR STRIP: 1.02 (ref 1–1.03)
SPECIMEN SOURCE: NORMAL
UROBILINOGEN UR STRIP-ACNC: 0.2 EU/DL (ref 0.2–1)
WBC #/AREA URNS AUTO: ABNORMAL /HPF
WET PREP SPEC: NORMAL

## 2020-08-07 PROCEDURE — 87210 SMEAR WET MOUNT SALINE/INK: CPT | Performed by: NURSE PRACTITIONER

## 2020-08-07 PROCEDURE — 99213 OFFICE O/P EST LOW 20 MIN: CPT | Performed by: NURSE PRACTITIONER

## 2020-08-07 PROCEDURE — 81001 URINALYSIS AUTO W/SCOPE: CPT | Performed by: NURSE PRACTITIONER

## 2020-08-07 ASSESSMENT — ENCOUNTER SYMPTOMS
DIARRHEA: 0
NAUSEA: 1
FEVER: 0
CHILLS: 0
DYSURIA: 1
VOMITING: 0
ABDOMINAL PAIN: 0
DIFFICULTY URINATING: 0

## 2020-08-07 NOTE — PROGRESS NOTES
Subjective     Vanesa Owen is a 50 year old female who presents to clinic today for the following health issues:    HPI       Vaginal Symptoms      Duration: 2 days    Description  burning and upset stomach    Intensity:  mild, moderate    Accompanying signs and symptoms (fever/dysuria/abdominal or back pain): None    History  Sexually active: not at present  Possibility of pregnancy: No  Recent antibiotic use: YES    Precipitating or alleviating factors: None    Therapies tried and outcome: Diflucan and Flagyl (metronidazole) at end of July Outcome: helped    Reports vaginal burning, even when showering.  Improved some today.  Denies vaginal discharge.  Overall just doesn't feel well today, mild nausea today.  She was treated for BV 2 weeks ago.        Patient Active Problem List   Diagnosis     Moderate major depression (H)     Presence of intrauterine contraceptive device     Vitamin D deficiency     Lumbar back pain     genital herpes, mainly cervical.      Gastric hyperacidity     CARDIOVASCULAR SCREENING; LDL GOAL LESS THAN 160     HCH     Obesity     Hip pain, left     Gastroesophageal reflux disease without esophagitis     Obesity (BMI 35.0-39.9) with comorbidity (H)     Pain in joint, ankle and foot, right     Iris nevus, right     Past Surgical History:   Procedure Laterality Date     EXCIS VAGINAL CYST/TUMOR       SLING TRANSVAGINAL N/A 8/23/2016    Procedure: SLING TRANSVAGINAL;  Surgeon: Nam Sommers MD;  Location: [facility]     TONSILLECTOMY & ADENOIDECTOMY         Social History     Tobacco Use     Smoking status: Never Smoker     Smokeless tobacco: Never Used   Substance Use Topics     Alcohol use: Yes     Comment: rare     Family History   Problem Relation Age of Onset     Diabetes Maternal Grandmother         Type II      Hypertension Maternal Grandmother      Alcohol/Drug Maternal Grandmother      Asthma Mother      Depression Mother      Glaucoma Mother      Depression Father 58      Cerebrovascular Disease Father      Alcohol/Drug Maternal Grandfather      Circulatory Paternal Grandmother      Glaucoma Paternal Grandmother      Asthma Brother      Breast Cancer Maternal Aunt      Macular Degeneration No family hx of          Current Outpatient Medications   Medication Sig Dispense Refill     fluticasone (FLONASE) 50 MCG/ACT nasal spray USE 2 SPRAYS INTO THE NOSTRIL DAILY 48 g 11     gabapentin (NEURONTIN) 300 MG capsule TAKE ONE CAPSULE BY MOUTH EVERY NIGHT AT BEDTIME 90 capsule 0     levonorgestrel (MIRENA) 20 MCG/24HR IUD 1 each by Intrauterine route once       MULTI-VITAMIN OR TABS 1 TABLET DAILY       omeprazole (PRILOSEC) 40 MG DR capsule Take 1 capsule (40 mg) by mouth daily Take 30-60 minutes before a meal. 90 capsule 3     sertraline (ZOLOFT) 100 MG tablet TAKE TWO TABLETS BY MOUTH EVERY  tablet 3     Doxycycline Hyclate 75 MG TABS        Allergies   Allergen Reactions     Sulfa Drugs Rash       Reviewed and updated as needed this visit by Provider         Review of Systems   Constitutional: Negative for chills and fever.   Gastrointestinal: Positive for nausea. Negative for abdominal pain, diarrhea and vomiting.   Genitourinary: Positive for dysuria and vaginal pain. Negative for difficulty urinating, pelvic pain and vaginal discharge.          Objective    /78   Pulse 87   Temp 98.7  F (37.1  C) (Oral)   Wt 102.7 kg (226 lb 6.4 oz)   BMI 40.10 kg/m    Body mass index is 40.1 kg/m .  Physical Exam  Vitals signs reviewed.   Constitutional:       Appearance: She is well-developed.   HENT:      Head: Normocephalic.   Cardiovascular:      Rate and Rhythm: Normal rate and regular rhythm.   Pulmonary:      Effort: Pulmonary effort is normal.      Breath sounds: Normal breath sounds.   Skin:     General: Skin is warm and dry.   Neurological:      Mental Status: She is alert and oriented to person, place, and time.   Psychiatric:         Mood and Affect: Mood normal.          Behavior: Behavior normal.         Thought Content: Thought content normal.                Assessment & Plan     1. Dysuria  - UA reflex to Microscopic and Culture    2. Vaginal irritation  - Wet prep     Will await test results to determine what/if treatment necessary.     Return in about 1 week (around 8/14/2020) for worsening symptoms or failure to improve.    KIRSTIN Pineda [facility]

## 2020-08-07 NOTE — LETTER
August 7, 2020      Vanesa Owen  [address] [place] [id number]        To Whom It May Concern:    Vanesa Owen  was seen on 8/7/2020.  Please excuse her  until 8/8/2020 due to illness.        Sincerely,        KIRSTIN Pineda CNP

## 2020-08-10 NOTE — TELEPHONE ENCOUNTER
Last Written Prescription Date:  5/12/2020  Last Fill Quantity: 90,  # refills: 0   Last office visit: 1/28/2020 with prescribing provider:  Dr. Alcantar for acute concern.  Last preventative OV with PCP:  9/19/2018  Last physical: 7/22/2020 with OBGYN  Future Office Visit: none    Routing refill request to provider for review/approval because:  Drug not on the [facility] refill protocol     Elsi Mariano, RN, BSN

## 2020-08-11 RX ORDER — GABAPENTIN 300 MG/1
CAPSULE ORAL
Qty: 90 CAPSULE | Refills: 0 | Status: SHIPPED | OUTPATIENT
Start: 2020-08-11 | End: 2020-11-06

## 2020-08-20 ENCOUNTER — TRANSFERRED RECORDS (OUTPATIENT)
Dept: HEALTH INFORMATION MANAGEMENT | Facility: CLINIC | Age: 50
End: 2020-08-20

## 2020-08-22 ENCOUNTER — HOSPITAL ENCOUNTER (EMERGENCY)
Facility: CLINIC | Age: 50
Discharge: HOME | End: 2020-08-22
Attending: EMERGENCY MEDICINE | Admitting: EMERGENCY MEDICINE
Payer: COMMERCIAL

## 2020-08-22 VITALS
OXYGEN SATURATION: 97 % | HEART RATE: 79 BPM | RESPIRATION RATE: 16 BRPM | DIASTOLIC BLOOD PRESSURE: 89 MMHG | TEMPERATURE: 98.3 F | SYSTOLIC BLOOD PRESSURE: 121 MMHG

## 2020-08-22 DIAGNOSIS — H11.32 SUBCONJUNCTIVAL HEMORRHAGE OF LEFT EYE: ICD-10-CM

## 2020-08-22 DIAGNOSIS — H57.89 IRRITATION OF LEFT EYE: ICD-10-CM

## 2020-08-22 PROCEDURE — 99283 EMERGENCY DEPT VISIT LOW MDM: CPT | Performed by: EMERGENCY MEDICINE

## 2020-08-22 PROCEDURE — 99284 EMERGENCY DEPT VISIT MOD MDM: CPT | Mod: Z6 | Performed by: EMERGENCY MEDICINE

## 2020-08-22 PROCEDURE — 25000125 ZZHC RX 250: Performed by: EMERGENCY MEDICINE

## 2020-08-22 RX ORDER — PROPARACAINE HYDROCHLORIDE 5 MG/ML
1 SOLUTION/ DROPS OPHTHALMIC ONCE
Status: COMPLETED | OUTPATIENT
Start: 2020-08-22 | End: 2020-08-22

## 2020-08-22 RX ADMIN — PROPARACAINE HYDROCHLORIDE 1 DROP: 5 SOLUTION/ DROPS OPHTHALMIC at 08:29

## 2020-08-22 RX ADMIN — FLUORESCEIN SODIUM 1 STRIP: 1 STRIP OPHTHALMIC at 08:30

## 2020-08-22 ASSESSMENT — VISUAL ACUITY: OU: 1

## 2020-08-22 NOTE — DISCHARGE INSTRUCTIONS
Use lubricating drops such as Systane or refresh for the next 2 days   please make an appointment to follow up with [facility] (phone: [phone]) in 2 days unless symptoms completely resolve.

## 2020-08-22 NOTE — ED PROVIDER NOTES
ED Provider Note  [facility]      History     Chief Complaint   Patient presents with     Eye Injury     left eye redness/ discomfort     HPI  Vanesa Owen is a 50 year old female who presents for evaluation of left eye discomfort and redness.  Patient states that she was in her usual state of health and may have swept out her left eye yesterday evening when she began noticing a mild discomfort to almost as if a foreign body was present.  She noted left nasal sided redness.  She used Visine without relief.  She is here with concerns of a possible foreign body.  She denies blurred vision, any type of discharge or excessive tearing.  She denies similar history in the past.  She denies use of contact lenses.  She does state that she uses glasses for far vision.  She denies exacerbating or improving factors for her mild discomfort including light sensitivity.  Past Medical History  Past Medical History:   Diagnosis Date     Anxiety      Depressive disorder      Herpes simplex with other ophthalmic complications      UTI (urinary tract infection)      Past Surgical History:   Procedure Laterality Date     EXCIS VAGINAL CYST/TUMOR       SLING TRANSVAGINAL N/A 8/23/2016    Procedure: SLING TRANSVAGINAL;  Surgeon: Nam Sommers MD;  Location: [facility] OR     TONSILLECTOMY & ADENOIDECTOMY       fluticasone (FLONASE) 50 MCG/ACT nasal spray  gabapentin (NEURONTIN) 300 MG capsule  levonorgestrel (MIRENA) 20 MCG/24HR IUD  MULTI-VITAMIN OR TABS  omeprazole (PRILOSEC) 40 MG DR capsule  sertraline (ZOLOFT) 100 MG tablet  Doxycycline Hyclate 75 MG TABS      Allergies   Allergen Reactions     Sulfa Drugs Rash     Family History  Family History   Problem Relation Age of Onset     Diabetes Maternal Grandmother         Type II      Hypertension Maternal Grandmother      Alcohol/Drug Maternal Grandmother      Asthma Mother      Depression Mother      Glaucoma Mother      Depression Father 58      Cerebrovascular Disease Father      Alcohol/Drug Maternal Grandfather      Circulatory Paternal Grandmother      Glaucoma Paternal Grandmother      Asthma Brother      Breast Cancer Maternal Aunt      Macular Degeneration No family hx of      Social History   Social History     Tobacco Use     Smoking status: Never Smoker     Smokeless tobacco: Never Used   Substance Use Topics     Alcohol use: Yes     Comment: rare     Drug use: No      Past medical history, past surgical history, medications, allergies, family history, and social history were reviewed with the patient. No additional pertinent items.       Review of Systems  A complete review of systems was performed with pertinent positives and negatives noted in the HPI, and all other systems negative.    Physical Exam   BP: (!) 128/92  Pulse: 92  Temp: 98.3  F (36.8  C)  Resp: 16  SpO2: 97 %  Physical Exam  Constitutional:       General: She is not in acute distress.     Appearance: She is not diaphoretic.   HENT:      Head: Atraumatic.      Mouth/Throat:      Pharynx: No oropharyngeal exudate.   Eyes:      General: Lids are normal. Vision grossly intact. Gaze aligned appropriately. No scleral icterus.        Left eye: No foreign body, discharge or hordeolum.      Extraocular Movements: Extraocular movements intact.      Conjunctiva/sclera:      Left eye: Hemorrhage present. Left eye exudate:  Over nasal fold.      Pupils:      Left eye: Pupil is round, reactive and not sluggish. No corneal abrasion or fluorescein uptake.      Funduscopic exam:        Left eye: Hemorrhage present. No exudate, AV nicking, arteriolar narrowing or papilledema. Venous pulsations present.     Slit lamp exam:     Right eye: Anterior chamber quiet.      Left eye: Anterior chamber quiet.      Visual Fields: Left eye visual fields normal.   Cardiovascular:      Heart sounds: Normal heart sounds.   Pulmonary:      Effort: No respiratory distress.      Breath sounds: Normal breath sounds.    Abdominal:      General: Bowel sounds are normal.      Palpations: Abdomen is soft.      Tenderness: There is no abdominal tenderness.   Musculoskeletal:         General: No tenderness.   Skin:     General: Skin is warm.      Findings: No rash.         ED Course      Procedures           No results found for any visits on 08/22/20.  Medications   proparacaine (ALCAINE) 0.5 % ophthalmic solution 1 drop (has no administration in time range)   fluorescein (FUL-BAILEE) ophthalmic strip 1 strip (has no administration in time range)        Assessments & Plan (with Medical Decision Making)   50-year-old female who presents for evaluation of mild irritation of left eye with associated redness.  Differential included conjunctivitis, subconjunctival hemorrhage, foreign body, vitreous hemorrhage, corneal abrasion.  Examination revealed subconjunctival hemorrhage.  There was no evidence of foreign body, corneal abrasion, anterior chamber pathology.  I have recommended use of lubricating eyedrop and follow-up with ophthalmology in 2 days unless symptoms completely resolved.    I have reviewed the nursing notes. I have reviewed the findings, diagnosis, plan and need for follow up with the patient.    New Prescriptions    No medications on file       Final diagnoses:   Subconjunctival hemorrhage of left eye   Irritation of left eye       --  Nam Og  [facility], [place], EMERGENCY DEPARTMENT  8/22/2020     Nam Og MD  08/22/20 [id number]

## 2020-08-22 NOTE — ED AVS SNAPSHOT
[facility], [place], Emergency Department  [address]  [facility] [address]  Phone:  [phone]  Fax:  [phone]                                    Vanesa Owen   MRN: [id number]    Department:  [facility], Emergency Department   Date of Visit:  8/22/2020           After Visit Summary Signature Page    I have received my discharge instructions, and my questions have been answered. I have discussed any challenges I see with this plan with the nurse or doctor.    ..........................................................................................................................................  Patient/Patient Representative Signature      ..........................................................................................................................................  Patient Representative Print Name and Relationship to Patient    ..................................................               ................................................  Date                                   Time    ..........................................................................................................................................  Reviewed by Signature/Title    ...................................................              ..............................................  Date                                               Time          22EPIC Rev 08/18

## 2020-09-25 ENCOUNTER — TRANSFERRED RECORDS (OUTPATIENT)
Dept: HEALTH INFORMATION MANAGEMENT | Facility: CLINIC | Age: 50
End: 2020-09-25

## 2020-10-29 ENCOUNTER — RESULTS ONLY (OUTPATIENT)
Dept: LAB | Age: 50
End: 2020-10-29

## 2020-10-29 ENCOUNTER — VIRTUAL VISIT (OUTPATIENT)
Dept: FAMILY MEDICINE | Facility: OTHER | Age: 50
End: 2020-10-29
Payer: COMMERCIAL

## 2020-10-29 ENCOUNTER — OFFICE VISIT (OUTPATIENT)
Dept: URGENT CARE | Facility: URGENT CARE | Age: 50
End: 2020-10-29
Payer: COMMERCIAL

## 2020-10-29 DIAGNOSIS — Z53.9 ERRONEOUS ENCOUNTER--DISREGARD: Primary | ICD-10-CM

## 2020-10-29 LAB
SARS-COV-2 RNA SPEC QL NAA+PROBE: NORMAL
SPECIMEN SOURCE: NORMAL

## 2020-10-29 PROCEDURE — 99421 OL DIG E/M SVC 5-10 MIN: CPT | Performed by: PHYSICIAN ASSISTANT

## 2020-10-29 NOTE — PROGRESS NOTES
"Date: 10/29/2020 11:44:28  Clinician: Arian Chandler  Clinician NPI: [id number]  Patient: Vanesa Owen  Patient : 1970  Patient Address: [address] [place] [address] [place] [address]  Patient Phone: [phone]  Visit Protocol: URI  Patient Summary:  Vanesa is a 50 year old ( : 1970 ) female who initiated a [facility] Visit for COVID-19 (Coronavirus) evaluation and screening. When asked the question \"Please sign me up to receive news, health information and promotions from [facility].\", Vanesa responded \"No\".    Vanesa states her symptoms started today.   Her symptoms consist of myalgia, malaise, and a sore throat.   Symptom details   Sore throat: Vanesa reports having mild throat pain (1-3 on a 10 point pain scale), does not have exudate on her tonsils, and can swallow liquids. She is not sure if the lymph nodes in her neck are enlarged. A rash has not appeared on the skin since the sore throat started.    Vanesa denies having ear pain, headache, wheezing, fever, cough, nasal congestion, nausea, facial pain or pressure, chills, teeth pain, ageusia, diarrhea, anosmia, vomiting, and rhinitis. She also denies having recent facial or sinus surgery in the past 60 days and taking antibiotic medication in the past month. She is not experiencing dyspnea.   Precipitating events  Within the past week, Vanesa has not been exposed to someone with strep throat. She has not recently been exposed to someone with influenza. Vanesa has been in close contact with the following high risk individuals: immunocompromised people and adults 65 or older.   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 In the past 14 days, Vanesa has worked or volunteered at a hospital or a clinic. 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   " Vanesa has had a close contact with a laboratory-confirmed COVID-19 patient within 14 days of symptom onset. She was not exposed at her work. Date Vanesa was exposed to the laboratory-confirmed COVID-19 patient: 10/26/2020   Additional information about contact with COVID-19 (Coronavirus) patient as reported by the patient (free text): My mom just passed away yesterday from COVID    Since December 2019, Vanesa has not been diagnosed with lab-confirmed COVID-19 test and has not had upper respiratory infection or influenza-like illness.   Pertinent medical history  Vanesa typically gets a yeast infection when she takes antibiotics. She has used fluconazole (Diflucan) to treat previous yeast infections. 1 dose of fluconazole (Diflucan) has typically been sufficient for symptoms to resolve in the past.   Vanesa needs a return to work/school note.   Weight: 220 lbs   Vanesa does not smoke or use smokeless tobacco.   She denies pregnancy and denies breastfeeding. She does not menstruate.   Weight: 220 lbs  Reason for repeat visit for the same protocol within 24 hours:  I answered a question incorrectly, I can swallow  See the History of referred by protocol and completed visits section for details on previous visits (visits currently in queue to be diagnosed will not appear in this section).    MEDICATIONS: gabapentin oral, omeprazole oral, Zoloft oral, ALLERGIES: Sulfa (Sulfonamide Antibiotics)  Clinician Response:  Dear Vanesa,   Your symptoms show that you may have coronavirus (COVID-19). This illness can cause fever, cough and trouble breathing. Many people get a mild case and get better on their own. Some people can get very sick.  What should I do?  We would like to test you for this virus.   1. Please call [phone] to schedule your visit. Explain that you were referred by [facility] to have a COVID-19 test. Be ready to share your [facility] visit ID number.  The following will serve as your written order for  "this COVID Test, ordered by me, for the indication of suspected COVID [Z20.828]: The test will be ordered in [employer], our electronic health record, after you are scheduled. It will show as ordered and authorized by Jeovanny Oliveros MD.  Order: COVID-19 (Coronavirus) PCR [facility]TOMATIC [facility] from [facility].   2. When it's time for your COVID test:  Stay at least 6 feet away from others. (If someone will drive you to your test, stay in the backseat, as far away from the  as you can.)   Cover your mouth and nose with a mask, tissue or washcloth.  Go straight to the testing site. Don't make any stops on the way there or back.      3.Starting now: Stay home and away from others (self-isolate) until:   You've had no fever---and no medicine that reduces fever---for one full day (24 hours). And...   Your other symptoms have gotten better. For example, your cough or breathing has improved. And...   At least 10 days have passed since your symptoms started.       During this time, don't leave the house except for testing or medical care.   Stay in your own room, even for meals. Use your own bathroom if you can.   Stay away from others in your home. No hugging, kissing or shaking hands. No visitors.  Don't go to work, school or anywhere else.    Clean \"high touch\" surfaces often (doorknobs, counters, handles, etc.). Use a household cleaning spray or wipes. You'll find a full list of  on the EPA website: www.epa.gov/pesticide-registration/list-n-disinfectants-use-against-sars-cov-2.   Cover your mouth and nose with a mask, tissue or washcloth to avoid spreading germs.  Wash your hands and face often. Use soap and water.  Caregivers in these groups are at risk for severe illness due to COVID-19:  o People 65 years and older  o People who live in a nursing home or long-term care facility  o People with chronic disease (lung, heart, cancer, diabetes, kidney, liver, immunologic)  o People who have a weakened immune system, " including those who:   Are in cancer treatment  Take medicine that weakens the immune system, such as corticosteroids  Had a bone marrow or organ transplant  Have an immune deficiency  Have poorly controlled HIV or AIDS  Are obese (body mass index of 40 or higher)  Smoke regularly   o Caregivers should wear gloves while washing dishes, handling laundry and cleaning bedrooms and bathrooms.  o Use caution when washing and drying laundry: Don't shake dirty laundry, and use the warmest water setting that you can.  o For more tips, go to www.cdc.gov/coronavirus/2019-ncov/downloads/10Things.pdf.    4.Sign up for [employer]. We know it's scary to hear that you might have COVID-19. We want to track your symptoms to make sure you're okay over the next 2 weeks. Please look for an email from [employer]---this is a free, online program that we'll use to keep in touch. To sign up, follow the link in the email. Learn more at [email or web address]  How can I take care of myself?   Get lots of rest. Drink extra fluids (unless a doctor has told you not to).   Take Tylenol (acetaminophen) for fever or pain. If you have liver or kidney problems, ask your family doctor if it's okay to take Tylenol.   Adults can take either:    650 mg (two 325 mg pills) every 4 to 6 hours, or...   1,000 mg (two 500 mg pills) every 8 hours as needed.    Note: Don't take more than 3,000 mg in one day. Acetaminophen is found in many medicines (both prescribed and over-the-counter medicines). Read all labels to be sure you don't take too much.   For children, check the Tylenol bottle for the right dose. The dose is based on the child's age or weight.    If you have other health problems (like cancer, heart failure, an organ transplant or severe kidney disease): Call your specialty clinic if you don't feel better in the next 2 days.       Know when to call 911. Emergency warning signs include:    Trouble breathing or shortness of breath Pain  or pressure in the chest that doesn't go away Feeling confused like you haven't felt before, or not being able to wake up Bluish-colored lips or face.  Where can I get more information?   [facility] -- About COVID-19: [email or web address]   CDC -- What to Do If You're Sick: www.cdc.gov/coronavirus/2019-ncov/about/steps-when-sick.html   CDC -- Ending Home Isolation: www.cdc.gov/coronavirus/2019-ncov/hcp/disposition-in-home-patients.html   [facility] -- Caring for Someone: www.cdc.gov/coronavirus/2019-ncov/if-you-are-sick/care-for-someone.html   [facility] -- Interim Guidance for Hospital Discharge to Home: www.health.[facility].[place]./diseases/coronavirus/hcp/hospdischarge.pdf   [facility] clinical trials (COVID-19 research studies): clinicalaffairs.[facility]-clinical-trials    Below are the COVID-19 hotlines at the [facility] of Health ([facility]). Interpreters are available.    For health questions: Call 734-054-0253 or 1-614.671.2203 (7 a.m. to 7 p.m.) For questions about schools and childcare: Call 798-129-1700 or 1-692.492.6358 (7 a.m. to 7 p.m.)    Diagnosis: Contact with and (suspected) exposure to other viral communicable diseases  Diagnosis ICD: Z20.828  Additional Clinician Notes:   Please see your regular doctor for any return to work or school documentation once COVID testing is complete and results are available.

## 2020-10-30 LAB
LABORATORY COMMENT REPORT: NORMAL
SARS-COV-2 RNA SPEC QL NAA+PROBE: NEGATIVE
SPECIMEN SOURCE: NORMAL

## 2020-11-05 DIAGNOSIS — F32.1 MODERATE MAJOR DEPRESSION (H): ICD-10-CM

## 2020-11-05 DIAGNOSIS — M79.605 LOWER EXTREMITY PAIN, BILATERAL: ICD-10-CM

## 2020-11-05 DIAGNOSIS — M79.604 LOWER EXTREMITY PAIN, BILATERAL: ICD-10-CM

## 2020-11-05 DIAGNOSIS — F34.1 DYSTHYMIA: ICD-10-CM

## 2020-11-05 DIAGNOSIS — K31.89 GASTRIC HYPERACIDITY: ICD-10-CM

## 2020-11-05 NOTE — TELEPHONE ENCOUNTER
Routing refill request to provider for review/approval because:  Drug not on the [facility] refill protocol Gabapentin  Prescriptions . prilosec and zoloft                PHQ 2018   PHQ-9 Total Score 0 0 5   Q9: Thoughts of better off dead/self-harm past 2 weeks Not at all Not at all Not at all

## 2020-11-06 RX ORDER — SERTRALINE HYDROCHLORIDE 100 MG/1
TABLET, FILM COATED ORAL
Qty: 60 TABLET | Refills: 0 | Status: SHIPPED | OUTPATIENT
Start: 2020-11-06 | End: 2020-12-04

## 2020-11-06 RX ORDER — GABAPENTIN 300 MG/1
CAPSULE ORAL
Qty: 30 CAPSULE | Refills: 0 | Status: SHIPPED | OUTPATIENT
Start: 2020-11-06 | End: 2020-12-04

## 2020-11-06 RX ORDER — OMEPRAZOLE 40 MG/1
CAPSULE, DELAYED RELEASE ORAL
Qty: 30 CAPSULE | Refills: 0 | Status: SHIPPED | OUTPATIENT
Start: 2020-11-06 | End: 2020-12-04

## 2020-11-06 NOTE — TELEPHONE ENCOUNTER
jose is due for a visit with me. Schedule her for a virtual (video or phone based on patient preference. Always promote a video visit first) visit with me. Preferably with me today

## 2020-11-10 ENCOUNTER — MYC MEDICAL ADVICE (OUTPATIENT)
Dept: FAMILY MEDICINE | Facility: CLINIC | Age: 50
End: 2020-11-10

## 2020-11-12 ENCOUNTER — VIRTUAL VISIT (OUTPATIENT)
Dept: FAMILY MEDICINE | Facility: OTHER | Age: 50
End: 2020-11-12
Payer: COMMERCIAL

## 2020-11-12 PROCEDURE — 99421 OL DIG E/M SVC 5-10 MIN: CPT | Performed by: PHYSICIAN ASSISTANT

## 2020-11-12 NOTE — PROGRESS NOTES
"Date: 2020 09:32:20  Clinician: Rios Mittal  Clinician NPI: [id number]  Patient: Vanesa Owen  Patient : 1970  Patient Address: [address] [place] [address] [place] [address]  Patient Phone: [phone]  Visit Protocol: URI  Patient Summary:  Vanesa is a 50 year old ( : 1970 ) female who initiated a [facility] Visit for COVID-19 (Coronavirus) evaluation and screening. When asked the question \"Please sign me up to receive news, health information and promotions from [facility].\", Vanesa responded \"Yes\".    Vanesa states her symptoms started suddenly 3-4 days ago. After her symptoms started, they improved and then got worse again.   Her symptoms consist of rhinitis, myalgia, malaise, a sore throat, a headache, enlarged lymph nodes, a cough, nasal congestion, nausea, and anosmia. Vanesa also feels feverish.   Symptom details     Nasal secretions: The color of her mucus is clear.    Cough: Vanesa coughs a few times an hour and her cough is not more bothersome at night. Phlegm does not come into her throat when she coughs. She believes her cough is caused by post-nasal drip.     Sore throat: Vanesa reports having mild throat pain (1-3 on a 10 point pain scale), does not have exudate on her tonsils, and can swallow liquids. The lymph nodes in her neck are enlarged. A rash has not appeared on the skin since the sore throat started.     Temperature: Her current temperature is 98.8 degrees Fahrenheit.     Headache: She states the headache is mild (1-3 on a 10 point pain scale).      Vanesa denies having vomiting, facial pain or pressure, chills, teeth pain, ageusia, diarrhea, ear pain, and wheezing. She also denies taking antibiotic medication in the past month, having recent facial or sinus surgery in the past 60 days, and having a sinus infection within the past year. She is not experiencing dyspnea.   Precipitating events  Within the past week, Vanesa has not been exposed to someone with strep throat. " She has not recently been exposed to someone with influenza. Vanesa has been in close contact with the following high risk individuals: children under the age of 5.   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 In the past 14 days, Vanesa has worked or volunteered at a hospital or a clinic. 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   Vanesa has had a close contact with a laboratory-confirmed COVID-19 patient within 14 days of symptom onset. She was not exposed at her work. Date Vanesa was exposed to the laboratory-confirmed COVID-19 patient: 11/06/2020   Additional information about contact with COVID-19 (Coronavirus) patient as reported by the patient (free text): My 12 year old son just tested COVID positive.  My brother is COVID positive, and my  is having symptoms    Since December 2019, Vanesa has been tested for COVID-19 and has had upper respiratory infection (URI) or influenza-like illness.      Result of COVID-19 test: Negative     Date of her COVID-19 test: 10/30/2020     Date(s) of previous URI or influenza-like illness (free-text): November 2019     Symptoms Vanesa experienced during previous URI or influenza-like illness as reported by the patient (free-text): terrible cough, run down.        Pertinent medical history  Vanesa typically gets a yeast infection when she takes antibiotics. She has used fluconazole (Diflucan) to treat previous yeast infections. 1 dose of fluconazole (Diflucan) has typically been sufficient for symptoms to resolve in the past.   Vanesa needs a return to work/school note.   Weight: 215 lbs   Vanesa does not smoke or use smokeless tobacco.   She denies pregnancy and denies breastfeeding. She is currently menstruating.    Weight: 215 lbs    MEDICATIONS: gabapentin oral, omeprazole oral, Zoloft oral, ALLERGIES: Sulfa (Sulfonamide Antibiotics)  Clinician Response:  Dear Vanesa,   Your symptoms show that you may have coronavirus (COVID-19). This illness can cause fever, cough and trouble breathing. Many people get a mild case and get better on their own. Some people can get very sick.  What should I do?  We would like to test you for this virus.   1. Please call [phone] to schedule your visit. Explain that you were referred by [facility] to have a COVID-19 test. Be ready to share your [facility] visit ID number.  * If you need to schedule in [facility] please call [phone] or for [employer] employees please call [phone].  * If you need to schedule in the [place] area please call [phone]. [employer] employees call [phone].  The following will serve as your written order for this COVID Test, ordered by me, for the indication of suspected COVID [Z20.828]: The test will be ordered in [employer], our electronic health record, after you are scheduled. It will show as ordered and authorized by Jeovanny Oliveros MD.  Order: COVID-19 (Coronavirus) PCR for SYMPTOMATIC testing from [facility].   2. When it's time for your COVID test:  Stay at least 6 feet away from others. (If someone will drive you to your test, stay in the backseat, as far away from the  as you can.)   Cover your mouth and nose with a mask, tissue or washcloth.  Go straight to the testing site. Don't make any stops on the way there or back.      3.Starting now: Stay home and away from others (self-isolate) until:   You've had no fever---and no medicine that reduces fever---for one full day (24 hours). And...   Your other symptoms have gotten better. For example, your cough or breathing has improved. And...   At least 10 days have passed since your symptoms started.       During this time, don't leave the house except for testing or medical care.   Stay in your own room, even  "for meals. Use your own bathroom if you can.   Stay away from others in your home. No hugging, kissing or shaking hands. No visitors.  Don't go to work, school or anywhere else.    Clean \"high touch\" surfaces often (doorknobs, counters, handles, etc.). Use a household cleaning spray or wipes. You'll find a full list of  on the EPA website: www.epa.gov/pesticide-registration/list-n-disinfectants-use-against-sars-cov-2.   Cover your mouth and nose with a mask, tissue or washcloth to avoid spreading germs.  Wash your hands and face often. Use soap and water.  Caregivers in these groups are at risk for severe illness due to COVID-19:  o People 65 years and older  o People who live in a nursing home or long-term care facility  o People with chronic disease (lung, heart, cancer, diabetes, kidney, liver, immunologic)  o People who have a weakened immune system, including those who:   Are in cancer treatment  Take medicine that weakens the immune system, such as corticosteroids  Had a bone marrow or organ transplant  Have an immune deficiency  Have poorly controlled HIV or AIDS  Are obese (body mass index of 40 or higher)  Smoke regularly   o Caregivers should wear gloves while washing dishes, handling laundry and cleaning bedrooms and bathrooms.  o Use caution when washing and drying laundry: Don't shake dirty laundry, and use the warmest water setting that you can.  o For more tips, go to www.cdc.gov/coronavirus/2019-ncov/downloads/10Things.pdf.    How can I take care of myself?    Get lots of rest. Drink extra fluids (unless a doctor has told you not to).   Take Tylenol (acetaminophen) for fever or pain. If you have liver or kidney problems, ask your family doctor if it's okay to take Tylenol.   Adults can take either:    650 mg (two 325 mg pills) every 4 to 6 hours, or...   1,000 mg (two 500 mg pills) every 8 hours as needed.    Note: Don't take more than 3,000 mg in one day. Acetaminophen is found in many " medicines (both prescribed and over-the-counter medicines). Read all labels to be sure you don't take too much.   For children, check the Tylenol bottle for the right dose. The dose is based on the child's age or weight.    If you have other health problems (like cancer, heart failure, an organ transplant or severe kidney disease): Call your specialty clinic if you don't feel better in the next 2 days.       Know when to call 911. Emergency warning signs include:    Trouble breathing or shortness of breath Pain or pressure in the chest that doesn't go away Feeling confused like you haven't felt before, or not being able to wake up Bluish-colored lips or face.  Where can I get more information?    [facility] -- About COVID-19: [email or web address]   CDC -- What to Do If You're Sick: www.cdc.gov/coronavirus/2019-ncov/about/steps-when-sick.html   CDC -- Ending Home Isolation: www.cdc.gov/coronavirus/2019-ncov/hcp/disposition-in-home-patients.html   CDC -- Caring for Someone: www.cdc.gov/coronavirus/2019-ncov/if-you-are-sick/care-for-someone.html   [facility] -- Interim Guidance for Hospital Discharge to Home: www.health.[facility].[place]/diseases/coronavirus/hcp/hospdischarge.pdf   [facility] clinical trials (COVID-19 research studies): clinicalaffairs.[facility]-clinical-trials    Below are the COVID-19 hotlines at the [facility] ([facility]). Interpreters are available.    For health questions: Call 879-582-5355 or 1-854.926.5433 (7 a.m. to 7 p.m.) For questions about schools and childcare: Call 784-125-9956 or 1-560.511.5235 (7 a.m. to 7 p.m.)    Diagnosis: Contact with and (suspected) exposure to other viral communicable diseases  Diagnosis ICD: Z20.828

## 2020-11-13 ENCOUNTER — RESULTS ONLY (OUTPATIENT)
Dept: LAB | Age: 50
End: 2020-11-13

## 2020-11-13 ENCOUNTER — OFFICE VISIT (OUTPATIENT)
Dept: URGENT CARE | Facility: URGENT CARE | Age: 50
End: 2020-11-13
Payer: COMMERCIAL

## 2020-11-13 DIAGNOSIS — Z20.822 SUSPECTED 2019 NOVEL CORONAVIRUS INFECTION: Primary | ICD-10-CM

## 2020-11-13 LAB
SARS-COV-2 RNA SPEC QL NAA+PROBE: NORMAL
SPECIMEN SOURCE: NORMAL

## 2020-11-14 LAB
LABORATORY COMMENT REPORT: ABNORMAL
SARS-COV-2 RNA SPEC QL NAA+PROBE: POSITIVE
SPECIMEN SOURCE: ABNORMAL

## 2020-11-18 ENCOUNTER — HOSPITAL ENCOUNTER (EMERGENCY)
Facility: CLINIC | Age: 50
Discharge: HOME | End: 2020-11-19
Attending: EMERGENCY MEDICINE | Admitting: EMERGENCY MEDICINE
Payer: COMMERCIAL

## 2020-11-18 DIAGNOSIS — U07.1 COVID-19: ICD-10-CM

## 2020-11-18 DIAGNOSIS — R51.9 NONINTRACTABLE HEADACHE, UNSPECIFIED CHRONICITY PATTERN, UNSPECIFIED HEADACHE TYPE: ICD-10-CM

## 2020-11-18 PROCEDURE — 99284 EMERGENCY DEPT VISIT MOD MDM: CPT | Performed by: EMERGENCY MEDICINE

## 2020-11-18 PROCEDURE — 250N000013 HC RX MED GY IP 250 OP 250 PS 637: Performed by: EMERGENCY MEDICINE

## 2020-11-18 PROCEDURE — 99283 EMERGENCY DEPT VISIT LOW MDM: CPT

## 2020-11-18 RX ORDER — CYCLOBENZAPRINE HCL 5 MG
10 TABLET ORAL ONCE
Status: COMPLETED | OUTPATIENT
Start: 2020-11-18 | End: 2020-11-18

## 2020-11-18 RX ORDER — IBUPROFEN 800 MG/1
800 TABLET, FILM COATED ORAL ONCE
Status: COMPLETED | OUTPATIENT
Start: 2020-11-18 | End: 2020-11-19

## 2020-11-18 RX ORDER — OLANZAPINE 5 MG/1
5 TABLET, ORALLY DISINTEGRATING ORAL ONCE
Status: COMPLETED | OUTPATIENT
Start: 2020-11-18 | End: 2020-11-18

## 2020-11-18 RX ADMIN — OLANZAPINE 5 MG: 5 TABLET, ORALLY DISINTEGRATING ORAL at 23:00

## 2020-11-18 RX ADMIN — CYCLOBENZAPRINE HYDROCHLORIDE 10 MG: 5 TABLET, FILM COATED ORAL at 23:00

## 2020-11-18 NOTE — ED AVS SNAPSHOT
[facility] Emergency Department  [address] [facility] [address]  Phone: [phone]                                    Vanesa Owen   MRN: [id number]    Department: [facility] Emergency Department   Date of Visit: 11/18/2020           After Visit Summary Signature Page    I have received my discharge instructions, and my questions have been answered. I have discussed any challenges I see with this plan with the nurse or doctor.    ..........................................................................................................................................  Patient/Patient Representative Signature      ..........................................................................................................................................  Patient Representative Print Name and Relationship to Patient    ..................................................               ................................................  Date                                   Time    ..........................................................................................................................................  Reviewed by Signature/Title    ...................................................              ..............................................  Date                                               Time          22EPIC Rev 08/18

## 2020-11-19 ENCOUNTER — PATIENT OUTREACH (OUTPATIENT)
Dept: NURSING | Facility: CLINIC | Age: 50
End: 2020-11-19
Payer: COMMERCIAL

## 2020-11-19 VITALS
HEART RATE: 89 BPM | SYSTOLIC BLOOD PRESSURE: 131 MMHG | RESPIRATION RATE: 16 BRPM | OXYGEN SATURATION: 96 % | DIASTOLIC BLOOD PRESSURE: 88 MMHG | TEMPERATURE: 98.1 F

## 2020-11-19 DIAGNOSIS — U07.1 2019 NOVEL CORONAVIRUS DISEASE (COVID-19): Primary | ICD-10-CM

## 2020-11-19 PROCEDURE — 250N000013 HC RX MED GY IP 250 OP 250 PS 637: Performed by: EMERGENCY MEDICINE

## 2020-11-19 RX ORDER — ONDANSETRON 4 MG/1
4 TABLET, ORALLY DISINTEGRATING ORAL EVERY 8 HOURS PRN
Qty: 10 TABLET | Refills: 0 | Status: SHIPPED | OUTPATIENT
Start: 2020-11-19 | End: 2020-11-22

## 2020-11-19 RX ORDER — CYCLOBENZAPRINE HCL 10 MG
10 TABLET ORAL 3 TIMES DAILY PRN
Qty: 20 TABLET | Refills: 0 | Status: SHIPPED | OUTPATIENT
Start: 2020-11-19 | End: 2020-11-26

## 2020-11-19 RX ORDER — NAPROXEN 500 MG/1
500 TABLET ORAL 2 TIMES DAILY WITH MEALS
Qty: 24 TABLET | Refills: 0 | Status: SHIPPED | OUTPATIENT
Start: 2020-11-19 | End: 2020-12-01

## 2020-11-19 RX ADMIN — IBUPROFEN 800 MG: 800 TABLET, FILM COATED ORAL at 00:09

## 2020-11-19 ASSESSMENT — ACTIVITIES OF DAILY LIVING (ADL): DEPENDENT_IADLS:: INDEPENDENT

## 2020-11-19 NOTE — PROGRESS NOTES
Clinic Care Coordination Contact    Clinic Care Coordination Contact  OUTREACH    Referral Information:  Referral Source: ED Follow-Up    Primary Diagnosis: Respiratory Disorders - other    Chief Complaint   Patient presents with     Clinic Care Coordination - Post Hospital     Care Team        [facility] Utilization: Patient identified for Care Coordination follow up due to:  Hospitalization:   NO  Emergency Department utilization risk:  YES COVID  Fall risk:   NO  Pain:  NO  Medication concerns:  NO  Social Determinants of Health risks:   NO    Clinic Utilization  Difficulty keeping appointments:: No  Compliance Concerns: No  No-Show Concerns: No  No PCP office visit in Past Year: No  Utilization    Last refreshed: 11/19/2020  9:14 AM: Hospital Admissions 0           Last refreshed: 11/19/2020  9:14 AM: ED Visits 3           Last refreshed: 11/19/2020  9:14 AM: No Show Count (past year) 0              Current as of: 11/19/2020  9:14 AM              Clinical Concerns:  Current Medical Concerns:  Patient recently diagnosed with Covid. Patient reports headaches. No respiratory symptoms reported. Patient reports she is about 10 days into this infection. Patient's employer is asking Patient to come back to work.     Current Behavioral Concerns: none    Education Provided to patient: Encouraged follow up appointment with PCP.        Health Maintenance Reviewed: Due/Overdue   Health Maintenance Due   Topic Date Due     HEPATITIS C SCREENING  03/13/1990     INFLUENZA VACCINE (1) 09/01/2020     MAMMO SCREENING  11/22/2020       Medication Management:  Medication reconciliation status: Medications reviewed and reconciled.  Continue medications without change.         Functional Status:  Dependent ADLs:: Independent  Dependent IADLs:: Independent  Mobility Status: Independent    Living Situation:  Current living arrangement:: I live in a private home  Type of residence:: Private home - stairs    Lifestyle & Psychosocial  Needs:        Diet:: Regular  Inadequate nutrition (GOAL):: No  Tube Feeding: No  Inadequate activity/exercise (GOAL):: No  Significant changes in sleep pattern (GOAL): No  Transportation means:: Regular car     Presybeterian or spiritual beliefs that impact treatment:: No  Mental health DX:: No  Mental health management concern (GOAL):: No  Informal Support system:: Family, Friends   Socioeconomic History     Marital status:      Spouse name: Not on file     Number of children: Not on file     Years of education: Not on file     Highest education level: Not on file     Tobacco Use     Smoking status: Never Smoker     Smokeless tobacco: Never Used   Substance and Sexual Activity     Alcohol use: Yes     Comment: rare     Drug use: No     Sexual activity: Yes     Partners: Male     Birth control/protection: I.U.D.     Comment: mirena       Plan: 1) Patient will continue to follow treatment plan as directed and follow up with PCP with concerns ongoing.   2) Care Coordination to remain available for future needs.     Babak Patel RN  Clinic Care Coordinator  [phone]

## 2020-11-19 NOTE — LETTER
[facility] CARE COORDINATION  [facility]  [address] [address] [facility] [address] [place] [address]  [phone]    November 19, 2020    Vanesa Owen  [address] [place] [id number]      Dear Vanesa,    I am a clinic care coordinator who works with Darrick Kay PA-C at [facility]. I wanted to thank you for spending the time to talk with me.  Below is a description of clinic care coordination and how I can further assist you.      The clinic care coordination team is made up of a registered nurse,  and community health worker who understand the health care system. The goal of clinic care coordination is to help you manage your health and improve access to the health care system in the most efficient manner. The team can assist you in meeting your health care goals by providing education, coordinating services, strengthening the communication among your providers and supporting you with any resource needs.    Please feel free to contact me at [phone] with any questions or concerns. We are focused on providing you with the highest-quality healthcare experience possible and that all starts with you.     Sincerely,     Babak Patel RN  Clinic Care Coordinator    Discharge Instructions for COVID-19 Patients  You have--or may have--COVID-19. Please follow the instructions listed below.   If you have a weakened immune system, discuss with your doctor any other actions you need to take.  How can I protect others?  If you have symptoms (fever, cough, body aches or trouble breathing):    Stay home and away from others (self-isolate) until:  ? At least 10 days have passed since your symptoms started, And   ? You've had no fever--and no medicine that reduces fever--for 1 full day (24 hours), And    ? Your other symptoms have resolved (gotten better).  If you don't show symptoms, but testing showed that you have COVID-19:    Stay home and away from others (self-isolate). Follow the tips under  "\"How do I self-isolate?\" below for 10 days (20 days if you have a weak immune system).    You don't need to be retested for COVID-19 before going back to school or work. As long as you're fever-free and feeling better, you can go back to school, work and other activities after waiting the 10 or 20 days.   How do I self-isolate?    Stay in your own room, even for meals. Use your own bathroom if you can.    Stay away from others in your home. No hugging, kissing or shaking hands. No visitors.    Don't go to work, school or anywhere else.    Clean \"high touch\" surfaces often (doorknobs, counters, handles). Use household cleaning spray or wipes. You'll find a full list of  on the EPA website: www.epa.gov/pesticide-registration/list-n-disinfectants-use-against-sars-cov-2.    Cover your mouth and nose with a mask or other face covering to avoid spreading germs.    Wash your hands and face often. Use soap and water.    Caregivers in these groups are at risk for severe illness due to COVID-19:  ? People 65 years and older  ? People who live in a nursing home or long-term care facility  ? People with chronic disease (lung, heart, cancer, diabetes, kidney, liver, immunologic)  ? People who have a weakened immune system, including those who:    Are in cancer treatment    Take medicine that weakens the immune system, such as corticosteroids    Had a bone marrow or organ transplant    Have an immune deficiency    Have poorly controlled HIV or AIDS    Are obese (body mass index of 40 or higher)    Smoke regularly    Caregivers should wear gloves while washing dishes, handling laundry and cleaning bedrooms and bathrooms.    Use caution when washing and drying laundry: Don't shake dirty laundry and use the warmest water setting that you can.    For more tips on managing your health at home, go to www.cdc.gov/coronavirus/2019-ncov/downloads/10Things.pdf.  How can I take care of myself at home?  1. Get lots of rest. Drink " extra fluids (unless a doctor has told you not to).    2. Take Tylenol (acetaminophen) for fever or pain. If you have liver or kidney problems, ask your family doctor if it's okay to take Tylenol.     Adults can take either:  ? 650 mg (two 325 mg pills) every 4 to 6 hours, or   ? 1,000 mg (two 500 mg pills) every 8 hours as needed.  ? Note: Don't take more than 3,000 mg in one day. Acetaminophen is found in many medicines (both prescribed and over-the-counter medicines). Read all labels to be sure you don't take too much.   For children, check the Tylenol bottle for the right dose. The dose is based on the child's age or weight.  3. If you have other health problems (like cancer, heart failure, an organ transplant or severe kidney disease): Call your specialty clinic if you don't feel better in the next 2 days.    4. Know when to call 911. Emergency warning signs include:  ? Trouble breathing or shortness of breath  ? Pain or pressure in the chest that doesn't go away  ? Feeling confused like you haven't felt before, or not being able to wake up  ? Bluish-colored lips or face    5. Your doctor may have prescribed a blood thinner medicine. Follow their instructions.  Where can I get more information?    [facility] - About COVID-19: [email or web address]    CDC - What to Do If You're Sick: www.cdc.gov/coronavirus/2019-ncov/about/steps-when-sick.html    CDC - Ending Home Isolation: www.cdc.gov/coronavirus/2019-ncov/hcp/disposition-in-home-patients.html    CDC - Caring for Someone: www.cdc.gov/coronavirus/2019-ncov/if-you-are-sick/care-for-someone.html    [facility] - Interim Guidance for Hospital Discharge to Home: www.health.[facility].mn.us/diseases/coronavirus/hcp/hospdischarge.pdf    [facility] clinical trials (COVID-19 research studies): clinicalaffairs.[facility]/n-clinical-trials    Below are the COVID-19 hotlines at the [place] [facility] ([facility]). Interpreters are available.  ? For health  questions: Call 873-404-7210 or 1-388.503.3214 (7 a.m. to 7 p.m.)  ? For questions about schools and childcare: Call 025-765-8989 or 1-583.936.8599 (7 a.m. to 7 p.m.)    For informational purposes only. Not to replace the advice of your health care provider. Clinically reviewed by the Infection Prevention Team. Copyright   2020 [facility]. All rights reserved. [place] 016886 - REV 08/04/20.

## 2020-11-19 NOTE — ED PROVIDER NOTES
[facility] ([facility])  November 18, 2020  History     Chief Complaint   Patient presents with     Headache     Covid Concern     HPI  Vanesa Owen is a 50 year old female with a past medical history significant for anxiety who presents here to the Emergency Department due to headache.      Per review of patient's chart, patient received a positive COVID test on 11/13.    Today in the ED, patient reports a worsening headache. She states her headache is now moving down the back of her neck.  Patient states that she has been taking over-the-counter medications to help with her symptoms.  Patient denies any other significant complaints.    PAST MEDICAL HISTORY:   Past Medical History:   Diagnosis Date     Anxiety      Depressive disorder      Herpes simplex with other ophthalmic complications      UTI (urinary tract infection)        PAST SURGICAL HISTORY:   Past Surgical History:   Procedure Laterality Date     EXCIS VAGINAL CYST/TUMOR       SLING TRANSVAGINAL N/A 8/23/2016    Procedure: SLING TRANSVAGINAL;  Surgeon: Nam Sommers MD;  Location: [facility] OR     TONSILLECTOMY & ADENOIDECTOMY         Past medical history, past surgical history, medications, and allergies were reviewed with the patient. Additional pertinent items: None    FAMILY HISTORY:   Family History   Problem Relation Age of Onset     Diabetes Maternal Grandmother         Type II      Hypertension Maternal Grandmother      Alcohol/Drug Maternal Grandmother      Asthma Mother      Depression Mother      Glaucoma Mother      Depression Father 58     Cerebrovascular Disease Father      Alcohol/Drug Maternal Grandfather      Circulatory Paternal Grandmother      Glaucoma Paternal Grandmother      Asthma Brother      Breast Cancer Maternal Aunt      Macular Degeneration No family hx of        SOCIAL HISTORY:   Social History     Tobacco Use     Smoking status: Never Smoker     Smokeless tobacco: Never Used    Substance Use Topics     Alcohol use: Yes     Comment: rare     Social history was reviewed with the patient. Additional pertinent items: None      Discharge Medication List as of 11/19/2020 12:52 AM      START taking these medications    Details   cyclobenzaprine (FLEXERIL) 10 MG tablet Take 1 tablet (10 mg) by mouth 3 times daily as needed for muscle spasms, Disp-20 tablet, R-0, E-Prescribe      naproxen (NAPROSYN) 500 MG tablet Take 1 tablet (500 mg) by mouth 2 times daily (with meals) for 12 days, Disp-24 tablet, R-0, E-Prescribe      ondansetron (ZOFRAN ODT) 4 MG ODT tab Take 1 tablet (4 mg) by mouth every 8 hours as needed, Disp-10 tablet, R-0, E-Prescribe         CONTINUE these medications which have NOT CHANGED    Details   fluticasone (FLONASE) 50 MCG/ACT nasal spray USE 2 SPRAYS INTO THE NOSTRIL DAILY, Disp-48 g,R-11, E-Prescribe      gabapentin (NEURONTIN) 300 MG capsule TAKE ONE CAPSULE BY MOUTH AT BEDTIME, Disp-30 capsule, R-0, E-Prescribe      levonorgestrel (MIRENA) 20 MCG/24HR IUD 1 each by Intrauterine route onceHistorical      MULTI-VITAMIN OR TABS 1 TABLET DAILY, Historical      omeprazole (PRILOSEC) 40 MG DR capsule TAKE ONE CAPSULE BY MOUTH EVERY DAY. TAKE 30 TO 60 MINUTES BEFORE A MEAL, Disp-30 capsule, R-0, E-Prescribe      sertraline (ZOLOFT) 100 MG tablet TAKE TWO TABLETS BY MOUTH DAILY, Disp-60 tablet, R-0, E-Prescribe                Allergies   Allergen Reactions     Sulfa Drugs Rash        Review of Systems  A complete review of systems was performed with pertinent positives and negatives noted in the HPI, and all other systems negative.    Physical Exam   BP: 131/88  Pulse: 89  Temp: 98.1  F (36.7  C)  Resp: 18  SpO2: 97 %      Physical Exam  Vitals signs and nursing note reviewed.   Constitutional:       Appearance: She is not ill-appearing or toxic-appearing.      Comments: Comfortably resting, lying in bed, NAD, nondiaphoretic, lucid, fully conversant, no  respiratory distress,  alert and oriented.     HENT:      Mouth/Throat:      Mouth: Mucous membranes are moist.   Neck:      Musculoskeletal: Normal range of motion. No neck rigidity.   Cardiovascular:      Rate and Rhythm: Normal rate.   Pulmonary:      Effort: Pulmonary effort is normal.   Neurological:      Mental Status: She is oriented to person, place, and time.         ED Course        Procedures                           No results found for this or any previous visit (from the past 24 hour(s)).  Medications   cyclobenzaprine (FLEXERIL) tablet 10 mg (10 mg Oral Given 11/18/20 2300)   OLANZapine zydis (zyPREXA) ODT tab 5 mg (5 mg Oral Given 11/18/20 2300)   ibuprofen (ADVIL/MOTRIN) tablet 800 mg (800 mg Oral Given 11/19/20 0009)             Assessments & Plan (with Medical Decision Making)   This is a 50-year-old female patient who was recently diagnosed with Covid who is now presenting to the emergency room with a headache.  She states that the headache is chronic primarily in the backside of her head.  She has been taking some Motrin and Tylenol on occasion to help with her symptoms but is not proving.  She is otherwise vitally stable at this time and is otherwise afebrile.  My exam was done over a video phone as to maintain distance due to her Covid status and being symptomatic.  On video exam she has no signs of nuchal rigidity.  She is able to sit up in the bed without issue.  She ranges her neck without issue.  She has normal mental status and otherwise appears to be doing well.  She was provided with Flexeril, Zyprexa and Motrin here in the emergency room with moderate improvement of her symptoms.  Is explained to the patient that this is a common symptom with Covid unfortunately in most cases it may be difficult to treat.  I believe she can be safely discharged at this time with a prescription for naproxen, Flexeril and Zofran and she is instructed to follow-up with her primary care physician if she has any further  concerns.  Pt was discharged home/self-care.  PT was provided written discharge instructions. Additionally verbal instructions were given and discussed with patient.  PT was asked to return to the ED immediately for any new or concerning symptoms.  Pt was in agreement, endorsed understanding, and questions were answered.    I have reviewed the nursing notes.    I have reviewed the findings, diagnosis, plan and need for follow up with the patient.    Discharge Medication List as of 11/19/2020 12:52 AM      START taking these medications    Details   cyclobenzaprine (FLEXERIL) 10 MG tablet Take 1 tablet (10 mg) by mouth 3 times daily as needed for muscle spasms, Disp-20 tablet, R-0, E-Prescribe      naproxen (NAPROSYN) 500 MG tablet Take 1 tablet (500 mg) by mouth 2 times daily (with meals) for 12 days, Disp-24 tablet, R-0, E-Prescribe      ondansetron (ZOFRAN ODT) 4 MG ODT tab Take 1 tablet (4 mg) by mouth every 8 hours as needed, Disp-10 tablet, R-0, E-Prescribe             Final diagnoses:   COVID-19   Nonintractable headache, unspecified chronicity pattern, unspecified headache type     --  Teddy Holley MD  11/18/2020   [facility] EMERGENCY DEPARTMENT     Teddy Holley MD  11/19/20 [id number]

## 2020-11-19 NOTE — ED TRIAGE NOTES
Arrives ambulatory to triage c/o worsening headache, now moving down back of neck. Positive COVID-19 test on 11/14. Has been having mild fevers and a nonproductive occasional cough, as well as fatigue. Today presenting with worsening headache and some nausea. A&Ox4 in triage, mildly ill-appearing but otherwise stable in triage. Satting well on room air.

## 2020-11-22 ENCOUNTER — HEALTH MAINTENANCE LETTER (OUTPATIENT)
Age: 50
End: 2020-11-22

## 2020-12-04 DIAGNOSIS — M79.604 LOWER EXTREMITY PAIN, BILATERAL: ICD-10-CM

## 2020-12-04 DIAGNOSIS — K31.89 GASTRIC HYPERACIDITY: ICD-10-CM

## 2020-12-04 DIAGNOSIS — F32.1 MODERATE MAJOR DEPRESSION (H): ICD-10-CM

## 2020-12-04 DIAGNOSIS — M79.605 LOWER EXTREMITY PAIN, BILATERAL: ICD-10-CM

## 2020-12-04 DIAGNOSIS — F34.1 DYSTHYMIA: ICD-10-CM

## 2020-12-04 NOTE — TELEPHONE ENCOUNTER
"Routing refill request to provider for review/approval because:  Labs out of range:  phq9  PHQ 6/14/2018 11/4/2019 7/22/2020   PHQ-9 Total Score 0 0 5   Q9: Thoughts of better off dead/self-harm past 2 weeks Not at all Not at all Not at all   Patient needs to be seen because it has been more than 1 year since last office visit.  11/18/19  gabapentin (NEURONTIN) 300 MG capsule  Last Written Prescription Date:  11/6/20  Last Fill Quantity: 30,  # refills: 0   Last office visit: 11/18/19 with prescribing provider:  Darrick Kay   Future Office Visit:          Medication pended for approval, 30 day supply with reminder.             Requested Prescriptions   Pending Prescriptions Disp Refills     gabapentin (NEURONTIN) 300 MG capsule [Pharmacy Med Name: GABAPENTIN 300MG CAPS] 30 capsule 0     Sig: TAKE ONE CAPSULE BY MOUTH AT BEDTIME       There is no refill protocol information for this order        sertraline (ZOLOFT) 100 MG tablet [Pharmacy Med Name: SERTRALINE HCL 100MG TABS] 60 tablet 0     Sig: TAKE TWO TABLETS BY MOUTH DAILY       SSRIs Protocol Failed - 12/4/2020 11:14 AM        Failed - PHQ-9 score less than 5 in past 6 months     Please review last PHQ-9 score.           Passed - Medication is active on med list        Passed - Patient is age 18 or older        Passed - No active pregnancy on record        Passed - No positive pregnancy test in last 12 months        Passed - Recent (6 mo) or future (30 days) visit within the authorizing provider's specialty     Patient had office visit in the last 6 months or has a visit in the next 30 days with authorizing provider or within the authorizing provider's specialty.  See \"Patient Info\" tab in inbasket, or \"Choose Columns\" in Meds & Orders section of the refill encounter.               omeprazole (PRILOSEC) 40 MG DR capsule [Pharmacy Med Name: OMEPRAZOLE 40MG CPDR] 30 capsule 0     Sig: TAKE ONE CAPSULE BY MOUTH EVERY DAY. TAKE 30 TO 60 MINUTES BEFORE A MEAL       " "PPI Protocol Passed - 12/4/2020 11:14 AM        Passed - Not on Clopidogrel (unless Pantoprazole ordered)        Passed - No diagnosis of osteoporosis on record        Passed - Recent (12 mo) or future (30 days) visit within the authorizing provider's specialty     Patient has had an office visit with the authorizing provider or a provider within the authorizing providers department within the previous 12 mos or has a future within next 30 days. See \"Patient Info\" tab in inbasket, or \"Choose Columns\" in Meds & Orders section of the refill encounter.              Passed - Medication is active on med list        Passed - Patient is age 18 or older        Passed - No active pregnacy on record        Passed - No positive pregnancy test in past 12 months             "

## 2020-12-05 RX ORDER — SERTRALINE HYDROCHLORIDE 100 MG/1
TABLET, FILM COATED ORAL
Qty: 60 TABLET | Refills: 0 | Status: SHIPPED | OUTPATIENT
Start: 2020-12-05 | End: 2020-12-10

## 2020-12-05 RX ORDER — GABAPENTIN 300 MG/1
CAPSULE ORAL
Qty: 30 CAPSULE | Refills: 0 | Status: SHIPPED | OUTPATIENT
Start: 2020-12-05 | End: 2020-12-10

## 2020-12-05 RX ORDER — OMEPRAZOLE 40 MG/1
CAPSULE, DELAYED RELEASE ORAL
Qty: 30 CAPSULE | Refills: 0 | Status: SHIPPED | OUTPATIENT
Start: 2020-12-05 | End: 2020-12-10

## 2020-12-05 NOTE — TELEPHONE ENCOUNTER
jose is due for a visit with me. Schedule her for a virtual (video or phone based on patient preference. Always promote a video visit first) visit with me.

## 2020-12-09 ASSESSMENT — ANXIETY QUESTIONNAIRES
2. NOT BEING ABLE TO STOP OR CONTROL WORRYING: SEVERAL DAYS
1. FEELING NERVOUS, ANXIOUS, OR ON EDGE: NOT AT ALL
3. WORRYING TOO MUCH ABOUT DIFFERENT THINGS: SEVERAL DAYS
7. FEELING AFRAID AS IF SOMETHING AWFUL MIGHT HAPPEN: NOT AT ALL
GAD7 TOTAL SCORE: 5
6. BECOMING EASILY ANNOYED OR IRRITABLE: SEVERAL DAYS
IF YOU CHECKED OFF ANY PROBLEMS ON THIS QUESTIONNAIRE, HOW DIFFICULT HAVE THESE PROBLEMS MADE IT FOR YOU TO DO YOUR WORK, TAKE CARE OF THINGS AT HOME, OR GET ALONG WITH OTHER PEOPLE: SOMEWHAT DIFFICULT
5. BEING SO RESTLESS THAT IT IS HARD TO SIT STILL: SEVERAL DAYS

## 2020-12-09 ASSESSMENT — PATIENT HEALTH QUESTIONNAIRE - PHQ9
5. POOR APPETITE OR OVEREATING: SEVERAL DAYS
SUM OF ALL RESPONSES TO PHQ QUESTIONS 1-9: 2

## 2020-12-09 NOTE — PROGRESS NOTES
"Vanesa Owen is a 50 year old female who is being evaluated via a billable video visit.      The patient has been notified of following:     \"This video visit will be conducted via a call between you and your physician/provider. We have found that certain health care needs can be provided without the need for an in-person physical exam.  This service lets us provide the care you need with a video conversation.  If a prescription is necessary we can send it directly to your pharmacy.  If lab work is needed we can place an order for that and you can then stop by our lab to have the test done at a later time.    Video visits are billed at different rates depending on your insurance coverage.  Please reach out to your insurance provider with any questions.    If during the course of the call the physician/provider feels a video visit is not appropriate, you will not be charged for this service.\"    Patient has given verbal consent for Video visit? Yes  How would you like to obtain your AVS? MyChart  If you are dropped from the video visit, the video invite should be resent to: Text to cell phone: [phone]  Will anyone else be joining your video visit? No    Subjective     Vanesa Owen is a 50 year old female who presents today via video visit for the following health issues:    HPI     Depression and Anxiety Follow-Up    How are you doing with your depression since your last visit? Worsened due to mother passing    How are you doing with your anxiety since your last visit?  Worsened due to mother passing    Are you having other symptoms that might be associated with depression or anxiety? No    Have you had a significant life event? Grief or Loss     Do you have any concerns with your use of alcohol or other drugs? No  Finally back to work.   She's doing okay.  covid symptoms have resolved.     Social History     Tobacco Use     Smoking status: Never Smoker     Smokeless tobacco: Never Used "   Substance Use Topics     Alcohol use: Yes     Comment: rare     Drug use: No     PHQ 11/4/2019 7/22/2020 12/9/2020   PHQ-9 Total Score 0 5 2   Q9: Thoughts of better off dead/self-harm past 2 weeks Not at all Not at all Not at all     VIKASH-7 SCORE 11/4/2019 7/22/2020 12/9/2020   Total Score 0 2 5     Last PHQ-9 12/9/2020   1.  Little interest or pleasure in doing things 0   2.  Feeling down, depressed, or hopeless 0   3.  Trouble falling or staying asleep, or sleeping too much 1   4.  Feeling tired or having little energy 0   5.  Poor appetite or overeating 1   6.  Feeling bad about yourself 0   7.  Trouble concentrating 0   8.  Moving slowly or restless 0   Q9: Thoughts of better off dead/self-harm past 2 weeks 0   PHQ-9 Total Score 2   Difficulty at work, home, or with people Somewhat difficult     VIKASH-7  12/9/2020   1. Feeling nervous, anxious, or on edge 0   2. Not being able to stop or control worrying 1   3. Worrying too much about different things 1   4. Trouble relaxing 1   5. Being so restless that it is hard to sit still 1   6. Becoming easily annoyed or irritable 1   7. Feeling afraid, as if something awful might happen 0   VIKASH-7 Total Score 5   If you checked any problems, how difficult have they made it for you to do your work, take care of things at home, or get along with other people? Somewhat difficult       Suicide Assessment Five-step Evaluation and Treatment (SAFE-T)      How many servings of fruits and vegetables do you eat daily?  2-3    On average, how many sweetened beverages do you drink each day (Examples: soda, juice, sweet tea, etc.  Do NOT count diet or artificially sweetened beverages)?   1-2    How many days per week do you exercise enough to make your heart beat faster? 3 or less    How many minutes a day do you exercise enough to make your heart beat faster? 9 or less    How many days per week do you miss taking your medication? 0        Video Start Time: 2:56 PM        Review of  Systems   Constitutional, HEENT, cardiovascular, pulmonary, GI, , musculoskeletal, neuro, skin, endocrine and psych systems are negative, except as otherwise noted.      Objective           Vitals:  No vitals were obtained today due to virtual visit.    Physical Exam     GENERAL: Healthy, alert and no distress  EYES: Eyes grossly normal to inspection.  No discharge or erythema, or obvious scleral/conjunctival abnormalities.  RESP: No audible wheeze, cough, or visible cyanosis.  No visible retractions or increased work of breathing.    SKIN: Visible skin clear. No significant rash, abnormal pigmentation or lesions.  NEURO: Cranial nerves grossly intact.  Mentation and speech appropriate for age.  PSYCH: Mentation appears normal, affect normal/bright, judgement and insight intact, normal speech and appearance well-groomed.              Vanesa was seen today for recheck medication.    Diagnoses and all orders for this visit:    Dysthymia  -     sertraline (ZOLOFT) 100 MG tablet; Take 2 tablets (200 mg) by mouth daily    Moderate major depression (H)  -     sertraline (ZOLOFT) 100 MG tablet; Take 2 tablets (200 mg) by mouth daily    Lower extremity pain, bilateral  -     gabapentin (NEURONTIN) 300 MG capsule; TAKE ONE CAPSULE BY MOUTH twice a day    Gastric hyperacidity  -     omeprazole (PRILOSEC) 40 MG DR capsule; TAKE ONE CAPSULE BY MOUTH EVERY DAY. TAKE 30 TO 60 MINUTES BEFORE A MEAL      Advised supportive and symptomatic treatment.  Follow up with Provider - if condition persists or worsens.   work on lifestyle modification        Video-Visit Details    Type of service:  Video Visit    Video End Time:3:22 PM    Originating Location (pt. Location): Home    Distant Location (provider location):  [facility]     Platform used for Video Visit: [employer]

## 2020-12-10 ENCOUNTER — VIRTUAL VISIT (OUTPATIENT)
Dept: FAMILY MEDICINE | Facility: CLINIC | Age: 50
End: 2020-12-10
Payer: COMMERCIAL

## 2020-12-10 DIAGNOSIS — M79.605 LOWER EXTREMITY PAIN, BILATERAL: ICD-10-CM

## 2020-12-10 DIAGNOSIS — K31.89 GASTRIC HYPERACIDITY: ICD-10-CM

## 2020-12-10 DIAGNOSIS — F32.1 MODERATE MAJOR DEPRESSION (H): ICD-10-CM

## 2020-12-10 DIAGNOSIS — M79.604 LOWER EXTREMITY PAIN, BILATERAL: ICD-10-CM

## 2020-12-10 DIAGNOSIS — F34.1 DYSTHYMIA: ICD-10-CM

## 2020-12-10 PROCEDURE — 99213 OFFICE O/P EST LOW 20 MIN: CPT | Mod: 95 | Performed by: PHYSICIAN ASSISTANT

## 2020-12-10 RX ORDER — OMEPRAZOLE 40 MG/1
CAPSULE, DELAYED RELEASE ORAL
Qty: 90 CAPSULE | Refills: 3 | Status: SHIPPED | OUTPATIENT
Start: 2020-12-10 | End: 2021-01-06

## 2020-12-10 RX ORDER — GABAPENTIN 300 MG/1
CAPSULE ORAL
Qty: 180 CAPSULE | Refills: 1 | Status: SHIPPED | OUTPATIENT
Start: 2020-12-10 | End: 2021-10-23

## 2020-12-10 RX ORDER — SERTRALINE HYDROCHLORIDE 100 MG/1
200 TABLET, FILM COATED ORAL DAILY
Qty: 180 TABLET | Refills: 1 | Status: SHIPPED | OUTPATIENT
Start: 2020-12-10 | End: 2021-01-06

## 2020-12-10 ASSESSMENT — ANXIETY QUESTIONNAIRES: GAD7 TOTAL SCORE: 5

## 2021-01-01 ENCOUNTER — OFFICE VISIT (OUTPATIENT)
Dept: URGENT CARE | Facility: URGENT CARE | Age: 51
End: 2021-01-01
Payer: COMMERCIAL

## 2021-01-01 VITALS
WEIGHT: 210 LBS | BODY MASS INDEX: 37.2 KG/M2 | OXYGEN SATURATION: 98 % | HEART RATE: 81 BPM | SYSTOLIC BLOOD PRESSURE: 125 MMHG | TEMPERATURE: 98.8 F | RESPIRATION RATE: 20 BRPM | DIASTOLIC BLOOD PRESSURE: 86 MMHG

## 2021-01-01 DIAGNOSIS — R30.0 DYSURIA: ICD-10-CM

## 2021-01-01 DIAGNOSIS — R82.90 ABNORMAL URINE FINDINGS: Primary | ICD-10-CM

## 2021-01-01 LAB
ALBUMIN UR-MCNC: NEGATIVE MG/DL
APPEARANCE UR: CLEAR
BACTERIA #/AREA URNS HPF: ABNORMAL /HPF
BILIRUB UR QL STRIP: NEGATIVE
COLOR UR AUTO: YELLOW
GLUCOSE UR STRIP-MCNC: NEGATIVE MG/DL
HGB UR QL STRIP: ABNORMAL
KETONES UR STRIP-MCNC: NEGATIVE MG/DL
LEUKOCYTE ESTERASE UR QL STRIP: NEGATIVE
NITRATE UR QL: NEGATIVE
NON-SQ EPI CELLS #/AREA URNS LPF: ABNORMAL /LPF
PH UR STRIP: 6 PH (ref 5–7)
RBC #/AREA URNS AUTO: ABNORMAL /HPF
SOURCE: ABNORMAL
SP GR UR STRIP: 1.02 (ref 1–1.03)
SPECIMEN SOURCE: NORMAL
UROBILINOGEN UR STRIP-ACNC: 0.2 EU/DL (ref 0.2–1)
WBC #/AREA URNS AUTO: ABNORMAL /HPF
WET PREP SPEC: NORMAL

## 2021-01-01 PROCEDURE — 81001 URINALYSIS AUTO W/SCOPE: CPT | Performed by: PHYSICIAN ASSISTANT

## 2021-01-01 PROCEDURE — 87210 SMEAR WET MOUNT SALINE/INK: CPT | Performed by: PHYSICIAN ASSISTANT

## 2021-01-01 PROCEDURE — 99213 OFFICE O/P EST LOW 20 MIN: CPT | Performed by: PHYSICIAN ASSISTANT

## 2021-01-01 RX ORDER — NITROFURANTOIN 25; 75 MG/1; MG/1
100 CAPSULE ORAL 2 TIMES DAILY
Qty: 6 CAPSULE | Refills: 0 | Status: SHIPPED | OUTPATIENT
Start: 2021-01-01 | End: 2021-01-04

## 2021-01-01 NOTE — PROGRESS NOTES
S: 51 yo female is here for dysuria for 2 days.  Also some increased frequency and urgency.  Feels some bladder pressure.  No nausea or vomiting.  Some bilateral low back pain.  LMP- Mirena, no menses with it.  No fever.  Some years since last urinary tract infection.  Some vaginal burning sensation.        Allergies   Allergen Reactions     Sulfa Drugs Rash       Past Medical History:   Diagnosis Date     Anxiety      Depressive disorder      Herpes simplex with other ophthalmic complications      UTI (urinary tract infection)             fluticasone (FLONASE) 50 MCG/ACT nasal spray, USE 2 SPRAYS INTO THE NOSTRIL DAILY       gabapentin (NEURONTIN) 300 MG capsule, TAKE ONE CAPSULE BY MOUTH twice a day       levonorgestrel (MIRENA) 20 MCG/24HR IUD, 1 each by Intrauterine route once       MULTI-VITAMIN OR TABS, 1 TABLET DAILY       omeprazole (PRILOSEC) 40 MG DR capsule, TAKE ONE CAPSULE BY MOUTH EVERY DAY. TAKE 30 TO 60 MINUTES BEFORE A MEAL       sertraline (ZOLOFT) 100 MG tablet, Take 2 tablets (200 mg) by mouth daily    No current facility-administered medications on file prior to visit.       Social History     Tobacco Use     Smoking status: Never Smoker     Smokeless tobacco: Never Used   Substance Use Topics     Alcohol use: Yes     Comment: rare     Drug use: No       ROS:  General: negative for fever  ABD: Denies abd pain  : as above    OBJECTIVE:  /86   Pulse 81   Temp 98.8  F (37.1  C) (Tympanic)   Resp 20   Wt 95.3 kg (210 lb)   SpO2 98%   BMI 37.20 kg/m     General:   awake, alert, and cooperative.  NAD.   Head: Normocephalic, atraumatic.  Eyes: Conjunctiva clear, non icteric.   ABD: soft, no tenderness to palpation , no rigidity, guarding or rebound . No CVAT  Neuro: Alert and oriented - normal speech.   Results for orders placed or performed in visit on 01/01/21   *UA reflex to Microscopic and Culture ([place] [facility] (except [place] and [place])     Status: Abnormal     Specimen: Midstream Urine   Result Value Ref Range    Color Urine Yellow     Appearance Urine Clear     Glucose Urine Negative NEG^Negative mg/dL    Bilirubin Urine Negative NEG^Negative    Ketones Urine Negative NEG^Negative mg/dL    Specific Gravity Urine 1.025 1.003 - 1.035    Blood Urine Trace (A) NEG^Negative    pH Urine 6.0 5.0 - 7.0 pH    Protein Albumin Urine Negative NEG^Negative mg/dL    Urobilinogen Urine 0.2 0.2 - 1.0 EU/dL    Nitrite Urine Negative NEG^Negative    Leukocyte Esterase Urine Negative NEG^Negative    Source Midstream Urine    Urine Microscopic     Status: Abnormal   Result Value Ref Range    WBC Urine 0 - 5 OTO5^0 - 5 /HPF    RBC Urine O - 2 OTO2^O - 2 /HPF    Squamous Epithelial /LPF Urine Few FEW^Few /LPF    Bacteria Urine Moderate (A) NEG^Negative /HPF   Wet prep     Status: None    Specimen: Vagina   Result Value Ref Range    Specimen Description Vagina     Wet Prep No Trichomonas seen     Wet Prep No clue cells seen     Wet Prep No yeast seen     Wet Prep Few  WBC'S seen          ASSESSMENT:    ICD-10-CM    1. Abnormal urine findings  R82.90 nitroFURantoin macrocrystal-monohydrate (MACROBID) 100 MG capsule   2. Dysuria  R30.0 *UA reflex to Microscopic and Culture ([place] [facility] (except [place] and [place])     Wet prep     Urine Microscopic     nitroFURantoin macrocrystal-monohydrate (MACROBID) 100 MG capsule               PLAN: Likely contamination of urine but will start treatment and await for urine culture.  She states she has been flushing her system with lots of water.  As per ordered above.   Prevention and treatment of UTI's discussed. Follow up with primary care physician if not improving.  Advised about symptoms which might herald more serious problems.      Julee Dai PA-C

## 2021-01-03 ENCOUNTER — NURSE TRIAGE (OUTPATIENT)
Dept: NURSING | Facility: CLINIC | Age: 51
End: 2021-01-03

## 2021-01-03 NOTE — TELEPHONE ENCOUNTER
Patient says she is taking Macrobid for a UTI infection but still having the burning and frequency.  Patient reports no new fever but has lower back pain/ache.  Reviewed care advice with caller per RN triage protocol guideline to be seen w/i 4 hrs.  Caller verbalized understanding will go to  [facility].        Additional Information    Negative: Shock suspected (e.g., cold/pale/clammy skin, too weak to stand, low BP, rapid pulse)    Negative: Sounds like a life-threatening emergency to the triager    Negative: Urinary tract infection suspected, but not taking antibiotics    Negative: [1] Unable to urinate (or only a few drops) > 4 hours AND     [2] bladder feels very full (e.g., palpable bladder or strong urge to urinate)    Negative: Passing pure blood or large blood clots (i.e., size > a dime)  (Exceptions: flecks, small strands, or pinkish-red color)    Negative: Patient sounds very sick or weak to the triager    Negative: [1] SEVERE pain (e.g., excruciating) AND [2] no improvement 2 hours after pain medications    Negative: [1] Fever > 100.0 F (37.8 C) AND [2] new onset since starting antibiotics    [1] Side (flank) or lower back pain AND [2] new onset since starting antibiotics    Protocols used: URINARY TRACT INFECTION ON ANTIBIOTIC FOLLOW-UP CALL - FEMALE-A-AH

## 2021-01-06 ENCOUNTER — MYC MEDICAL ADVICE (OUTPATIENT)
Dept: FAMILY MEDICINE | Facility: CLINIC | Age: 51
End: 2021-01-06

## 2021-01-06 ENCOUNTER — VIRTUAL VISIT (OUTPATIENT)
Dept: FAMILY MEDICINE | Facility: CLINIC | Age: 51
End: 2021-01-06
Payer: COMMERCIAL

## 2021-01-06 DIAGNOSIS — R39.9 URINARY TRACT INFECTION SYMPTOMS: ICD-10-CM

## 2021-01-06 DIAGNOSIS — F34.1 DYSTHYMIA: ICD-10-CM

## 2021-01-06 DIAGNOSIS — J01.80 ACUTE NON-RECURRENT SINUSITIS OF OTHER SINUS: Primary | ICD-10-CM

## 2021-01-06 DIAGNOSIS — F32.1 MODERATE MAJOR DEPRESSION (H): ICD-10-CM

## 2021-01-06 DIAGNOSIS — K31.89 GASTRIC HYPERACIDITY: ICD-10-CM

## 2021-01-06 LAB
ALBUMIN UR-MCNC: NEGATIVE MG/DL
APPEARANCE UR: CLEAR
BILIRUB UR QL STRIP: NEGATIVE
COLOR UR AUTO: YELLOW
GLUCOSE UR STRIP-MCNC: NEGATIVE MG/DL
HGB UR QL STRIP: ABNORMAL
KETONES UR STRIP-MCNC: NEGATIVE MG/DL
LEUKOCYTE ESTERASE UR QL STRIP: NEGATIVE
NITRATE UR QL: NEGATIVE
PH UR STRIP: 5.5 PH (ref 5–7)
RBC #/AREA URNS AUTO: NORMAL /HPF
SOURCE: ABNORMAL
SP GR UR STRIP: <=1.005 (ref 1–1.03)
UROBILINOGEN UR STRIP-ACNC: 0.2 EU/DL (ref 0.2–1)
WBC #/AREA URNS AUTO: NORMAL /HPF

## 2021-01-06 PROCEDURE — 99213 OFFICE O/P EST LOW 20 MIN: CPT | Mod: TEL | Performed by: PHYSICIAN ASSISTANT

## 2021-01-06 PROCEDURE — 81001 URINALYSIS AUTO W/SCOPE: CPT | Performed by: PHYSICIAN ASSISTANT

## 2021-01-06 RX ORDER — SERTRALINE HYDROCHLORIDE 100 MG/1
200 TABLET, FILM COATED ORAL DAILY
Qty: 180 TABLET | Refills: 3 | Status: SHIPPED | OUTPATIENT
Start: 2021-01-06 | End: 2021-12-25

## 2021-01-06 RX ORDER — DOXYCYCLINE 100 MG/1
100 CAPSULE ORAL 2 TIMES DAILY
Qty: 14 CAPSULE | Refills: 0 | Status: SHIPPED | OUTPATIENT
Start: 2021-01-13 | End: 2021-01-21

## 2021-01-06 RX ORDER — PREDNISONE 20 MG/1
20 TABLET ORAL 2 TIMES DAILY
Qty: 10 TABLET | Refills: 0 | Status: SHIPPED | OUTPATIENT
Start: 2021-01-06 | End: 2021-01-11

## 2021-01-06 RX ORDER — OMEPRAZOLE 40 MG/1
CAPSULE, DELAYED RELEASE ORAL
Qty: 90 CAPSULE | Refills: 3 | Status: SHIPPED | OUTPATIENT
Start: 2021-01-06 | End: 2021-12-25

## 2021-01-06 NOTE — TELEPHONE ENCOUNTER
Patient contacted and is re-scheduled with Jimmy RYDER this afternoon (Virtual visit).     Linda Peterson RN BSN  [facility]

## 2021-01-06 NOTE — PATIENT INSTRUCTIONS
Anyi Alexis,    Thank you for allowing [facility] to manage your care.    I sent your prescriptions to your pharmacy.    Prednisone Discharge Instructions:    Please take the steroid, Prednisone, for the full course as prescribed.  Take Prednisone with food or milk to minimize stomach upset.      Side effects of Prednisone include difficulty sleeping, increased appetite, weight gain, and changes in mood.  If you are diabetic, please monitor your blood sugar regularly while taking this medicine as Prednisone can cause high blood sugar.    Please allow 1-2 business days for our office to contact you in regards to your laboratory/radiological studies.  If not done so, I encourage you to login into [employer] ([email or web address]".org/[email or web address]t/) to review your results as well.     If you have any questions or concerns, please feel free to call us at [phone]    Sincerely,    Jimmy Villanueva PA-C    Did you know?      You can schedule a video visit for follow-up appointments as well as future appointments for certain conditions.  Please see the below link.     [email or web address]    If you have not already done so,  I encourage you to sign up for [employer] ([email or web address]".[email or web address]/[email or web address]/).  This will allow you to review your results, securely communicate with a provider, and schedule virtual visits as well.      Patient Education     Understanding Acute Rhinosinusitis  Acute rhinosinusitis is when the lining of the inside of the nose and the sinuses becomes irritated and swollen. It is also called sinusitis, or a sinus infection.   Sinuses are air-filled spaces in the skull behind the face. They are kept moist and clean by a lining of mucosa. Things such as pollen, smoke, and chemical fumes can irritate the mucosa. It can then swell up. As a response to irritation, the mucosa makes more mucus and other fluids. Tiny hairlike cilia cover the mucosa. Cilia help carry  mucus toward the opening of the sinus. Too much mucus may cause the cilia to stop working. This blocks the sinus opening. A buildup of fluid in the sinuses then causes pain and pressure. It can also cause bacteria to grow in the sinuses.     What causes acute rhinosinusitis?  A sinus infection is most often caused by a virus. You are more likely to get one after having a cold or the flu. In some cases, a sinus infection can be caused by bacteria.   You are at higher risk for a sinus infection if you:     Are older in age    Have structural problems with your sinuses    Smoke or are exposed to secondhand smoke    Are exposed to changes in pressure, such as from flying a lot or deep sea diving    Have asthma or allergies    Have a weak immune system    Have dental disease  Symptoms of acute rhinosinusitis  Symptoms of acute rhinosinusitis often last around 7 to 10 days. If you have a bacterial infection, they may last longer. They may also get better but then worsen. You may have:     Face pain or pressure under the eyes and around the nose    Headache    Fluid draining in the back of the throat (postnasal drip)    Congestion    Drainage that is thick and colored (often green), instead of clear    Cough    Problems with your sense of smell    Ear pain or hearing problems    Fever    Tooth pain    Fatigue  Diagnosing acute rhinosinusitis  Your healthcare provider will ask about your symptoms and past health. He or she will look at your ears, nose, throat, and sinuses. Imaging tests, such as X-rays, are often not needed.   It can be hard to figure out if a sinus infection is caused by a virus or bacterium. A bacterial infection tends to last longer. Symptoms may also get better but then worsen. Your healthcare provider may take a sample of mucus from your nose to check for bacteria.   Treating acute rhinosinusitis  Most sinus infections will go away within 10 days. Your body will fight off the virus. If your symptoms  seem to get better but then worsen, you may have a bacterial infection instead. Your healthcare provider will then give you antibiotics. Take this medicine until it is gone, even if you feel better.   To help ease your symptoms, your healthcare provider may advise:     Over-the-counter pain relievers. Medicines such as acetaminophen or ibuprofen can ease sinus pain. They may also lower a fever.    Nasal washes. Washing your nasal passages with salt water may ease pain and pressure. It can rinse out mucous and other irritants from your sinuses. Your healthcare provider can show you how to do it.    Nasal steroid spray. This prescription medicine can reduce inflammation in your sinuses.    Other medicines. Decongestants, antihistamines, and other nasal sprays may give short-term relief. They may help with congestion. Talk with your healthcare provider before taking these medicines.    Preventing acute rhinosinusitis  You can help prevent a sinus infection with these steps:     Wash your hands well and often.    Stay away from people who have a cold or upper respiratory infection.    Don't smoke. And stay away from secondhand smoke.    Use a humidifier at home.    Make sure you are up-to-date on your vaccines, such as the flu shot.    When to call your healthcare provider  Call your healthcare provider right away if you have any of these:     Fever of 100.4 F (38 C) or higher, or as directed by your healthcare provider    Pain that gets worse    Symptoms that don t get better, or get worse    New symptoms  Sami last reviewed this educational content on 6/1/2019 2000-2020 The "[employer]. [address] [place] [address], [place] [address]. All rights reserved. This information is not intended as a substitute for professional medical care. Always follow your healthcare professional's instructions.

## 2021-01-06 NOTE — PROGRESS NOTES
Vanesa Owen is a 50 year old female who is being evaluated via a billable telephone visit.      What phone number would you like to be contacted at? [phone]  How would you like to obtain your AVS? [facility]  Assessment & Plan   Problem List Items Addressed This Visit        Digestive    Gastric hyperacidity    Relevant Medications    omeprazole (PRILOSEC) 40 MG DR capsule       Behavioral    Moderate major depression (H)    Relevant Medications    sertraline (ZOLOFT) 100 MG tablet      Other Visit Diagnoses     Acute non-recurrent sinusitis of other sinus    -  Primary    Relevant Medications    predniSONE (DELTASONE) 20 MG tablet    doxycycline hyclate (VIBRAMYCIN) 100 MG capsule (Start on 1/13/2021)    Dysthymia        Relevant Medications    sertraline (ZOLOFT) 100 MG tablet    Urinary tract infection symptoms        Relevant Orders    *UA reflex to Microscopic and Culture ([facility] (except [place] and [place])        Impression is sinusitis, likely viral. Pt was COVID on 11/13/20 and felt that she recovered from mild illness before feeling ill again recently after receiving the COVID vaccination roughly a week ago. Will treat with steroid burst and course of doxycycline to be started in one week if not improving. Also encouraged otc med use. Continues to have symptoms of UTI and will repeat UA. Sertraline and omeprazole refilled. No side effects. Has been on them chronically.  Sounds well and non-toxic and I have low suspicion for impending airway obstruction or respiratory distress.    DDx and Dx discussed with and explained to the pt to their satisfaction.  All questions were answered at this time. Pt expressed understanding of and agreement with this dx, tx, and plan. No further workup warranted and standard medication warnings given. I have given the patient a list of pertinent indications for re-evaluation. Will go to the Emergency Department if symptoms worsen or new  concerning symptoms arise. Patient left the call in no apparent distress.     22 minutes spent on the date of the encounter doing chart review, history and exam, documentation and further activities as noted abov     See Patient Instructions    No follow-ups on file.    ALEKSEY Brown  [facility] PILY Owen is a 50 year old who presents to clinic today for the following health issues:    HPI   Acute Illness  Acute illness concerns: Possible sinus infection  Onset/Duration: 3 days  Symptoms:  Fever: no  Chills/Sweats: YES- chills  Headache (location?): YES- right side of face, around eye, shooting to right ear  Sinus Pressure: YES  Conjunctivitis:  no  Ear Pain: no  Rhinorrhea: no  Congestion: YES  Sore Throat: no  Cough: no  Wheeze: no  Decreased Appetite: no  Nausea: no  Vomiting: no  Diarrhea: no  Dysuria/Freq.: no  Dysuria or Hematuria: no  Fatigue/Achiness: no  Sick/Strep Exposure: no  Therapies tried and outcome: Advil, Claritin D, IBU    Review of Systems   Constitutional, HEENT, cardiovascular, pulmonary, gi and gu systems are negative, except as otherwise noted.      Objective           Vitals:  No vitals were obtained today due to virtual visit.    Physical Exam   healthy, alert and no distress  PSYCH: Alert and oriented times 3; coherent speech, normal   rate and volume, able to articulate logical thoughts, able   to abstract reason, no tangential thoughts, no hallucinations   or delusions  Her affect is normal and pleasant  RESP: No cough, no audible wheezing, able to talk in full sentences  Remainder of exam unable to be completed due to telephone visits    UA pending     Phone call duration: 15 minutes

## 2021-01-21 ENCOUNTER — OFFICE VISIT (OUTPATIENT)
Dept: FAMILY MEDICINE | Facility: CLINIC | Age: 51
End: 2021-01-21
Payer: COMMERCIAL

## 2021-01-21 DIAGNOSIS — J02.9 SORE THROAT: Primary | ICD-10-CM

## 2021-01-21 LAB
DEPRECATED S PYO AG THROAT QL EIA: NEGATIVE
SPECIMEN SOURCE: NORMAL
SPECIMEN SOURCE: NORMAL
STREP GROUP A PCR: NOT DETECTED

## 2021-01-21 PROCEDURE — 87651 STREP A DNA AMP PROBE: CPT | Performed by: PHYSICIAN ASSISTANT

## 2021-01-21 PROCEDURE — 99213 OFFICE O/P EST LOW 20 MIN: CPT | Mod: TEL | Performed by: PHYSICIAN ASSISTANT

## 2021-01-21 PROCEDURE — 99N1174 PR STATISTIC STREP A RAPID: Performed by: PHYSICIAN ASSISTANT

## 2021-01-21 ASSESSMENT — ENCOUNTER SYMPTOMS
FEVER: 0
SHORTNESS OF BREATH: 0
ABDOMINAL PAIN: 0
LIGHT-HEADEDNESS: 0
SORE THROAT: 0
CHILLS: 0
NERVOUS/ANXIOUS: 0

## 2021-01-21 NOTE — PROGRESS NOTES
Vanesa is a 50 year old who is being evaluated via a billable telephone visit.      What phone number would you like to be contacted at? [phone]  How would you like to obtain your AVS? MyChart  Assessment & Plan     Sore throat  Patient is a 50-year-old female who presents virtual visit due to sore throat.  Patient has completed Covid vaccination and did have Covid within the last few months, thus low suspicion for COVID-19.  Symptoms likely due to strep throat or other viral URI.  Rapid strep pending.  Discussed symptomatic management with rest, hydration, salt water gargles, Tylenol/ibuprofen.  Recommend follow-up for new or worsening symptoms.  - Streptococcus A Rapid Scr w Reflx to PCR  - Group A Streptococcus PCR Throat Swab      See Patient Instructions    Return in about 1 week (around 1/28/2021), or if symptoms worsen or fail to improve.    SAMSON [facility]uman [facility] PILY Alexis is a 50 year old who presents to clinic today for the following health issues: sore throat     HPI       Acute Illness  Acute illness concerns: Sore throat ongoing for the last two days. Sore when swallowing on both sides. 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  Patient had 2nd COVID-19 vaccination.  Patient also had Covid19 a few months ago.  Symptoms today are different from previous episode of COVID-19.  Onset/Duration: 2 days  Symptoms:  Fever: no  Chills/Sweats: no  Headache (location?): no  Sinus Pressure: no  Conjunctivitis:  no  Ear Pain: no  Rhinorrhea: no  Congestion: no  Sore Throat: YES  Cough: no  Wheeze: no  Decreased Appetite: YES- Less PO  Nausea: no  Vomiting: no  Diarrhea: no  Dysuria/Freq.: no  Dysuria or Hematuria: no  Fatigue/Achiness: no  Sick/Strep Exposure: no  Therapies tried and outcome: Advil      Review of Systems   Constitutional: Negative for chills and fever.   HENT:  Negative for congestion and sore throat.    Respiratory: Negative for shortness of breath.    Cardiovascular: Negative for chest pain.   Gastrointestinal: Negative for abdominal pain.   Skin: Negative for rash.   Neurological: Negative for light-headedness.   Psychiatric/Behavioral: The patient is not nervous/anxious.             Objective           Vitals:  No vitals were obtained today due to virtual visit.    Physical Exam   healthy, alert and no distress  PSYCH: Alert and oriented times 3; coherent speech, normal   rate and volume, able to articulate logical thoughts, able   to abstract reason, no tangential thoughts, no hallucinations   or delusions  Her affect is normal  RESP: No cough, no audible wheezing, able to talk in full sentences  Remainder of exam unable to be completed due to telephone visits    Rapid Strep Pending           Phone call duration: 6 minutes

## 2021-01-22 NOTE — PATIENT INSTRUCTIONS
Sebastian Alexis,     As your strep test is negative, your symptoms are likely due to a viral pharyngitis.  Treatment you may use Tylenol/ibuprofen, salt water gargles, hydration, and rest.  Please make sure to follow-up if you develop any new or worsening symptoms.    Take Care,  Lilly Dixon PA-C    Patient Education     Viral Pharyngitis (Sore Throat)    You or your child have pharyngitis (sore throat). This infection is caused by a virus. It can cause throat pain that is worse when swallowing, aching all over, headache, and fever. The infection may be spread by coughing, kissing, or touching others after touching your mouth or nose. Antibiotic medicines do not work against viruses. They are not used for treating this illness.  Home care    If symptoms are severe, you or your child should rest at home. Return to work or school when you or your child feel well enough.     You or your child should drink plenty of fluids to prevent dehydration.    Use throat lozenges or numbing throat sprays to help reduce pain. Gargling with warm salt water will also help reduce throat pain. Dissolve 1/2 teaspoon of salt in 1 glass of warm water. Children can sip on juice or a popsicle. Children 5 years and older can also suck on a lollipop or hard candy.    Don t eat salty or spicy foods or give them to your child. These can be irritating to the throat.  Medicines for a child: You can give your child acetaminophen for fever, fussiness, or discomfort. In babies over 6 months of age, you may use ibuprofen instead of acetaminophen. If your child has chronic liver or kidney disease or ever had a stomach ulcer or GI bleeding, talk with your child s healthcare provider before giving these medicines. Aspirin should never be used by any child under 18 years of age who has a fever. It may cause severe liver damage.  Medicines for an adult: You may use acetaminophen or ibuprofen to control pain or fever, unless another medicine was prescribed for  this. If you have chronic liver or kidney disease or ever had a stomach ulcer or GI bleeding, talk with your healthcare provider before using these medicines.  Follow-up care  Follow up with a healthcare provider or our staff if you or your child are not getting better over the next week.  When to seek medical advice  Call your healthcare provider right away if any of these occur:    Fever as directed by your healthcare provider.  For children, seek care if:  ? Your child is of any age and has repeated fevers above 104 F (40 C).  ? Your child is younger than 2 years of age and has a fever of 100.4 F (38 C) for more than 1 day.  ? Your child is 2 years old or older and has a fever of 100.4 F (38 C) for more than 3 days.    New or worsening ear pain, sinus pain, or headache    Painful lumps in the back of neck    Stiff neck    Lymph nodes are getting larger    Can t swallow liquids, a lot of drooling, or can t open mouth wide due to throat pain    Signs of dehydration, such as very dark urine or no urine, sunken eyes, dizziness    Trouble breathing or noisy breathing    Muffled voice    New rash    Other symptoms are getting worse  [employer] last reviewed this educational content on 10/1/2017    3354-3501 The [employer]. [address] [place] [address], [place] [address]. All rights reserved. This information is not intended as a substitute for professional medical care. Always follow your healthcare professional's instructions.

## 2021-02-13 ENCOUNTER — HEALTH MAINTENANCE LETTER (OUTPATIENT)
Age: 51
End: 2021-02-13

## 2021-02-24 ENCOUNTER — TRANSFERRED RECORDS (OUTPATIENT)
Dept: HEALTH INFORMATION MANAGEMENT | Facility: CLINIC | Age: 51
End: 2021-02-24

## 2021-03-03 ENCOUNTER — OFFICE VISIT (OUTPATIENT)
Dept: FAMILY MEDICINE | Facility: CLINIC | Age: 51
End: 2021-03-03
Payer: COMMERCIAL

## 2021-03-03 ENCOUNTER — ANCILLARY PROCEDURE (OUTPATIENT)
Dept: GENERAL RADIOLOGY | Facility: CLINIC | Age: 51
End: 2021-03-03
Attending: PHYSICIAN ASSISTANT
Payer: COMMERCIAL

## 2021-03-03 VITALS
HEART RATE: 82 BPM | DIASTOLIC BLOOD PRESSURE: 88 MMHG | TEMPERATURE: 99.2 F | OXYGEN SATURATION: 95 % | RESPIRATION RATE: 20 BRPM | WEIGHT: 228.4 LBS | SYSTOLIC BLOOD PRESSURE: 123 MMHG | BODY MASS INDEX: 40.46 KG/M2

## 2021-03-03 DIAGNOSIS — M89.8X8 ILIAC CREST BONE PAIN: ICD-10-CM

## 2021-03-03 DIAGNOSIS — M79.661 RIGHT CALF PAIN: ICD-10-CM

## 2021-03-03 DIAGNOSIS — M89.8X8 ILIAC CREST BONE PAIN: Primary | ICD-10-CM

## 2021-03-03 DIAGNOSIS — M17.31 POST-TRAUMATIC OSTEOARTHRITIS OF RIGHT KNEE: ICD-10-CM

## 2021-03-03 DIAGNOSIS — M19.131 POST-TRAUMATIC OSTEOARTHRITIS OF RIGHT WRIST: ICD-10-CM

## 2021-03-03 PROCEDURE — 73110 X-RAY EXAM OF WRIST: CPT | Mod: RT | Performed by: RADIOLOGY

## 2021-03-03 PROCEDURE — 73502 X-RAY EXAM HIP UNI 2-3 VIEWS: CPT | Performed by: RADIOLOGY

## 2021-03-03 PROCEDURE — 99214 OFFICE O/P EST MOD 30 MIN: CPT | Performed by: PHYSICIAN ASSISTANT

## 2021-03-03 PROCEDURE — 73560 X-RAY EXAM OF KNEE 1 OR 2: CPT | Mod: RT | Performed by: RADIOLOGY

## 2021-03-03 RX ORDER — DICLOFENAC SODIUM 75 MG/1
75 TABLET, DELAYED RELEASE ORAL 2 TIMES DAILY
Qty: 30 TABLET | Refills: 0 | Status: SHIPPED | OUTPATIENT
Start: 2021-03-03 | End: 2022-08-23

## 2021-03-03 RX ORDER — DOXYCYCLINE 100 MG/1
CAPSULE ORAL
COMMUNITY
Start: 2021-01-06 | End: 2021-08-23

## 2021-03-03 NOTE — PROGRESS NOTES
Subjective   Vanesa is a 50 year old who presents for the following health issues     HPI       Right Thumb pain - possible arthritis, getting worse overtime    Right Hip pain - 1 month, causing knee to give out  - no injuries  - gabapentin not helping with knee or hip  Occasional locking right knee. No obvious swelling.  Some mild low back pain. No paresthesias.    Review of Systems   Constitutional, HEENT, cardiovascular, pulmonary, GI, , musculoskeletal, neuro, skin, endocrine and psych systems are negative, except as otherwise noted.      Objective    There were no vitals taken for this visit.  There is no height or weight on file to calculate BMI.  Physical Exam   Pain at base of 1st metacarpal. rom of thumb normal.   KNEE: The injured knee reveals soft tissue tenderness over medial joint line, negative drawer sign, collateral ligaments intact, negative Osmin sign, normal ipsilateral hip exam, normal ipsilateral foot and ankle exam, normal contralateral knee exam. X-ray is negative for fracture.  Hip rom normal. Some tenderness involving her right iliac crest.     Vanesa was seen today for thumb discomfort and musculoskeletal problem.    Diagnoses and all orders for this visit:    Iliac crest bone pain  -     XR Pelvis and Hip Right 1 View; Future  -     diclofenac (VOLTAREN) 75 MG EC tablet; Take 1 tablet (75 mg) by mouth 2 times daily    Post-traumatic osteoarthritis of right knee  -     XR Knee Right 1/2 Views; Future  -     MR Knee Right w/o Contrast; Future  -     diclofenac (VOLTAREN) 75 MG EC tablet; Take 1 tablet (75 mg) by mouth 2 times daily    Post-traumatic osteoarthritis of right wrist  -     XR Wrist Right G/E 3 Views; Future  -     diclofenac (VOLTAREN) 75 MG EC tablet; Take 1 tablet (75 mg) by mouth 2 times daily  -     [facility] PT, HAND, AND CHIROPRACTIC REFERRAL; Future      Advised supportive and symptomatic treatment.  Follow up with Provider - if condition persists or worsens.    work on lifestyle modification

## 2021-03-08 ENCOUNTER — ANCILLARY PROCEDURE (OUTPATIENT)
Dept: ULTRASOUND IMAGING | Facility: CLINIC | Age: 51
End: 2021-03-08
Attending: PHYSICIAN ASSISTANT
Payer: COMMERCIAL

## 2021-03-08 DIAGNOSIS — M79.661 RIGHT CALF PAIN: ICD-10-CM

## 2021-03-12 ENCOUNTER — ANCILLARY PROCEDURE (OUTPATIENT)
Dept: MRI IMAGING | Facility: CLINIC | Age: 51
End: 2021-03-12
Attending: PHYSICIAN ASSISTANT
Payer: COMMERCIAL

## 2021-03-12 DIAGNOSIS — M17.31 POST-TRAUMATIC OSTEOARTHRITIS OF RIGHT KNEE: ICD-10-CM

## 2021-03-12 PROCEDURE — 73721 MRI JNT OF LWR EXTRE W/O DYE: CPT | Mod: RT | Performed by: RADIOLOGY

## 2021-03-22 ENCOUNTER — THERAPY VISIT (OUTPATIENT)
Dept: OCCUPATIONAL THERAPY | Facility: CLINIC | Age: 51
End: 2021-03-22
Payer: COMMERCIAL

## 2021-03-22 DIAGNOSIS — M79.644 PAIN OF RIGHT THUMB: ICD-10-CM

## 2021-03-22 PROCEDURE — 97110 THERAPEUTIC EXERCISES: CPT | Mod: GO | Performed by: OCCUPATIONAL THERAPIST

## 2021-03-22 PROCEDURE — 97140 MANUAL THERAPY 1/> REGIONS: CPT | Mod: GO | Performed by: OCCUPATIONAL THERAPIST

## 2021-03-22 PROCEDURE — 97165 OT EVAL LOW COMPLEX 30 MIN: CPT | Mod: GO | Performed by: OCCUPATIONAL THERAPIST

## 2021-03-22 PROCEDURE — 97035 APP MDLTY 1+ULTRASOUND EA 15: CPT | Mod: GO | Performed by: OCCUPATIONAL THERAPIST

## 2021-03-22 NOTE — LETTER
[facility]  [address]  [facility] [address]  [phone]    2021    Re: Vanesa Owen   :   1970  MRN:  [id number]   REFERRING PHYSICIAN:   Darrick HARDING [facility]  Date of Initial Evaluation:  3/22/21  Visits: 1  Rxs Used: 1  Reason for Referral:  Pain of right thumb    Hand Therapy Initial Evaluation    Current Date:  3/22/2021  Referring Physician:Darrick Kay PA-C  Diagnosis: Right thumb  pain  DOI: 3/3/21 (Date of order)    Subjective:  Vanesa Owen is a 51 year old right hand dominant female.  Patient reports symptoms of pain, stiffness/loss of motion and weakness/loss of strength of the right thumb which occurred due to overuse in job. Since onset symptoms are Unchanged  Special tests:  x-ray.  Previous treatment: none.    General health as reported by patient is good.  Pertinent medical history includes:Dizziness, Depression, Migraines/Headaches, Numbness/Tingling, Overweight, Calf Pain, Pain at Night/Rest  Medical allergies:sulfa.  Surgical history: other: bladder sling.  Medication history: Anti-depressants, Anti-inflammatory.    Occupational Profile Information:  Current occupation is RN in NICU  Currently working in normal job without restrictions  Job Tasks: Computer Work, Prolonged Standing, Repetitive Tasks  Prior functional level:  no limitations  Barriers include:none  Mobility: No difficulty  Transportation: drives  Leisure activities/hobbies: walking, dog, busy with children, watch TV    Upper Extremity Functional Index Score:  SCORE:   Column Totals: /80: 77   (A lower score indicates greater disability.)    Objective:  Pain Level (Scale 0-10):   3/22/2021   At Rest 1/10   With Use 2/10   Re: Vaneas Owen   :   1970  Page 2    Pain Description:  Date 3/22/2021   Location thumb   Pain Quality Sharp   Frequency intermittent     Pain is worst  daytime    Exacerbated by  holding a wide grasp, gripping, computer   Relieved by rest   Progression Gradually worsening     ROM  Thumb 3/22/2021 3/22/2021   AROM  (PROM) Left Right   MP /60 /55   IP /65 /60   RABD 60 45   PABD 55 50   Kapandji Opposition Scale (0-10/10) 10/10 10/10     Thumb Observation/Appearance  Key: + = present/ - = not observed    3/22/2021   Shoulder deformity present over CMC R:-  L:-   Volar subluxation present R:-  L:-   Edema over the CMC joint R:-  L:-   Noted collapse of MP into hyperextension during pinch R:+  L:+   Tenderness at CMC R:-  L:-   Tenderness in web space R: +  L:-      Provocative Tests  Pain Report:  - none    + mild    ++ moderate    +++ severe     3/22/2021   CMC Grind test R: -   Crepitus present R: -   CMC Adduction Stress Test R: ++   CMC Extension Stress Test R: -   Finkelstein's R: -   Re: Vanesa Owen   :   1970  Page 3      Strength   (Measured in pounds)  Pain Report: - none  + mild    ++ moderate    +++ severe    3/22/2021 3/22/2021   Trials Left Right   1  2  3 44  47  47 47  48  53   Average 46 49     Lat Pinch 3/22/2021 3/22/2021   Trials Left Right   1  2  3 14 14   Average       3 Pt Pinch 3/22/2021 3/22/2021   Trials Left Right   1  2  3 12 11+   Average       Assessment:  Patient presents with symptoms consistent with diagnosis of  thumb pain, with conservative intervention.  Patient s limitations or Problem List includes:  Pain, Decreased ROM/motion, weakness, decreased stability of the thumb joint, decreased  and pinch strength of the thumb which interferes with patients ability to perform  home maintenance, work and sleeping as compared to previous level of function.  Rehab Potential:  Excellent - Return to full activity, no limitations  Patient will benefit from skilled Occupational Therapy to increase ROM, flexibility and stability of thumb and decrease pain to return to previous activity level and resume normal daily tasks and to  reach their rehab potential.  Barriers to Learning:  No barrier  Communication Issues:  Patient appears to be able to clearly communicate and understand verbal and written communication and follow directions correctly.  Chart Review: Brief history including review of medical and/or therapy records relating to the presenting problem and Simple history review with patient  Identified Performance Deficits: home establishment and management, work and leisure activities    Assessment of Occupational Performance:  3-5 Performance Deficits  Clinical Decision Making (Complexity): Low complexity  Treatment Explanation:  The following has been discussed with the patient:  RX ordered/plan of care  Anticipated outcomes  Possible risks and side effects    Re: Vanesa Owen   :   1970  Page 4      Plan:  Frequency:  2 X a month, once daily  Duration:  for 3 months    Treatment Plan:  Modalities:  Paraffin  Therapeutic Exercise: AROM, Isometrics, and Stabilization exercises of the Thumb CMC, including active and resisted abduction, 1st DI strengthening  Manual Techniques: Joint Mobilization or reseating of the trapezium, self MFR to thumb adductor with clip  Orthosis fabrication:  Hand based Thumb Spica, Custom neoprene support   Education: Anatomy of CMC, joint protection principles, adaptive equipment as needed    Discharge Plan:  Achieve all LTG  [place] in home treatment program.  Reach maximal therapeutic benefit.    Home Program:  Warmth  1st web release with clip  Stretching to 1st web space  Incorporate joint protection into daily functional activities  Adaptive equipment as needed.    Next Visit:  US  Place and hold pinch  Thumb Stabilization Program with hard  C  and index abd  Hand based Thumb Spica orthosis night/sleeping and per symptoms during the day   CMC neoprene cool comfort orthosis during the day with ADL s per symptoms      Thank you for your referral.    INQUIRIES    Therapist: Marisel  Pearl KIM/L, PAULINE  [facility]  [address]  [facility] [address]  Phone: [phone]  Fax: [phone]

## 2021-03-22 NOTE — PROGRESS NOTES
Hand Therapy Initial Evaluation    Current Date:  3/22/2021  Referring Physician:Darrick Kay PA-C    Diagnosis: Right thumb  pain  DOI: 3/3/21 (Date of order)    Subjective:  Vanesa Owen is a 51 year old right hand dominant female.    Patient reports symptoms of pain, stiffness/loss of motion and weakness/loss of strength of the right thumb which occurred due to overuse in job. Since onset symptoms are Unchanged  Special tests:  x-ray.  Previous treatment: none.    General health as reported by patient is good.  Pertinent medical history includes:Dizziness, Depression, Migraines/Headaches, Numbness/Tingling, Overweight, Calf Pain, Pain at Night/Rest  Medical allergies:sulfa.  Surgical history: other: bladder sling.  Medication history: Anti-depressants, Anti-inflammatory.    Occupational Profile Information:  Current occupation is RN in [employer]  Currently working in normal job without restrictions  Job Tasks: Computer Work, Prolonged Standing, Repetitive Tasks  Prior functional level:  no limitations  Barriers include:none  Mobility: No difficulty  Transportation: drives  Leisure activities/hobbies: walking, dog, busy with children, watch TV    Upper Extremity Functional Index Score:  SCORE:   Column Totals: /80: 77   (A lower score indicates greater disability.)    Objective:  Pain Level (Scale 0-10):   3/22/2021   At Rest 1/10   With Use 2/10     Pain Description:  Date 3/22/2021   Location thumb   Pain Quality Sharp   Frequency intermittent     Pain is worst  daytime   Exacerbated by  holding a wide grasp, gripping, computer   Relieved by rest   Progression Gradually worsening     ROM  Thumb 3/22/2021 3/22/2021   AROM  (PROM) Left Right   MP /60 /55   IP /65 /60   RABD 60 45   PABD 55 50   Kapandji Opposition Scale (0-10/10) 10/10 10/10     Thumb Observation/Appearance  Key: + = present/ - = not observed    3/22/2021   Shoulder deformity present over CMC R:-  L:-   Volar subluxation present R:-  L:-    Edema over the CMC joint R:-  L:-   Noted collapse of MP into hyperextension during pinch R:+  L:+   Tenderness at CMC R:-  L:-   Tenderness in web space R: +  L:-      Provocative Tests  Pain Report:  - none    + mild    ++ moderate    +++ severe     3/22/2021   CMC Grind test R: -   Crepitus present R: -   CMC Adduction Stress Test R: ++   CMC Extension Stress Test R: -   Finkelstein's R: -     Strength   (Measured in pounds)  Pain Report: - none  + mild    ++ moderate    +++ severe    3/22/2021 3/22/2021   Trials Left Right   1  2  3 44  47  47 47  48  53   Average 46 49     Lat Pinch 3/22/2021 3/22/2021   Trials Left Right   1  2  3 14 14   Average       3 Pt Pinch 3/22/2021 3/22/2021   Trials Left Right   1  2  3 12 11+   Average       Assessment:  Patient presents with symptoms consistent with diagnosis of  thumb pain, with conservative intervention.    Patient s limitations or Problem List includes:  Pain, Decreased ROM/motion, weakness, decreased stability of the thumb joint, decreased  and pinch strength of the thumb which interferes with patients ability to perform  home maintenance, work and sleeping as compared to previous level of function.    Rehab Potential:  Excellent - Return to full activity, no limitations    Patient will benefit from skilled Occupational Therapy to increase ROM, flexibility and stability of thumb and decrease pain to return to previous activity level and resume normal daily tasks and to reach their rehab potential.    Barriers to Learning:  No barrier    Communication Issues:  Patient appears to be able to clearly communicate and understand verbal and written communication and follow directions correctly.    Chart Review: Brief history including review of medical and/or therapy records relating to the presenting problem and Simple history review with patient    Identified Performance Deficits: home establishment and management, work and leisure activities    Assessment  of Occupational Performance:  3-5 Performance Deficits    Clinical Decision Making (Complexity): Low complexity    Treatment Explanation:  The following has been discussed with the patient:  RX ordered/plan of care  Anticipated outcomes  Possible risks and side effects    Plan:  Frequency:  2 X a month, once daily  Duration:  for 3 months    Treatment Plan:  Modalities:  Paraffin  Therapeutic Exercise: AROM, Isometrics, and Stabilization exercises of the Thumb CMC, including active and resisted abduction, 1st DI strengthening  Manual Techniques: Joint Mobilization or reseating of the trapezium, self MFR to thumb adductor with clip  Orthosis fabrication:  Hand based Thumb Spica, Custom neoprene support   Education: Anatomy of CMC, joint protection principles, adaptive equipment as needed    Discharge Plan:  Achieve all LTG  [place] in home treatment program.  Reach maximal therapeutic benefit.    Home Program:  Warmth  1st web release with clip  Stretching to 1st web space  Incorporate joint protection into daily functional activities  Adaptive equipment as needed.    Next Visit:  US  Place and hold pinch  Thumb Stabilization Program with hard  C  and index abd  Hand based Thumb Spica orthosis night/sleeping and per symptoms during the day   CMC neoprene cool comfort orthosis during the day with ADL s per symptoms

## 2021-03-26 ENCOUNTER — MYC MEDICAL ADVICE (OUTPATIENT)
Dept: FAMILY MEDICINE | Facility: CLINIC | Age: 51
End: 2021-03-26

## 2021-03-26 DIAGNOSIS — H80.92 OTOSCLEROSIS OF LEFT EAR: Primary | ICD-10-CM

## 2021-03-26 NOTE — TELEPHONE ENCOUNTER
Routed to provider to address patient request for referral and to clarify 3/12/21 results, MRI of right knee.  See her mychart message.    Capri Casillas RN  [facility]

## 2021-03-30 NOTE — TELEPHONE ENCOUNTER
Contact patient and find out where this provider works, get specifics and mandi up referral order and i'll sign it. Re: her mri, It shows evidence of degenerative / arthritic changes. Next step would be for me to give her a cortisone shot in the knee.

## 2021-03-30 NOTE — TELEPHONE ENCOUNTER
See first note from patient, Dr. Brigid Clark is with [facility].   I see 6/16/16 office visit with that person.     It appears she is still an active provider (I set up a fakie routing to check).    Routed back to Darrick Kay to place internal referral to [facility].    Capri Casillas RN  [facility]

## 2021-04-02 NOTE — TELEPHONE ENCOUNTER
Notified patient per mychart, that referral has been placed.  Routed to  pool to please fax this to the number in her Vucl[employer] message.    Thank-you,    Janna TORRESN-RN  Triage Nurse  [facility]: [facility]

## 2021-04-05 ENCOUNTER — THERAPY VISIT (OUTPATIENT)
Dept: OCCUPATIONAL THERAPY | Facility: CLINIC | Age: 51
End: 2021-04-05
Payer: COMMERCIAL

## 2021-04-05 DIAGNOSIS — M79.644 PAIN OF RIGHT THUMB: ICD-10-CM

## 2021-04-05 PROCEDURE — 97140 MANUAL THERAPY 1/> REGIONS: CPT | Mod: GO | Performed by: OCCUPATIONAL THERAPIST

## 2021-04-05 PROCEDURE — 97110 THERAPEUTIC EXERCISES: CPT | Mod: GO | Performed by: OCCUPATIONAL THERAPIST

## 2021-04-05 PROCEDURE — 97035 APP MDLTY 1+ULTRASOUND EA 15: CPT | Mod: GO | Performed by: OCCUPATIONAL THERAPIST

## 2021-04-05 NOTE — PROGRESS NOTES
SOAP Note - Hand Therapy - Objective Information    Current Date:  4/5/2021  Referring Physician:Darrick Kay PA-C    Diagnosis: Right thumb  pain  DOI: 3/3/21 (Date of order)    aVnesa Owen is a 51 year old right hand dominant female.    Patient reports symptoms of pain, stiffness/loss of motion and weakness/loss of strength of the right thumb which occurred due to overuse in job.     Occupational Profile Information:  Current occupation is RN in [employer]    S:  Subjective changes as noted by patient: The thumb is sore.  I think it is from all the exercises  Functional changes noted by patient: no changes     O:  Pain Level (Scale 0-10):   3/22/2021 4/5/21   At Rest 1/10 3/10   With Use 2/10 3/10     Pain Description:  Date 3/22/2021   Location thumb   Pain Quality Sharp   Frequency intermittent     Pain is worst  daytime   Exacerbated by  holding a wide grasp, gripping, computer   Relieved by rest   Progression Gradually worsening     ROM  Thumb 3/22/2021 3/22/2021 4/5/21   AROM  (PROM) Left Right Right   MP /60 /55    IP /65 /60    RABD 60 45 55   PABD 55 50    Kapandji Opposition Scale (0-10/10) 10/10 10/10      Thumb Observation/Appearance  Key: + = present/ - = not observed    3/22/2021   Shoulder deformity present over CMC R:-  L:-   Volar subluxation present R:-  L:-   Edema over the CMC joint R:-  L:-   Noted collapse of MP into hyperextension during pinch R:+  L:+   Tenderness at CMC R:-  L:-   Tenderness in web space R: +  L:-      Provocative Tests  Pain Report:  - none    + mild    ++ moderate    +++ severe     3/22/2021   CMC Grind test R: -   Crepitus present R: -   CMC Adduction Stress Test R: ++   CMC Extension Stress Test R: -   Finkelstein's R: -     Strength   (Measured in pounds)  Pain Report: - none  + mild    ++ moderate    +++ severe    3/22/2021 3/22/2021   Trials Left Right   1  2  3 44  47  47 47  48  53   Average 46 49     Lat Pinch 3/22/2021 3/22/2021   Trials Left Right    1  2  3 14 14   Average       3 Pt Pinch 3/22/2021 3/22/2021   Trials Left Right   1  2  3 12 11+   Average         Please refer to the daily flowsheet for treatment provided today.   Home Program:  Warmth  1st web release with clip  Stretching to 1st web space  Incorporate joint protection into daily functional activities  Adaptive equipment as needed.    Next Visit:  US  Place and hold pinch  Thumb Stabilization Program with hard  C  and index abd  Hand based Thumb Spica orthosis night/sleeping and per symptoms during the day   CMC neoprene cool comfort orthosis during the day with ADL s per symptoms

## 2021-04-13 ENCOUNTER — OFFICE VISIT (OUTPATIENT)
Dept: AUDIOLOGY | Facility: CLINIC | Age: 51
End: 2021-04-13
Attending: PHYSICIAN ASSISTANT
Payer: COMMERCIAL

## 2021-04-13 ENCOUNTER — OFFICE VISIT (OUTPATIENT)
Dept: OTOLARYNGOLOGY | Facility: CLINIC | Age: 51
End: 2021-04-13
Attending: PHYSICIAN ASSISTANT
Payer: COMMERCIAL

## 2021-04-13 VITALS — SYSTOLIC BLOOD PRESSURE: 124 MMHG | DIASTOLIC BLOOD PRESSURE: 82 MMHG | HEART RATE: 80 BPM

## 2021-04-13 DIAGNOSIS — H90.72 MIXED CONDUCTIVE AND SENSORINEURAL HEARING LOSS OF LEFT EAR WITH UNRESTRICTED HEARING OF RIGHT EAR: Primary | ICD-10-CM

## 2021-04-13 DIAGNOSIS — H80.22 COCHLEAR OTOSCLEROSIS OF EAR, LEFT: Primary | ICD-10-CM

## 2021-04-13 DIAGNOSIS — H81.12 BENIGN PAROXYSMAL POSITIONAL VERTIGO OF LEFT EAR: ICD-10-CM

## 2021-04-13 PROCEDURE — 92557 COMPREHENSIVE HEARING TEST: CPT | Performed by: AUDIOLOGIST

## 2021-04-13 PROCEDURE — 92550 TYMPANOMETRY & REFLEX THRESH: CPT | Performed by: AUDIOLOGIST

## 2021-04-13 PROCEDURE — 99207 PR NO CHARGE LOS: CPT | Performed by: AUDIOLOGIST

## 2021-04-13 PROCEDURE — 99203 OFFICE O/P NEW LOW 30 MIN: CPT | Performed by: OTOLARYNGOLOGY

## 2021-04-13 RX ORDER — ISOTRETINOIN 20 MG/1
CAPSULE ORAL
COMMUNITY
Start: 2021-04-01 | End: 2022-08-23

## 2021-04-13 RX ORDER — MECLIZINE HYDROCHLORIDE 25 MG/1
25 TABLET ORAL 3 TIMES DAILY PRN
Qty: 30 TABLET | Refills: 1 | Status: SHIPPED | OUTPATIENT
Start: 2021-04-13 | End: 2022-08-23

## 2021-04-13 ASSESSMENT — ENCOUNTER SYMPTOMS
NAUSEA: 0
DIZZINESS: 1
BLURRED VISION: 0
VOMITING: 0
SINUS PAIN: 0
COUGH: 0
SPUTUM PRODUCTION: 0
HEMOPTYSIS: 0
STRIDOR: 0
CONSTITUTIONAL NEGATIVE: 1
HEARTBURN: 0
SORE THROAT: 0
PHOTOPHOBIA: 0
BRUISES/BLEEDS EASILY: 0
TINGLING: 0
TREMORS: 0
HEADACHES: 0

## 2021-04-13 ASSESSMENT — PAIN SCALES - GENERAL: PAINLEVEL: MILD PAIN (3)

## 2021-04-13 NOTE — LETTER
4/13/2021         RE: Vanesa Owen  [address]   [place] [address]        Dear Colleague,    Thank you for referring your patient, Vanesa Owen, to the [facility]. Please see a copy of my visit note below.    HPI    This pleasant patient has a dx of left otosclerosis for years. She continues to have hearing impairment in her left ear. She started to have brief episodes of vertigo when she drinks, moves around and sometimes when she rolls over in the bed. States constant tinnitus in her left ear. Denies any aural pressure, or otorrhea. Her hearing test is similar to the one from 2016.    Diagnosis of left otosclerosis. Previous results from 5/4/16 revealed. a mild to moderate conductive loss in left. Pt reports  gradually more bothersome tinnitus in left. Also reports episodes of dizziness for several months.  Results: Hearing WNL right. Mild to moderate mixed loss left. Stable compared with 5/4/16. 100% word rec. bilaterally. Tymps WNL. Absent reflexes bilaterally.    Review of Systems   Constitutional: Negative.    HENT: Positive for ear pain, hearing loss and tinnitus. Negative for congestion, ear discharge, nosebleeds, sinus pain and sore throat.    Eyes: Negative for blurred vision and photophobia.   Respiratory: Negative for cough, hemoptysis, sputum production and stridor.    Gastrointestinal: Negative for heartburn, nausea and vomiting.   Skin: Negative.    Neurological: Positive for dizziness. Negative for tingling, tremors and headaches.   Endo/Heme/Allergies: Negative for environmental allergies. Does not bruise/bleed easily.         Physical Exam  Vitals signs reviewed.   HENT:      Head: Normocephalic and atraumatic.      Right Ear: Tympanic membrane, ear canal and external ear normal. No decreased hearing noted. No middle ear effusion. There is no impacted cerumen.      Left Ear: Tympanic membrane, ear canal and external ear normal. Decreased hearing  noted.  No middle ear effusion. There is no impacted cerumen.      Nose: Nose normal. No septal deviation, laceration or mucosal edema.      Right Turbinates: Not enlarged or swollen.      Left Turbinates: Not enlarged or swollen.      Mouth/Throat:      Mouth: Mucous membranes are moist.      Pharynx: Oropharynx is clear. Uvula midline.   Eyes:      Extraocular Movements: Extraocular movements intact.      Pupils: Pupils are equal, round, and reactive to light.   Neurological:      Mental Status: She is alert.       A/P  Vansea has a dx of Otosclerosis in her left ear. Mild to moderate mixed loss left. Stable compared with 5/4/16. 100% word rec. bilaterally. Tymps WNL. Absent reflexes bilaterally. Regarding her brief episodes of vertigo, I will request a Dionicio Hallpike to r/o canalolithiasis. She might have a cochlear otosclerosis with vestibular symptoms. Her questions were answered.          Again, thank you for allowing me to participate in the care of your patient.        Sincerely,        Tram Chong MD

## 2021-04-13 NOTE — NURSING NOTE
Vanesa Owen's goals for this visit include:   Chief Complaint   Patient presents with     Ear Problem     otosclerosis, left       She requests these members of her care team be copied on today's visit information:     PCP: Darrick Kay    Referring Provider:  Darrick Kay PA-C  [id number] [place] [address]    There were no vitals taken for this visit.    Do you need any medication refills at today's visit? No    Deborah Schultz RN

## 2021-04-13 NOTE — PROGRESS NOTES
HPI    This pleasant patient has a dx of left otosclerosis for years. She continues to have hearing impairment in her left ear. She started to have brief episodes of vertigo when she drinks, moves around and sometimes when she rolls over in the bed. States constant tinnitus in her left ear. Denies any aural pressure, or otorrhea. Her hearing test is similar to the one from 2016.    Diagnosis of left otosclerosis. Previous results from 5/4/16 revealed. a mild to moderate conductive loss in left. Pt reports  gradually more bothersome tinnitus in left. Also reports episodes of dizziness for several months.  Results: Hearing WNL right. Mild to moderate mixed loss left. Stable compared with 5/4/16. 100% word rec. bilaterally. Tymps WNL. Absent reflexes bilaterally.    Review of Systems   Constitutional: Negative.    HENT: Positive for ear pain, hearing loss and tinnitus. Negative for congestion, ear discharge, nosebleeds, sinus pain and sore throat.    Eyes: Negative for blurred vision and photophobia.   Respiratory: Negative for cough, hemoptysis, sputum production and stridor.    Gastrointestinal: Negative for heartburn, nausea and vomiting.   Skin: Negative.    Neurological: Positive for dizziness. Negative for tingling, tremors and headaches.   Endo/Heme/Allergies: Negative for environmental allergies. Does not bruise/bleed easily.         Physical Exam  Vitals signs reviewed.   HENT:      Head: Normocephalic and atraumatic.      Right Ear: Tympanic membrane, ear canal and external ear normal. No decreased hearing noted. No middle ear effusion. There is no impacted cerumen.      Left Ear: Tympanic membrane, ear canal and external ear normal. Decreased hearing noted.  No middle ear effusion. There is no impacted cerumen.      Nose: Nose normal. No septal deviation, laceration or mucosal edema.      Right Turbinates: Not enlarged or swollen.      Left Turbinates: Not enlarged or swollen.      Mouth/Throat:      Mouth:  Mucous membranes are moist.      Pharynx: Oropharynx is clear. Uvula midline.   Eyes:      Extraocular Movements: Extraocular movements intact.      Pupils: Pupils are equal, round, and reactive to light.   Neurological:      Mental Status: She is alert.       A/P  Vanesa has a dx of Otosclerosis in her left ear. Mild to moderate mixed loss left. Stable compared with 5/4/16. 100% word rec. bilaterally. Tymps WNL. Absent reflexes bilaterally. Regarding her brief episodes of vertigo, I will request a Dionicio Hallpike to r/o canalolithiasis. She might have a cochlear otosclerosis with vestibular symptoms. Her questions were answered.

## 2021-04-13 NOTE — PROGRESS NOTES
AUDIOLOGY REPORT    SUMMARY: Audiology visit completed. See audiogram for results.    RECOMMENDATIONS: Follow-up with ENT.    Coretta Mcdonough  Doctor of Audiology  [place] License # [id number]

## 2021-04-21 ENCOUNTER — THERAPY VISIT (OUTPATIENT)
Dept: OCCUPATIONAL THERAPY | Facility: CLINIC | Age: 51
End: 2021-04-21
Payer: COMMERCIAL

## 2021-04-21 DIAGNOSIS — M79.644 PAIN OF RIGHT THUMB: ICD-10-CM

## 2021-04-21 PROCEDURE — 97035 APP MDLTY 1+ULTRASOUND EA 15: CPT | Mod: GO | Performed by: OCCUPATIONAL THERAPIST

## 2021-04-21 PROCEDURE — 97110 THERAPEUTIC EXERCISES: CPT | Mod: GO | Performed by: OCCUPATIONAL THERAPIST

## 2021-04-21 PROCEDURE — 97140 MANUAL THERAPY 1/> REGIONS: CPT | Mod: GO | Performed by: OCCUPATIONAL THERAPIST

## 2021-04-21 NOTE — PROGRESS NOTES
SOAP Note - Hand Therapy - Objective Information    Current Date:  4/21/2021  Referring Physician:Darrick Kay PA-C    Diagnosis: Right thumb  pain  DOI: 3/3/21 (Date of order)    Vanesa Owen is a 51 year old right hand dominant female.    Patient reports symptoms of pain, stiffness/loss of motion and weakness/loss of strength of the right thumb which occurred due to overuse in job.     Occupational Profile Information:  Current occupation is RN in [employer]    S:  Subjective changes as noted by patient: The thumb is the same.  Functional changes noted by patient: no changes     O:  Pain Level (Scale 0-10):   3/22/2021 4/5/21 4/21/21   At Rest 1/10 3/10 0/10   With Use 2/10 3/10 0-1/10     Pain Description:  Date 3/22/2021   Location thumb   Pain Quality Sharp   Frequency intermittent     Pain is worst  daytime   Exacerbated by  holding a wide grasp, gripping, computer   Relieved by rest   Progression Gradually worsening     ROM  Thumb 3/22/2021 3/22/2021 4/5/21 4/21/21   AROM  (PROM) Left Right Right Right   MP /60 /55     IP /65 /60     RABD 60 45 55 60   PABD 55 50     Kapandji Opposition Scale (0-10/10) 10/10 10/10       Thumb Observation/Appearance  Key: + = present/ - = not observed    3/22/2021   Shoulder deformity present over CMC R:-  L:-   Volar subluxation present R:-  L:-   Edema over the CMC joint R:-  L:-   Noted collapse of MP into hyperextension during pinch R:+  L:+   Tenderness at CMC R:-  L:-   Tenderness in web space R: +  L:-      Provocative Tests  Pain Report:  - none    + mild    ++ moderate    +++ severe     3/22/2021   CMC Grind test R: -   Crepitus present R: -   CMC Adduction Stress Test R: ++   CMC Extension Stress Test R: -   Finkelstein's R: -     Strength   (Measured in pounds)  Pain Report: - none  + mild    ++ moderate    +++ severe    3/22/2021 3/22/2021   Trials Left Right   1  2  3 44  47  47 47  48  53   Average 46 49     Lat Pinch 3/22/2021 3/22/2021   Trials Left  Right   1  2  3 14 14   Average       3 Pt Pinch 3/22/2021 3/22/2021   Trials Left Right   1  2  3 12 11+   Average         Please refer to the daily flowsheet for treatment provided today.   Home Program:  Warmth  1st web release with clip  Stretching to 1st web space  Incorporate joint protection into daily functional activities  Adaptive equipment as needed.  Place and hold pinch  Thumb Stabilization Program with hard  C  and index abd    Next Visit:  US  Hand based Thumb Spica orthosis night/sleeping and per symptoms during the day   CMC neoprene cool comfort orthosis during the day with ADL s per symptoms  Thumb stabilization

## 2021-05-03 ENCOUNTER — MEDICAL CORRESPONDENCE (OUTPATIENT)
Dept: HEALTH INFORMATION MANAGEMENT | Facility: CLINIC | Age: 51
End: 2021-05-03

## 2021-05-12 ENCOUNTER — THERAPY VISIT (OUTPATIENT)
Dept: OCCUPATIONAL THERAPY | Facility: CLINIC | Age: 51
End: 2021-05-12
Payer: COMMERCIAL

## 2021-05-12 DIAGNOSIS — M79.644 PAIN OF RIGHT THUMB: ICD-10-CM

## 2021-05-12 PROCEDURE — 97035 APP MDLTY 1+ULTRASOUND EA 15: CPT | Mod: GO | Performed by: OCCUPATIONAL THERAPIST

## 2021-05-12 PROCEDURE — 97110 THERAPEUTIC EXERCISES: CPT | Mod: GO | Performed by: OCCUPATIONAL THERAPIST

## 2021-05-12 PROCEDURE — 97140 MANUAL THERAPY 1/> REGIONS: CPT | Mod: GO | Performed by: OCCUPATIONAL THERAPIST

## 2021-05-12 NOTE — PROGRESS NOTES
SOAP Note - Hand Therapy - Objective Information    Current Date: 5/12/2021  Referring Physician:Darrick Kay PA-C    Diagnosis: Right thumb  pain  DOI: 3/3/21 (Date of order)    Vanesa Owen is a 51 year old right hand dominant female.    Patient reports symptoms of pain, stiffness/loss of motion and weakness/loss of strength of the right thumb which occurred due to overuse in job.     Occupational Profile Information:  Current occupation is RN in [employer]    S:  Subjective changes as noted by patient: I feel my pain more in the thumb and wrist area today.  Functional changes noted by patient: no changes     O:  Pain Level (Scale 0-10):   3/22/2021 4/5/21 4/21/21 5/12/21   At Rest 1/10 3/10 0/10 0/10   With Use 2/10 3/10 0-1/10 0-2/10     Pain Description:  Date 3/22/2021   Location thumb   Pain Quality Sharp   Frequency intermittent     Pain is worst  daytime   Exacerbated by  holding a wide grasp, gripping, computer   Relieved by rest   Progression Gradually worsening     ROM  Thumb 3/22/2021 3/22/2021 4/5/21 4/21/21 5/12/21   AROM  (PROM) Left Right Right Right Right   MP /60 /55      IP /65 /60      RABD 60 45 55 60 60   PABD 55 50      Kapandji Opposition Scale (0-10/10) 10/10 10/10        Thumb Observation/Appearance  Key: + = present/ - = not observed    3/22/2021   Shoulder deformity present over CMC R:-  L:-   Volar subluxation present R:-  L:-   Edema over the CMC joint R:-  L:-   Noted collapse of MP into hyperextension during pinch R:+  L:+   Tenderness at CMC R:-  L:-   Tenderness in web space R: +  L:-      Provocative Tests  Pain Report:  - none    + mild    ++ moderate    +++ severe     3/22/2021 5/12/21   CMC Grind test R: -    Crepitus present R: -    CMC Adduction Stress Test R: ++ R ++   CMC Extension Stress Test R: -    Finkelstein's R: -      Strength   (Measured in pounds)  Pain Report: - none  + mild    ++ moderate    +++ severe    3/22/2021 3/22/2021   Trials Left Right    1  2  3 44  47  47 47  48  53   Average 46 49     Lat Pinch 3/22/2021 3/22/2021   Trials Left Right   1  2  3 14 14   Average       3 Pt Pinch 3/22/2021 3/22/2021 5/12/21   Trials Left Right Right   1  2  3 12 11+ 10+   Average          Please refer to the daily flowsheet for treatment provided today.   Home Program:  Warmth  1st web release with clip  Stretching to 1st web space  Incorporate joint protection into daily functional activities  Adaptive equipment as needed.  Place and hold pinch  Thumb Stabilization Program with hard  C  and index abd    Next Visit:  US  Hand based Thumb Spica orthosis night/sleeping and per symptoms during the day   CMC neoprene cool comfort orthosis during the day with ADL s per symptoms  Thumb stabilization

## 2021-05-17 DIAGNOSIS — L85.3 XEROSIS CUTIS: ICD-10-CM

## 2021-05-17 DIAGNOSIS — L82.0 INFLAMED SEBORRHEIC KERATOSIS: ICD-10-CM

## 2021-05-17 DIAGNOSIS — L71.8 OTHER ROSACEA: Primary | ICD-10-CM

## 2021-05-17 DIAGNOSIS — L53.8 OTHER SPECIFIED ERYTHEMATOUS CONDITIONS: ICD-10-CM

## 2021-05-17 DIAGNOSIS — L29.89 OTHER PRURITUS: ICD-10-CM

## 2021-05-17 DIAGNOSIS — R20.8 BURNING SENSATION: ICD-10-CM

## 2021-05-17 DIAGNOSIS — Z79.899 ENCOUNTER FOR DRUG THERAPY: ICD-10-CM

## 2021-05-17 DIAGNOSIS — L90.5 SCAR CONDITION AND FIBROSIS OF SKIN: ICD-10-CM

## 2021-05-17 DIAGNOSIS — R20.8 OTHER DISTURBANCES OF SKIN SENSATION (CODE): ICD-10-CM

## 2021-05-19 ENCOUNTER — HOSPITAL ENCOUNTER (OUTPATIENT)
Dept: PHYSICAL THERAPY | Facility: CLINIC | Age: 51
Setting detail: THERAPIES SERIES
End: 2021-05-19
Attending: PHYSICIAN ASSISTANT
Payer: COMMERCIAL

## 2021-05-19 DIAGNOSIS — R42 DIZZINESS: Primary | ICD-10-CM

## 2021-05-19 DIAGNOSIS — H81.12 BENIGN PAROXYSMAL POSITIONAL VERTIGO OF LEFT EAR: ICD-10-CM

## 2021-05-19 PROCEDURE — 97161 PT EVAL LOW COMPLEX 20 MIN: CPT | Mod: GP | Performed by: PHYSICAL THERAPIST

## 2021-05-19 PROCEDURE — 97112 NEUROMUSCULAR REEDUCATION: CPT | Mod: GP | Performed by: PHYSICAL THERAPIST

## 2021-05-20 ENCOUNTER — TELEPHONE (OUTPATIENT)
Dept: OTOLARYNGOLOGY | Facility: CLINIC | Age: 51
End: 2021-05-20

## 2021-05-20 NOTE — PROGRESS NOTES
05/19/21 0900   General Information   Start of Care Date 05/19/21   Referring Physician Dr Chong   Orders Evaluate and Treat as Indicated   Order Date 04/13/21   Medical Diagnosis BPPV   Surgical/Medical history reviewed Yes  (left ear otosclerosis, )   Pertinent history of current vestibular problem (include personal factors and/or comorbidities that impact the POC)  Migraines  (headaches are better over the years)   Pertinent history of current problem (include personal factors and/or comorbidities that impact the POC) It comes and goes. Dizziness is in the car alot. IF I am drinking or chewing in the car it is worse than at home not moving. I have to not have a sip of a drink when driving. No symptoms in bed or in chair. Ok to bend over or look up. It is a spin sensation, lasts for maybe a minute or two. IT has been going on for 6 months. It might be getting better. No nausea. No headache. Only when chewing or eating. not when lifting heavy items, or bowel movment, or sex. STrenous work that involves bending down might cause it. Balance will be a little jilted. Had COVID Novemeber and had one episode of dizziness that was bad, really scared me. It lasted a couple hours. Almost couldnt see. I thought I was stroking out. yesterday in car it occured when eatin a Sami schumacher ( not hard to chew).    Pertinent Visual History  glasses for reading   Current Community Support Family/friend caregiver   Patient role/Employment history Employed  (NICU nurse)   Living environment [facility]   General Information Comments Dr Chong talked to her about hearing aide and she wants a hearing aide. Difficult to hear stethoscope and if sleeping on her right side.   Fall Risk Screen   Fall screen completed by PT   Have you fallen 2 or more times in the past year? No   Have you fallen and had an injury in the past year? No   Is patient a fall risk? No   Functional Scales   Functional Scales and Outcomes DHI 16/100   Pain    Patient currently in pain No   Integumentary   Integumentary Comments obese   Range of Motion (ROM)   ROM Comment CROM WNLS   Gait   Gait Comments 25fTW at fast speed 5.16 second and 11 steps.    Gait Special Tests 25 Foot Timed Walk   Seconds 7.1   Steps 13 Steps   Comments normal SHELIA, symmerical steps, arms swing   Gait Special Tests Dynamic Gait Index   Score out of 24 24   Gait Special Tests Functional Gait Assessment Score out of 30   Score out of 30 27   Infrared Goggle Exam or Frenzel Lense Exam   Vestibular Suppressant in Last 24 Hours? No   Exam completed with Infrared Goggles   Spontaneous Nystagmus Negative   Gaze Evoked Nystagmus Negative   Head Shake Horizontal Nystagmus Negative   Positional Testing comments Seated: pinched nostrils: no nystagmus or symptoms. Seated bearing down: no nystagmus or symptoms, except headache   Dionicio-Hallpike (right) Negative   [place]-Hallpike (Left) Negative   HSCC Supine Roll Test (Right) Negative   HSCC Supine Roll Test (Left) Negative   Dynamic Visual Acuity (DVA)   Static Acuity (LogMar) .1   Horizontal Head Movement at 1 Hz (LogMar) .2   Horizontal Head Movement at 2 Hz (LogMar) .4   DVA Comments this is WNLs. Mile dizziness with 2 HZ head motion but not like her episodes.    Clinical Impression   Criteria for Skilled Therapeutic Interventions Met yes, treatment indicated   PT Diagnosis dizziness   Influenced by the following impairments dizziness, dynamic postural control, hearing left ear and obesity   Functional limitations due to impairments affects driving   Clinical Presentation Stable/Uncomplicated   Clinical Presentation Rationale medical history (otosclerosis, COVID), impairments, DVA, FGA, positional testing, HS testing and clinical judgement   Clinical Decision Making (Complexity) Moderate complexity   Therapy Frequency other (see comments)   Predicted Duration of Therapy Intervention (days/wks) up to 6 months  (keep chart open in case she has further  symptoms)   Risk & Benefits of therapy have been explained Yes   Patient, Family & other staff in agreement with plan of care Yes   Clinical Impression Comments Has had dizziness for about 6 months that occurs when chewing/drinking particularly when driving. NO symptoms when in bed and todays testing was negative for BPPV. It is possible she has a peripheral vestibular asymmetry due to virus/COVID or her otosclerosis BUT her head shake test was normal and DVA also. I did give her gaze stabilization ex to see if this helps her with the head motion during driving. She would like to follow up with DR Clark about her dizziness and possible hearing aide.    Goal 1   Goal Identifier symptoms   Goal Description She will report improvement in > 50% of symptoms she has when driving and eating/drinking.   Target Date 21   Total Evaluation Time   PT Eval, Low Complexity Minutes (56705) 42   Michelle Castillo DPT, MPT, NCS  Physical Therapist   Board Certified Neurologic Clinical Specialist     [facility]   [address] [place] [address]   [email or web address]   [phone]   Clinic: [phone] //   Fax: [phone]     21 0900   General Information   Start of Care Date 21   Referring Physician Dr Chong   Orders Evaluate and Treat as Indicated   Order Date 21   Medical Diagnosis BPPV   Surgical/Medical history reviewed Yes  (left ear otosclerosis, )   Pertinent history of current vestibular problem (include personal factors and/or comorbidities that impact the POC)  Migraines  (headaches are better over the years)   Pertinent history of current problem (include personal factors and/or comorbidities that impact the POC) It comes and goes. Dizziness is in the car alot. IF I am drinking or chewing in the car it is worse than at home not moving. I have to not have a sip of a drink when  driving. No symptoms in bed or in chair. Ok to bend over or look up. It is a spin sensation, lasts for maybe a minute or two. IT has been going on for 6 months. It might be getting better. No nausea. No headache. Only when chewing or eating. not when lifting heavy items, or bowel movment, or sex. STrenous work that involves bending down might cause it. Balance will be a little jilted. Had COVID Novemeber and had one episode of dizziness that was bad, really scared me. It lasted a couple hours. Almost couldnt see. I thought I was stroking out. yesterday in car it occured when eatin a German schumacher ( not hard to chew).    Pertinent Visual History  glasses for reading   Current Community Support Family/friend caregiver   Patient role/Employment history Employed  (NICU nurse)   Living environment [facility]   General Information Comments Dr Chong talked to her about hearing aide and she wants a hearing aide. Difficult to hear stethoscope and if sleeping on her right side.   Fall Risk Screen   Fall screen completed by PT   Have you fallen 2 or more times in the past year? No   Have you fallen and had an injury in the past year? No   Is patient a fall risk? No   Functional Scales   Functional Scales and Outcomes DHI 16/100   Pain   Patient currently in pain No   Integumentary   Integumentary Comments obese   Range of Motion (ROM)   ROM Comment CROM WNLS   Gait   Gait Comments 25fTW at fast speed 5.16 second and 11 steps.    Gait Special Tests 25 Foot Timed Walk   Seconds 7.1   Steps 13 Steps   Comments normal SHELIA, symmerical steps, arms swing   Gait Special Tests Dynamic Gait Index   Score out of 24 24   Gait Special Tests Functional Gait Assessment Score out of 30   Score out of 30 27   Infrared Goggle Exam or Frenzel Lense Exam   Vestibular Suppressant in Last 24 Hours? No   Exam completed with Infrared Goggles   Spontaneous Nystagmus Negative   Gaze Evoked Nystagmus Negative   Head Shake Horizontal Nystagmus  Negative   Positional Testing comments Seated: pinched nostrils: no nystagmus or symptoms. Seated bearing down: no nystagmus or symptoms, except headache   Dionicio-Hallpike (right) Negative   Dionicio-Hallpike (Left) Negative   HSCC Supine Roll Test (Right) Negative   HSCC Supine Roll Test (Left) Negative   Dynamic Visual Acuity (DVA)   Static Acuity (LogMar) .1   Horizontal Head Movement at 1 Hz (LogMar) .2   Horizontal Head Movement at 2 Hz (LogMar) .4   DVA Comments this is WNLs. Mile dizziness with 2 HZ head motion but not like her episodes.    Clinical Impression   Criteria for Skilled Therapeutic Interventions Met yes, treatment indicated   PT Diagnosis dizziness   Influenced by the following impairments dizziness, dynamic postural control, hearing left ear and obesity   Functional limitations due to impairments affects driving   Clinical Presentation Stable/Uncomplicated   Clinical Presentation Rationale medical history (otosclerosis, COVID), impairments, DVA, FGA, positional testing, HS testing and clinical judgement   Clinical Decision Making (Complexity) Moderate complexity   Therapy Frequency other (see comments)   Predicted Duration of Therapy Intervention (days/wks) up to 6 months  (keep chart open in case she has further symptoms)   Risk & Benefits of therapy have been explained Yes   Patient, Family & other staff in agreement with plan of care Yes   Clinical Impression Comments Has had dizziness for about 6 months that occurs when chewing/drinking particularly when driving. NO symptoms when in bed and todays testing was negative for BPPV. It is possible she has a peripheral vestibular asymmetry due to virus/COVID or her otosclerosis BUT her head shake test was normal and DVA also. I did give her gaze stabilization ex to see if this helps her with the head motion during driving. She would like to follow up with DR Clark about her dizziness and possible hearing aide.    Goal 1   Goal Identifier symptoms   Goal  Description She will report improvement in > 50% of symptoms she has when driving and eating/drinking.   Target Date 08/19/21   Total Evaluation Time   PT Christie, Low Complexity Minutes (70207) 42

## 2021-05-20 NOTE — PROGRESS NOTES
21 0900   Signing Clinician's Name / Credentials   Signing clinician's name / credentials Michelle Castillo DPT NCS   Functional Gait Assessment (ARSENIO Malloy, DEVIN Shelley, et al. (2004))   1. GAIT LEVEL SURFACE 3   2. CHANGE IN GAIT SPEED 3   3. GAIT WITH HORIZONTAL HEAD TURNS 3   4. GAIT WITH VERTICAL HEAD TURNS 3  (feels a bit wonky)   5. GAIT AND PIVOT TURN 3   6. STEP OVER OBSTACLE 3   7. GAIT WITH NARROW BASE OF SUPPORT 1   8. GAIT WITH EYES CLOSED 2  (7.84)   9. AMBULATING BACKWARDS 3   10. STEPS 3   Total Functional Gait Assessment Score   TOTAL SCORE: (MAXIMUM SCORE 30) 27   Functional Gait Assessment (FGA): The FGA assesses postural stability during various walking tasks.   Gait assistive device used: None    Patient Score: 27/30  Scores of <22/30 have been correlated with predicting falls in community-dwelling older adults according to Mellissa & Brigido 2010.   Scores of <18/30 have been correlated with increased risk for falls in patients with Parkinsons Disease according to Jey Escobar Zhou et al 2014.  Minimal Detectable Change for patients with acute/chronic stroke = 4.2 according to Leonel & Gabrielle 2009  Minimal Detectable Change for patients with vestibular disorder = 8 according to Mellissa & Brigido 2010    Assessment (rationale for performing, application to patient s function & care plan): not at risk of falls  Minutes billed as physical performance test: part of evaluation    Michelle Castillo DPT, MPT, NCS  Physical Therapist   Board Certified Neurologic Clinical Specialist     [facility]   [address] [place] [address]   [email or web address]   [phone]   Clinic: [phone] //   Fax: [phone]

## 2021-06-01 NOTE — TELEPHONE ENCOUNTER
FUTURE VISIT INFORMATION      FUTURE VISIT INFORMATION:    Date: 8/23/2021    Time: 9AM    Location: [facility]  REFERRAL INFORMATION:    Referring provider:  Charlette Castillo, PT    Referring providers clinic:  [facility]    Reason for visit/diagnosis  Vertigo- referred Michelle Castillo PT    RECORDS REQUESTED FROM:       Clinic name Comments Records Status Imaging Status   [facility] 7/28/21 VNG/Chair with Ja Wilson (sched)  6/16/2016 audiogram  [facility] E[facility] and [facility]iology  4/13/2021 note from Tram Chong MD  4/13/2021 audiogram  [facility]    [facility] 5/19/2021 Vestibular Eval with Charlette Castillo, PT Epic    Imaging 8/4/2015 CT [facility]  Epic PACS

## 2021-06-04 ENCOUNTER — TELEPHONE (OUTPATIENT)
Dept: FAMILY MEDICINE | Facility: CLINIC | Age: 51
End: 2021-06-04

## 2021-06-04 ENCOUNTER — VIRTUAL VISIT (OUTPATIENT)
Dept: FAMILY MEDICINE | Facility: CLINIC | Age: 51
End: 2021-06-04
Payer: COMMERCIAL

## 2021-06-04 DIAGNOSIS — N76.0 ACUTE VAGINITIS: Primary | ICD-10-CM

## 2021-06-04 PROCEDURE — 99213 OFFICE O/P EST LOW 20 MIN: CPT | Mod: TEL | Performed by: FAMILY MEDICINE

## 2021-06-04 ASSESSMENT — ENCOUNTER SYMPTOMS
DYSURIA: 0
NAUSEA: 0
SORE THROAT: 0
PARESTHESIAS: 0
BREAST MASS: 0
HEARTBURN: 0
DIZZINESS: 0
PALPITATIONS: 0
FREQUENCY: 0
HEADACHES: 0
DIARRHEA: 0
EYE PAIN: 0
MYALGIAS: 0
NERVOUS/ANXIOUS: 0
WEAKNESS: 0
JOINT SWELLING: 0
ARTHRALGIAS: 0
HEMATURIA: 0
CONSTIPATION: 0
SHORTNESS OF BREATH: 0
FEVER: 0
COUGH: 0
ABDOMINAL PAIN: 0
CHILLS: 0
HEMATOCHEZIA: 0

## 2021-06-04 ASSESSMENT — PATIENT HEALTH QUESTIONNAIRE - PHQ9: SUM OF ALL RESPONSES TO PHQ QUESTIONS 1-9: 4

## 2021-06-04 NOTE — PROGRESS NOTES
"Vanesa is a 51 year old who is being evaluated via a billable telephone visit.      What phone number would you like to be contacted at? [phone]  How would you like to obtain your AVS? MyChart    Assessment & Plan     1. Acute vaginitis  Advised to complete her diflucan prescription prescribed by another provider today.  Will treat for UTI labs are positive.  - UA reflex to Microscopic and Culture     BMI:   Estimated body mass index is 40.46 kg/m  as calculated from the following:    Height as of 7/22/20: 1.6 m (5' 3\").    Weight as of 3/3/21: 103.6 kg (228 lb 6.4 oz).   See Patient Instructions    No follow-ups on file.    DO MEHDI [facility] PILY Alexis is a 51 year old who presents for the following health issues     HPI     Vaginal Symptoms  Onset/Duration: 1 week  Description:  Vaginal Discharge: white   Itching (Pruritis): no  Burning sensation:  YES  Odor: YES  Accompanying Signs & Symptoms:  Urinary symptoms: YES  Abdominal pain: YES  Fever: no  History:   Sexually active: YES  New Partner: no  Possibility of Pregnancy:  no  Recent antibiotic use: no  Previous vaginitis issues: YES  Precipitating or alleviating factors: None  Therapies tried and outcome: none    1. UTI and vaginitis concerns:  States of burning sensation.  Intermittent for the past week.  No dysuria.  Mild thin discharge.  States of vaginal irritation.  Denies any urinary frequency.  Denies possible vaginal odor.     Review of Systems   Constitutional: Negative for chills and fever.   HENT: Negative for congestion, ear pain, hearing loss and sore throat.    Eyes: Negative for pain and visual disturbance.   Respiratory: Negative for cough and shortness of breath.    Cardiovascular: Negative for chest pain, palpitations and peripheral edema.   Gastrointestinal: Negative for abdominal pain, constipation, diarrhea, heartburn, hematochezia and nausea.   Breasts:  Negative for tenderness, breast " mass and discharge.   Genitourinary: Positive for vaginal discharge. Negative for dysuria, frequency, genital sores, hematuria, pelvic pain, urgency and vaginal bleeding.        Vaginal irritation   Musculoskeletal: Negative for arthralgias, joint swelling and myalgias.   Skin: Negative for rash.   Neurological: Negative for dizziness, weakness, headaches and paresthesias.   Psychiatric/Behavioral: Negative for mood changes. The patient is not nervous/anxious.           Objective         Vitals:  No vitals were obtained today due to virtual visit.    Physical Exam   healthy, alert and no distress  PSYCH: Alert and oriented times 3; coherent speech, normal   rate and volume, able to articulate logical thoughts, able   to abstract reason, no tangential thoughts, no hallucinations   or delusions  Her affect is normal  RESP: No cough, no audible wheezing, able to talk in full sentences  Remainder of exam unable to be completed due to telephone visits      Phone call duration: 15 minutes

## 2021-06-04 NOTE — TELEPHONE ENCOUNTER
Patient calling, says she developed symptoms yesterday, having vaginal burning starting yesterday.    Jiménez after she urinates, denies fever, blood in urine, nausea, low abdominal pain.  Denies urinary frequency.  Denies vaginal drainage or itching.    Has history of UTI and vaginosis per her report.       She is at work and has to go to a child's soccer game today, is willing to do a virtual visit and possibly lab if needed.    Scheduled for 2pm with Dr. Jackson.    Patient verbalized understanding of and agreement with plan.    Capri Casillas RN  [facility]

## 2021-06-05 ENCOUNTER — OFFICE VISIT (OUTPATIENT)
Dept: URGENT CARE | Facility: URGENT CARE | Age: 51
End: 2021-06-05
Payer: COMMERCIAL

## 2021-06-05 VITALS
OXYGEN SATURATION: 96 % | TEMPERATURE: 98 F | SYSTOLIC BLOOD PRESSURE: 114 MMHG | WEIGHT: 220 LBS | BODY MASS INDEX: 38.97 KG/M2 | HEART RATE: 84 BPM | DIASTOLIC BLOOD PRESSURE: 76 MMHG

## 2021-06-05 DIAGNOSIS — N76.0 VAGINITIS AND VULVOVAGINITIS: ICD-10-CM

## 2021-06-05 DIAGNOSIS — B96.89 BACTERIAL VAGINOSIS: ICD-10-CM

## 2021-06-05 DIAGNOSIS — N76.0 BACTERIAL VAGINOSIS: ICD-10-CM

## 2021-06-05 DIAGNOSIS — R30.0 DYSURIA: Primary | ICD-10-CM

## 2021-06-05 LAB
ALBUMIN UR-MCNC: NEGATIVE MG/DL
APPEARANCE UR: CLEAR
BILIRUB UR QL STRIP: NEGATIVE
COLOR UR AUTO: YELLOW
GLUCOSE UR STRIP-MCNC: NEGATIVE MG/DL
HGB UR QL STRIP: NEGATIVE
KETONES UR STRIP-MCNC: NEGATIVE MG/DL
LEUKOCYTE ESTERASE UR QL STRIP: NEGATIVE
NITRATE UR QL: NEGATIVE
PH UR STRIP: 6 PH (ref 5–7)
SOURCE: NORMAL
SP GR UR STRIP: >1.03 (ref 1–1.03)
SPECIMEN SOURCE: ABNORMAL
UROBILINOGEN UR STRIP-ACNC: 0.2 EU/DL (ref 0.2–1)
WET PREP SPEC: ABNORMAL

## 2021-06-05 PROCEDURE — 87210 SMEAR WET MOUNT SALINE/INK: CPT | Performed by: FAMILY MEDICINE

## 2021-06-05 PROCEDURE — 87086 URINE CULTURE/COLONY COUNT: CPT | Performed by: FAMILY MEDICINE

## 2021-06-05 PROCEDURE — 99214 OFFICE O/P EST MOD 30 MIN: CPT | Performed by: FAMILY MEDICINE

## 2021-06-05 PROCEDURE — 81003 URINALYSIS AUTO W/O SCOPE: CPT | Performed by: FAMILY MEDICINE

## 2021-06-05 RX ORDER — METRONIDAZOLE 7.5 MG/G
GEL TOPICAL 2 TIMES DAILY
Qty: 45 G | Refills: 0 | Status: SHIPPED | OUTPATIENT
Start: 2021-06-05 | End: 2021-06-12

## 2021-06-05 RX ORDER — FLUCONAZOLE 150 MG/1
150 TABLET ORAL ONCE
Qty: 1 TABLET | Refills: 0 | Status: SHIPPED | OUTPATIENT
Start: 2021-06-05 | End: 2021-06-05

## 2021-06-05 RX ORDER — METRONIDAZOLE 500 MG/1
500 TABLET ORAL 2 TIMES DAILY
Qty: 14 TABLET | Refills: 0 | Status: SHIPPED | OUTPATIENT
Start: 2021-06-05 | End: 2021-06-12

## 2021-06-05 ASSESSMENT — PAIN SCALES - GENERAL: PAINLEVEL: SEVERE PAIN (7)

## 2021-06-05 NOTE — PROGRESS NOTES
Chief complaint: vaginal symptoms    thelast 3-4 days   Burning in the vaginal area   Some dysuria   Some frequency     Took diflucan yesterday and didn't work  Foul-smelling or fishy odor: YES  Cheese like discharge: NO  Worsening: YES  Itchy: NO   Pelvic pain: No  Concern about STD exposure, unprotected sex, or high risk sexual behavior: No  Problem list and histories reviewed & adjusted, as indicated.  Additional history: as documented    Problem list, Medication list, Allergies, and Medical/Social/Surgical histories reviewed in [facility] and updated as appropriate.    ROS:  Constitutional, HEENT, cardiovascular, pulmonary, gi and gu systems are negative, except as otherwise noted.    OBJECTIVE:                                                    /76   Pulse 84   Temp 98  F (36.7  C) (Oral)   Wt 99.8 kg (220 lb)   LMP  (LMP Unknown)   SpO2 96%   Breastfeeding No   BMI 38.97 kg/m    Body mass index is 38.97 kg/m .  GENERAL: healthy, alert and no distress  EYES: Eyes grossly normal to inspection   (female): patient declined  MS: no gross musculoskeletal defects noted, no edema  SKIN: no suspicious lesions or rashes  NEURO: Normal strength and tone, mentation intact and speech normal  PSYCH: mentation appears normal, affect normal/bright    Diagnostic Test Results:  Results for orders placed or performed in visit on 06/05/21 (from the past 24 hour(s))   *UA reflex to Microscopic and Culture ([place] and [facility] (except [place] and [place])    Specimen: Midstream Urine   Result Value Ref Range    Color Urine Yellow     Appearance Urine Clear     Glucose Urine Negative NEG^Negative mg/dL    Bilirubin Urine Negative NEG^Negative    Ketones Urine Negative NEG^Negative mg/dL    Specific Gravity Urine >1.030 1.003 - 1.035    Blood Urine Negative NEG^Negative    pH Urine 6.0 5.0 - 7.0 pH    Protein Albumin Urine Negative NEG^Negative mg/dL    Urobilinogen Urine 0.2 0.2 - 1.0 EU/dL    Nitrite Urine  Negative NEG^Negative    Leukocyte Esterase Urine Negative NEG^Negative    Source Midstream Urine    Wet prep    Specimen: Vagina   Result Value Ref Range    Specimen Description Vagina     Wet Prep No Trichomonas seen     Wet Prep Few  Clue cells seen   (A)     Wet Prep No yeast seen     Wet Prep Moderate  WBC'S seen           ASSESSMENT/PLAN:                                                        ICD-10-CM    1. Dysuria  R30.0 *UA reflex to Microscopic and Culture ([place] [facility] (except [place] and [place])     Wet prep       ICD-10-CM    1. Dysuria  R30.0 *UA reflex to Microscopic and Culture ([place] [facility] (except [place] and [place])     Wet prep     Urine Culture Aerobic Bacterial   2. Bacterial vaginosis  N76.0 metroNIDAZOLE (FLAGYL) 500 MG tablet    B96.89 metroNIDAZOLE (METROGEL) 0.75 % external gel   3. Vaginitis and vulvovaginitis  N76.0 fluconazole (DIFLUCAN) 150 MG tablet       Patient declined pelvic exam  Prescribed with metronidazole or flagyl. Warned about metallic taste and advised no alcohol until 24 hours after the last dose.   Took one dose of diflucan already - wet prep negative but may be falsely negative  recmmend another dose if symptoms persist   Recommend follow up with primary care provider if no relief, sooner if worse  Adverse reactions of medications discussed.  Over the counter medications discussed.   Aware to come back in if with worsening symptoms or if no relief despite treatment plan  Patient voiced understanding and had no further questions.    Marcella Bah MD    See Patient Instructions    Marcella Bah MD  [facility] [place]

## 2021-06-06 LAB
BACTERIA SPEC CULT: NORMAL
Lab: NORMAL
SPECIMEN SOURCE: NORMAL

## 2021-06-23 ENCOUNTER — THERAPY VISIT (OUTPATIENT)
Dept: OCCUPATIONAL THERAPY | Facility: CLINIC | Age: 51
End: 2021-06-23
Payer: COMMERCIAL

## 2021-06-23 DIAGNOSIS — M79.644 PAIN OF RIGHT THUMB: ICD-10-CM

## 2021-06-23 PROCEDURE — 97110 THERAPEUTIC EXERCISES: CPT | Mod: GO | Performed by: OCCUPATIONAL THERAPIST

## 2021-06-23 PROCEDURE — 97760 ORTHOTIC MGMT&TRAING 1ST ENC: CPT | Mod: GO | Performed by: OCCUPATIONAL THERAPIST

## 2021-06-23 PROCEDURE — 97035 APP MDLTY 1+ULTRASOUND EA 15: CPT | Mod: GO | Performed by: OCCUPATIONAL THERAPIST

## 2021-06-23 NOTE — PROGRESS NOTES
Progress Note - Hand Therapy     Current Date: 6/23/2021  Reporting period is 5/12/21 to 6/23/2021  Referring Physician:Darrick Kay PA-C    Diagnosis: Right thumb  pain  DOI: 3/3/21 (Date of order)    Vanesa Owen is a 51 year old right hand dominant female.    Patient reports symptoms of pain, stiffness/loss of motion and weakness/loss of strength of the right thumb which occurred due to overuse in job.     Occupational Profile Information:  Current occupation is RN in NICU    S:  Subjective changes as noted by patient: The exercises help but until I quit bottleing babies at my job it will be a problem.  I think I would like a splint.  Functional changes noted by patient: no changes     O:  Pain Level (Scale 0-10):   3/22/2021 4/5/21 4/21/21 5/12/21 6/23/21   At Rest 1/10 3/10 0/10 0/10 0/10   With Use 2/10 3/10 0-1/10 0-2/10 0-2/10     Pain Description:  Date 3/22/2021   Location thumb   Pain Quality Sharp   Frequency intermittent     Pain is worst  daytime   Exacerbated by  holding a wide grasp, gripping, computer   Relieved by rest   Progression Gradually worsening     ROM  Thumb 3/22/2021 3/22/2021 4/5/21 4/21/21 5/12/21   AROM  (PROM) Left Right Right Right Right   MP /60 /55      IP /65 /60      RABD 60 45 55 60 60   PABD 55 50      Kapandji Opposition Scale (0-10/10) 10/10 10/10        Thumb Observation/Appearance  Key: + = present/ - = not observed    3/22/2021 6/23/21   Shoulder deformity present over CMC R:-  L:-    Volar subluxation present R:-  L:-    Edema over the CMC joint R:-  L:-    Noted collapse of MP into hyperextension during pinch R:+  L:+ R -  L: +   Tenderness at CMC R:-  L:-    Tenderness in web space R: +  L:-       Provocative Tests  Pain Report:  - none    + mild    ++ moderate    +++ severe     3/22/2021 5/12/21 6/23/21   CMC Grind test R: -  -   Crepitus present R: -  -   CMC Adduction Stress Test R: ++ R ++ ++   CMC Extension Stress Test R: -  +   Finkelstein's R: -  -      Strength   (Measured in pounds)  Pain Report: - none  + mild    ++ moderate    +++ severe    3/22/2021 3/22/2021 6/23/21   Trials Left Right Right   1  2  3 44  47  47 47  48  53    Average 46 49 54     Lat Pinch 3/22/2021 3/22/2021 6/23/21   Trials Left Right Right   1  2  3 14 14 12   Average        3 Pt Pinch 3/22/2021 3/22/2021 5/12/21 6/23/21   Trials Left Right Right Right   1  2  3 12 11+ 10+ 11+   Average           Please refer to the daily flowsheet for treatment provided today.      Assessment:  Response to therapy has been improvement to:  ROM of Thumb:  Radial Abduction and Palmar Abduction  Flexibility:  less tightness in involved muscles  Strength:   and pinch      Overall Assessment:  Patient's symptoms are resolving.  Patient is becoming more independent in home exercise program  STG/LTG:  STGoals have been reviewed and progress or achievement has occurred;  see goal sheet for details and updates.    I have re-evaluated this patient and find that the nature, scope, duration and intensity of the therapy is appropriate for the medical condition of the patient.  Plan:  Frequency/Duration:  Recommend holding therapy at this time    Home Program:  Warmth  1st web release with clip  Stretching to 1st web space  Incorporate joint protection into daily functional activities  Adaptive equipment as needed.  Place and hold pinch  Thumb Stabilization Program with hard  C  and index abd  Hand based Thumb Spica orthosis night/sleeping and per symptoms during the day     Next Visit:  No further visits scheduled at this time. The chart will be left open for one month to allow patient to schedule further appointments if problems develop. If no additional therapy sessions are scheduled, then the patient will be discharged and this will serve as a discharge note.

## 2021-07-05 ENCOUNTER — OFFICE VISIT (OUTPATIENT)
Dept: FAMILY MEDICINE | Facility: CLINIC | Age: 51
End: 2021-07-05
Payer: COMMERCIAL

## 2021-07-05 VITALS
BODY MASS INDEX: 38.84 KG/M2 | TEMPERATURE: 98.7 F | OXYGEN SATURATION: 98 % | SYSTOLIC BLOOD PRESSURE: 120 MMHG | RESPIRATION RATE: 16 BRPM | HEART RATE: 70 BPM | WEIGHT: 219.2 LBS | DIASTOLIC BLOOD PRESSURE: 74 MMHG | HEIGHT: 63 IN

## 2021-07-05 DIAGNOSIS — R30.0 DYSURIA: ICD-10-CM

## 2021-07-05 DIAGNOSIS — F41.9 ANXIETY: ICD-10-CM

## 2021-07-05 DIAGNOSIS — N76.0 BACTERIAL VAGINOSIS: Primary | ICD-10-CM

## 2021-07-05 DIAGNOSIS — F32.1 MODERATE MAJOR DEPRESSION (H): ICD-10-CM

## 2021-07-05 DIAGNOSIS — B96.89 BACTERIAL VAGINOSIS: Primary | ICD-10-CM

## 2021-07-05 LAB
ALBUMIN UR-MCNC: NEGATIVE MG/DL
APPEARANCE UR: CLEAR
BILIRUB UR QL STRIP: NEGATIVE
COLOR UR AUTO: YELLOW
GLUCOSE UR STRIP-MCNC: NEGATIVE MG/DL
HGB UR QL STRIP: NEGATIVE
KETONES UR STRIP-MCNC: NEGATIVE MG/DL
LEUKOCYTE ESTERASE UR QL STRIP: NEGATIVE
NITRATE UR QL: NEGATIVE
PH UR STRIP: 6 PH (ref 5–7)
SOURCE: NORMAL
SP GR UR STRIP: 1.02 (ref 1–1.03)
SPECIMEN SOURCE: ABNORMAL
UROBILINOGEN UR STRIP-ACNC: 0.2 EU/DL (ref 0.2–1)
WET PREP SPEC: ABNORMAL

## 2021-07-05 PROCEDURE — 87210 SMEAR WET MOUNT SALINE/INK: CPT | Performed by: PHYSICIAN ASSISTANT

## 2021-07-05 PROCEDURE — 81003 URINALYSIS AUTO W/O SCOPE: CPT | Performed by: PHYSICIAN ASSISTANT

## 2021-07-05 PROCEDURE — 99214 OFFICE O/P EST MOD 30 MIN: CPT | Performed by: PHYSICIAN ASSISTANT

## 2021-07-05 RX ORDER — BUSPIRONE HYDROCHLORIDE 5 MG/1
5 TABLET ORAL 2 TIMES DAILY
Qty: 90 TABLET | Refills: 0 | Status: SHIPPED | OUTPATIENT
Start: 2021-07-05 | End: 2021-08-19

## 2021-07-05 RX ORDER — METRONIDAZOLE 500 MG/1
500 TABLET ORAL 2 TIMES DAILY
Qty: 14 TABLET | Refills: 0 | Status: SHIPPED | OUTPATIENT
Start: 2021-07-05 | End: 2021-07-12

## 2021-07-05 ASSESSMENT — MIFFLIN-ST. JEOR: SCORE: 1588.41

## 2021-07-05 NOTE — PROGRESS NOTES
Assessment & Plan     Dysuria  Bacterial vaginosis  Patient is a 51-year-old female who presents to clinic due to return of dysuria, vaginal itching, and burning.  Vital signs normal per physical exam without acute abnormalities.  Urinalysis negative for UTI.  Wet prep does show BV.  We will treat with repeat dosing of Flagyl.  Recommended follow-up if symptoms persist or worsen.  - UA reflex to Microscopic and Culture  - Wet prep  - metroNIDAZOLE (FLAGYL) 500 MG tablet; Take 1 tablet (500 mg) by mouth 2 times daily for 7 days    Moderate major depression (H)  Anxiety  Patient also notes worsening depression anxiety related to stress at work and recent loss of mother.  No SI.  Patient compliant with prescribed sertraline.  Discussed importance of counseling.  Patient agreeable.  Referral placed.  Additionally, added BuSpar to treatment.  Recommended reevaluation in 1 month.  Patient to follow-up sooner for any new or worsening symptoms.  Crisis information provided.  - MENTAL HEALTH REFERRAL  - Adult; Outpatient Treatment; Individual/Couples/Family/Group Therapy/Health Psychology; [facility] [place]: [facility] [phone]; We will contact you to schedule the appointment or please call with any questions  - busPIRone (BUSPAR) 5 MG tablet; Take 1 tablet (5 mg) by mouth 2 times daily      See Patient Instructions    Return in about 4 weeks (around 8/2/2021) for Reevaluation.    SAMSON Bauman [facility] PILY Alexis is a 51 year old who presents for the following health issues     HPI   Per chart review, patient evaluated at urgent care June 5, 2021 and diagnosed with bacterial vaginosis well as vulvovaginitis.  Patient prescribed Flagyl, metronidazole gel, and fluconazole.    Genitourinary - Female  Onset/Duration: A few weeks ago, diagnosed with vaginosis in the ER and prescribed medications with improvement in symptoms.  Patient symptoms have returned including  "dysuria, itching and burning.   Description:   Painful urination (Dysuria): no           Frequency: YES  Blood in urine (Hematuria): no  Delay in urine (Hesitency): YES  Intensity: moderate  Progression of Symptoms:  worsening  Accompanying Signs & Symptoms:  Fever/chills: no  Flank pain: YES  Nausea and vomiting: YES- nausea  Vaginal symptoms: odor and itching  Abdominal/Pelvic Pain: no  History:   History of frequent UTI s: YES- years ago  History of kidney stones: no  Sexually Active: YES  Possibility of pregnancy: No  Precipitating or alleviating factors: None  Therapies tried and outcome: Increase fluid intake    LMP: IUD in place     Patient notes that she is experiencing worsening depression and anxiety. Her mother passed away from COVID-19 in late 2020. She has noted less desire to do activities. There is a lot of work stress. Patient has been compliant with Sertraline.  No SI.  Patient is not currently in counseling.        Objective    /74   Pulse 70   Temp 98.7  F (37.1  C) (Tympanic)   Resp 16   Ht 1.6 m (5' 3\")   Wt 99.4 kg (219 lb 3.2 oz)   LMP  (LMP Unknown)   SpO2 98%   BMI 38.83 kg/m    Body mass index is 38.83 kg/m .  Physical Exam  Vitals signs and nursing note reviewed.   Constitutional:       General: She is not in acute distress.     Appearance: Normal appearance.      Comments: Tearful   HENT:      Head: Normocephalic and atraumatic.   Eyes:      Extraocular Movements: Extraocular movements intact.      Pupils: Pupils are equal, round, and reactive to light.   Neck:      Musculoskeletal: Normal range of motion.   Cardiovascular:      Rate and Rhythm: Normal rate and regular rhythm.      Heart sounds: Normal heart sounds.   Pulmonary:      Effort: Pulmonary effort is normal.      Breath sounds: Normal breath sounds.   Abdominal:      General: Bowel sounds are normal.      Palpations: Abdomen is soft.      Tenderness: There is no abdominal tenderness. There is no guarding or " rebound.   Musculoskeletal: Normal range of motion.   Skin:     General: Skin is warm and dry.   Neurological:      General: No focal deficit present.      Mental Status: She is alert.   Psychiatric:         Mood and Affect: Mood normal.         Behavior: Behavior normal.            Results for orders placed or performed in visit on 07/05/21   UA reflex to Microscopic and Culture     Status: None    Specimen: Midstream Urine   Result Value Ref Range    Color Urine Yellow     Appearance Urine Clear     Glucose Urine Negative NEG^Negative mg/dL    Bilirubin Urine Negative NEG^Negative    Ketones Urine Negative NEG^Negative mg/dL    Specific Gravity Urine 1.020 1.003 - 1.035    Blood Urine Negative NEG^Negative    pH Urine 6.0 5.0 - 7.0 pH    Protein Albumin Urine Negative NEG^Negative mg/dL    Urobilinogen Urine 0.2 0.2 - 1.0 EU/dL    Nitrite Urine Negative NEG^Negative    Leukocyte Esterase Urine Negative NEG^Negative    Source Midstream Urine    Wet prep     Status: Abnormal    Specimen: Vagina   Result Value Ref Range    Specimen Description Vagina     Wet Prep No Trichomonas seen     Wet Prep Rare  Clue cells seen   (A)     Wet Prep No yeast seen     Wet Prep Rare  WBC'S seen

## 2021-07-05 NOTE — PATIENT INSTRUCTIONS
Your lab work does show bacterial vaginosis.  For treatment you have been prescribed metronidazole/Flagyl.  Please take as prescribed.  Additionally, you can try over-the-counter Replens which can alleviate discomfort to the vaginal area.    For depression/anxiety related to recent life events, we have placed a referral to counseling.  The counseling team will call you to schedule your appointment.  Additionally, we have prescribed BuSpar which can be a great add-on medication to your current Zoloft.  BuSpar does help with both depression and anxiety management.  Please schedule a follow-up in 4 weeks for a recheck.  Follow-up sooner for any new or worsening symptoms.    I have added crisis information to your paperwork in case you are ever in need of someone to talk to immediately.    Patient Education     Depression  Depression is one of the most common mental health problems today. It is not just a state of unhappiness or sadness. It is a true disease. The cause seems to be linked to a drop in chemicals that transmit signals in the brain. Having a family history of depression, alcoholism, or suicide increases the risk. Chronic illness, chronic pain, migraine headaches, and high emotional stress also increase the risk.   Depression is something we tend to recognize in others. But we may have a hard time seeing it in ourselves. It can show in many physical and emotional ways:     Loss of appetite    Overeating    Not being able to sleep    Sleeping too much    Excessive tiredness not linked to physical exertion    Restlessness or irritability    Slowness of movement or speech    Feeling depressed or withdrawn    Loss of interest in things you once enjoyed    Trouble concentrating, remembering, or making decisions    Thoughts of harming or killing oneself, or thoughts that life is not worth living    Low self-esteem  The treatment for depression may include both medicine and psychotherapy. Antidepressants can reduce  suffering. They can also improve the ability to function during the depressed period. Therapy can offer emotional support. It can also help you understand emotional factors that may be causing the depression.   Home care    Ongoing care and support help people manage this disease. Find a healthcare provider and therapist who meet your needs. Seek help when you feel like you may be getting ill.    Be kind to yourself. Make it a point to do things that you enjoy (gardening, walking in nature, going to a movie). Reward yourself for small successes.    Once you start your medicine, expect a slow decrease in your symptoms. Depression will lift gradually, not right away. Ask your healthcare provider how long it will take for the medicine to start working.    Take care of your physical body. Eat a balanced diet (low in saturated fat and high in fruits and vegetables). Exercise at least 3 times a week for 30 minutes. Even mild to moderate exercise (like brisk walking) can make you feel better.    Don't make major decisions, such as a job change, a divorce, or a marriage until you feel better.    Don't drink alcohol. It can make depression worse.    Take medicine as prescribed. Don't stop your medicine or adjust the dose unless you talk with your healthcare provider.    Don't share your medicine or use someone else's medicine.    Tell all your healthcare providers about all the prescription and over-the-counter medicines, vitamins, and supplements you take. Certain supplements interact with medicines. They can cause dangerous side effects. Ask your pharmacist about medicine interactions when you have questions.    Talk with your family and trusted friends about your feelings and thoughts. Ask them to help you notice behavior changes early. You can then get help and, if needed, your medicine can be adjusted.    Talk with your healthcare provider if you are not getting better. He or she may change your medicine or have you try  another treatment.    Follow-up care  Follow up with your healthcare provider as advised.   Call 911  Call 911 if you:     Have suicidal thoughts, a suicide plan, and the means to carry out the plan    Have serious thoughts of hurting someone else    Have trouble breathing    Are very confused    Feel very drowsy or have trouble awakening    Faint or lose consciousness    Have new chest pain that becomes more severe, lasts longer, or spreads into your shoulder, arm, neck, jaw, or back  When to seek medical advice  Call your healthcare provider right away if any of these happen:    Worsening of your symptoms    Feeling extreme depression, fear, anxiety, or anger toward yourself or others    Feeling out of control    Feeling that you may try to harm yourself or another    Hearing voices that others do not hear    Seeing things that others do not see    Not sleeping or eating for 3 days in a row    Having friends or family express concern over your behavior and ask you to seek help  Sami last reviewed this educational content on 7/1/2020 2000-2021 [employer]. All rights reserved. This information is not intended as a substitute for professional medical care. Always follow your healthcare professional's instructions.           Patient Education     Your Body s Response to Anxiety  Normal anxiety is part of the body s natural defense system. It's an alert to a threat that is unknown, vague, or comes from your own internal fears. While you re in this state, your feelings can range from a vague sense of worry to physical sensations such as a pounding heartbeat. These feelings make you want to react to the threat. An anxiety response is normal in many situations. But when you have an anxiety disorder, the same response can occur at the wrong times.   Anxiety can be helpful  Normal anxiety is a signal from your brain. It warns you of a threat. It's a normal response to help you prevent something. Or to  decrease the bad effects of something you can't control. For example, anxiety is a normal response to situations that might harm your body, separate you from a loved one, or lose your job. The symptoms of anxiety can be physical and mental.   How does it feel?  People with anxiety may have:    Dizziness    Muscle tension or pain    Restlessness    Sleeplessness    Trouble focusing    Racing heartbeat    Fast breathing    Shaking or trembling    Stomachache    Diarrhea    Loss of energy    Sweating    Cold, clammy hands    Chest pain    Dry mouth  Anxiety can also be a problem  Anxiety can become a problem when it is hard to control, occurs for months, and interferes with important parts of your life. With an anxiety disorder, your body has the response described above, but in inappropriate ways. The response a person has depends on the anxiety disorder he or she has. With some disorders, the anxiety is way out of proportion to the threat that triggers it. With others, anxiety may occur even when there isn t a clear threat or trigger.   Who does it affect?  Some people are more likely to have lasting anxiety than others. It tends to run in families. And it affects more younger people than older people, and more women than men. But no age, race, or gender is immune to anxiety problems.   Anxiety can be treated  The good news is that the anxiety that s disrupting your life can be treated. Check with your healthcare provider and rule out any physical problems that may be causing the anxiety symptoms. If an anxiety disorder is diagnosed, seek mental healthcare. This is an illness and it can respond to treatment. Most types of anxiety disorders will respond to talk therapy (counseling) and medicines. Working with your doctor or other healthcare provider, you can develop skills to help you cope with anxiety. You can also gain the perspective you need to overcome your fears. Good sources of support or guidance can be found  at your local hospital, mental health clinic, or an employee assistance program.     How to cope with anxiety  Here are some things you can do to cope:    Do what you can.  Keep in mind that you can t control everything. Change what you can. And let the rest take its course.    Exercise. This is a great way to ease tension and help your body feel relaxed.    Stay away from caffeine and nicotine.  These can make anxiety symptoms worse.    Stay sober.  Don't use alcohol or unprescribed medicines. They only make things worse in the long run.    Learn more about anxiety disorders.  Keep track of helpful online resources and books you can use during stressful periods.    Try stress management. Try methods such as meditation.    Talk with others. Think about joining online or in-person support groups.    Get help. Find professional mental health services if your symptoms can't be managed or reduced with the above methods.  Sami last reviewed this educational content on 4/1/2020 2000-2021 [employer]. All rights reserved. This information is not intended as a substitute for professional medical care. Always follow your healthcare professional's instructions.           Patient Education     Bacterial Vaginosis    You have a vaginal infection called bacterial vaginosis (BV). Both good and bad bacteria are present in a healthy vagina. BV occurs when these bacteria get out of balance. The number of bad bacteria increase. And the number of good bacteria decrease. BV is linked with sexual activity, but it's not a sexually transmitted infection (STI).   BV may or may not cause symptoms. If symptoms do occur, they can include:     Thin, gray, milky-white, or sometimes green discharge    Unpleasant odor or  fishy  smell    Itching, burning, or pain in or around the vagina  It is not known what causes BV, but certain factors can make the problem more likely. These can include:     Douching    Spermicides    Use  of antibiotics    Change in hormone levels with pregnancy, breastfeeding, or menopause    Having sex with a new partner    Having sex with more than one partner  BV will sometimes go away on its own. But treatment is often advised. This is because untreated BV can raise the risk of more serious health problems such as:     Pelvic inflammatory disease (PID)     delivery (giving birth to a baby early if you re pregnant)    HIV and some other sexually transmitted infections (STIs)    Infection after surgery on the reproductive organs  Home care  General care    BV is most often treated with medicines called antibiotics. These may be given as pills or as a vaginal cream. If antibiotics are prescribed, be sure to use them exactly as directed. And complete all of the medicine, even if your symptoms go away.    Don't douche or having sex during treatment.    If you have sex with a female partner, ask your healthcare provider if she should also be treated.  Prevention    Don't douche.    Don't have sex. If you do have sex, then take steps to lower your risk:  ? Use condoms when having sex.  ? Limit the number of sex partners you have.    Follow-up care  Follow up with your healthcare provider, or as advised.   When to get medical advice  Call your healthcare provider right away if:     You have a fever of 100.4 F (38 C) or higher, or as directed by your provider.    Your symptoms get worse, or they don t go away within a few days of starting treatment.    You have new pain in the lower belly or pelvic region.    You have side effects that bother you or a reaction to the pills or cream you re prescribed.    You or any of your sex partners have new symptoms, such as a rash, joint pain, or sores.  [employer] last reviewed this educational content on 2020-2021 [employer]. All rights reserved. This information is not intended as a substitute for professional medical care. Always follow your  healthcare professional's instructions.    In an emergency--call 911  Don't leave the person alone. Anyone who is at imminent risk of suicide needs psychiatric services right away. The person must be constantly watched, and never left out of sight. Call 911 or a 24-hour suicide crisis hotline. You can search for this online. You can also take the person to the nearest hospital emergency room.     Don't keep it a secret and don't wait  Call a mental health clinic or a licensed mental health professional in your area right away. This may be a psychiatrist, clinical psychologist, psychiatric or licensed clinical , marriage and family counselor, or clergy. If you don't know how to contact such professionals and there is an immediate risk, call 911. Tell them you need help for a person who is thinking about suicide.     Resources    National Suicide Prevention Cjcdghwz879-139-9324 (273-986-ZHIK)www.suicidepreventionlifeline.org    National Suicide Cmtsxhz624-154-7669 (800-SUICIDE)    National [place] of Mental Jvfbbi049-353-3036kbc.nimh.nih.gov    National [place] on Mental Jzyeczn454-815-8779fdk.zuri.org    Mental Health Gsrxljc545-968-6919bji.nmha.org

## 2021-07-20 ENCOUNTER — TELEPHONE (OUTPATIENT)
Dept: AUDIOLOGY | Facility: CLINIC | Age: 51
End: 2021-07-20

## 2021-07-20 NOTE — TELEPHONE ENCOUNTER
"\"I m calling from the Audiology and Balance Testing department at the . This is just a call to remind you of your upcoming Balance Testing appointment on [Date], and to see if you have any questions or concerns regarding the balance testing you'll be doing. You should have received an itinerary via mail or via [employer], if you are active, that goes over what to expect and explains the dos and don ts both 48 hours before, and the day of. There is a list of medications for you to review on the itinerary that we would like you to stop taking beforehand. If you didn t receive the itinerary or you still have questions, please give our clinic a call at [phone]. Otherwise, we will see you on [Date] starting at [Time].\"    Please send encounter if patient would like to reschedule.  "

## 2021-07-26 ENCOUNTER — TELEPHONE (OUTPATIENT)
Dept: AUDIOLOGY | Facility: CLINIC | Age: 51
End: 2021-07-26

## 2021-07-26 NOTE — TELEPHONE ENCOUNTER
[facility] Call Center    Phone Message    May a detailed message be left on voicemail: yes     Reason for Call: Other: Pt is calling in stating she has an upcoming appt  with Sarahi PRITCHETT in Audiology for dizziness and has testing scheduled.      Pt stated she just started taking a medication for depression called Buspar, can she still take this?  Should she stop it before the testing?    Pt is asking for a call back to discuss before 07/29 please    Action Taken: Message routed to:  [facility] ([facility] Audiology    Travel Screening: Not Applicable

## 2021-07-27 NOTE — TELEPHONE ENCOUNTER
Left patient a voicemail letting her know it is ok to keep taking the medication if she has been on it for more than 8 weeks. If she has started it within the last 8 weeks, she should stop taking it for 48 hours prior to testing.    Lisa Collins.  Licensed Audiologist  [place] # [id number]

## 2021-07-28 ENCOUNTER — OFFICE VISIT (OUTPATIENT)
Dept: AUDIOLOGY | Facility: CLINIC | Age: 51
End: 2021-07-28
Payer: COMMERCIAL

## 2021-07-28 DIAGNOSIS — R42 DIZZINESS AND GIDDINESS: Primary | ICD-10-CM

## 2021-07-28 PROCEDURE — 92546 SINUSOIDAL ROTATIONAL TEST: CPT | Performed by: AUDIOLOGIST

## 2021-07-28 PROCEDURE — 92537 CALORIC VSTBLR TEST W/REC: CPT | Performed by: AUDIOLOGIST

## 2021-07-28 PROCEDURE — 92545 OSCILLATING TRACKING TEST: CPT | Mod: 59 | Performed by: AUDIOLOGIST

## 2021-07-28 PROCEDURE — 92567 TYMPANOMETRY: CPT | Performed by: AUDIOLOGIST

## 2021-07-28 PROCEDURE — 92541 SPONTANEOUS NYSTAGMUS TEST: CPT | Performed by: AUDIOLOGIST

## 2021-07-28 PROCEDURE — 92542 POSITIONAL NYSTAGMUS TEST: CPT | Mod: 59 | Performed by: AUDIOLOGIST
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
## 2021-08-19 ENCOUNTER — MYC MEDICAL ADVICE (OUTPATIENT)
Dept: FAMILY MEDICINE | Facility: CLINIC | Age: 51
End: 2021-08-19

## 2021-08-19 DIAGNOSIS — B96.89 BACTERIAL VAGINOSIS: ICD-10-CM

## 2021-08-19 DIAGNOSIS — F32.1 MODERATE MAJOR DEPRESSION (H): ICD-10-CM

## 2021-08-19 DIAGNOSIS — F41.9 ANXIETY: ICD-10-CM

## 2021-08-19 DIAGNOSIS — N76.0 BACTERIAL VAGINOSIS: ICD-10-CM

## 2021-08-19 RX ORDER — METRONIDAZOLE 500 MG/1
500 TABLET ORAL 2 TIMES DAILY
Qty: 14 TABLET | Refills: 0 | OUTPATIENT
Start: 2021-08-19

## 2021-08-19 RX ORDER — BUSPIRONE HYDROCHLORIDE 5 MG/1
5 TABLET ORAL 2 TIMES DAILY
Qty: 60 TABLET | Refills: 0 | Status: SHIPPED | OUTPATIENT
Start: 2021-08-19 | End: 2021-08-23

## 2021-08-19 NOTE — TELEPHONE ENCOUNTER
Patient also called the clinic and requests these refills be sent to the [facility] pharmacy (pended).     Please see My Chart message from patient.   She will be seeing you next week.       Future Office Visit:   Next 5 appointments (look out 90 days)    Aug 27, 2021  9:00 AM  Office Visit with Lilly Dixon PA-C  [facility] [place] ([facility] ) [address]  [place] [address]  [phone]           Linda Peterson RN BSN  [facility]

## 2021-08-19 NOTE — TELEPHONE ENCOUNTER
Patient informed of rx sent and the need to follow up on BV per Lilly Dixon PA-C, see below read word for word.  Patient scheduled for appointment on 8-23-21 to be evaluated.

## 2021-08-19 NOTE — TELEPHONE ENCOUNTER
Refill of BuSpar sent to preferred pharmacy.  Please have patient complete visit for recheck regarding bacterial vaginosis prior to restarting medication-flagyl.    Lilly Dixon PA-C on 8/19/2021 at 3:40 PM

## 2021-08-23 ENCOUNTER — VIRTUAL VISIT (OUTPATIENT)
Dept: OTOLARYNGOLOGY | Facility: CLINIC | Age: 51
End: 2021-08-23
Payer: COMMERCIAL

## 2021-08-23 ENCOUNTER — PRE VISIT (OUTPATIENT)
Dept: OTOLARYNGOLOGY | Facility: CLINIC | Age: 51
End: 2021-08-23

## 2021-08-23 ENCOUNTER — OFFICE VISIT (OUTPATIENT)
Dept: FAMILY MEDICINE | Facility: CLINIC | Age: 51
End: 2021-08-23
Payer: COMMERCIAL

## 2021-08-23 VITALS
RESPIRATION RATE: 16 BRPM | OXYGEN SATURATION: 96 % | BODY MASS INDEX: 39.35 KG/M2 | SYSTOLIC BLOOD PRESSURE: 124 MMHG | HEART RATE: 89 BPM | WEIGHT: 222.13 LBS | DIASTOLIC BLOOD PRESSURE: 66 MMHG | TEMPERATURE: 98.7 F

## 2021-08-23 VITALS — WEIGHT: 220 LBS | BODY MASS INDEX: 38.98 KG/M2 | HEIGHT: 63 IN

## 2021-08-23 DIAGNOSIS — R42 DIZZINESS: Primary | ICD-10-CM

## 2021-08-23 DIAGNOSIS — F32.1 MODERATE MAJOR DEPRESSION (H): ICD-10-CM

## 2021-08-23 DIAGNOSIS — F41.9 ANXIETY: ICD-10-CM

## 2021-08-23 DIAGNOSIS — R30.0 DYSURIA: Primary | ICD-10-CM

## 2021-08-23 DIAGNOSIS — R09.81 NASAL CONGESTION: ICD-10-CM

## 2021-08-23 LAB

## 2021-08-23 PROCEDURE — 81001 URINALYSIS AUTO W/SCOPE: CPT | Performed by: PHYSICIAN ASSISTANT

## 2021-08-23 PROCEDURE — 99212 OFFICE O/P EST SF 10 MIN: CPT | Mod: TEL | Performed by: OTOLARYNGOLOGY

## 2021-08-23 PROCEDURE — 99214 OFFICE O/P EST MOD 30 MIN: CPT | Performed by: PHYSICIAN ASSISTANT

## 2021-08-23 PROCEDURE — 87210 SMEAR WET MOUNT SALINE/INK: CPT | Performed by: PHYSICIAN ASSISTANT

## 2021-08-23 RX ORDER — BUSPIRONE HYDROCHLORIDE 5 MG/1
5 TABLET ORAL 2 TIMES DAILY
Qty: 90 TABLET | Refills: 1 | Status: SHIPPED | OUTPATIENT
Start: 2021-08-23 | End: 2021-12-24

## 2021-08-23 RX ORDER — FLUTICASONE PROPIONATE 50 MCG
1 SPRAY, SUSPENSION (ML) NASAL DAILY
Qty: 16 G | Refills: 4 | Status: SHIPPED | OUTPATIENT
Start: 2021-08-23 | End: 2023-06-22

## 2021-08-23 ASSESSMENT — ENCOUNTER SYMPTOMS
DYSURIA: 1
SHORTNESS OF BREATH: 0
COUGH: 0
NERVOUS/ANXIOUS: 0
FEVER: 0
ABDOMINAL PAIN: 0
FREQUENCY: 0
VOMITING: 0
LIGHT-HEADEDNESS: 0
NAUSEA: 0

## 2021-08-23 ASSESSMENT — ANXIETY QUESTIONNAIRES
GAD7 TOTAL SCORE: 4
1. FEELING NERVOUS, ANXIOUS, OR ON EDGE: SEVERAL DAYS
3. WORRYING TOO MUCH ABOUT DIFFERENT THINGS: SEVERAL DAYS
6. BECOMING EASILY ANNOYED OR IRRITABLE: SEVERAL DAYS
2. NOT BEING ABLE TO STOP OR CONTROL WORRYING: NOT AT ALL
5. BEING SO RESTLESS THAT IT IS HARD TO SIT STILL: NOT AT ALL
7. FEELING AFRAID AS IF SOMETHING AWFUL MIGHT HAPPEN: SEVERAL DAYS

## 2021-08-23 ASSESSMENT — PATIENT HEALTH QUESTIONNAIRE - PHQ9
SUM OF ALL RESPONSES TO PHQ QUESTIONS 1-9: 5
5. POOR APPETITE OR OVEREATING: NOT AT ALL

## 2021-08-23 ASSESSMENT — PAIN SCALES - GENERAL
PAINLEVEL: NO PAIN (0)
PAINLEVEL: NO PAIN (0)

## 2021-08-23 ASSESSMENT — MIFFLIN-ST. JEOR: SCORE: 1592.04

## 2021-08-23 NOTE — PROGRESS NOTES
Vanesa is a 51 year old who is being evaluated via a billable telephone visit.      What phone number would you like to be contacted at? [phone]  How would you like to obtain your AVS? Hernanharrobert      Subjective   Vanesa Owen is seen for increased left tinnitus and worsening dizziness. She has known likely left otosclerosis. She was told in April that her hearing is stable although she feels like it's been harder to hear. She has also noticed a worsening of her left tinnitus. No otalgia or otorrhea.    She reports that she started having episodes of vertigo the day after her mother  from COVID which was prior to her mil[place] COVID last November. Her symptoms during COVID were different than what she has now. 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 She reports that it is not constant and not even every time she does certain movements but sometimes, when she stands up, she has vertigo. It will also occur if she is standing up and drinking. Driving can be difficult at times. She had some occasional mild symptoms when bending over prior to last year but it worsened since last year. She denies any hearing changes, increased tinnitus or fullness associated with the vertigo.    Review of systems:  She has occasional headaches which can be associated with light sensitivity. She has not been evaluated for migraine in the past.    Physical exam:  Objective    Vitals - Patient Reported  Pain Score: No Pain (0)    healthy, alert and no distress  PSYCH: Alert and oriented times 3; coherent speech, normal   rate and volume, able to articulate logical thoughts, able   to abstract reason, no tangential thoughts, no hallucinations   or delusions  Her affect is normal  RESP: No cough, no audible wheezing, able to talk in full sentences  Remainder of exam unable to be completed due to telephone visits    Audiogram:  Audiograms were  independently reviewed. April 2021 audiogram showed stable left mild/moderate primarily conductive hearing loss with a slight drop in the bone thresholds around 2Khz with excellent speech discrimination, this has been stable since May 2016. Normal right hearing. Normal tympanograms and absent reflexes bilaterally.    Balance testing:  VNG showed no peripheral or central abnormalities.     CT: Maxillofacial CT from 2015 was reviewed which showed no temporal bone abnormalities on either side, no obvious otospongiotic plaques and no canal dehiscences.    Assessment and plan:  New onset vertigo associated with a time of significant stress. She also has been noticing increased tinnitus. Her balance testing showed no abnormalities and her hearing has been stable for at least 5 years. She does have some headaches which sound potentially migrainous. We discussed her symptoms and findings. Unfortunately I do not have a definitive diagnosis for her vertigo but she was reassured that her testing did not show an inner ear cause. Recommendation was made for a visit with vestibular therapy as there is some positional component to her symptoms. We also discussed discussing her headaches with her PCP as migraines can be associated with imbalance. She had her questions answered and is comfortable with the plan.    Phone call duration: 16 minutes

## 2021-08-23 NOTE — PROGRESS NOTES
Assessment & Plan     Dysuria  Patient is a 51-year-old female who presents to clinic due to persistent vaginal itching and burning with urination.  Vital signs normal.  UA negative for UTI.  Wet prep negative for bacterial vaginosis or candidiasis.  Symptoms are likely related to vaginitis.  Discussed preventative measures as well as recommended use of Replens.  Return precautions provided.  - UA with Microscopic reflex to Culture - Clinic Collect  - Wet prep - Clinic Collect  - Urine Microscopic    Moderate major depression (H)  Anxiety  Patient has BuSpar is helping manage depression and anxiety.  She would like to continue this medication.  Refill provided.  Additionally, patient has not started counseling yet as she lost the contact number.  Phone number provided.  - busPIRone (BUSPAR) 5 MG tablet; Take 1 tablet (5 mg) by mouth 2 times daily    Nasal congestion  Patient requesting refill of Flonase as she is experiencing nasal congestion.  Flonase has worked well in the past.  Refill provided.  - fluticasone (FLONASE) 50 MCG/ACT nasal spray; Spray 1 spray into both nostrils daily    See Patient Instructions    Return in about 2 weeks (around 9/6/2021), or if symptoms worsen or fail to improve.    SAMSON Bauman [facility] PLIY Alexis is a 51 year old who presents for the following health issues     HPI     Vaginal Symptoms  Onset/Duration: Follow up after 7/5 visit. Dx BV.  Patient notes ongoing itching and some burning with urination.  Description:  Vaginal Discharge: none   Itching (Pruritis): YES  Burning sensation:  no  Odor: no  Accompanying Signs & Symptoms:  Urinary symptoms: no  Abdominal pain: yes, patient was evaluated for this in ED. No clear etiology. CT and US completed.   Fever: no  History:   Sexually active: no  New Partner: no  Possibility of Pregnancy:  no  Recent antibiotic use: no  Previous vaginitis issues: no  Precipitating or alleviating factors:  "None  Therapies tried and outcome: She has finished Metronidazole 500mg bid      Depression Followup   Buspar 5mg bid, zoloft 200mg every day.  BuSpar is working well to manage symptoms.  Patient would like to proceed with counseling, but lost phone number.    How are you doing with your depression since your last visit? She notes that she is \"ok\" and feels that it is helping. She has not been seen by therapist yet due to loosing #    Are you having other symptoms that might be associated with depression? No    Have you had a significant life event?  No     Are you feeling anxious or having panic attacks?   No    Do you have any concerns with your use of alcohol or other drugs? No       Patient requesting refill of Flonase as she is experiencing nasal congestion.  Flonase has worked well in the past.    Social History     Tobacco Use     Smoking status: Never Smoker     Smokeless tobacco: Never Used   Substance Use Topics     Alcohol use: Yes     Comment: rare     Drug use: No     PHQ 7/22/2020 12/9/2020 6/4/2021   PHQ-9 Total Score 5 2 4   Q9: Thoughts of better off dead/self-harm past 2 weeks Not at all Not at all Not at all     VIKASH-7 SCORE 11/4/2019 7/22/2020 12/9/2020   Total Score 0 2 5       Review of Systems   Constitutional: Negative for fever.   HENT: Positive for congestion.    Respiratory: Negative for cough and shortness of breath.    Cardiovascular: Negative for chest pain.   Gastrointestinal: Negative for abdominal pain, nausea and vomiting.   Genitourinary: Positive for dysuria. Negative for frequency, urgency, vaginal bleeding and vaginal discharge.   Skin: Negative for rash.   Neurological: Negative for light-headedness.   Psychiatric/Behavioral: The patient is not nervous/anxious.             Objective    /66   Pulse 89   Temp 98.7  F (37.1  C) (Tympanic)   Resp 16   Wt 100.8 kg (222 lb 2 oz)   SpO2 96%   BMI 39.35 kg/m    Body mass index is 39.35 kg/m .  Physical Exam  Vitals and " nursing note reviewed.   Constitutional:       General: She is not in acute distress.     Appearance: Normal appearance.   HENT:      Head: Normocephalic and atraumatic.      Nose: Congestion present.      Mouth/Throat:      Mouth: Mucous membranes are moist.      Pharynx: Oropharynx is clear.   Eyes:      Extraocular Movements: Extraocular movements intact.      Pupils: Pupils are equal, round, and reactive to light.   Cardiovascular:      Rate and Rhythm: Normal rate and regular rhythm.      Heart sounds: Normal heart sounds.   Pulmonary:      Effort: Pulmonary effort is normal.      Breath sounds: Normal breath sounds. No wheezing.   Abdominal:      General: Bowel sounds are normal.      Palpations: Abdomen is soft.      Tenderness: There is no abdominal tenderness. There is no guarding or rebound.   Musculoskeletal:         General: Normal range of motion.      Cervical back: Normal range of motion.   Skin:     General: Skin is warm and dry.   Neurological:      General: No focal deficit present.      Mental Status: She is alert.   Psychiatric:         Mood and Affect: Mood normal.         Behavior: Behavior normal.            Results for orders placed or performed in visit on 08/23/21   UA with Microscopic reflex to Culture - Clinic Collect     Status: Abnormal    Specimen: Urine, Clean Catch   Result Value Ref Range    Color Urine Yellow Colorless, Straw, Light Yellow, Yellow    Appearance Urine Clear Clear    Glucose Urine Negative Negative mg/dL    Bilirubin Urine Negative Negative    Ketones Urine Negative Negative mg/dL    Specific Gravity Urine 1.015 1.003 - 1.035    Blood Urine Trace (A) Negative    pH Urine 5.5 5.0 - 7.0    Protein Albumin Urine Negative Negative mg/dL    Urobilinogen Urine 0.2 0.2, 1.0 E.U./dL    Nitrite Urine Negative Negative    Leukocyte Esterase Urine Trace (A) Negative   Urine Microscopic     Status: Abnormal   Result Value Ref Range    Bacteria Urine Few (A) None Seen /HPF    RBC  Urine 0-2 0-2 /HPF /HPF    WBC Urine 0-5 0-5 /HPF /HPF    Squamous Epithelials Urine Few (A) None Seen /LPF    Narrative    Urine Culture not indicated   Wet prep - Clinic Collect     Status: Abnormal    Specimen: Vagina; Swab   Result Value Ref Range    Trichomonas Absent Absent    Yeast Absent Absent    Clue Cells Absent Absent    WBCs/high power field 1+ (A) None

## 2021-08-23 NOTE — Clinical Note
2021       RE: Vanesa Owen  [address]  [place] [address]     Dear Colleague,    Thank you for referring your patient, Vanesa Owen, to the [facility] [place] at [facility]. Please see a copy of my visit note below.    Vanesa is a 51 year old who is being evaluated via a billable telephone visit.      What phone number would you like to be contacted at? [phone]  How would you like to obtain your AVS? MyChart      Subjective   Vanesa Owen is seen for increased left tinnitus and worsening dizziness. She has known likely left otosclerosis. She was told in April that her hearing is stable although she feels like it's been harder to hear. She has also noticed a worsening of her left tinnitus. No otalgia or otorrhea.    She reports that she started having episodes of vertigo the day after her mother  from COVID which was prior to her [place] COVID last November. Her symptoms during COVID were different than what she has now. 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 She reports that it is not constant and not even every time she does certain movements but sometimes, when she stands up, she has vertigo. It will also occur if she is standing up and drinking. Driving can be difficult at times. She had some occasional mild symptoms when bending over prior to last year but it worsened since last year. She denies any hearing changes, increased tinnitus or fullness associated with the vertigo.    Review of systems:  She has occasional headaches which can be associated with light sensitivity. She has not been evaluated for migraine in the past.    Physical exam:  Objective    Vitals - Patient Reported  Pain Score: No Pain (0)    healthy, alert and no distress  PSYCH: Alert and oriented times 3; coherent speech, normal    rate and volume, able to articulate logical thoughts, able   to abstract reason, no tangential thoughts, no hallucinations   or delusions  Her affect is normal  RESP: No cough, no audible wheezing, able to talk in full sentences  Remainder of exam unable to be completed due to telephone visits    Audiogram:  Audiograms were independently reviewed. April 2021 audiogram showed stable left mild/moderate primarily conductive hearing loss with a slight drop in the bone thresholds around 2Khz with excellent speech discrimination, this has been stable since May 2016. Normal right hearing. Normal tympanograms and absent reflexes bilaterally.    Balance testing:  VNG showed no peripheral or central abnormalities.     CT: Maxillofacial CT from 2015 was reviewed which showed no temporal bone abnormalities on either side, no obvious otospongiotic plaques and no canal dehiscences.    Assessment and plan:  ***       Phone call duration: 16 minutes          Again, thank you for allowing me to participate in the care of your patient.      Sincerely,    Brigid Clark MD

## 2021-08-23 NOTE — LETTER
2021      RE: Vanesa Owen  [address]  [place] [address]       Vanesa is a 51 year old who is being evaluated via a billable telephone visit.      What phone number would you like to be contacted at? [phone]  How would you like to obtain your AVS? MyChart      Subjective   Vanesa Owen is seen for increased left tinnitus and worsening dizziness. She has known likely left otosclerosis. She was told in April that her hearing is stable although she feels like it's been harder to hear. She has also noticed a worsening of her left tinnitus. No otalgia or otorrhea.    She reports that she started having episodes of vertigo the day after her mother  from COVID which was prior to her mil[place] COVID last November. Her symptoms during COVID were different than what she has now. 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 She reports that it is not constant and not even every time she does certain movements but sometimes, when she stands up, she has vertigo. It will also occur if she is standing up and drinking. Driving can be difficult at times. She had some occasional mild symptoms when bending over prior to last year but it worsened since last year. She denies any hearing changes, increased tinnitus or fullness associated with the vertigo.    Review of systems:  She has occasional headaches which can be associated with light sensitivity. She has not been evaluated for migraine in the past.    Physical exam:  Objective    Vitals - Patient Reported  Pain Score: No Pain (0)    healthy, alert and no distress  PSYCH: Alert and oriented times 3; coherent speech, normal   rate and volume, able to articulate logical thoughts, able   to abstract reason, no tangential thoughts, no hallucinations   or delusions  Her affect is normal  RESP: No cough, no audible wheezing, able to talk in full sentences  Remainder of exam  unable to be completed due to telephone visits    Audiogram:  Audiograms were independently reviewed. April 2021 audiogram showed stable left mild/moderate primarily conductive hearing loss with a slight drop in the bone thresholds around 2Khz with excellent speech discrimination, this has been stable since May 2016. Normal right hearing. Normal tympanograms and absent reflexes bilaterally.    Balance testing:  VNG showed no peripheral or central abnormalities.     CT: Maxillofacial CT from 2015 was reviewed which showed no temporal bone abnormalities on either side, no obvious otospongiotic plaques and no canal dehiscences.    Assessment and plan:  New onset vertigo associated with a time of significant stress. She also has been noticing increased tinnitus. Her balance testing showed no abnormalities and her hearing has been stable for at least 5 years. She does have some headaches which sound potentially migrainous. We discussed her symptoms and findings. Unfortunately I do not have a definitive diagnosis for her vertigo but she was reassured that her testing did not show an inner ear cause. Recommendation was made for a visit with vestibular therapy as there is some positional component to her symptoms. We also discussed discussing her headaches with her PCP as migraines can be associated with imbalance. She had her questions answered and is comfortable with the plan.    Phone call duration: 16 minutes          Brigid Clark MD

## 2021-08-23 NOTE — PATIENT INSTRUCTIONS
1. You were seen in the ENT Clinic today by Dr. Clark. If you have any questions or concerns after your appointment, please call   - Option 1: ENT Clinic: [phone]  - Option 2: Peggy (Dr. Clark's Nurse): [phone]        Sasha (Dr. Clark's Nurse): [phone]     2.   Plan to return to clinic as needed    3. Referral for Vestibular PT    Peggy, RN, BSN  RN Care Coordinator  [facility]

## 2021-08-23 NOTE — PATIENT INSTRUCTIONS
Your lab results do not show any signs of urinary tract infection, yeast infection, or bacterial vaginosis.  Your symptoms may be due to vaginitis.  Please see the handout attached for additional information.  You can try over-the-counter Replens which can provide some relief.    I am happy to hear the BuSpar is working well with the Zoloft to manage your symptoms.  I have sent a refill of BuSpar to your pharmacy.  Additionally, hears the number for scheduling counseling: [facility] [phone]    I also sent over a refill of Flonase for nasal congestion.    Please reach out with questions or concerns.  Return to clinic for new or worsening symptoms.      Patient Education     Preventing Vaginitis     Use mild, unscented soap when you bathe or shower to avoid irritating your vagina.    Vaginitis is irritation or infection of the vagina or the outside opening of it (vulva). Vaginitis can be caused by bacteria, viruses, parasites, or yeast. Chemicals such as in perfumes or soaps or in spermicides can sometimes be a cause. Vaginitis can be caused by hormone changes in pregnancy or with menopause. You can help prevent vaginitis. Follow the tips below. And see your healthcare provider if you have any symptoms.   Hygiene    Stay away from chemicals. Don't use vaginal sprays. Don't use scented toilet paper or tampons that are scented. Sprays and scents have chemicals that can irritate your vagina.    Don't douche unless you are told to by your healthcare provider. Douching is rarely needed. And it upsets the normal balance in the vagina.    Wash yourself well. Wash the outer vaginal area (vulva) every day with mild, unscented soap. Keep it as dry as possible.    Wipe correctly. Make sure to wipe from front to back after a bowel movement. This helps keep from spreading bacteria from your anus to your vagina.    Change your tampon often. During your period, make sure to change your tampon as often as  directed on the package. This allows the normal flow of vaginal discharge and blood.    Lifestyle    Limit your number of sexual partners. The more partners you have, the greater your risk of infection. Using condoms helps reduce your risk.    Get enough sleep. Sleep helps keep your body s immune system healthy. This helps you fight infection.    Lose weight, if needed. Excess weight can reduce air circulation around your vagina. This can increase your risk of infection.    Exercise regularly. Regular activity helps keep your body healthy.    Take antibiotics only as directed. Antibiotics can change the normal chemical balance in the vagina.      Clothing    Don t sit in wet clothes. Yeast thrives when it s warm and damp.    Don t wear tight pants. And don t wear tights, leggings, or hose without a cotton crotch. These types of clothing trap warmth and moisture.    Wear cotton underwear. Cotton lets air circulate around the vagina.    Symptoms of vaginitis    Irritation, swelling, or itching of the genital area    Vaginal discharge    Bad vaginal odor    Pain or burning during urination    [employer] last reviewed this educational content on 11/1/2019 2000-2021 [employer]. All rights reserved. This information is not intended as a substitute for professional medical care. Always follow your healthcare professional's instructions.

## 2021-08-24 ASSESSMENT — ANXIETY QUESTIONNAIRES: GAD7 TOTAL SCORE: 4

## 2021-08-30 PROBLEM — M79.644 PAIN OF RIGHT THUMB: Status: RESOLVED | Noted: 2021-03-22 | Resolved: 2021-08-30

## 2021-09-19 ENCOUNTER — HEALTH MAINTENANCE LETTER (OUTPATIENT)
Age: 51
End: 2021-09-19

## 2021-09-20 ENCOUNTER — OFFICE VISIT (OUTPATIENT)
Dept: AUDIOLOGY | Facility: CLINIC | Age: 51
End: 2021-09-20
Payer: COMMERCIAL

## 2021-09-20 DIAGNOSIS — H90.12 CONDUCTIVE HEARING LOSS OF LEFT EAR WITH UNRESTRICTED HEARING OF RIGHT EAR: Primary | ICD-10-CM

## 2021-09-20 PROCEDURE — 92590 PR HEARING AID EXAM MONAURAL: CPT | Performed by: AUDIOLOGIST

## 2021-09-20 NOTE — PROGRESS NOTES
AUDIOLOGY REPORT    SUBJECTIVE: Vanesa Owen is a 51 year old female who was seen in the Audiology Clinic at [facility] on 9/20/2021 to discuss concerns with hearing and functional communication difficulties. The patient has been seen previously on 4/13/2021, and results revealed normal hearing for the right ear and moderate rising to mild conductive hearing loss for the left ear. She was medically evaluated and determined to be cleared for a left hearing aid by Brigid Clark M.D. Vanesa notes difficulty with communication in a variety of listening situations.    OBJECTIVE: The patient is a hearing aid candidate. The patient would like to move forward with a hearing aid evaluation today. Therefore, the patient was presented with different options for amplification to help aid in communication. Discussed styles, levels of technology, tinnitus maskers, iPhone compatibility, and rechargeable options. 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 Explained that there is not a good option that works well with the hearing aid at this time so using an amplified stethoscope instead.     The hearing aid mutually chosen was:  LEFT: Widex Moment 220 - R  COLOR: Summer gold  BATTERY SIZE: Rechargeable  EARMOLD/TIPS: Medium closed domes  CANAL/ LENGTH: 2M    ASSESSMENT: Reviewed purchase information and warranty information with the patient. The 45 day trial period was explained to the patient. The patient was given a copy of the [place] Department of Health consumer brochure on purchasing hearing instruments. Patient risk factors have been provided to the patient in writing prior to the sale of the hearing aid per FDA regulation. The risk factors are also available in the User Instructional Booklet to be presented on the day of the hearing aid fitting. Hearing aid evaluation completed.    PLAN: Vanesa will contact the  clinic when she is ready to proceed with ordering the hearing aid. A hearing aid fitting and programming will be scheduled at that time. Please contact the clinic with any questions or concerns.      Coretta Arnold, [facility]  Licensed Audiologist  [place] #[id number]

## 2021-10-13 ENCOUNTER — HOSPITAL ENCOUNTER (OUTPATIENT)
Dept: PHYSICAL THERAPY | Facility: CLINIC | Age: 51
Setting detail: THERAPIES SERIES
End: 2021-10-13
Attending: OTOLARYNGOLOGY
Payer: COMMERCIAL

## 2021-10-13 DIAGNOSIS — R42 DIZZINESS: ICD-10-CM

## 2021-10-13 PROCEDURE — 97112 NEUROMUSCULAR REEDUCATION: CPT | Mod: GP | Performed by: PHYSICAL THERAPIST

## 2021-10-21 DIAGNOSIS — M79.604 LOWER EXTREMITY PAIN, BILATERAL: ICD-10-CM

## 2021-10-21 DIAGNOSIS — M79.605 LOWER EXTREMITY PAIN, BILATERAL: ICD-10-CM

## 2021-10-23 RX ORDER — GABAPENTIN 300 MG/1
CAPSULE ORAL
Qty: 180 CAPSULE | Refills: 0 | Status: SHIPPED | OUTPATIENT
Start: 2021-10-23 | End: 2022-05-19

## 2021-11-10 NOTE — MR AVS SNAPSHOT
After Visit Summary   3/23/2017    Vanesa Owen    MRN: [id number]           Patient Information     Date Of Birth          1970        Visit Information        Provider Department      3/23/2017 9:40 AM Antoinette Frank PA-C [facility]        Today's Diagnoses     Throat pain    -  1    Exposure to influenza        Sinus pressure        Viral upper respiratory tract infection          Care Instructions    For the next 2 days take the Claritin-D twice daily.  Start the prednisone.  Increase your water intake in order to keep the secretions/mucous in your upper respiratory tract thin. Get plenty of rest and wash your hands well. Follow up if symptoms fail to improve or worsen.          Follow-ups after your visit        Who to contact     Normal or non-critical lab and imaging results will be communicated to you by [employer], letter or phone within 4 business days after the clinic has received the results. If you do not hear from us within 7 days, please contact the clinic through [employer] or phone. If you have a critical or abnormal lab result, we will notify you by phone as soon as possible.  Submit refill requests through [employer] or call your pharmacy and they will forward the refill request to us. Please allow 3 business days for your refill to be completed.          If you need to speak with a  for additional information , please call: [phone]             Additional Information About Your Visit        [employer] Information     [employer] gives you secure access to your electronic health record. If you see a primary care provider, you can also send messages to your care team and make appointments. If you have questions, please call your primary care clinic.  If you do not have a primary care provider, please call [phone] and they will assist you.        Care EveryWhere ID     This is your Care EveryWhere ID. This could be used by other organizations to  "access your [facility] medical records  [id number]        Your Vitals Were     Pulse Temperature Height Pulse Oximetry BMI (Body Mass Index)       99 98.9  F (37.2  C) (Oral) 5' 3.25\" (1.607 m) 98% 34.59 kg/m2        Blood Pressure from Last 3 Encounters:   03/23/17 102/69   01/05/17 104/72   08/30/16 122/84    Weight from Last 3 Encounters:   03/23/17 196 lb 12.8 oz (89.3 kg)   01/05/17 195 lb 3.2 oz (88.5 kg)   08/30/16 209 lb (94.8 kg)              We Performed the Following     Beta strep group A culture     Influenza A/B antigen     Strep, Rapid Screen          Today's Medication Changes          These changes are accurate as of: 3/23/17 10:38 AM.  If you have any questions, ask your nurse or doctor.               Start taking these medicines.        Dose/Directions    predniSONE 20 MG tablet   Commonly known as:  DELTASONE   Used for:  Sinus pressure, Viral upper respiratory tract infection   Started by:  Antoinette Frank PA-C        Take 2 tabs (40 mg) daily x 3 days, 1 tab (20 mg) daily x 3 days, then 1/2 tab (10 mg) x 4 days.   Quantity:  11 tablet   Refills:  0         These medicines have changed or have updated prescriptions.        Dose/Directions    omeprazole 40 MG capsule   Commonly known as:  priLOSEC   This may have changed:    - when to take this  - reasons to take this  - additional instructions   Used for:  Gastric hyperacidity, Esophageal reflux        Dose:  40 mg   Take 1 capsule (40 mg) by mouth daily Take 30-60 minutes before a meal.   Quantity:  90 capsule   Refills:  3       valACYclovir 500 MG tablet   Commonly known as:  VALTREX   This may have changed:  additional instructions   Used for:  Herpes simplex virus infection        Dose:  500 mg   Take 1 tablet (500 mg) by mouth daily   Quantity:  90 tablet   Refills:  3            Where to get your medicines      These medications were sent to [facility] - [address]  [address], " [place] [address]     Phone:  [phone]     predniSONE 20 MG tablet                Primary Care Provider Office Phone # Fax #    Crystal Goel [phone]       [facility] [address] [place] [address]        Thank you!     Thank you for choosing [facility]  for your care. Our goal is always to provide you with excellent care. Hearing back from our patients is one way we can continue to improve our services. Please take a few minutes to complete the written survey that you may receive in the mail after your visit with us. Thank you!             Your Updated Medication List - Protect others around you: Learn how to safely use, store and throw away your medicines at www.disposemymeds.org.          This list is accurate as of: 3/23/17 10:38 AM.  Always use your most recent med list.                   Brand Name Dispense Instructions for use    fluticasone 50 MCG/ACT spray    FLONASE    1 Bottle    Spray 2 sprays in nostril daily       MIRENA (52 MG) 20 MCG/24HR IUD   Generic drug:  levonorgestrel      1 each by Intrauterine route once       Multi-vitamin Tabs tablet   Generic drug:  multivitamin, therapeutic with minerals      1 TABLET DAILY       nystatin-triamcinolone cream    MYCOLOG II    30 g    Apply topically 2 times daily       omeprazole 40 MG capsule    priLOSEC    90 capsule    Take 1 capsule (40 mg) by mouth daily Take 30-60 minutes before a meal.       predniSONE 20 MG tablet    DELTASONE    11 tablet    Take 2 tabs (40 mg) daily x 3 days, 1 tab (20 mg) daily x 3 days, then 1/2 tab (10 mg) x 4 days.       sertraline 100 MG tablet    ZOLOFT    180 tablet    Take 2 tablets (200 mg) by mouth daily       triamcinolone 0.1 % cream    KENALOG    30 g    Apply topically 3 times daily       valACYclovir 500 MG tablet    VALTREX    90 tablet    Take 1 tablet (500 mg) by mouth daily          Show Applicator Variable?: Yes Post-Care Instructions: I reviewed with the patient in detail post-care instructions. Patient is to wear sunprotection, and avoid picking at any of the treated lesions. Pt may apply Vaseline to crusted or scabbing areas. Render Note In Bullet Format When Appropriate: No Consent: The patient's consent was obtained including but not limited to risks of crusting, scabbing, blistering, scarring, darker or lighter pigmentary change, recurrence, incomplete removal and infection. Detail Level: Simple Number Of Freeze-Thaw Cycles: 3 freeze-thaw cycles Duration Of Freeze Thaw-Cycle (Seconds): 2

## 2021-12-23 DIAGNOSIS — K31.89 GASTRIC HYPERACIDITY: ICD-10-CM

## 2021-12-23 DIAGNOSIS — F32.1 MODERATE MAJOR DEPRESSION (H): ICD-10-CM

## 2021-12-23 DIAGNOSIS — F41.9 ANXIETY: ICD-10-CM

## 2021-12-23 DIAGNOSIS — F34.1 DYSTHYMIA: ICD-10-CM

## 2021-12-24 NOTE — TELEPHONE ENCOUNTER
Routing refill request to provider for review/approval because:  Drug not on the [facility] refill protocol   PHQ-9 score:    PHQ 8/23/2021   PHQ-9 Total Score 5   Q9: Thoughts of better off dead/self-harm past 2 weeks Not at all

## 2021-12-25 RX ORDER — SERTRALINE HYDROCHLORIDE 100 MG/1
200 TABLET, FILM COATED ORAL DAILY
Qty: 180 TABLET | Refills: 3 | Status: SHIPPED | OUTPATIENT
Start: 2021-12-25 | End: 2022-12-08

## 2021-12-25 RX ORDER — OMEPRAZOLE 40 MG/1
CAPSULE, DELAYED RELEASE ORAL
Qty: 90 CAPSULE | Refills: 3 | Status: SHIPPED | OUTPATIENT
Start: 2021-12-25 | End: 2022-12-28

## 2021-12-25 RX ORDER — BUSPIRONE HYDROCHLORIDE 5 MG/1
5 TABLET ORAL 2 TIMES DAILY
Qty: 180 TABLET | Refills: 1 | Status: SHIPPED | OUTPATIENT
Start: 2021-12-25 | End: 2022-07-06

## 2022-03-05 ENCOUNTER — HEALTH MAINTENANCE LETTER (OUTPATIENT)
Age: 52
End: 2022-03-05

## 2022-03-10 ENCOUNTER — ANCILLARY PROCEDURE (OUTPATIENT)
Dept: GENERAL RADIOLOGY | Facility: CLINIC | Age: 52
End: 2022-03-10
Attending: FAMILY MEDICINE
Payer: OTHER MISCELLANEOUS

## 2022-03-10 ENCOUNTER — OFFICE VISIT (OUTPATIENT)
Dept: ORTHOPEDICS | Facility: CLINIC | Age: 52
End: 2022-03-10
Payer: OTHER MISCELLANEOUS

## 2022-03-10 VITALS
HEIGHT: 63 IN | DIASTOLIC BLOOD PRESSURE: 80 MMHG | SYSTOLIC BLOOD PRESSURE: 124 MMHG | WEIGHT: 225 LBS | BODY MASS INDEX: 39.87 KG/M2

## 2022-03-10 DIAGNOSIS — M54.12 LEFT CERVICAL RADICULOPATHY: Primary | ICD-10-CM

## 2022-03-10 DIAGNOSIS — M25.512 ACUTE PAIN OF LEFT SHOULDER: ICD-10-CM

## 2022-03-10 DIAGNOSIS — M25.512 LEFT SHOULDER PAIN: ICD-10-CM

## 2022-03-10 DIAGNOSIS — M54.12 LEFT CERVICAL RADICULOPATHY: ICD-10-CM

## 2022-03-10 PROCEDURE — 73030 X-RAY EXAM OF SHOULDER: CPT | Mod: LT | Performed by: RADIOLOGY

## 2022-03-10 PROCEDURE — 72040 X-RAY EXAM NECK SPINE 2-3 VW: CPT | Performed by: RADIOLOGY

## 2022-03-10 PROCEDURE — 99204 OFFICE O/P NEW MOD 45 MIN: CPT | Performed by: FAMILY MEDICINE

## 2022-03-10 RX ORDER — CYCLOBENZAPRINE HCL 10 MG
10 TABLET ORAL
Qty: 30 TABLET | Refills: 0 | Status: SHIPPED | OUTPATIENT
Start: 2022-03-10 | End: 2022-04-28

## 2022-03-10 RX ORDER — METHYLPREDNISOLONE 4 MG
TABLET, DOSE PACK ORAL
Qty: 21 TABLET | Refills: 0 | Status: SHIPPED | OUTPATIENT
Start: 2022-03-10 | End: 2022-08-23

## 2022-03-10 NOTE — PATIENT INSTRUCTIONS
# Left Cervical/Shoulder Injury: Symptoms noted after trying to catch a transport monitor approximately 8 hours ago while at work with pain over the left shoulder and trapezius muscles.  She does have mild pain with palpation over the trapezius muscle with symptoms worse with cervical nerve compression and stretching of the brachial plexus nerves.  No weakness on rotator cuff testing.  Shoulder x-rays today were negative for significant injury.  Cervical x-ray showing mild arthritis in the cervical spine.  Concerned that she may have either developing cervical radiculopathy versus brachial plexus stretch injury.  Given this plan to treat as below and follow-up in 3 weeks.  Image Findings: Mild arthritis in the cervical spine, negative shoulder x-ray  Treatment: Activities as tolerated, home exercises given today, referral to physical therapy  Job: Form for work restrictions filled out  Medications/Injections: Medrol Dosepak ordered today, Flexeril to take at night, after finishing steroids can use Limited tylenol/ibuprofen for pain for 1-2 weeks, none  Follow-up: 3/29/2022 for repeat evaluation    Please call [phone]   Ask for my team if you have any questions or concerns    If you have not yet received the influenza vaccine but would like to get one, please call  [phone] or you can schedule via [employer]    It was great seeing you today!    Eliceo Nielson MD, [facility]

## 2022-03-10 NOTE — LETTER
3/10/2022         RE: Vanesa Owen  [address]   [place] [address]        Dear Colleague,    Thank you for referring your patient, Vanesa Owen, to the [facility]. Please see a copy of my visit note below.    ASSESSMENT & PLAN    Vanesa was seen today for pain and pain.    Diagnoses and all orders for this visit:    Left cervical radiculopathy  -     XR Cervical Spine 2/3 vws; Future  -     methylPREDNISolone (MEDROL DOSEPAK) 4 MG tablet therapy pack; Follow Package Directions  -     cyclobenzaprine (FLEXERIL) 10 MG tablet; Take 1 tablet (10 mg) by mouth nightly as needed for muscle spasms  -     Physical Therapy Referral; Future    Acute pain of left shoulder  -     XR Shoulder Left G/E 3 Views; Future      This issue is acute and Worsening.    # Left Cervical/Shoulder Injury: Symptoms noted after trying to catch a transport monitor approximately 8 hours ago while at work with pain over the left shoulder and trapezius muscles.  She does have mild pain with palpation over the trapezius muscle with symptoms worse with cervical nerve compression and stretching of the brachial plexus nerves.  No weakness on rotator cuff testing.  Shoulder x-rays today were negative for significant injury.  Cervical x-ray showing mild arthritis in the cervical spine.  Concerned that she may have either developing cervical radiculopathy versus brachial plexus stretch injury.  Given this plan to treat as below and follow-up in 3 weeks.  Image Findings: Mild arthritis in the cervical spine, negative shoulder x-ray  Treatment: Activities as tolerated, home exercises given today, referral to physical therapy  Job: Form for work restrictions filled out  Medications/Injections: Medrol Dosepak ordered today, Flexeril to take at night, after finishing steroids can use Limited tylenol/ibuprofen for pain for 1-2 weeks, none  Follow-up: 3/29/2022 for repeat evaluation       Eliceo FUENTES  "MD MEHDI Nielson [facility]    -----  Chief Complaint   Patient presents with     Left Shoulder - Pain     Neck - Pain       SUBJECTIVE  Vanesa Owen is a/an 51 year old female who is seen as a self referral for evaluation of left shoulder.     The patient is seen by themselves.  The patient is Right handed    Onset: 8 hour(s) ago. Patient describes injury as attempting to catch a transport monitor (15lb) that was falling from a top shelf and noticed her shoulder hurt in the anterior portion of her shoulder right away.    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     REVIEW OF SYSTEMS:  Review of Systems  Constitutional, HEENT, cardiovascular, pulmonary, GI, , musculoskeletal, neuro, skin, endocrine and psych systems are negative, except as otherwise noted.    OBJECTIVE:  /80   Ht 1.6 m (5' 3\")   Wt 102.1 kg (225 lb)   BMI 39.86 kg/m     General: healthy, alert and in no distress  HEENT: no scleral icterus or conjunctival erythema  Skin: no suspicious lesions or rash. No jaundice.  CV: distal perfusion intact    Resp: normal respiratory effort without conversational dyspnea   Psych: normal mood and affect  Gait: normal steady gait with appropriate coordination and balance   Neuro: Normal light sensory exam of left upper  extremity     LEFT SHOULDER  Inspection:    no swelling, bruising, discoloration, or obvious deformity or asymmetry  Palpation:    Tender about the upper trapezius region. Remainder of bony and tendinous " landmarks are nontender.  Active Range of Motion:     Abduction 1800, FF 1800, , IR L1.       Strength:    Scapular plane abduction 5/5,  ER 5/5, IR 5/5, painful, biceps 5/5, triceps 5/5  Special Tests:    Positive: None    Negative: Neer's, Zepeda', supraspinatus (empty can), Sutherland's, Speed's and Yergason's    CERVICAL SPINE  Inspection:    normal cervical lordosis present, rounded shoulders, forward head posture  Palpation:  Mild TTP over left trapezius muscle  Range of Motion:     Flexion full    Extension full    Right side bend full    Left side bend full    Right rotation full    Left rotation full  Strength:    Full strength throughout all neck muscles  Special Tests:    Positive: Spurling's left    Negative: Spurling's (right)      RADIOLOGY:  I independently ordered, visualized and reviewed these images with the patient    IMPRESSION: Anatomic alignment left shoulder. No acute displaced left  shoulder fracture. No significant acromioclavicular or glenohumeral  joint space narrowing. Cystic/erosive change distal left clavicle at  the AC joint. Slight lateral downsloping of the acromion. Visualized  left lung grossly clear.    Review of external notes as documented elsewhere in note  Review of the result(s) of each unique test - left shoulder, cervical x-rays          Again, thank you for allowing me to participate in the care of your patient.        Sincerely,        Eliceo Nielson MD

## 2022-03-10 NOTE — PROGRESS NOTES
ASSESSMENT & PLAN    Vanesa was seen today for pain and pain.    Diagnoses and all orders for this visit:    Left cervical radiculopathy  -     XR Cervical Spine 2/3 vws; Future  -     methylPREDNISolone (MEDROL DOSEPAK) 4 MG tablet therapy pack; Follow Package Directions  -     cyclobenzaprine (FLEXERIL) 10 MG tablet; Take 1 tablet (10 mg) by mouth nightly as needed for muscle spasms  -     Physical Therapy Referral; Future    Acute pain of left shoulder  -     XR Shoulder Left G/E 3 Views; Future      This issue is acute and Worsening.    # Left Cervical/Shoulder Injury: Symptoms noted after trying to catch a transport monitor approximately 8 hours ago while at work with pain over the left shoulder and trapezius muscles.  She does have mild pain with palpation over the trapezius muscle with symptoms worse with cervical nerve compression and stretching of the brachial plexus nerves.  No weakness on rotator cuff testing.  Shoulder x-rays today were negative for significant injury.  Cervical x-ray showing mild arthritis in the cervical spine.  Concerned that she may have either developing cervical radiculopathy versus brachial plexus stretch injury.  Given this plan to treat as below and follow-up in 3 weeks.  Image Findings: Mild arthritis in the cervical spine, negative shoulder x-ray  Treatment: Activities as tolerated, home exercises given today, referral to physical therapy  Job: Form for work restrictions filled out  Medications/Injections: Medrol Dosepak ordered today, Flexeril to take at night, after finishing steroids can use Limited tylenol/ibuprofen for pain for 1-2 weeks, none  Follow-up: 3/29/2022 for repeat evaluation       Eliceo Nielson MD  [facility]    -----  Chief Complaint   Patient presents with     Left Shoulder - Pain     Neck - Pain       SUBJECTIVE  Vanesa Owen is a/an 51 year old female who is seen as a self referral for evaluation of left  "shoulder.     The patient is seen by themselves.  The patient is Right handed    Onset: 8 hour(s) ago. Patient describes injury as attempting to catch a transport monitor (15lb) that was falling from a top shelf and noticed her shoulder hurt in the anterior portion of her shoulder right away.    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     REVIEW OF SYSTEMS:  Review of Systems  Constitutional, HEENT, cardiovascular, pulmonary, GI, , musculoskeletal, neuro, skin, endocrine and psych systems are negative, except as otherwise noted.    OBJECTIVE:  /80   Ht 1.6 m (5' 3\")   Wt 102.1 kg (225 lb)   BMI 39.86 kg/m     General: healthy, alert and in no distress  HEENT: no scleral icterus or conjunctival erythema  Skin: no suspicious lesions or rash. No jaundice.  CV: distal perfusion intact    Resp: normal respiratory effort without conversational dyspnea   Psych: normal mood and affect  Gait: normal steady gait with appropriate coordination and balance   Neuro: Normal light sensory exam of left upper  extremity     LEFT SHOULDER  Inspection:    no swelling, bruising, discoloration, or obvious deformity or asymmetry  Palpation:    Tender about the upper trapezius region. Remainder of bony and tendinous landmarks are nontender.  Active Range of Motion:     Abduction 1800, FF 1800, , IR L1.       Strength:    Scapular plane abduction 5/5,  ER 5/5, IR 5/5, painful, biceps 5/5, triceps 5/5  Special Tests:    Positive: None    Negative: Neer's, Zepeda', supraspinatus (empty " can), [place]'s, Speed's and Yergason's    CERVICAL SPINE  Inspection:    normal cervical lordosis present, rounded shoulders, forward head posture  Palpation:  Mild TTP over left trapezius muscle  Range of Motion:     Flexion full    Extension full    Right side bend full    Left side bend full    Right rotation full    Left rotation full  Strength:    Full strength throughout all neck muscles  Special Tests:    Positive: Spurling's left    Negative: Spurling's (right)      RADIOLOGY:  I independently ordered, visualized and reviewed these images with the patient    IMPRESSION: Anatomic alignment left shoulder. No acute displaced left  shoulder fracture. No significant acromioclavicular or glenohumeral  joint space narrowing. Cystic/erosive change distal left clavicle at  the AC joint. Slight lateral downsloping of the acromion. Visualized  left lung grossly clear.    Review of external notes as documented elsewhere in note  Review of the result(s) of each unique test - left shoulder, cervical x-rays

## 2022-03-11 ENCOUNTER — TELEPHONE (OUTPATIENT)
Dept: ORTHOPEDICS | Facility: CLINIC | Age: 52
End: 2022-03-11
Payer: COMMERCIAL

## 2022-03-11 NOTE — TELEPHONE ENCOUNTER
Patient LVM that [place] and w/candice is requesting that the office visit be sent to     Nilsa Palmer MS ATC

## 2022-03-15 ENCOUNTER — THERAPY VISIT (OUTPATIENT)
Dept: PHYSICAL THERAPY | Facility: CLINIC | Age: 52
End: 2022-03-15
Payer: OTHER MISCELLANEOUS

## 2022-03-15 DIAGNOSIS — M54.12 CERVICAL RADICULOPATHY: Primary | ICD-10-CM

## 2022-03-15 DIAGNOSIS — M54.12 LEFT CERVICAL RADICULOPATHY: ICD-10-CM

## 2022-03-15 PROCEDURE — 97110 THERAPEUTIC EXERCISES: CPT | Mod: GP | Performed by: PHYSICAL THERAPIST

## 2022-03-15 PROCEDURE — 97161 PT EVAL LOW COMPLEX 20 MIN: CPT | Mod: GP | Performed by: PHYSICAL THERAPIST

## 2022-03-15 PROCEDURE — 97530 THERAPEUTIC ACTIVITIES: CPT | Mod: GP | Performed by: PHYSICAL THERAPIST

## 2022-03-15 NOTE — PROGRESS NOTES
[facility] Initial Evaluation -- Cervical    Evaluation Date: March 15, 2022  Vanesa Owen is a 52 year old female with a cervical condition.   Referral: Dr. Nielson  Work mechanical stresses: NICU, nurse   Employment status: currently not working due to symptoms  Leisure mechanical stresses: hang out with family, friends  Functional disability score (NDI):  22%  VAS score (0-10): 2/10, at worst 5/10  Patient goals/expectations:  To be able to feed the babies at work without shoulder pain.      HISTORY:    Present symptoms:  Patient notes that she was trying to catch a transport monitor that was falling from a high shelf and as she was reaching to catch the monitor (15-20#), she felt something pop/twinge, in the L UT.She continues to feel a constant ache in about the L AC joint area,  top of the shoulder, and can feel symptoms in the L hand (stiff/tingling in the morning).  She continues to feel a pulling behind the elbow into the forearm (mild)    Paresthesia (yes/no):  Yes - intermittent    Present since (onset date): 3/10/2022     Symptoms (improving/unchanging/worsening):  unchanging    Symptoms commenced as a result of: work related injury   Condition occurred in the following environment:  work    Symptoms at onset (neck/arm/forearm/headache):L  UT, AC joint, L elbow  Constant symptoms (neck/arm/forearm/headache): L AC joint, now less in the UT,   Intermittent symptoms (neck/arm/forearm/headache): L elbow and hand    Symptoms are made worse with the following: Sometimes Bending (down), Sometimes Turning (neck stiffness with rotation L), Sometimes Lying (on the L side), time of day - Always AM (hand) and Always as the day progresses (L shoulder) and lifting,reaching, holding babies for feeding    Symptoms are made better with the following: Always When still  Today is her last day with Prednisone, felt better initially but now not changing  Disturbed sleep (yes/no): 1 time a night  and able to change positions to go back to sleep  Number of pillows: 3  Sleeping postures (prone/sup/side R/L): sides    Previous episodes (0/1-5/6-10/11+): none Year of first episode: 2022    Previous history: none  Previous treatments: trial of Prednisone (taper with today being her last day)    Specific Questions: (as reported by the patient)  Dizziness/Tinnitus/Nausea/Swallowing (pos/neg): dizziness if turns head too quickly (spinning) - did have PT for vertigo - without relief  Gait/Upper Limbs (normal/abnormal): avoiding using arm but dexterity and strength feel ok  Medications (nil/NSAIDS/anlag/steroids/anticoag/other):  Steroids and Other - Zoloft, gabapentin, Wellbutrin  Medical allergies:  Sulfa  General health (excellent/good/fair/poor):  good  Pertinent medical history:  Depression, anxiety, knee pain  Imaging (None/Xray/MRI/Other):  X-rays for neck and shoulder  Recent or major surgery (yes/no): no  Night pain (yes/no): no  Accidents (yes/no): no  Unexplained weight loss (yes/no): no  Barriers at home: no  Other red flags: no    EXAMINATION    Posture:   Sitting (good/fair/poor): poor  Standing (good/fair/poor): fair     Protruded head (yes/no): yes    Wry Neck (right/left/nil):  no  Relevant (yes/no):  no     Correction of posture(better/worse/no effect): no effect  Other observations:  Increased lower cervical kyphosis    Neurological:    Motor Deficit:  4/5 L biceps and thumb ext   Reflexes:  Brisk and symmetrical  Sensory Deficit:  Symmetrical to light touch   Dural signs:  + ULTT on the L at end range.    Movement Loss:   Humberto Mod Min Nil Pain   Protrusion    x    Flexion    x Pulling into the L UT   Retraction  x   No effect   Extension  X - 40   No effect   Lateral flexion R    x No effect   Lateral flexion L    x No effect   Rotation R   x x Pull L cervical/UT   Rotation L   x x Pull L cervical/UT     Test Movements:   During: produces, abolishes, increases, decreases, no effect, centralizing,  peripheralizing  After: better, worse, no better, no worse, no effect, centralized, peripheralized    Pretest symptoms sitting: ache L UT/AC joint area   Symptoms During Symptoms After ROM increased ROM decreased No Effect   PRO        Rep PRO        RET No Effect    No Effect         Rep RET Produces  Pull in neck    No Worse    Increased strength L Biceps and thumb extension.  Decreased tension with seated ULTT     RET EXT        Rep RET EXT          Pretest symptoms lying:     Symptoms During Symptoms After ROM increased ROM decreased No Effect   RET        Rep RET        RET EXT        Rep RET EXT          If required, pretest symptoms sitting:      Symptoms During Symptoms After ROM increased ROM decreased No Effect   LF-R        Rep LF-R        LF-L        Rep LF-L        ROT-R        Rep ROT-R        ROT-L        Rep ROT-L        FLEX        Rep FLEX            Static Tests:   Protrusion:  Not assessed  Flexion:  Not assessed  Retraction:  Not assessed  Extension (sitting/prone/supine):  Not assessed    Other Tests: very tender over the L AC joint    Provisional Classification:  Derangement - Asymmetrical, unilateral, symptoms below elbow and will monitor for possible shoulder involvement    Principle of Management:  Education:  Discussed proper sitting posture and had patient utilize a lumbar roll.  Discussed avoiding prolonged looking down, protrusion and over-reaching.  Discussed centralization/peripheralization and expectations with HEP.  Patient will trial use of a rolled towel to support her neck for sleeping.      Equipment provided:  none  Mechanical therapy (Y/N):  yes   Extension principle:  Repeated retraction 10 reps, 6-8 times a day  Lateral principle:  Flexion principle:  Other:      ASSESSMENT/PLAN:    Patient is a 52 year old female with cervical and left side shoulder complaints.    Patient has the following significant findings with corresponding treatment plan.                Diagnosis 1:   Cervical radiculopathy    Pain -  manual therapy, self management, education, directional preference exercise and home program  Decreased ROM/flexibility - manual therapy, therapeutic exercise and home program  Decreased strength - therapeutic exercise, therapeutic activities and home program  Decreased function - therapeutic activities and home program  Impaired posture - neuro re-education, therapeutic activities and home program    Therapy Evaluation Codes:   1) History comprised of:   Personal factors that impact the plan of care:      None.    Comorbidity factors that impact the plan of care are:      Depression.     Medications impacting care: Anti-depressant, Muscle relaxant and Steroids.  2) Examination of Body Systems comprised of:   Body structures and functions that impact the plan of care:      Cervical spine and Shoulder.   Activity limitations that impact the plan of care are:      Lifting, Sleeping and reaching.  3) Clinical presentation characteristics are:   Stable/Uncomplicated.  4) Decision-Making    Low complexity using standardized patient assessment instrument and/or measureable assessment of functional outcome.  Cumulative Therapy Evaluation is: Low complexity.    Previous and current functional limitations:  (See Goal Flow Sheet for this information)    Short term and Long term goals: (See Goal Flow Sheet for this information)     Communication ability:  Patient appears to be able to clearly communicate and understand verbal and written communication and follow directions correctly.  Treatment Explanation - The following has been discussed with the patient:   RX ordered/plan of care  Anticipated outcomes  Possible risks and side effects  This patient would benefit from PT intervention to resume normal activities.   Rehab potential is good.    Frequency:  2 X week, once daily  Duration:  for 3 weeks tapering to 1 X a week over 4-6 weeks  Discharge Plan:  Achieve all LTG.  Independent in home  treatment program.  Reach maximal therapeutic benefit.    Please refer to the daily flowsheet for treatment today, total treatment time and time spent performing 1:1 timed codes.

## 2022-03-18 ENCOUNTER — TELEPHONE (OUTPATIENT)
Dept: ORTHOPEDICS | Facility: CLINIC | Age: 52
End: 2022-03-18
Payer: COMMERCIAL

## 2022-03-18 ENCOUNTER — THERAPY VISIT (OUTPATIENT)
Dept: PHYSICAL THERAPY | Facility: CLINIC | Age: 52
End: 2022-03-18
Payer: OTHER MISCELLANEOUS

## 2022-03-18 DIAGNOSIS — M54.12 CERVICAL RADICULOPATHY: ICD-10-CM

## 2022-03-18 PROCEDURE — 97110 THERAPEUTIC EXERCISES: CPT | Mod: GP | Performed by: PHYSICAL THERAPIST

## 2022-03-18 NOTE — TELEPHONE ENCOUNTER
"Vanesa is calling today wondering if perhaps she could get a letter for work stating it would be ok for her to be on \"light duty\" prior to her appt at the end of the month.  She is also inquiring about possible prescription anti-inflammatory meds vs. Just Advil.  Since coming off the steriod pack she has been quite sore.  Her best call back number if questions is [phone].  She is ok picking up this letter- please let know when completed or if not ok to issue.    "

## 2022-03-22 ENCOUNTER — THERAPY VISIT (OUTPATIENT)
Dept: PHYSICAL THERAPY | Facility: CLINIC | Age: 52
End: 2022-03-22
Payer: OTHER MISCELLANEOUS

## 2022-03-22 DIAGNOSIS — M54.12 CERVICAL RADICULOPATHY: ICD-10-CM

## 2022-03-22 PROCEDURE — 97110 THERAPEUTIC EXERCISES: CPT | Mod: GP | Performed by: PHYSICAL THERAPIST

## 2022-03-22 NOTE — PROGRESS NOTES
"ASSESSMENT & PLAN  Vanesa was seen today for pain.    Diagnoses and all orders for this visit:    Left cervical radiculopathy  -     nabumetone (RELAFEN) 500 MG tablet; Take 1 tablet (500 mg) by mouth 2 times daily    Acute pain of left shoulder  -     Medium Joint Injection/Arthrocentesis: L acromioclavicular  -     nabumetone (RELAFEN) 500 MG tablet; Take 1 tablet (500 mg) by mouth 2 times daily    Other orders  -     Cancel: Large Joint Injection/Arthocentesis        # Left Cervical/Shoulder Injury: Symptoms noted after trying to catch a transport monitor approximately 8 hours ago while at work on 3/10/22 with pain over the left neck and AC joint.  Pain improved with PT with symptoms still over AC joint and symptoms worse with lifting on left side.  No weakness on rotator cuff testing.  Shoulder x-rays today were negative for significant injury.  Cervical x-ray showing mild arthritis in the cervical spine.  Concerned that she that cervical radiculopathy improved but AC pain persisting. Given this plan to treat as below and follow-up in 2 weeks if not improving.  Image Findings: Mild arthritis in the cervical spine, AC arthritis left shoulder  Treatment: Activities as tolerated, continue PT  Job: Form for work restrictions filled out  Medications/Injections: Relafen for pain,  left AC injection today.   Follow-up: 4/12/22 if not improve    -----    SUBJECTIVE:  Vanesa Owen is a 52 year old female who is seen in follow-up for left shoulder pain. They were last seen 3/10/2022, she notes constant tightness in her lateral left neck and a constant \"fired up\" AC joint. The patient is seen by themselves. Medrol dosepack helped initially and notes that PT helps on somedays and somedays, has no effect. Occasional ibuprofen. They indicate that their current pain level is 2/10. Pain with picking items up or sitting in a slumped position.  PT exercises for neck helps.        Patient's past medical, surgical, social, " and family histories were reviewed today and no changes are noted.    REVIEW OF SYSTEMS:  Constitutional: NEGATIVE for fever, chills, change in weight  Skin: NEGATIVE for worrisome rashes, moles or lesions  GI/: NEGATIVE for bowel or bladder changes  Neuro: NEGATIVE for weakness, dizziness or paresthesias    OBJECTIVE:  BP (!) 132/102   Wt 102.1 kg (225 lb)   BMI 39.86 kg/m     General: healthy, alert and in no distress  HEENT: no scleral icterus or conjunctival erythema  Skin: no suspicious lesions or rash. No jaundice.  CV: regular rhythm by palpation, no pedal edema  Resp: normal respiratory effort without conversational dyspnea   Psych: normal mood and affect  Gait: normal steady gait with appropriate coordination and balance  Neuro: normal light touch sensory exam of the extremities.    MSK:    LEFT SHOULDER  Inspection:    no swelling, bruising, discoloration, or obvious deformity or asymmetry  Palpation:    Tender about the AC joint. Remainder of bony and tendinous landmarks are non tender.  Active Range of Motion:     Abduction 1800, FF 1800, , IR 1.    Strength:    Scapular plane abduction 5/5,  ER 5/5, IR 5/5, biceps 5/5, triceps 5/5  Special Tests:    Positive: Zepeda'    Negative: supraspinatus (empty can), [place]'s, apprehension/relocation, posterior apprehension, Speed's and Yergason's    CERVICAL SPINE  Inspection:    normal cervical lordosis present, rounded shoulders, forward head posture  Palpation:    Nontender.  Range of Motion:     Flexion full    Extension full    Right side bend full    Left side bend full    Right rotation full    Left rotation full  Strength:    Full strength throughout all neck muscles  Special Tests:    Positive: None    Negative: Spurling's (bilateral)        Medium Joint Injection/Arthrocentesis: L acromioclavicular    Date/Time: 3/29/2022 4:46 PM  Performed by: Eliceo Nielson MD  Authorized by: Eliceo Nielson MD     Indications:  Pain  Needle Size:   25 G  Guidance: ultrasound    Approach:  Anterolateral  Location:  Shoulder  Site:  L acromioclavicular  Medications:  6 mg betamethasone acet & sod phos 6 (3-3) MG/ML; 0.5 mL ropivacaine 5 MG/ML  Medications comment:  Actually 0.5 mL Celestone   Ultrasound images of procedure were permanently stored.     Patient reported significant improvement of pain after the numbing portion left AC joint steroid injection.  Ultrasound guided images were permanently stored.   Aftercare instructions given to patient.  Plan to follow-up as discussed above.     Eliceo Nielson MD [facility]          Independent visualization of the below image:       Eliceo Nielson MD, [facility]    Disclaimer: This note consists of symbols derived from keyboarding, dictation and/or voice recognition software. As a result, there may be errors in the script that have gone undetected. Please consider this when interpreting information found in this chart.

## 2022-03-24 ENCOUNTER — THERAPY VISIT (OUTPATIENT)
Dept: PHYSICAL THERAPY | Facility: CLINIC | Age: 52
End: 2022-03-24
Payer: OTHER MISCELLANEOUS

## 2022-03-24 DIAGNOSIS — M54.12 CERVICAL RADICULOPATHY: ICD-10-CM

## 2022-03-24 PROCEDURE — 97110 THERAPEUTIC EXERCISES: CPT | Mod: GP | Performed by: PHYSICAL THERAPIST

## 2022-03-28 ENCOUNTER — THERAPY VISIT (OUTPATIENT)
Dept: PHYSICAL THERAPY | Facility: CLINIC | Age: 52
End: 2022-03-28
Payer: OTHER MISCELLANEOUS

## 2022-03-28 DIAGNOSIS — M54.12 CERVICAL RADICULOPATHY: ICD-10-CM

## 2022-03-28 PROCEDURE — 97110 THERAPEUTIC EXERCISES: CPT | Mod: GP | Performed by: PHYSICAL THERAPIST

## 2022-03-28 NOTE — CONFIDENTIAL NOTE
Contacted pt regarding request for letter.  Will see her tomorrow for follow-up.  Left VM in case she needs a letter prior to appointment.     Eliceo Nielson MD

## 2022-03-28 NOTE — PROGRESS NOTES
PROGRESS  REPORT    Progress reporting period is from 3/15/2022 to 3/28/2022.       SUBJECTIVE  Patient notes that she does better at rest as had her best day yesterday, 3/27/2022, after not working 3/25-3/27.  All symptoms returned today; symptoms have been worsening as she got up and started to move about.  Symptoms are felt on the L side of the neck, L  UT, across the top of her shoulder into the proximal arm.  She has been doing her exercises off and on during the day, without relief in her symptoms.  She has been back to work light duty for a week, 8 hours, 3 days last week and today.  Worked 3 days in a row last week and scheduled 3 days in a row this week.  She has noted that all the reaching with her arms has caused her shoulder to feel worse.  She did find that she had some relief with the prednisone, but today, does not feel any better at all.  (without lasting relief from the oral steroids) Does not feel that she has control over her symptoms with her home program while at work although she does her exercises off and on throughout the day.      Current Pain level: 3/10.     Initial Pain level: 5/10.   Changes in function:  Yes, she has been using her L UE more with return to work however, not lifting if at all possible  Adverse reaction to treatment or activity: activity - RTW; repetitive use of arms/reaching at shoulder height and below.    OBJECTIVE  Changes noted in objective findings:  Yes, improved neck ROM  Objective:   CROM Rot R 78, Rot L 80, SB 38 B, Ext 48 with reproduction of L sided symptoms.     She has improved CROM with less pain into the shoulder after neck retraction and retraction with extension.    Posture correction also helps to decrease her symptoms.    NDI has not changed - 22% at evaluation, 20% today.     (-) empty can and lift off testing  She will have improved shoulder ROM for IR after neck exercises.   SPADI score 16.15     ASSESSMENT/PLAN  Updated problem list and treatment  plan: Diagnosis 1:  Cervical radiculopathy     Pain -  hot/cold therapy, manual therapy, self management, education, directional preference exercise and home program  Decreased ROM/flexibility - manual therapy, therapeutic exercise and home program  Decreased strength - therapeutic exercise, therapeutic activities and home program  Impaired muscle performance - neuro re-education and home program  Decreased function - therapeutic activities and home program  Impaired posture - neuro re-education, therapeutic activities and home program  STG/LTGs have been met or progress has been made towards goals:  Yes, Using her L UE more now at work; will avoid activity with L UE as able, when at home.  Assessment of Progress: The patient's condition has exacerbated.  Self Management Plans:  Patient has been instructed in a home treatment program.  Patient  has been instructed in self management of symptoms.  I have re-evaluated this patient and find that the nature, scope, duration and intensity of the therapy is appropriate for the medical condition of the patient.  Vanesa continues to require the following intervention to meet STG and LTG's:  PT    Recommendations:  This patient would benefit from continued therapy.     Frequency:  2 X week, once daily  Duration:  for 3 weeks tapering to 1 X a week over 6 weeks        Please refer to the daily flowsheet for treatment today, total treatment time and time spent performing 1:1 timed codes.

## 2022-03-29 ENCOUNTER — OFFICE VISIT (OUTPATIENT)
Dept: ORTHOPEDICS | Facility: CLINIC | Age: 52
End: 2022-03-29
Payer: OTHER MISCELLANEOUS

## 2022-03-29 VITALS — WEIGHT: 225 LBS | DIASTOLIC BLOOD PRESSURE: 102 MMHG | SYSTOLIC BLOOD PRESSURE: 132 MMHG | BODY MASS INDEX: 39.86 KG/M2

## 2022-03-29 DIAGNOSIS — M25.512 ACUTE PAIN OF LEFT SHOULDER: ICD-10-CM

## 2022-03-29 DIAGNOSIS — M54.12 LEFT CERVICAL RADICULOPATHY: Primary | ICD-10-CM

## 2022-03-29 PROCEDURE — 20606 DRAIN/INJ JOINT/BURSA W/US: CPT | Mod: LT | Performed by: FAMILY MEDICINE

## 2022-03-29 RX ORDER — NABUMETONE 500 MG/1
500 TABLET, FILM COATED ORAL 2 TIMES DAILY
Qty: 30 TABLET | Refills: 1 | Status: SHIPPED | OUTPATIENT
Start: 2022-03-29 | End: 2022-08-23

## 2022-03-29 RX ADMIN — BETAMETHASONE SODIUM PHOSPHATE AND BETAMETHASONE ACETATE 6 MG: 3; 3 INJECTION, SUSPENSION INTRA-ARTICULAR; INTRALESIONAL; INTRAMUSCULAR; SOFT TISSUE at 16:46

## 2022-03-29 RX ADMIN — ROPIVACAINE HYDROCHLORIDE 0.5 ML: 5 INJECTION, SOLUTION EPIDURAL; INFILTRATION; PERINEURAL at 16:46

## 2022-03-29 ASSESSMENT — PAIN SCALES - GENERAL: PAINLEVEL: MILD PAIN (2)

## 2022-03-29 NOTE — Clinical Note
"    3/29/2022         RE: Vanesa Owen  [address]   [place] [address]        Dear Colleague,    Thank you for referring your patient, Vanesa Owen, to the [facility]. Please see a copy of my visit note below.    ASSESSMENT & PLAN    ***    -----    SUBJECTIVE:  Vanesa Owen is a 52 year old female who is seen in follow-up for left shoulder pain. They were last seen 3/10/2022, she notes constant tightness in her lateral left neck and a constant \"fired up\" AC joint. The patient is seen by themselves. Medrol dosepack helped initially and notes that PT helps on somedays and somedays, has no effect. Occasional ibuprofen. They indicate that their current pain level is 2/10. Pain with picking items up or sitting in a slumped position.  PT exercises for neck helps.        Patient's past medical, surgical, social, and family histories were reviewed today {[id number]}    REVIEW OF SYSTEMS:  Constitutional: NEGATIVE for fever, chills, change in weight  Skin: NEGATIVE for worrisome rashes, moles or lesions  GI/: NEGATIVE for bowel or bladder changes  Neuro: NEGATIVE for weakness, dizziness or paresthesias    OBJECTIVE:  There were no vitals taken for this visit.   General: healthy, alert and in no distress  HEENT: no scleral icterus or conjunctival erythema  Skin: no suspicious lesions or rash. No jaundice.  CV: regular rhythm by palpation, no pedal edema  Resp: normal respiratory effort without conversational dyspnea   Psych: normal mood and affect  Gait: normal steady gait with appropriate coordination and balance  Neuro: normal light touch sensory exam of the extremities.    MSK:    {.[id number]}    Medium Joint Injection/Arthrocentesis: L acromioclavicular    Date/Time: 3/29/2022 4:46 PM  Performed by: Eliceo Nielson MD  Authorized by: Eliceo Nielson MD     Indications:  Pain  Needle Size:  25 G  Guidance: ultrasound    Approach:  " Anterolateral  Location:  Shoulder  Site:  L acromioclavicular  Medications:  6 mg betamethasone acet & sod phos 6 (3-3) MG/ML; 0.5 mL ropivacaine 5 MG/ML  Medications comment:  Actually 0.5 mL Celestone   Ultrasound images of procedure were permanently stored.           Independent visualization of the below image:    Patient's conditions were thoroughly discussed during today's visit with total time spent face-to-face with the patient and documentation being *** minutes.    Eliceo Nielson MD, [facility]        Again, thank you for allowing me to participate in the care of your patient.        Sincerely,        Eliceo Nielson MD

## 2022-03-29 NOTE — PATIENT INSTRUCTIONS
# Left Cervical/Shoulder Injury: Symptoms noted after trying to catch a transport monitor approximately 8 hours ago while at work on 3/10/22 with pain over the left neck and AC joint.  Pain improved with PT with symptoms still over AC joint and symptoms worse with lifting on left side.  No weakness on rotator cuff testing.  Shoulder x-rays today were negative for significant injury.  Cervical x-ray showing mild arthritis in the cervical spine.  Concerned that she that cervical radiculopathy improved but AC pain persisting. Given this plan to treat as below and follow-up in 2 weeks if not improving.  Image Findings: Mild arthritis in the cervical spine, AC arthritis left shoulder  Treatment: Activities as tolerated, continue PT  Job: Form for work restrictions filled out  Medications/Injections: Relafen for pain,  left AC injection today.   Follow-up: 4/12/22 if not improve    It was great seeing you again today!    Eliceo Nielson    [facility] [place] [facility]charge [facility]    Procedure: left AC joint steroid injection      You may shower, however avoid swimming, tub baths or hot tubs for 24 hours following your procedure    You may have a mild to moderate increase in pain for several days following the injection.    It may take up to 14 days for the steroid medication to start working although you may feel the effect as early as a few days after the procedure.    You may use ice packs for 10-15 minutes, 3 to 4 times a day at the injection site for comfort    You may use anti-inflammatory medications (such as Ibuprofen or Aleve or Advil) or Tylenol for pain control if necessary    If you were fasting, you may resume your normal diet and medications after the procedure    If you have diabetes, check your blood sugar more frequently than usual as your blood sugar may be higher than normal for 10-14 days following a steroid injection. Contact your doctor who manages your diabetes if your blood sugar is higher than  usual      If you experience any of the following, call [facility] @ [phone] or [phone]  -Fever over 100 degree F  -Swelling, bleeding, redness, drainage, warmth at the injection site  - New or worsening pain

## 2022-03-31 ENCOUNTER — THERAPY VISIT (OUTPATIENT)
Dept: PHYSICAL THERAPY | Facility: CLINIC | Age: 52
End: 2022-03-31
Payer: OTHER MISCELLANEOUS

## 2022-03-31 DIAGNOSIS — M54.12 CERVICAL RADICULOPATHY: ICD-10-CM

## 2022-03-31 PROCEDURE — 97110 THERAPEUTIC EXERCISES: CPT | Mod: GP | Performed by: PHYSICAL THERAPIST

## 2022-03-31 PROCEDURE — 97140 MANUAL THERAPY 1/> REGIONS: CPT | Mod: GP | Performed by: PHYSICAL THERAPIST

## 2022-04-04 ENCOUNTER — THERAPY VISIT (OUTPATIENT)
Dept: PHYSICAL THERAPY | Facility: CLINIC | Age: 52
End: 2022-04-04
Payer: OTHER MISCELLANEOUS

## 2022-04-04 DIAGNOSIS — M54.12 CERVICAL RADICULOPATHY: ICD-10-CM

## 2022-04-04 PROCEDURE — 97110 THERAPEUTIC EXERCISES: CPT | Mod: GP | Performed by: PHYSICAL THERAPIST

## 2022-04-04 RX ORDER — BETAMETHASONE SODIUM PHOSPHATE AND BETAMETHASONE ACETATE 3; 3 MG/ML; MG/ML
6 INJECTION, SUSPENSION INTRA-ARTICULAR; INTRALESIONAL; INTRAMUSCULAR; SOFT TISSUE
Status: DISCONTINUED | OUTPATIENT
Start: 2022-03-29 | End: 2024-04-23

## 2022-04-04 RX ORDER — ROPIVACAINE HYDROCHLORIDE 5 MG/ML
0.5 INJECTION, SOLUTION EPIDURAL; INFILTRATION; PERINEURAL
Status: DISCONTINUED | OUTPATIENT
Start: 2022-03-29 | End: 2024-04-23

## 2022-04-05 NOTE — PROGRESS NOTES
ASSESSMENT & PLAN    # Left Shoulder Injury: Symptoms noted after trying to catch a transport monitor approximately 8 hours ago while at work on 3/10/22 with pain over the left neck and AC joint.  Patient still has pain over the anterior lateral portion of her shoulder with pain with rotator cuff testing.  She did get some relief from the AC joint steroid injection but symptoms are still persisting limit her ability to use her arm.  Pain is improving with physical therapy as well but deficits still persist.  Given this plan to treat as below and follow-up in 2 weeks for repeat evaluation  Image Findings: Mild arthritis in the cervical spine, AC arthritis left shoulder  Treatment: Activities as tolerated, continue PT, left shoulder MRI ordered today  Job: Form for work restrictions filled out  Medications after shoulder MRI    -----    SUBJECTIVE:  Vanesa Owen is a 52 year old female who is seen in follow-up for left-sided neck and left shoulder pain. They were last seen 3/29/2022, Vanesa notes very little relief from the corticosteroid injection from 3/29/2022 into the left AC joint. The patient is seen by themselves.  They indicate that their current pain level is 3/10 and is more of an achy pain. Pain is located over the posterior shoulder with radiation into the lateral upper arm on occasion. Notes that she is fearful the arm will give out with overhead motions. Pushing on an immovable object too hard causes posterior shoulder pain. She would like to discuss an MRI today.      Patient's past medical, surgical, social, and family histories were reviewed today and no changes are noted.    REVIEW OF SYSTEMS:  Constitutional: NEGATIVE for fever, chills, change in weight  Skin: NEGATIVE for worrisome rashes, moles or lesions  GI/: NEGATIVE for bowel or bladder changes  Neuro: NEGATIVE for weakness, dizziness or paresthesias    OBJECTIVE:  /76    General: healthy, alert and in no distress  HEENT: no  scleral icterus or conjunctival erythema  Skin: no suspicious lesions or rash. No jaundice.  CV: regular rhythm by palpation, no pedal edema  Resp: normal respiratory effort without conversational dyspnea   Psych: normal mood and affect  Gait: normal steady gait with appropriate coordination and balance  Neuro: normal light touch sensory exam of the extremities.    MSK:    LEFT SHOULDER  Inspection:    no swelling, bruising, discoloration, or obvious deformity or asymmetry  Palpation:    Tender about the anterior capsule and supraspinatus insertion. Remainder of bony and tendinous landmarks are nontender.  Active Range of Motion:     Abduction 1650, FF 1650, , IR L1.    Strength:    Scapular plane abduction 5-/5,  ER 5/5, IR 5/5, biceps 5/5, triceps 5/5  Special Tests:    Positive: Neer's and Zepeda'    Negative: supraspinatus (empty can), Person's, Speed's and Yergason's    CERVICAL SPINE  Inspection:    normal cervical lordosis present, rounded shoulders, forward head posture  Palpation:    Nontender.  Range of Motion:     Flexion full    Extension full    Right side bend full    Left side bend full    Right rotation full    Left rotation full  Strength:    Full strength throughout all neck muscles  Special Tests:    Positive: None    Negative: Spurling's (bilateral)        Independent visualization of the below image:    Recent Results (from the past 744 hour(s))   XR Shoulder Left G/E 3 Views    Narrative    SHOULDER LEFT THREE OR MORE VIEWS  DATE/TIME: 3/10/2022 4:38 PM    INDICATION: Left shoulder pain.  COMPARISON: None available.      Impression    IMPRESSION: Anatomic alignment left shoulder. No acute displaced left  shoulder fracture. No significant acromioclavicular or glenohumeral  joint space narrowing. Cystic/erosive change distal left clavicle at  the AC joint. Slight lateral downsloping of the acromion. Visualized  left lung grossly clear.    VALERIE MORELOS MD         SYSTEM ID:  [id number]    XR Cervical Spine 2/3 vws    Narrative    CERVICAL SPINE TWO TO THREE VIEWS  3/10/2022 4:58 PM     COMPARISON: None.    HISTORY: Concern for cervical radiculopathy. Left cervical  radiculopathy.      Impression    IMPRESSION: There is normal alignment of the cervical vertebrae.  Vertebral body heights of the cervical spine are normal.  Craniocervical alignment is normal. There are no fractures of the  cervical spine.  Moderate facet arthropathy throughout the cervical  spine.    HINA COREY MD         SYSTEM ID:  [id number]           Eliceo Nielson MD, [facility]

## 2022-04-07 ENCOUNTER — THERAPY VISIT (OUTPATIENT)
Dept: PHYSICAL THERAPY | Facility: CLINIC | Age: 52
End: 2022-04-07
Payer: OTHER MISCELLANEOUS

## 2022-04-07 DIAGNOSIS — M54.12 CERVICAL RADICULOPATHY: ICD-10-CM

## 2022-04-07 PROCEDURE — 97110 THERAPEUTIC EXERCISES: CPT | Mod: GP | Performed by: PHYSICAL THERAPIST

## 2022-04-07 PROCEDURE — 97140 MANUAL THERAPY 1/> REGIONS: CPT | Mod: GP | Performed by: PHYSICAL THERAPIST

## 2022-04-11 ENCOUNTER — THERAPY VISIT (OUTPATIENT)
Dept: PHYSICAL THERAPY | Facility: CLINIC | Age: 52
End: 2022-04-11
Payer: OTHER MISCELLANEOUS

## 2022-04-11 DIAGNOSIS — M54.12 CERVICAL RADICULOPATHY: Primary | ICD-10-CM

## 2022-04-11 PROCEDURE — 97110 THERAPEUTIC EXERCISES: CPT | Mod: GP | Performed by: PHYSICAL THERAPIST

## 2022-04-11 PROCEDURE — 97140 MANUAL THERAPY 1/> REGIONS: CPT | Mod: GP | Performed by: PHYSICAL THERAPIST

## 2022-04-11 NOTE — PROGRESS NOTES
PROGRESS  REPORT    Progress reporting period is from 3/28/2022 to 4/11/2022.       SUBJECTIVE  Patient notes that she is feeling tight in the lower neck into B UT region and the L anterior shoulder, 2/10.  Does her neck exercises consistently and that does help with the tightness in her neck, but is not effecting her shoulder symptoms.  Notes that the L AC joint has been minimally better since the injection on 3/29/2022.  She feels like there is something going on in the shoulder and needs to get it figured out; ?  neck, her shoulder or both.   Did do better with being on light duty and not having to use her shoulders off and on all day long.   Current Pain level: 2/10.     Previous pain level was  3/10 Initial Pain level: 5/10.   Changes in function:  None  Adverse reaction to treatment or activity: None    OBJECTIVE  Changes noted in objective findings:  Yes, function  Objective:   CROM flex - full, no effect on symptoms, ext 50 deg, No effect on symptoms, R rot 64, L rot 75, R SB 25 with inc sym on the L , L SB 31    L shoulder AROM flex 153 symmetrical, Abd 158 with pain on L (min loss as compared to R shoulder), Ext 62, IR symmetrical T 10,, ER 72.    Can feel symptoms with MMT but good strength throughout, except ER  (-) empty can and (-) lift off testing  Repeated neck lateral bend L will decrease her neck tightness but does not effect her shoulder  Shoulder active extension decreased her shoulder pain, improved ABD ROM and she had full strength with resisted ER afterwards.  Shoulder active assisted extension further decreased her shoulder pain, improved abduction and IR ROM and full strength per MMT continued.    NDI score is 10%, improved from 20%  SPADI score 11.54, improved from 11.15    ASSESSMENT/PLAN  Updated problem list and treatment plan: Diagnosis 1:  Cervical radiculopathy (L anterior shoulder pain)    Pain -  manual therapy, self management, education, directional preference exercise and home  program  Decreased ROM/flexibility - manual therapy, therapeutic exercise and home program  Decreased function - therapeutic activities and home program  Impaired posture - neuro re-education, therapeutic activities and home program  STG/LTGs have been met or progress has been made towards goals:  None  Assessment of Progress: The patient's condition is improving.  The patient's condition has potential to improve.  Self Management Plans:  Patient has been instructed in a home treatment program.  Patient  has been instructed in self management of symptoms.  I have re-evaluated this patient and find that the nature, scope, duration and intensity of the therapy is appropriate for the medical condition of the patient.  Vanesa continues to require the following intervention to meet STG and LTG's:  PT    Recommendations:  This patient would benefit from continued therapy.     Frequency:  1-2 X week, once daily  Duration:  for 6 weeks; continue with repeated movement testing for the shoulder and advance strengthening.  Continue to work with her cervical/thoracic mobility.         Please refer to the daily flowsheet for treatment today, total treatment time and time spent performing 1:1 timed codes.

## 2022-04-12 ENCOUNTER — OFFICE VISIT (OUTPATIENT)
Dept: ORTHOPEDICS | Facility: CLINIC | Age: 52
End: 2022-04-12
Payer: OTHER MISCELLANEOUS

## 2022-04-12 VITALS — DIASTOLIC BLOOD PRESSURE: 76 MMHG | SYSTOLIC BLOOD PRESSURE: 109 MMHG

## 2022-04-12 DIAGNOSIS — M25.512 ACUTE PAIN OF LEFT SHOULDER: Primary | ICD-10-CM

## 2022-04-12 PROCEDURE — 99213 OFFICE O/P EST LOW 20 MIN: CPT | Performed by: FAMILY MEDICINE

## 2022-04-12 ASSESSMENT — PAIN SCALES - GENERAL: PAINLEVEL: MILD PAIN (3)

## 2022-04-12 NOTE — PATIENT INSTRUCTIONS
# Left Shoulder Injury: Symptoms noted after trying to catch a transport monitor approximately 8 hours ago while at work on 3/10/22 with pain over the left neck and AC joint.  Patient still has pain over the anterior lateral portion of her shoulder with pain with rotator cuff testing.  She did get some relief from the AC joint steroid injection but symptoms are still persisting limit her ability to use her arm.  Pain is improving with physical therapy as well but deficits still persist.  Given this plan to treat as below and follow-up in 2 weeks for repeat evaluation  Image Findings: Mild arthritis in the cervical spine, AC arthritis left shoulder  Treatment: Activities as tolerated, continue PT, left shoulder MRI ordered today  Job: Form for work restrictions filled out  Medications after shoulder MRI    It was great seeing you again today!    Eliceo Nielson    MRI Scheduling Appointments  Call: [phone]  Toll-Free: [phone]  Fax: [phone]

## 2022-04-12 NOTE — LETTER
4/12/2022         RE: Vanesa Owen  [address]   [place] [address]        Dear Colleague,    Thank you for referring your patient, Vanesa Owen, to the [facility]. Please see a copy of my visit note below.    ASSESSMENT & PLAN    # Left Shoulder Injury: Symptoms noted after trying to catch a transport monitor approximately 8 hours ago while at work on 3/10/22 with pain over the left neck and AC joint.  Patient still has pain over the anterior lateral portion of her shoulder with pain with rotator cuff testing.  She did get some relief from the AC joint steroid injection but symptoms are still persisting limit her ability to use her arm.  Pain is improving with physical therapy as well but deficits still persist.  Given this plan to treat as below and follow-up in 2 weeks for repeat evaluation  Image Findings: Mild arthritis in the cervical spine, AC arthritis left shoulder  Treatment: Activities as tolerated, continue PT, left shoulder MRI ordered today  Job: Form for work restrictions filled out  Medications after shoulder MRI    -----    SUBJECTIVE:  Vanesa Owen is a 52 year old female who is seen in follow-up for left-sided neck and left shoulder pain. They were last seen 3/29/2022, Vanesa notes very little relief from the corticosteroid injection from 3/29/2022 into the left AC joint. The patient is seen by themselves.  They indicate that their current pain level is 3/10 and is more of an achy pain. Pain is located over the posterior shoulder with radiation into the lateral upper arm on occasion. Notes that she is fearful the arm will give out with overhead motions. Pushing on an immovable object too hard causes posterior shoulder pain. She would like to discuss an MRI today.      Patient's past medical, surgical, social, and family histories were reviewed today and no changes are noted.    REVIEW OF SYSTEMS:  Constitutional: NEGATIVE for  fever, chills, change in weight  Skin: NEGATIVE for worrisome rashes, moles or lesions  GI/: NEGATIVE for bowel or bladder changes  Neuro: NEGATIVE for weakness, dizziness or paresthesias    OBJECTIVE:  /76    General: healthy, alert and in no distress  HEENT: no scleral icterus or conjunctival erythema  Skin: no suspicious lesions or rash. No jaundice.  CV: regular rhythm by palpation, no pedal edema  Resp: normal respiratory effort without conversational dyspnea   Psych: normal mood and affect  Gait: normal steady gait with appropriate coordination and balance  Neuro: normal light touch sensory exam of the extremities.    MSK:    LEFT SHOULDER  Inspection:    no swelling, bruising, discoloration, or obvious deformity or asymmetry  Palpation:    Tender about the anterior capsule and supraspinatus insertion. Remainder of bony and tendinous landmarks are nontender.  Active Range of Motion:     Abduction 1650, FF 1650, , IR L1.    Strength:    Scapular plane abduction 5-/5,  ER 5/5, IR 5/5, biceps 5/5, triceps 5/5  Special Tests:    Positive: Neer's and Zepeda'    Negative: supraspinatus (empty can), Fleming's, Speed's and Yergason's    CERVICAL SPINE  Inspection:    normal cervical lordosis present, rounded shoulders, forward head posture  Palpation:    Nontender.  Range of Motion:     Flexion full    Extension full    Right side bend full    Left side bend full    Right rotation full    Left rotation full  Strength:    Full strength throughout all neck muscles  Special Tests:    Positive: None    Negative: Spurling's (bilateral)        Independent visualization of the below image:    Recent Results (from the past 744 hour(s))   XR Shoulder Left G/E 3 Views    Narrative    SHOULDER LEFT THREE OR MORE VIEWS  DATE/TIME: 3/10/2022 4:38 PM    INDICATION: Left shoulder pain.  COMPARISON: None available.      Impression    IMPRESSION: Anatomic alignment left shoulder. No acute displaced left  shoulder  fracture. No significant acromioclavicular or glenohumeral  joint space narrowing. Cystic/erosive change distal left clavicle at  the AC joint. Slight lateral downsloping of the acromion. Visualized  left lung grossly clear.    VALERIE MORELOS MD         SYSTEM ID:  [id number]   XR Cervical Spine 2/3 vws    Narrative    CERVICAL SPINE TWO TO THREE VIEWS  3/10/2022 4:58 PM     COMPARISON: None.    HISTORY: Concern for cervical radiculopathy. Left cervical  radiculopathy.      Impression    IMPRESSION: There is normal alignment of the cervical vertebrae.  Vertebral body heights of the cervical spine are normal.  Craniocervical alignment is normal. There are no fractures of the  cervical spine.  Moderate facet arthropathy throughout the cervical  spine.    HINA COREY MD         SYSTEM ID:  [id number]           Eliceo Nielson MD, [facility]        Again, thank you for allowing me to participate in the care of your patient.        Sincerely,        Eliceo Nielson MD

## 2022-04-14 ENCOUNTER — TELEPHONE (OUTPATIENT)
Dept: ORTHOPEDICS | Facility: CLINIC | Age: 52
End: 2022-04-14
Payer: COMMERCIAL

## 2022-04-14 NOTE — TELEPHONE ENCOUNTER
LVM. Patient is scheduled for follow-up one day before her shoulder MRI. Looking to reschedule her follow up to 1-2 days after her MRI. She should let us know if she needs new workability paperwork due to the change. Micaela De La Garza, ATC

## 2022-04-15 ENCOUNTER — THERAPY VISIT (OUTPATIENT)
Dept: PHYSICAL THERAPY | Facility: CLINIC | Age: 52
End: 2022-04-15
Payer: OTHER MISCELLANEOUS

## 2022-04-15 DIAGNOSIS — M54.12 CERVICAL RADICULOPATHY: Primary | ICD-10-CM

## 2022-04-15 PROCEDURE — 97110 THERAPEUTIC EXERCISES: CPT | Mod: GP | Performed by: PHYSICAL THERAPIST

## 2022-04-18 NOTE — TELEPHONE ENCOUNTER
Looks like Patient is re-scheduled to the 28th of April, which is 1 day following her MRI.  It does not appear to be needing follow up at this time.     Zari Vanegas ATC, CSCS  Dr. Coffman's Extender

## 2022-04-19 ENCOUNTER — THERAPY VISIT (OUTPATIENT)
Dept: PHYSICAL THERAPY | Facility: CLINIC | Age: 52
End: 2022-04-19
Payer: OTHER MISCELLANEOUS

## 2022-04-19 DIAGNOSIS — M54.12 CERVICAL RADICULOPATHY: Primary | ICD-10-CM

## 2022-04-19 PROCEDURE — 97140 MANUAL THERAPY 1/> REGIONS: CPT | Mod: GP | Performed by: PHYSICAL THERAPIST

## 2022-04-19 PROCEDURE — 97110 THERAPEUTIC EXERCISES: CPT | Mod: GP | Performed by: PHYSICAL THERAPIST

## 2022-04-21 ENCOUNTER — OFFICE VISIT (OUTPATIENT)
Dept: FAMILY MEDICINE | Facility: CLINIC | Age: 52
End: 2022-04-21
Payer: COMMERCIAL

## 2022-04-21 ENCOUNTER — THERAPY VISIT (OUTPATIENT)
Dept: PHYSICAL THERAPY | Facility: CLINIC | Age: 52
End: 2022-04-21
Payer: OTHER MISCELLANEOUS

## 2022-04-21 VITALS
RESPIRATION RATE: 16 BRPM | OXYGEN SATURATION: 95 % | TEMPERATURE: 97.2 F | WEIGHT: 230.13 LBS | HEIGHT: 63 IN | DIASTOLIC BLOOD PRESSURE: 77 MMHG | SYSTOLIC BLOOD PRESSURE: 112 MMHG | HEART RATE: 88 BPM | BODY MASS INDEX: 40.78 KG/M2

## 2022-04-21 DIAGNOSIS — N76.0 ACUTE VAGINITIS: Primary | ICD-10-CM

## 2022-04-21 DIAGNOSIS — R30.0 DYSURIA: ICD-10-CM

## 2022-04-21 DIAGNOSIS — M54.12 CERVICAL RADICULOPATHY: Primary | ICD-10-CM

## 2022-04-21 LAB
ALBUMIN UR-MCNC: NEGATIVE MG/DL
APPEARANCE UR: CLEAR
BILIRUB UR QL STRIP: NEGATIVE
CLUE CELLS: NORMAL
COLOR UR AUTO: YELLOW
GLUCOSE UR STRIP-MCNC: NEGATIVE MG/DL
HGB UR QL STRIP: NEGATIVE
KETONES UR STRIP-MCNC: NEGATIVE MG/DL
LEUKOCYTE ESTERASE UR QL STRIP: NEGATIVE
NITRATE UR QL: NEGATIVE
PH UR STRIP: 6 [PH] (ref 5–7)
SP GR UR STRIP: 1.01 (ref 1–1.03)
TRICHOMONAS, WET PREP: NORMAL
UROBILINOGEN UR STRIP-ACNC: 0.2 E.U./DL
WBC'S/HIGH POWER FIELD, WET PREP: NORMAL
YEAST, WET PREP: NORMAL

## 2022-04-21 PROCEDURE — 99213 OFFICE O/P EST LOW 20 MIN: CPT | Performed by: PHYSICIAN ASSISTANT

## 2022-04-21 PROCEDURE — 97140 MANUAL THERAPY 1/> REGIONS: CPT | Mod: GP | Performed by: PHYSICAL THERAPIST

## 2022-04-21 PROCEDURE — 81003 URINALYSIS AUTO W/O SCOPE: CPT | Performed by: PHYSICIAN ASSISTANT

## 2022-04-21 PROCEDURE — 97110 THERAPEUTIC EXERCISES: CPT | Mod: GP | Performed by: PHYSICAL THERAPIST

## 2022-04-21 PROCEDURE — 87210 SMEAR WET MOUNT SALINE/INK: CPT | Performed by: PHYSICIAN ASSISTANT

## 2022-04-21 ASSESSMENT — PAIN SCALES - GENERAL: PAINLEVEL: NO PAIN (0)

## 2022-04-21 NOTE — PATIENT INSTRUCTIONS
Your lab work does not show any signs of urinary tract infection, bacterial vaginosis, or yeast infection.  Your symptoms are likely due to vaginitis.  Please review the handouts with into the packet about vaginitis and how to prevent this.  You can try over-the-counter Replens to see if this provides relief.  With intercourse you can use an unscented water-based lubricant which can be helpful in providing relief.  Do not forget to schedule your yearly women's health exam with one of our providers.  Please reach out with questions or concerns.

## 2022-04-21 NOTE — PROGRESS NOTES
Assessment & Plan     Acute vaginitis  Dysuria  Patient is a 52-year-old female who presents to clinic due to 3 days of dysuria, small amount of vaginal discharge, and change in odor.  Patient also notes possible start of menopausal changes.  Vital signs normal.  Lab work negative for mitral vaginosis, candidiasis, or UTI.  Patient denies risk for STI.  Discussed this may be due to vaginitis.  Handouts provided regarding vaginitis and prevention.  Recommended trial of OTC Replens.  Patient will also schedule yearly women's health exam for follow-up.  - Wet prep - Clinic Collect  - UA macro with reflex to Microscopic and Culture - Clinc Collect  - UA reflex to Microscopic and Culture      See Patient Instructions    Return in about 4 weeks (around 5/19/2022) for Physical Exam.    Lilly Talley PA-C  [facility] Alexis is a 52 year old who presents for the following health issues     HPI     Genitourinary - Female  Onset/Duration: 3 days of dysuria with small amount of vaginal discharge and change in odor. Patient took Fluconazole yesterday. Patient notes nausea. LMP: NA-Has Mirena X 3 years. Patient notes she could be started menopausal changes and does note some vaginal discomfort during intercourse as well as dryness. Patient is overdue for women's health exam.   Description:   Painful urination (Dysuria): YES           Frequency: YES  Blood in urine (Hematuria): no  Delay in urine (Hesitency): no  Intensity: moderate  Progression of Symptoms:  same  Accompanying Signs & Symptoms:  Fever/chills: no  Flank pain: YES  Nausea and vomiting: no  Vaginal symptoms: odor and itching  Abdominal/Pelvic Pain: YES  History:   History of frequent UTI s: YES  History of kidney stones: no  Sexually Active: no  Possibility of pregnancy: No  Precipitating or alleviating factors: None  Therapies tried and outcome:  Fluconazole          Objective    /77   Pulse 88   Temp 97.2  F (36.2  " C) (Tympanic)   Resp 16   Ht 1.6 m (5' 3\")   Wt 104.4 kg (230 lb 2 oz)   LMP  (LMP Unknown)   SpO2 95%   Breastfeeding No   BMI 40.76 kg/m    Body mass index is 40.76 kg/m .  Physical Exam  Vitals and nursing note reviewed.   Constitutional:       General: She is not in acute distress.     Appearance: Normal appearance.   HENT:      Head: Normocephalic and atraumatic.   Eyes:      Extraocular Movements: Extraocular movements intact.      Pupils: Pupils are equal, round, and reactive to light.   Cardiovascular:      Rate and Rhythm: Normal rate and regular rhythm.      Heart sounds: Normal heart sounds.   Pulmonary:      Effort: Pulmonary effort is normal.      Breath sounds: Normal breath sounds.   Abdominal:      General: Bowel sounds are normal.      Palpations: Abdomen is soft.      Tenderness: There is no abdominal tenderness. There is no right CVA tenderness, left CVA tenderness, guarding or rebound.   Musculoskeletal:         General: Normal range of motion.      Cervical back: Normal range of motion.   Skin:     General: Skin is warm and dry.   Neurological:      General: No focal deficit present.      Mental Status: She is alert.   Psychiatric:         Mood and Affect: Mood normal.         Behavior: Behavior normal.             Results for orders placed or performed in visit on 04/21/22   UA macro with reflex to Microscopic and Culture - Clinc Collect     Status: Normal    Specimen: Urine, Clean Catch   Result Value Ref Range    Color Urine Yellow Colorless, Straw, Light Yellow, Yellow    Appearance Urine Clear Clear    Glucose Urine Negative Negative mg/dL    Bilirubin Urine Negative Negative    Ketones Urine Negative Negative mg/dL    Specific Gravity Urine 1.015 1.003 - 1.035    Blood Urine Negative Negative    pH Urine 6.0 5.0 - 7.0    Protein Albumin Urine Negative Negative mg/dL    Urobilinogen Urine 0.2 0.2, 1.0 E.U./dL    Nitrite Urine Negative Negative    Leukocyte Esterase Urine Negative " Negative    Narrative    Microscopic not indicated   Wet prep - Clinic Collect     Status: Normal    Specimen: Vagina; Swab   Result Value Ref Range    Trichomonas Absent Absent    Yeast Absent Absent    Clue Cells Absent Absent    WBCs/high power field None None

## 2022-04-25 ENCOUNTER — TELEPHONE (OUTPATIENT)
Dept: ORTHOPEDICS | Facility: CLINIC | Age: 52
End: 2022-04-25
Payer: COMMERCIAL

## 2022-04-25 ENCOUNTER — THERAPY VISIT (OUTPATIENT)
Dept: PHYSICAL THERAPY | Facility: CLINIC | Age: 52
End: 2022-04-25
Payer: OTHER MISCELLANEOUS

## 2022-04-25 DIAGNOSIS — M54.12 CERVICAL RADICULOPATHY: Primary | ICD-10-CM

## 2022-04-25 PROCEDURE — 97110 THERAPEUTIC EXERCISES: CPT | Mod: GP | Performed by: PHYSICAL THERAPIST

## 2022-04-25 PROCEDURE — 97140 MANUAL THERAPY 1/> REGIONS: CPT | Mod: GP | Performed by: PHYSICAL THERAPIST

## 2022-04-25 NOTE — PROGRESS NOTES
PROGRESS  REPORT    Progress reporting period is from 4/11/2022 to 4/25/2022.       SUBJECTIVE  Patient relates that she has been experiencing more symptoms at rest across the anterior L shoulder.  Will have MRI on 4/27 and will see MD after her MRI.  Has been working on lateral bending L for her neck which does not seem to be altering her symptoms.  Nor does horizontal abduction for her shoulder.  The shoulder exercise does not seem to be helping like it was last week.  Has woke with numbness in the L hand intermittently over the past 2 weeks.  Has continued to avoid using the L arm as much as able at home and work.    Notes that she has been experience dizziness again, mainly with quick movements through a greater amount of neck ROM.  Current Pain level: 1-2/10     Previous pain level was  2/10 Initial Pain level: 5/10.   Changes in function:  No as on light duty for work but does tend to worsen with increased use of the L UE  Adverse reaction to treatment or activity: activity - repetitive use.    OBJECTIVE  Changes noted in objective findings:  No significant change over the past 2 weeks  Objective:   AROM R shoulder; flexion full w pain at end range, anterior shoulder pain w/ IR and ABD at end range.   MMT R shoulder good strength with pain for flexion, IR min pain. CROM R SB and L rot inc pain anterior L shoulder.    She had decreased pain after neck retraction with self overpressure, less pain with L shoulder MMT and AROM for the L shoulder and neck    ASSESSMENT/PLAN  Updated problem list and treatment plan: Diagnosis 1:  Cervical radiculopathy, L shoulder pain    Pain -  manual therapy, self management, education, directional preference exercise, home program and modalities if needed  Decreased ROM/flexibility - manual therapy, therapeutic exercise and home program  Impaired muscle performance - neuro re-education and home program  Decreased function - therapeutic activities and home program  Impaired  posture - neuro re-education, therapeutic activities and home program  STG/LTGs have been met or progress has been made towards goals:  Yes, overall has improved shoulder ROM before pain begins, increased strength.    Assessment of Progress: Patient is progressing slower than would be anticipated.  She does best on the days that she is able to rest her shoulder more.  Self Management Plans:  Patient has been instructed in a home treatment program.  Patient  has been instructed in self management of symptoms.  I have re-evaluated this patient and find that the nature, scope, duration and intensity of the therapy is appropriate for the medical condition of the patient.  Vanesa continues to require the following intervention to meet STG and LTG's:  PT - determine results of MRI to determine if we need to alter our plan of care.    Recommendations:  This patient would benefit from continued therapy after follow up with MD and results of MRI are known.  Frequency:  1-2 X week, once daily  Duration:  for 6 weeks          Please refer to the daily flowsheet for treatment today, total treatment time and time spent performing 1:1 timed codes.

## 2022-04-27 ENCOUNTER — ANCILLARY PROCEDURE (OUTPATIENT)
Dept: MRI IMAGING | Facility: CLINIC | Age: 52
End: 2022-04-27
Attending: FAMILY MEDICINE
Payer: OTHER MISCELLANEOUS

## 2022-04-27 DIAGNOSIS — M54.12 LEFT CERVICAL RADICULOPATHY: ICD-10-CM

## 2022-04-27 DIAGNOSIS — M25.512 ACUTE PAIN OF LEFT SHOULDER: ICD-10-CM

## 2022-04-27 PROCEDURE — 73221 MRI JOINT UPR EXTREM W/O DYE: CPT | Mod: LT | Performed by: RADIOLOGY

## 2022-04-27 PROCEDURE — 72141 MRI NECK SPINE W/O DYE: CPT | Mod: TC | Performed by: RADIOLOGY

## 2022-04-28 ENCOUNTER — OFFICE VISIT (OUTPATIENT)
Dept: ORTHOPEDICS | Facility: CLINIC | Age: 52
End: 2022-04-28
Payer: OTHER MISCELLANEOUS

## 2022-04-28 VITALS
WEIGHT: 230.13 LBS | DIASTOLIC BLOOD PRESSURE: 80 MMHG | SYSTOLIC BLOOD PRESSURE: 122 MMHG | BODY MASS INDEX: 40.76 KG/M2

## 2022-04-28 DIAGNOSIS — M54.12 LEFT CERVICAL RADICULOPATHY: ICD-10-CM

## 2022-04-28 DIAGNOSIS — M67.912 TENDINOPATHY OF LEFT ROTATOR CUFF: Primary | ICD-10-CM

## 2022-04-28 PROCEDURE — 20611 DRAIN/INJ JOINT/BURSA W/US: CPT | Mod: LT | Performed by: FAMILY MEDICINE

## 2022-04-28 PROCEDURE — 99213 OFFICE O/P EST LOW 20 MIN: CPT | Mod: 25 | Performed by: FAMILY MEDICINE

## 2022-04-28 RX ORDER — CYCLOBENZAPRINE HCL 10 MG
10 TABLET ORAL
Qty: 30 TABLET | Refills: 0 | Status: SHIPPED | OUTPATIENT
Start: 2022-04-28 | End: 2024-04-15

## 2022-04-28 RX ADMIN — BETAMETHASONE SODIUM PHOSPHATE AND BETAMETHASONE ACETATE 6 MG: 3; 3 INJECTION, SUSPENSION INTRA-ARTICULAR; INTRALESIONAL; INTRAMUSCULAR; SOFT TISSUE at 15:40

## 2022-04-28 RX ADMIN — ROPIVACAINE HYDROCHLORIDE 4 ML: 5 INJECTION, SOLUTION EPIDURAL; INFILTRATION; PERINEURAL at 15:40

## 2022-04-28 ASSESSMENT — PAIN SCALES - GENERAL: PAINLEVEL: MILD PAIN (3)

## 2022-04-28 NOTE — LETTER
4/28/2022         RE: Vanesa Owen  [address]   [place] [address]        Dear Colleague,    Thank you for referring your patient, Vanesa Owen, to the [facility]. Please see a copy of my visit note below.    ASSESSMENT & PLAN    # Left Shoulder Injury: Symptoms noted after trying to catch a transport monitor while at work on 3/10/22 with pain over the left neck and AC joint.  Patient still has pain over the anterior lateral portion of her shoulder with pain with rotator cuff testing.  She did get some relief from the AC joint steroid injection but symptoms are still persisting limit her ability to use her arm.  Patient feels as though she is plateaued despite work restrictions and physical therapy.  Reviewed cervical and shoulder MRIs today with cervical negative and the shoulder MRI showing irritation over the rotator cuff tendon.  This is likely the cause of her persisting pain.  Given this plan to treat as below and follow-up in 3 weeks for repeat evaluation.  Plan otherwise as below.    Image Findings: Negative cervical MRI, left shoulder MRI showing irritated rotator cuff tendon  Treatment: Activities as tolerated, continue PT, left shoulder subacromial steroid injection today  Job: Form for work restrictions filled out for 3 weeks  Medications Flexeril  Follow-up: 3 weeks  -----    SUBJECTIVE:  Vanesa Owen is a 52 year old female who is seen in follow-up for left shoulder pain.They were last seen 4/12/2022 and has noticed continued left superior shoulder pain and is here for her left shoulder and cervical MRI results.  The patient is seen by themselves.    Since their last visit reports, she has had constant pain in her superior shoulder. Pain increases with internal rotation of her left shoulder. They indicate that their current pain level is 2.5/10. Notes chronic stiffness in the shoulders.      Patient's past medical, surgical, social,  and family histories were reviewed today and no changes are noted.    REVIEW OF SYSTEMS:  Constitutional: NEGATIVE for fever, chills, change in weight  Skin: NEGATIVE for worrisome rashes, moles or lesions  GI/: NEGATIVE for bowel or bladder changes  Neuro: NEGATIVE for weakness, dizziness or paresthesias    OBJECTIVE:  LMP  (LMP Unknown)    General: healthy, alert and in no distress  HEENT: no scleral icterus or conjunctival erythema  Skin: no suspicious lesions or rash. No jaundice.  CV: regular rhythm by palpation, no pedal edema  Resp: normal respiratory effort without conversational dyspnea   Psych: normal mood and affect  Gait: normal steady gait with appropriate coordination and balance  Neuro: normal light touch sensory exam of the extremities.    MSK:    LEFT SHOULDER  Inspection:    no swelling, bruising, discoloration, or obvious deformity or asymmetry  Palpation:    Tender about the supraspinatus insertion. Remainder of bony and tendinous landmarks are nontender.  Active Range of Motion:     Abduction 1800, FF 1800, , IR L1.    Strength:    Scapular plane abduction 5/5,  ER 5/5, IR 5/5, biceps 5/5, triceps 5/5  Special Tests:    Positive: supraspinatus (empty can), Borden's and Speed's    Negative: Neer's and Zepeda'    CERVICAL SPINE  Inspection:    normal cervical lordosis present, rounded shoulders, forward head posture  Palpation:    Nontender.  Range of Motion:     Flexion full    Extension full    Right side bend full    Left side bend full    Right rotation full    Left rotation full  Strength:    Full strength throughout all neck muscles  Special Tests:    Positive: None    Negative: Spurling's (bilateral)        Independent visualization of the below image:  Recent Results (from the past 744 hour(s))   MRI Cervical spine w/o contrast    Narrative    MRI CERVICAL SPINE WITHOUT CONTRAST 4/27/2022 8:41 AM     HISTORY: Left cervical radiculopathy.     TECHNIQUE: Multiplanar, multisequence  MRI of the cervical spine  without contrast.     COMPARISON: Cervical spine radiographs dated 3/10/2022.     FINDINGS: Normal vertebral body heights and sagittal alignment. Bone  marrow signal appears within normal limits. No abnormal spinal cord  signal. Partially visualized polypoid mucosal thickening in the right  greater than left maxillary sinuses. Otherwise, the visualized  paraspinous soft tissues are unremarkable.    Segmental analysis:  Craniovertebral Junction/C1-C2: Unremarkable.    C2-C3: Normal disc height. No herniation. Normal facets. No spinal  canal or neural foraminal stenosis.    C3-C4: Normal disc height. No herniation. Normal facets. No spinal  canal or neural foraminal stenosis.    C4-C5: Normal disc height. No herniation. Mild facet arthropathy. No  spinal canal or neural foraminal stenosis.    C5-C6: Normal disc height. No herniation. Mild facet arthropathy. No  spinal canal or neural foraminal stenosis.    C6-C7: Normal disc height. No herniation. Mild facet arthropathy. No  spinal canal or neural foraminal stenosis.    C7-T1: Normal disc height. No herniation. Normal facets. No spinal  canal or neural foraminal stenosis.      Impression    IMPRESSION:  1. Mild multilevel degenerative facet arthropathy.  2. Otherwise unremarkable cervical spine MRI.  3. No significant spinal canal or neural foraminal stenosis.  4. No spinal cord signal abnormality seen.     RELL PUENTE MD         SYSTEM ID:  [id number]   MR Shoulder Left w/o Contrast    Narrative    EXAM: MR left shoulder without  contrast 4/27/2022 1:44 PM    TECHNIQUE: Multiplanar, multisequence imaging of the   left  shoulder  were obtained without administration of intravenous or intra-articular  gadolinium contrast using routine protocol.    History: Shoulder pain, bursitis suspected, xray done; concern for   rotator cuff tendinopathy after work injury; Acute pain of left  shoulder    Additional Clinical information from EMR: Minimal  relief from AC joint  corticosteroid injection. Posterior shoulder pain.    Comparison: X-ray 3/10/2022    Findings:    ROTATOR CUFF and ASSOCIATED STRUCTURES  Rotator cuff: On the background of tendinosis, moderate intrasubstance  tearing of the supraspinatus anterior/middle fibers at the footprint.  Subscapularis tendinosis. Infraspinatus and teres minor are intact.    Bursa: Small subacromial/subdeltoid bursal fluid.    Musculature: Muscle bulk of rotator cuff is preserved.  Deltoid muscle  bulk is also preserved.  No muscle edema.    Acromioclavicular joint  There are moderate degenerative changes of the acromioclavicular  joint. Acromion is type 2 in sagittal morphology.  Coracoacromial  ligament is not thickened.    OSSEOUS STRUCTURES  No fracture, marrow contusion or marrow infiltration.    LONG BICIPITAL TENDON  The long head of the biceps tendon is normally situated within the  bicipital groove. No complete or partial biceps tendon tear is  present.    GLENOHUMERAL JOINT  Joint fluid: Physiologic amount of joint fluid is present.    Cartilage and subarticular bone: No focal hyaline cartilage defects  are noted. No Hill-Sachs, reverse Hill-Sachs, or bony Bankart lesions  are seen.    Labrum: Limited assessment on this study with relative lack of joint  distention shows no labral tear.    ANCILLARY FINDINGS:  None      Impression    Impression:  1. On the background of tendinosis, moderate grade intrasubstance  tearing of the supraspinatus anterior/middle fibers at the footprint.  No retraction. Muscle bulk intact.  2. Subscapularis tendinosis.   3. Moderate degenerative changes of the acromioclavicular joint.  4. Small subdeltoid/subacromial bursitis.    I have personally reviewed the examination and initial interpretation  and I agree with the findings.    JUTTA ELLERMANN, MD         SYSTEM ID:  [id number]     Large Joint Injection/Arthocentesis: L subacromial bursa    Date/Time: 4/28/2022 3:40 PM  Performed  by: Eliceo Nielson MD  Authorized by: Eliceo Nielson MD     Indications:  Pain  Needle Size:  25 G  Guidance: ultrasound    Approach:  Lateral  Location:  Shoulder      Site:  L subacromial bursa  Medications:  6 mg betamethasone acet & sod phos 6 (3-3) MG/ML; 4 mL ropivacaine 5 MG/ML  Medications comment:  Actual amount of ropivacaine used 4 mL  Outcome:  Tolerated well, no immediate complications  Procedure discussed: discussed risks, benefits, and alternatives    Consent Given by:  Patient  Timeout: timeout called immediately prior to procedure    Prep: patient was prepped and draped in usual sterile fashion     Ultrasound images of procedure were permanently stored.     Patient reported improvement of pain after the numbing portion left subacromial bursa steroid injection.  Ultrasound guided images were permanently stored.   Aftercare instructions given to patient.  Plan to follow-up as discussed above.     Eliceo Nielson MD [facility]          Eliceo Nielson MD, [facility]        Again, thank you for allowing me to participate in the care of your patient.        Sincerely,        Eliceo Nielson MD

## 2022-04-28 NOTE — PATIENT INSTRUCTIONS
# Left Shoulder Injury: Symptoms noted after trying to catch a transport monitor while at work on 3/10/22 with pain over the left neck and AC joint.  Patient still has pain over the anterior lateral portion of her shoulder with pain with rotator cuff testing.  She did get some relief from the AC joint steroid injection but symptoms are still persisting limit her ability to use her arm.  Patient feels as though she is plateaued despite work restrictions and physical therapy.  Reviewed cervical and shoulder MRIs today with cervical negative and the shoulder MRI showing irritation over the rotator cuff tendon.  This is likely the cause of her persisting pain.  Given this plan to treat as below and follow-up in 3 weeks for repeat evaluation.  Plan otherwise as below.    Image Findings: Negative cervical MRI, left shoulder MRI showing irritated rotator cuff tendon  Treatment: Activities as tolerated, continue PT, left shoulder subacromial steroid injection today  Job: Form for work restrictions filled out for 3 weeks  Medications Flexeril    It was great seeing you again today!    Eliceo Nielson    FSOC Injection Discharge Instructions    Procedure: left subacromial bursa steroid injection     You may shower, however avoid swimming, tub baths or hot tubs for 24 hours following your procedure  You may have a mild to moderate increase in pain for several days following the injection.  It may take up to 14 days for the steroid medication to start working although you may feel the effect as early as a few days after the procedure.  You may use ice packs for 10-15 minutes, 3 to 4 times a day at the injection site for comfort  You may use anti-inflammatory medications (such as Ibuprofen or Aleve or Advil) or Tylenol for pain control if necessary  If you were fasting, you may resume your normal diet and medications after the procedure  If you have diabetes, check your blood sugar more frequently than usual as your blood  sugar may be higher than normal for 10-14 days following a steroid injection. Contact your doctor who manages your diabetes if your blood sugar is higher than usual    If you experience any of the following, call [facility] @ [phone] or [phone]  -Fever over 100 degree F  -Swelling, bleeding, redness, drainage, warmth at the injection site  - New or worsening pain

## 2022-04-28 NOTE — PROGRESS NOTES
ASSESSMENT & PLAN    # Left Shoulder Injury: Symptoms noted after trying to catch a transport monitor while at work on 3/10/22 with pain over the left neck and AC joint.  Patient still has pain over the anterior lateral portion of her shoulder with pain with rotator cuff testing.  She did get some relief from the AC joint steroid injection but symptoms are still persisting limit her ability to use her arm.  Patient feels as though she is plateaued despite work restrictions and physical therapy.  Reviewed cervical and shoulder MRIs today with cervical negative and the shoulder MRI showing irritation over the rotator cuff tendon.  This is likely the cause of her persisting pain.  Given this plan to treat as below and follow-up in 3 weeks for repeat evaluation.  Plan otherwise as below.    Image Findings: Negative cervical MRI, left shoulder MRI showing irritated rotator cuff tendon  Treatment: Activities as tolerated, continue PT, left shoulder subacromial steroid injection today  Job: Form for work restrictions filled out for 3 weeks  Medications Flexeril  Follow-up: 3 weeks  -----    SUBJECTIVE:  Vanesa Owen is a 52 year old female who is seen in follow-up for left shoulder pain.They were last seen 4/12/2022 and has noticed continued left superior shoulder pain and is here for her left shoulder and cervical MRI results.  The patient is seen by themselves.    Since their last visit reports, she has had constant pain in her superior shoulder. Pain increases with internal rotation of her left shoulder. They indicate that their current pain level is 2.5/10. Notes chronic stiffness in the shoulders.      Patient's past medical, surgical, social, and family histories were reviewed today and no changes are noted.    REVIEW OF SYSTEMS:  Constitutional: NEGATIVE for fever, chills, change in weight  Skin: NEGATIVE for worrisome rashes, moles or lesions  GI/: NEGATIVE for bowel or bladder changes  Neuro: NEGATIVE  for weakness, dizziness or paresthesias    OBJECTIVE:  LMP  (LMP Unknown)    General: healthy, alert and in no distress  HEENT: no scleral icterus or conjunctival erythema  Skin: no suspicious lesions or rash. No jaundice.  CV: regular rhythm by palpation, no pedal edema  Resp: normal respiratory effort without conversational dyspnea   Psych: normal mood and affect  Gait: normal steady gait with appropriate coordination and balance  Neuro: normal light touch sensory exam of the extremities.    MSK:    LEFT SHOULDER  Inspection:    no swelling, bruising, discoloration, or obvious deformity or asymmetry  Palpation:    Tender about the supraspinatus insertion. Remainder of bony and tendinous landmarks are nontender.  Active Range of Motion:     Abduction 1800, FF 1800, , IR L1.    Strength:    Scapular plane abduction 5/5,  ER 5/5, IR 5/5, biceps 5/5, triceps 5/5  Special Tests:    Positive: supraspinatus (empty can), Callaway's and Speed's    Negative: Neer's and Zepeda'    CERVICAL SPINE  Inspection:    normal cervical lordosis present, rounded shoulders, forward head posture  Palpation:    Nontender.  Range of Motion:     Flexion full    Extension full    Right side bend full    Left side bend full    Right rotation full    Left rotation full  Strength:    Full strength throughout all neck muscles  Special Tests:    Positive: None    Negative: Spurling's (bilateral)        Independent visualization of the below image:  Recent Results (from the past 744 hour(s))   MRI Cervical spine w/o contrast    Narrative    MRI CERVICAL SPINE WITHOUT CONTRAST 4/27/2022 8:41 AM     HISTORY: Left cervical radiculopathy.     TECHNIQUE: Multiplanar, multisequence MRI of the cervical spine  without contrast.     COMPARISON: Cervical spine radiographs dated 3/10/2022.     FINDINGS: Normal vertebral body heights and sagittal alignment. Bone  marrow signal appears within normal limits. No abnormal spinal cord  signal. Partially  visualized polypoid mucosal thickening in the right  greater than left maxillary sinuses. Otherwise, the visualized  paraspinous soft tissues are unremarkable.    Segmental analysis:  Craniovertebral Junction/C1-C2: Unremarkable.    C2-C3: Normal disc height. No herniation. Normal facets. No spinal  canal or neural foraminal stenosis.    C3-C4: Normal disc height. No herniation. Normal facets. No spinal  canal or neural foraminal stenosis.    C4-C5: Normal disc height. No herniation. Mild facet arthropathy. No  spinal canal or neural foraminal stenosis.    C5-C6: Normal disc height. No herniation. Mild facet arthropathy. No  spinal canal or neural foraminal stenosis.    C6-C7: Normal disc height. No herniation. Mild facet arthropathy. No  spinal canal or neural foraminal stenosis.    C7-T1: Normal disc height. No herniation. Normal facets. No spinal  canal or neural foraminal stenosis.      Impression    IMPRESSION:  1. Mild multilevel degenerative facet arthropathy.  2. Otherwise unremarkable cervical spine MRI.  3. No significant spinal canal or neural foraminal stenosis.  4. No spinal cord signal abnormality seen.     RELL PUENTE MD         SYSTEM ID:  [id number]   MR Shoulder Left w/o Contrast    Narrative    EXAM: MR left shoulder without  contrast 4/27/2022 1:44 PM    TECHNIQUE: Multiplanar, multisequence imaging of the   left  shoulder  were obtained without administration of intravenous or intra-articular  gadolinium contrast using routine protocol.    History: Shoulder pain, bursitis suspected, xray done; concern for   rotator cuff tendinopathy after work injury; Acute pain of left  shoulder    Additional Clinical information from EMR: Minimal relief from AC joint  corticosteroid injection. Posterior shoulder pain.    Comparison: X-ray 3/10/2022    Findings:    ROTATOR CUFF and ASSOCIATED STRUCTURES  Rotator cuff: On the background of tendinosis, moderate intrasubstance  tearing of the supraspinatus  anterior/middle fibers at the footprint.  Subscapularis tendinosis. Infraspinatus and teres minor are intact.    Bursa: Small subacromial/subdeltoid bursal fluid.    Musculature: Muscle bulk of rotator cuff is preserved.  Deltoid muscle  bulk is also preserved.  No muscle edema.    Acromioclavicular joint  There are moderate degenerative changes of the acromioclavicular  joint. Acromion is type 2 in sagittal morphology.  Coracoacromial  ligament is not thickened.    OSSEOUS STRUCTURES  No fracture, marrow contusion or marrow infiltration.    LONG BICIPITAL TENDON  The long head of the biceps tendon is normally situated within the  bicipital groove. No complete or partial biceps tendon tear is  present.    GLENOHUMERAL JOINT  Joint fluid: Physiologic amount of joint fluid is present.    Cartilage and subarticular bone: No focal hyaline cartilage defects  are noted. No Hill-Sachs, reverse Hill-Sachs, or bony Bankart lesions  are seen.    Labrum: Limited assessment on this study with relative lack of joint  distention shows no labral tear.    ANCILLARY FINDINGS:  None      Impression    Impression:  1. On the background of tendinosis, moderate grade intrasubstance  tearing of the supraspinatus anterior/middle fibers at the footprint.  No retraction. Muscle bulk intact.  2. Subscapularis tendinosis.   3. Moderate degenerative changes of the acromioclavicular joint.  4. Small subdeltoid/subacromial bursitis.    I have personally reviewed the examination and initial interpretation  and I agree with the findings.    JUTTA ELLERMANN, MD         SYSTEM ID:  [id number]     Large Joint Injection/Arthocentesis: L subacromial bursa    Date/Time: 4/28/2022 3:40 PM  Performed by: Eliceo Nielson MD  Authorized by: Eliceo Nielson MD     Indications:  Pain  Needle Size:  25 G  Guidance: ultrasound    Approach:  Lateral  Location:  Shoulder      Site:  L subacromial bursa  Medications:  6 mg betamethasone acet & sod phos 6  (3-3) MG/ML; 4 mL ropivacaine 5 MG/ML  Medications comment:  Actual amount of ropivacaine used 4 mL  Outcome:  Tolerated well, no immediate complications  Procedure discussed: discussed risks, benefits, and alternatives    Consent Given by:  Patient  Timeout: timeout called immediately prior to procedure    Prep: patient was prepped and draped in usual sterile fashion     Ultrasound images of procedure were permanently stored.     Patient reported improvement of pain after the numbing portion left subacromial bursa steroid injection.  Ultrasound guided images were permanently stored.   Aftercare instructions given to patient.  Plan to follow-up as discussed above.     Eliceo Nielson MD [facility]          Eliceo Nielson MD, [facility]

## 2022-05-02 RX ORDER — ROPIVACAINE HYDROCHLORIDE 5 MG/ML
4 INJECTION, SOLUTION EPIDURAL; INFILTRATION; PERINEURAL
Status: DISCONTINUED | OUTPATIENT
Start: 2022-04-28 | End: 2024-04-23

## 2022-05-02 RX ORDER — BETAMETHASONE SODIUM PHOSPHATE AND BETAMETHASONE ACETATE 3; 3 MG/ML; MG/ML
6 INJECTION, SUSPENSION INTRA-ARTICULAR; INTRALESIONAL; INTRAMUSCULAR; SOFT TISSUE
Status: DISCONTINUED | OUTPATIENT
Start: 2022-04-28 | End: 2024-04-23

## 2022-05-06 ENCOUNTER — THERAPY VISIT (OUTPATIENT)
Dept: PHYSICAL THERAPY | Facility: CLINIC | Age: 52
End: 2022-05-06
Payer: OTHER MISCELLANEOUS

## 2022-05-06 DIAGNOSIS — M54.12 CERVICAL RADICULOPATHY: Primary | ICD-10-CM

## 2022-05-06 PROCEDURE — 97110 THERAPEUTIC EXERCISES: CPT | Mod: GP | Performed by: PHYSICAL THERAPIST

## 2022-05-12 ENCOUNTER — THERAPY VISIT (OUTPATIENT)
Dept: PHYSICAL THERAPY | Facility: CLINIC | Age: 52
End: 2022-05-12
Payer: OTHER MISCELLANEOUS

## 2022-05-12 DIAGNOSIS — M54.12 CERVICAL RADICULOPATHY: ICD-10-CM

## 2022-05-12 DIAGNOSIS — M54.12 LEFT CERVICAL RADICULOPATHY: Primary | ICD-10-CM

## 2022-05-12 PROCEDURE — 97110 THERAPEUTIC EXERCISES: CPT | Mod: GP | Performed by: PHYSICAL THERAPIST

## 2022-05-17 ENCOUNTER — THERAPY VISIT (OUTPATIENT)
Dept: PHYSICAL THERAPY | Facility: CLINIC | Age: 52
End: 2022-05-17
Payer: OTHER MISCELLANEOUS

## 2022-05-17 DIAGNOSIS — M54.12 CERVICAL RADICULOPATHY: Primary | ICD-10-CM

## 2022-05-17 DIAGNOSIS — M54.12 LEFT CERVICAL RADICULOPATHY: ICD-10-CM

## 2022-05-17 PROCEDURE — 97110 THERAPEUTIC EXERCISES: CPT | Mod: GP | Performed by: PHYSICAL THERAPIST

## 2022-05-17 NOTE — PROGRESS NOTES
"PROGRESS  REPORT    Progress reporting period is from 4/11/2022 to 5/17/2022.       SUBJECTIVE  Patient notes she was vacuuming/cleaning yesterday which has caused increased pain over the L shoulder.  She was lifting a piece of glass from a table upwards from underneath the glass  and felt a painful \"pop\". Now patient has palpation tenderness for the L shoulder and a constant ache that is 3/10 at rest. Increased pain with movement/use of the shoulder.  Reports HEP exercises are going good and shoulder feels better after.  Concerned with regards to return to work as she is able to tweak her shoulder easily with light work within her work restrictions.    Current Pain level: 3/10 (increased to 4/10 after strengthening).     Previous pain level was  2/10   Initial Pain level: 5/10.   Changes in function:  None   Adverse reaction to treatment or activity: activity - light lift, repetitive use of the L UE    OBJECTIVE  Changes noted in objective findings:  None  Objective:   Shoulder AROM symmetrical and WNLS; ABD, EXT, ER, IR min pain at end-range.   MMT L shoulder Flex 5/5 w/ min pain, Abd 4+/5 min pain, Ext 5/5 min pain, ER 5/5 min pain, IR 5/5 min pain.   Palpation tenderness over anterior L shoulder.   Decline in SPADI score from 11.15 to 15.38    ASSESSMENT/PLAN  Updated problem list and treatment plan: Diagnosis 1:  L shoulder pain (RCT) - no indication of L cervical radiculopathy per cervical MRI    Pain -  manual therapy, self management, education and home program  Decreased strength - therapeutic exercise, therapeutic activities and home program  Impaired muscle performance - neuro re-education and home program  Decreased function - therapeutic activities and home program  STG/LTGs have been met or progress has been made towards goals:  Yes (See Goal flow sheet completed today.)  Assessment of Progress: The patient's condition has exacerbated.  Self Management Plans:  Patient has been instructed in a home " treatment program.  Patient  has been instructed in self management of symptoms.  I have re-evaluated this patient and find that the nature, scope, duration and intensity of the therapy is appropriate for the medical condition of the patient.  Vanesa continues to require the following intervention to meet STG and LTG's:  PT    Recommendations:  This patient would benefit from continued therapy.     Frequency:  1 X week, once daily  Duration:  for 4 weeks tapering to every other week over 8 weeks        Please refer to the daily flowsheet for treatment today, total treatment time and time spent performing 1:1 timed codes.

## 2022-05-19 ENCOUNTER — OFFICE VISIT (OUTPATIENT)
Dept: ORTHOPEDICS | Facility: CLINIC | Age: 52
End: 2022-05-19
Payer: OTHER MISCELLANEOUS

## 2022-05-19 VITALS
DIASTOLIC BLOOD PRESSURE: 82 MMHG | WEIGHT: 230.13 LBS | SYSTOLIC BLOOD PRESSURE: 122 MMHG | BODY MASS INDEX: 40.76 KG/M2

## 2022-05-19 DIAGNOSIS — S14.3XXA INJURY OF LEFT BRACHIAL PLEXUS, INITIAL ENCOUNTER: Primary | ICD-10-CM

## 2022-05-19 DIAGNOSIS — M67.912 TENDINOPATHY OF LEFT ROTATOR CUFF: ICD-10-CM

## 2022-05-19 PROCEDURE — 99214 OFFICE O/P EST MOD 30 MIN: CPT | Performed by: FAMILY MEDICINE

## 2022-05-19 RX ORDER — GABAPENTIN 300 MG/1
CAPSULE ORAL
Qty: 180 CAPSULE | Refills: 0 | Status: SHIPPED | OUTPATIENT
Start: 2022-05-19 | End: 2022-08-25

## 2022-05-19 ASSESSMENT — PAIN SCALES - GENERAL: PAINLEVEL: MILD PAIN (3)

## 2022-05-19 NOTE — PATIENT INSTRUCTIONS
# Left Shoulder Pain: Symptoms noted after trying to catch a monitor at work on 3/10/2022.  She is still having persisting pain mainly in the upper trapezius with some pain going down her arm.  Shoulder and cervical exam today essentially negative with MRIs of these areas unrevealing.  Last visit a subacromial steroid junction was completed on 4/28/2022 without significant improvement.  There is pain with compression of the left brachial plexus worse with stretching out the nerves.  Likely cause of her pain at this time due to brachial plexus injury.  Given this we will get brachioplexus MRI to evaluate for possible nerve root tear and follow-up in clinic to go over the results.  Plan otherwise as below.  Image Findings: Reviewed shoulder and cervical MRIs  Treatment: Activities as tolerated, continue physical therapy, left brachial plexus MRI ordered today  Job: Continue work restrictions  Medications/Injections: Start gabapentin 3 to milligrams twice daily can increase to 3 times, defer for now  Follow-up: After MRI to go over results    MRI Scheduling Appointments  Call: [phone]  Toll-Free: [phone]  Fax: [phone]      Please call [phone]   Ask for my team if you have any questions or concerns    If you have not yet received the influenza vaccine but would like to get one, please call  [phone] or you can schedule via M87    It was great seeing you again today!    Eliceo Nielson MD, [facility]

## 2022-05-19 NOTE — LETTER
5/19/2022         RE: Vanesa Owen  [address] [place] [address]  [place] [address]        Dear Colleague,    Thank you for referring your patient, Vanesa Owen, to the [facility]. Please see a copy of my visit note below.    ASSESSMENT & PLAN    Vanesa was seen today for pain.    Diagnoses and all orders for this visit:    Injury of left brachial plexus, initial encounter  -     MR Brachial Plexus Left wo Contrast; Future  -     gabapentin (NEURONTIN) 300 MG capsule; Start twice daily, can increase to three times per day    Tendinopathy of left rotator cuff      # Left Shoulder Pain: Symptoms noted after trying to catch a monitor at work on 3/10/2022.  She is still having persisting pain mainly in the upper trapezius with some pain going down her arm.  Shoulder and cervical exam today essentially negative with MRIs of these areas unrevealing.  Last visit a subacromial steroid junction was completed on 4/28/2022 without significant improvement.  There is pain with compression of the left brachial plexus worse with stretching out the nerves.  Likely cause of her pain at this time due to brachial plexus injury.  Given this we will get brachioplexus MRI to evaluate for possible nerve root tear and follow-up in clinic to go over the results.  Plan otherwise as below.  Image Findings: Reviewed shoulder and cervical MRIs  Treatment: Activities as tolerated, continue physical therapy, left brachial plexus MRI ordered today  Job: Continue work restrictions  Medications/Injections: Start gabapentin 3 to milligrams twice daily can increase to 3 times, defer for now  Follow-up: After MRI to go over results    -----    SUBJECTIVE:  Vanesa Owen is a 52 year old female who is seen in follow-up for left sided shoulder pain.They were last seen 4/28/2022 and had a left sided subacromial corticosteroid injection that allowed for minimal relief in pain.  The patient is seen by  themselves.    Since their last visit reports that she was dusting the other day and was lifting a small coffee table and notes an increase in anterior shoulder pain. Some radiation down her left arm on occasion.  They indicate that their current pain level is 3/10. They have tried physical therapy in [place].        Patient's past medical, surgical, social, and family histories were reviewed today and no changes are noted.    REVIEW OF SYSTEMS:  Constitutional: NEGATIVE for fever, chills, change in weight  Skin: NEGATIVE for worrisome rashes, moles or lesions  GI/: NEGATIVE for bowel or bladder changes  Neuro: NEGATIVE for weakness, dizziness or paresthesias    OBJECTIVE:  /82   Wt 104.4 kg (230 lb 2 oz)   LMP  (LMP Unknown)   BMI 40.76 kg/m     General: healthy, alert and in no distress  HEENT: no scleral icterus or conjunctival erythema  Skin: no suspicious lesions or rash. No jaundice.  CV: regular rhythm by palpation, no pedal edema  Resp: normal respiratory effort without conversational dyspnea   Psych: normal mood and affect  Gait: normal steady gait with appropriate coordination and balance  Neuro: normal light touch sensory exam of the extremities.    MSK:    LEFT SHOULDER  Inspection:    no swelling, bruising, discoloration, or obvious deformity or asymmetry  Palpation:  TTP over trapezius   Active Range of Motion:     Abduction 1800, FF 1800, , IR L1.    Strength:    Scapular plane abduction 5/5,  ER 5/5, IR 5/5, biceps 5/5, triceps 5/5  Special Tests:    Positive: squeeze of brachial plexus    Negative: Neer's and Zepeda', supraspinatus (empty can), Teller's and Speed's     CERVICAL SPINE  Inspection:    normal cervical lordosis present, rounded shoulders, forward head posture  Palpation:    Nontender.  Range of Motion:     Flexion full    Extension full    Right side bend full    Left side bend full    Right rotation full    Left rotation full  Strength:    Full strength  throughout all neck muscles  Special Tests:    Positive: None    Negative: Spurling's (bilateral)     Independent visualization of the below image:    Impression:  1. On the background of tendinosis, moderate grade intrasubstance  tearing of the supraspinatus anterior/middle fibers at the footprint.  No retraction. Muscle bulk intact.  2. Subscapularis tendinosis.   3. Moderate degenerative changes of the acromioclavicular joint.  4. Small subdeltoid/subacromial bursitis.     I have personally reviewed the examination and initial interpretation  and I agree with the findings.     JUTTA ELLERMANN, MD     IMPRESSION:  1. Mild multilevel degenerative facet arthropathy.  2. Otherwise unremarkable cervical spine MRI.  3. No significant spinal canal or neural foraminal stenosis.  4. No spinal cord signal abnormality seen.      MD Eliceo MARRERO MD, [facility]        Again, thank you for allowing me to participate in the care of your patient.        Sincerely,        Eliceo Nielson MD

## 2022-05-20 ENCOUNTER — TELEPHONE (OUTPATIENT)
Dept: ORTHOPEDICS | Facility: CLINIC | Age: 52
End: 2022-05-20
Payer: COMMERCIAL

## 2022-05-20 NOTE — TELEPHONE ENCOUNTER
Patient saw dr. scott yesterday and a workability form was supposed to be faxed to patients employer.  Employer has not received letter yet.  Fax number is [phone] ATTN Lou Mata.  Patient will have to be at work on Monday if letter is not received today.  Please call her when note is faxed.

## 2022-05-26 ENCOUNTER — THERAPY VISIT (OUTPATIENT)
Dept: PHYSICAL THERAPY | Facility: CLINIC | Age: 52
End: 2022-05-26
Payer: OTHER MISCELLANEOUS

## 2022-05-26 DIAGNOSIS — M54.12 CERVICAL RADICULOPATHY: Primary | ICD-10-CM

## 2022-05-26 DIAGNOSIS — M54.12 LEFT CERVICAL RADICULOPATHY: ICD-10-CM

## 2022-05-26 PROCEDURE — 97110 THERAPEUTIC EXERCISES: CPT | Mod: GP | Performed by: PHYSICAL THERAPIST

## 2022-05-31 ENCOUNTER — TELEPHONE (OUTPATIENT)
Dept: ORTHOPEDICS | Facility: CLINIC | Age: 52
End: 2022-05-31
Payer: COMMERCIAL

## 2022-05-31 NOTE — TELEPHONE ENCOUNTER
[facility] Call Center    Phone Message    May a detailed message be left on voicemail: yes     Reason for Call Patient want Doctor to reorder MRI . Work comp haven't received it Yet. Please call Patient Action Taken: Message routed to:  [facility]): kelly    Travel Screening: Not Applicable

## 2022-06-02 ENCOUNTER — THERAPY VISIT (OUTPATIENT)
Dept: PHYSICAL THERAPY | Facility: CLINIC | Age: 52
End: 2022-06-02
Payer: OTHER MISCELLANEOUS

## 2022-06-02 DIAGNOSIS — M54.12 CERVICAL RADICULOPATHY: Primary | ICD-10-CM

## 2022-06-02 PROCEDURE — 97110 THERAPEUTIC EXERCISES: CPT | Mod: GP | Performed by: PHYSICAL THERAPIST

## 2022-06-03 NOTE — TELEPHONE ENCOUNTER
[facility] Call Center    Phone Message    May a detailed message be left on voicemail: yes     Reason for Call: Form or Letter   Type or form/letter needing completion: Workability form to extend the date until patient sees Dr. Nielson, 06/14  Provider: Eliceo Nielson  Date form needed: 06/14  Once completed: Fax form to: Lou Mata [phone]      Action Taken: Other: Sports Medicine    Travel Screening: Not Applicable

## 2022-06-07 ENCOUNTER — MYC MEDICAL ADVICE (OUTPATIENT)
Dept: ORTHOPEDICS | Facility: CLINIC | Age: 52
End: 2022-06-07
Payer: COMMERCIAL

## 2022-06-09 ENCOUNTER — THERAPY VISIT (OUTPATIENT)
Dept: PHYSICAL THERAPY | Facility: CLINIC | Age: 52
End: 2022-06-09
Payer: OTHER MISCELLANEOUS

## 2022-06-09 DIAGNOSIS — M54.12 CERVICAL RADICULOPATHY: Primary | ICD-10-CM

## 2022-06-09 PROCEDURE — 97110 THERAPEUTIC EXERCISES: CPT | Mod: GP | Performed by: PHYSICAL THERAPIST

## 2022-06-10 ENCOUNTER — ANCILLARY PROCEDURE (OUTPATIENT)
Dept: MRI IMAGING | Facility: CLINIC | Age: 52
End: 2022-06-10
Attending: FAMILY MEDICINE
Payer: OTHER MISCELLANEOUS

## 2022-06-10 DIAGNOSIS — S14.3XXA INJURY OF LEFT BRACHIAL PLEXUS, INITIAL ENCOUNTER: ICD-10-CM

## 2022-06-10 PROCEDURE — 72141 MRI NECK SPINE W/O DYE: CPT | Mod: TC | Performed by: RADIOLOGY

## 2022-06-10 NOTE — PROGRESS NOTES
PROGRESS  REPORT    Progress reporting period is from 5/17/2022 to 6/9/2022.       SUBJECTIVE  Patient notes that she does feel her L shoulder is better, maybe 80% improved.  She no longer has pain at rest.  She will feel L sided shoulder symptoms from the distal anterior/ top of the shoulder to about the insertion of the Deltoid.  Symptoms at their worst over the past week was a 3/10 with reaching behind her back.  Still has a clicking within the shoulder which is not painful, but was not present prior to the injury  Does feels that she turned a corner over the past week with feeling better.  MRI tomorrow for her brachial plexus.  Does fatigue with strengthening, and did feel fatigue in the L forearm prior to her posterior arm when strengthening the Triceps    Has been avoiding repetitive use of the L UE and has not been lifting or carrying.   Current Pain level: 0/10.     Previous pain level was 3/10 (increased to 4/10 after strengthening  Initial Pain level: 5/10.   Changes in function:  Yes, able to use arm within restrictions without lasting pain  Adverse reaction to treatment or activity: None    OBJECTIVE  Changes noted in objective findings:  Yes, full active shoulder ROM, full strength per MMT, improved function per SPADI (minimal change as score was already low)  Objective:   L shoulder AROM Flexion 160, Ext 53, Abd 154, ER 85, IR T 9-10  Can feel a residual small amount of symptoms after Ext and Ext/IR (both of those motions are better on the injured side vs the R shoulder).    Strong and without pain for MMT  SPADI 10.83 improved from 15.38  She had improved shoulder posture after passive stretch for her upper back and strengthening for the posterior shoulder girdle and RC   We discussed proper lifting mechanics with engaging the posterior shoulder girdle prior to lifting.     ASSESSMENT/PLAN  Updated problem list and treatment plan: Diagnosis 1:  Cervical radiculopathy, L shoulder pain.    Pain -   manual therapy, self management, education, home program and modalities if needed.  Will further address repeated movement exam if needed for the neck and/or L shoulder.   Decreased strength - therapeutic exercise, therapeutic activities, home program (although her strength is good per MMT, does fatigue quickly thus will need to continue to strengthen).  Impaired muscle performance - neuro re-education and home program  Decreased function - therapeutic activities and home program  STG/LTGs have been met or progress has been made towards goals:  Yes (See Goal flow sheet completed today.)  Assessment of Progress: The patient's condition is improving.  The patient's condition has potential to improve.  Self Management Plans:  Patient has been instructed in a home treatment program.  Patient  has been instructed in self management of symptoms.  I have re-evaluated this patient and find that the nature, scope, duration and intensity of the therapy is appropriate for the medical condition of the patient.  Vanesa continues to require the following intervention to meet STG and LTG's:  PT    Recommendations:  This patient would benefit from continued therapy.     Frequency:  1 X week, once daily  Duration:  for 3 weeks tapering to every other week over 8 weeks  Will monitor safe return to work and progress strengthening, endurance        Please refer to the daily flowsheet for treatment today, total treatment time and time spent performing 1:1 timed codes.

## 2022-06-14 ENCOUNTER — OFFICE VISIT (OUTPATIENT)
Dept: ORTHOPEDICS | Facility: CLINIC | Age: 52
End: 2022-06-14
Payer: OTHER MISCELLANEOUS

## 2022-06-14 VITALS
HEIGHT: 63 IN | SYSTOLIC BLOOD PRESSURE: 117 MMHG | HEART RATE: 101 BPM | DIASTOLIC BLOOD PRESSURE: 82 MMHG | BODY MASS INDEX: 40.76 KG/M2

## 2022-06-14 DIAGNOSIS — G89.29 CHRONIC LEFT SHOULDER PAIN: Primary | ICD-10-CM

## 2022-06-14 DIAGNOSIS — M25.512 CHRONIC LEFT SHOULDER PAIN: Primary | ICD-10-CM

## 2022-06-14 PROCEDURE — 99213 OFFICE O/P EST LOW 20 MIN: CPT | Performed by: FAMILY MEDICINE

## 2022-06-14 NOTE — PROGRESS NOTES
"ASSESSMENT & PLAN    Vanesa was seen today for follow up.    Diagnoses and all orders for this visit:    Chronic left shoulder pain        # Left Shoulder Pain: Symptoms noted after trying to catch a monitor at work on 3/10/2022.  Pain significantly improved today.  Brachial plexus, cervical and shoulder MRIs negative.  Last visit a subacromial steroid injection was completed on 4/28/2022 without significant improvement.  There is some pain over AC joint but otherwise negative exam.  Likely cause of her pain at this time due to brachial plexus injury now resolving.  Given this will treat as below and follow-up as needed  Image Findings: Reviewed brachial plexus and shoulder, cervical MRIs  Treatment: Activities as tolerated, continue physical therapy  Job: Continue work restrictions increase shifts to 4 hours per day  Medications/Injections: none today  Follow-up: two weeks can send message via Camera360.    -----    SUBJECTIVE:  Vanesa Owen is a 52 year old female who is seen in follow-up for left shoulder pain.They were last seen 5/31/2022.  DOI 3/10/22, about 3 months out from injury. Brachial plexus MRI performed 6/10/22. The patient is seen by themselves.    Since their last visit reports slowly improving left shoulder pain.  They indicate that their current pain level is 1/10. They have tried rest/activity avoidance and physical therapy, HEP.        Patient's past medical, surgical, social, and family histories were reviewed today and no changes are noted.    REVIEW OF SYSTEMS:  Constitutional: NEGATIVE for fever, chills, change in weight  Skin: NEGATIVE for worrisome rashes, moles or lesions  GI/: NEGATIVE for bowel or bladder changes  Neuro: NEGATIVE for weakness, dizziness or paresthesias    OBJECTIVE:  /82   Pulse 101   Ht 1.6 m (5' 3\")   LMP  (LMP Unknown)   BMI 40.76 kg/m     General: healthy, alert and in no distress  HEENT: no scleral icterus or conjunctival erythema  Skin: no " suspicious lesions or rash. No jaundice.  CV: regular rhythm by palpation, no pedal edema  Resp: normal respiratory effort without conversational dyspnea   Psych: normal mood and affect  Gait: normal steady gait with appropriate coordination and balance  Neuro: normal light touch sensory exam of the extremities.    MSK:    LEFT SHOULDER  Inspection:    no swelling, bruising, discoloration, or obvious deformity or asymmetry  Palpation:    Tender about the AC joint. Remainder of bony and tendinous landmarks are nontender.  Active Range of Motion:     Abduction 1800, FF 1800, , IR L1.    Strength:    Scapular plane abduction 5/5,  ER 5/5, IR 5/5, biceps 5/5, triceps 5/5  Special Tests:    Positive: None    Negative: Neer's, Zepeda', supraspinatus (empty can), [place]'s, Speed's and Yergason's    CERVICAL SPINE  Inspection:    normal cervical lordosis present, rounded shoulders, forward head posture  Palpation:    Nontender.  Range of Motion:     Flexion full    Extension full    Right side bend full    Left side bend full    Right rotation full    Left rotation full  Strength:    Full strength throughout all neck muscles  Special Tests:    Positive: None    Negative: Spurling's (bilateral)        Independent visualization of the below image:  EXAM:  MR BRACHIAL PLEXUS LEFT W/O CONTRAST  6/10/2022 12:38 PM      INDICATION: Left brachioplexopathy, injury of the left brachial  plexus.  COMPARISON: None     FINDINGS:  No abnormality is demonstrated along the expected course of the left  brachial plexus.     No evidence of signal abnormality or expansion within the imaged  cervical and thoracic spinal cord.     Small effusion within the left glenohumeral joint.     Trace fluid within the subacromial subdeltoid bursa.                                                                      IMPRESSION:    1.  Unremarkable noncontrast MRI of the left brachial plexus.  2.  Small left glenohumeral joint effusion.     DEINZ  MD Eliceo LUNSFORD MD, [facility]    Disclaimer: This note consists of symbols derived from keyboarding, dictation and/or voice recognition software. As a result, there may be errors in the script that have gone undetected. Please consider this when interpreting information found in this chart.

## 2022-06-14 NOTE — PATIENT INSTRUCTIONS
# Left Shoulder Pain: Symptoms noted after trying to catch a monitor at work on 3/10/2022.  Pain significantly improved today.  Brachial plexus, cervical and shoulder MRIs negative.  Last visit a subacromial steroid junction was completed on 4/28/2022 without significant improvement.  There is some pain over AC joint but otherwise negative exam.  Likely cause of her pain at this time due to brachial plexus injury now resolving.  Given this will treat as below and follow-up as needed  Image Findings: Reviewed brachial plexus and shoulder, cervical MRIs  Treatment: Activities as tolerated, continue physical therapy  Job: Continue work restrictions increase house to 4 per day  Medications/Injections: none today  Follow-up: two weeks can send message via EventVue.    It was great seeing you again today!    Eliceo Nielson

## 2022-06-14 NOTE — LETTER
6/14/2022         RE: Vanesa Owen  [address]   [place] [address]        Dear Colleague,    Thank you for referring your patient, Vanesa Owen, to the [facility]. Please see a copy of my visit note below.    ASSESSMENT & PLAN    Vanesa was seen today for follow up.    Diagnoses and all orders for this visit:    Chronic left shoulder pain        # Left Shoulder Pain: Symptoms noted after trying to catch a monitor at work on 3/10/2022.  Pain significantly improved today.  Brachial plexus, cervical and shoulder MRIs negative.  Last visit a subacromial steroid injection was completed on 4/28/2022 without significant improvement.  There is some pain over AC joint but otherwise negative exam.  Likely cause of her pain at this time due to brachial plexus injury now resolving.  Given this will treat as below and follow-up as needed  Image Findings: Reviewed brachial plexus and shoulder, cervical MRIs  Treatment: Activities as tolerated, continue physical therapy  Job: Continue work restrictions increase shifts to 4 hours per day  Medications/Injections: none today  Follow-up: two weeks can send message via Bookmycab.    -----    SUBJECTIVE:  Vanesa Owen is a 52 year old female who is seen in follow-up for left shoulder pain.They were last seen 5/31/2022.  DOI 3/10/22, about 3 months out from injury. Brachial plexus MRI performed 6/10/22. The patient is seen by themselves.    Since their last visit reports slowly improving left shoulder pain.  They indicate that their current pain level is 1/10. They have tried rest/activity avoidance and physical therapy, HEP.        Patient's past medical, surgical, social, and family histories were reviewed today and no changes are noted.    REVIEW OF SYSTEMS:  Constitutional: NEGATIVE for fever, chills, change in weight  Skin: NEGATIVE for worrisome rashes, moles or lesions  GI/: NEGATIVE for bowel or bladder  "changes  Neuro: NEGATIVE for weakness, dizziness or paresthesias    OBJECTIVE:  /82   Pulse 101   Ht 1.6 m (5' 3\")   LMP  (LMP Unknown)   BMI 40.76 kg/m     General: healthy, alert and in no distress  HEENT: no scleral icterus or conjunctival erythema  Skin: no suspicious lesions or rash. No jaundice.  CV: regular rhythm by palpation, no pedal edema  Resp: normal respiratory effort without conversational dyspnea   Psych: normal mood and affect  Gait: normal steady gait with appropriate coordination and balance  Neuro: normal light touch sensory exam of the extremities.    MSK:    LEFT SHOULDER  Inspection:    no swelling, bruising, discoloration, or obvious deformity or asymmetry  Palpation:    Tender about the AC joint. Remainder of bony and tendinous landmarks are nontender.  Active Range of Motion:     Abduction 1800, FF 1800, , IR L1.    Strength:    Scapular plane abduction 5/5,  ER 5/5, IR 5/5, biceps 5/5, triceps 5/5  Special Tests:    Positive: None    Negative: Neer's, Zepeda', supraspinatus (empty can), Phillips's, Speed's and Yergason's    CERVICAL SPINE  Inspection:    normal cervical lordosis present, rounded shoulders, forward head posture  Palpation:    Nontender.  Range of Motion:     Flexion full    Extension full    Right side bend full    Left side bend full    Right rotation full    Left rotation full  Strength:    Full strength throughout all neck muscles  Special Tests:    Positive: None    Negative: Spurling's (bilateral)        Independent visualization of the below image:  EXAM:  MR BRACHIAL PLEXUS LEFT W/O CONTRAST  6/10/2022 12:38 PM      INDICATION: Left brachioplexopathy, injury of the left brachial  plexus.  COMPARISON: None     FINDINGS:  No abnormality is demonstrated along the expected course of the left  brachial plexus.     No evidence of signal abnormality or expansion within the imaged  cervical and thoracic spinal cord.     Small effusion within the left " glenohumeral joint.     Trace fluid within the subacromial subdeltoid bursa.                                                                      IMPRESSION:    1.  Unremarkable noncontrast MRI of the left brachial plexus.  2.  Small left glenohumeral joint effusion.     MD Eliceo PICKETT MD, [facility]    Disclaimer: This note consists of symbols derived from keyboarding, dictation and/or voice recognition software. As a result, there may be errors in the script that have gone undetected. Please consider this when interpreting information found in this chart.          Again, thank you for allowing me to participate in the care of your patient.        Sincerely,        Eliceo Nielson MD

## 2022-06-17 ENCOUNTER — THERAPY VISIT (OUTPATIENT)
Dept: PHYSICAL THERAPY | Facility: CLINIC | Age: 52
End: 2022-06-17
Payer: OTHER MISCELLANEOUS

## 2022-06-17 DIAGNOSIS — M25.512 LEFT SHOULDER PAIN, UNSPECIFIED CHRONICITY: ICD-10-CM

## 2022-06-17 DIAGNOSIS — M54.12 CERVICAL RADICULOPATHY: Primary | ICD-10-CM

## 2022-06-17 PROCEDURE — 97140 MANUAL THERAPY 1/> REGIONS: CPT | Mod: GP | Performed by: PHYSICAL THERAPIST

## 2022-06-17 PROCEDURE — 97110 THERAPEUTIC EXERCISES: CPT | Mod: GP | Performed by: PHYSICAL THERAPIST

## 2022-07-07 ENCOUNTER — THERAPY VISIT (OUTPATIENT)
Dept: PHYSICAL THERAPY | Facility: CLINIC | Age: 52
End: 2022-07-07
Payer: OTHER MISCELLANEOUS

## 2022-07-07 DIAGNOSIS — M54.12 CERVICAL RADICULOPATHY: Primary | ICD-10-CM

## 2022-07-07 PROCEDURE — 97110 THERAPEUTIC EXERCISES: CPT | Mod: GP | Performed by: PHYSICAL THERAPIST

## 2022-07-07 PROCEDURE — 97530 THERAPEUTIC ACTIVITIES: CPT | Mod: GP | Performed by: PHYSICAL THERAPIST

## 2022-07-07 PROCEDURE — 97140 MANUAL THERAPY 1/> REGIONS: CPT | Mod: GP | Performed by: PHYSICAL THERAPIST

## 2022-07-11 ENCOUNTER — OFFICE VISIT (OUTPATIENT)
Dept: OPTOMETRY | Facility: CLINIC | Age: 52
End: 2022-07-11
Payer: COMMERCIAL

## 2022-07-11 DIAGNOSIS — H11.32 SUBCONJUNCTIVAL HEMORRHAGE OF LEFT EYE: Primary | ICD-10-CM

## 2022-07-11 DIAGNOSIS — H10.13 ALLERGIC CONJUNCTIVITIS OF BOTH EYES: ICD-10-CM

## 2022-07-11 PROCEDURE — 92012 INTRM OPH EXAM EST PATIENT: CPT | Performed by: OPTOMETRIST

## 2022-07-11 ASSESSMENT — VISUAL ACUITY
OS_SC: 20/25
OD_SC: 20/50
OD_PH_SC: 20/20
METHOD: SNELLEN - LINEAR
OS_SC+: -1

## 2022-07-11 ASSESSMENT — SLIT LAMP EXAM - LIDS
COMMENTS: NORMAL
COMMENTS: NORMAL

## 2022-07-11 ASSESSMENT — REFRACTION_WEARINGRX
OD_CYLINDER: +1.50
OS_AXIS: 165
OD_AXIS: 160
OS_SPHERE: -0.50
OS_CYLINDER: +1.25
SPECS_TYPE: PAL
OD_SPHERE: -1.50
OD_ADD: +1.75
OS_ADD: +1.75

## 2022-07-11 ASSESSMENT — EXTERNAL EXAM - RIGHT EYE: OD_EXAM: NORMAL

## 2022-07-11 ASSESSMENT — EXTERNAL EXAM - LEFT EYE: OS_EXAM: NORMAL

## 2022-07-11 ASSESSMENT — TONOMETRY: OS_IOP_MMHG: 15

## 2022-07-11 NOTE — PROGRESS NOTES
Chief Complaint   Patient presents with     Foreign Body in Eye     Patient woke up 3 days ago with slight lid swelling. Yesterday started burning and itching and feeling like something in os eye.       Foreign Body in Eye  Laterality: left eye   Duration: 3 days   Associated symptoms: eye pain, redness, lid swelling, and discharge   Pain scale: 3/10   Course: gradually worsening   Treatments tried: eye drops   Response to treatment: mild improvement   Comments: Patient woke up 3 days ago with slight lid swelling. Yesterday started burning and itching and feeling like something in os eye.              Patient does not wear contacts     Medical, surgical and family histories reviewed and updated 7/11/2022.       OBJECTIVE: See Ophthalmology exam    ASSESSMENT:    ICD-10-CM    1. Subconjunctival hemorrhage of left eye  H11.32    2. Allergic conjunctivitis of both eyes  H10.13       PLAN:     Patient Instructions     You have a broken blood vessel in your eye.  The redness may take a few weeks to resolve.  Visine or Clear Eyes drops will not make the redness go away any faster.  Sometimes it looks worse before it gets better.    Systane Complete 1 drop both eyes 4 x day, or other artificial tear.    OTC Pataday to be used once or twice daily for itchy eyes.  Use as needed.    Return in 2-3 weeks for comprehensive eye exam or sooner if needed.    Joel Buck, OD

## 2022-07-11 NOTE — LETTER
7/11/2022         RE: Vanesa Owen  [address] [place] [address]  [place] [address]        Dear Colleague,    Thank you for referring your patient, Vanesa Owen, to the [facility]. Please see a copy of my visit note below.    Chief Complaint   Patient presents with     Foreign Body in Eye     Patient woke up 3 days ago with slight lid swelling. Yesterday started burning and itching and feeling like something in os eye.       Foreign Body in Eye  Laterality: left eye   Duration: 3 days   Associated symptoms: eye pain, redness, lid swelling, and discharge   Pain scale: 3/10   Course: gradually worsening   Treatments tried: eye drops   Response to treatment: mild improvement   Comments: Patient woke up 3 days ago with slight lid swelling. Yesterday started burning and itching and feeling like something in os eye.              Patient does not wear contacts     Medical, surgical and family histories reviewed and updated 7/11/2022.       OBJECTIVE: See Ophthalmology exam    ASSESSMENT:    ICD-10-CM    1. Subconjunctival hemorrhage of left eye  H11.32    2. Allergic conjunctivitis of both eyes  H10.13       PLAN:     Patient Instructions     You have a broken blood vessel in your eye.  The redness may take a few weeks to resolve.  Visine or Clear Eyes drops will not make the redness go away any faster.  Sometimes it looks worse before it gets better.    Systane Complete 1 drop both eyes 4 x day, or other artificial tear.    OTC Pataday to be used once or twice daily for itchy eyes.  Use as needed.    Return in 2-3 weeks for comprehensive eye exam or sooner if needed.    Joel Buck, ROE               Again, thank you for allowing me to participate in the care of your patient.        Sincerely,        Joel Buck, OD

## 2022-07-11 NOTE — PATIENT INSTRUCTIONS
You have a broken blood vessel in your eye.  The redness may take a few weeks to resolve.  Visine or Clear Eyes drops will not make the redness go away any faster.  Sometimes it looks worse before it gets better.    Systane Complete 1 drop both eyes 4 x day, or other artificial tear.    OTC Pataday to be used once or twice daily for itchy eyes.  Use as needed.    Return in 2-3 weeks for comprehensive eye exam or sooner if needed.    Joel Buck, OD

## 2022-07-21 ENCOUNTER — THERAPY VISIT (OUTPATIENT)
Dept: PHYSICAL THERAPY | Facility: CLINIC | Age: 52
End: 2022-07-21
Payer: OTHER MISCELLANEOUS

## 2022-07-21 DIAGNOSIS — M54.12 CERVICAL RADICULOPATHY: Primary | ICD-10-CM

## 2022-07-21 PROCEDURE — 97140 MANUAL THERAPY 1/> REGIONS: CPT | Mod: GP | Performed by: PHYSICAL THERAPIST

## 2022-07-21 PROCEDURE — 97110 THERAPEUTIC EXERCISES: CPT | Mod: GP | Performed by: PHYSICAL THERAPIST

## 2022-08-23 ENCOUNTER — OFFICE VISIT (OUTPATIENT)
Dept: URGENT CARE | Facility: URGENT CARE | Age: 52
End: 2022-08-23
Payer: COMMERCIAL

## 2022-08-23 VITALS
HEART RATE: 89 BPM | SYSTOLIC BLOOD PRESSURE: 115 MMHG | WEIGHT: 227.4 LBS | BODY MASS INDEX: 40.28 KG/M2 | DIASTOLIC BLOOD PRESSURE: 80 MMHG | OXYGEN SATURATION: 96 % | TEMPERATURE: 98.8 F

## 2022-08-23 DIAGNOSIS — R39.9 UTI SYMPTOMS: ICD-10-CM

## 2022-08-23 DIAGNOSIS — N30.01 ACUTE CYSTITIS WITH HEMATURIA: Primary | ICD-10-CM

## 2022-08-23 LAB
ALBUMIN UR-MCNC: NEGATIVE MG/DL
APPEARANCE UR: ABNORMAL
BACTERIA #/AREA URNS HPF: ABNORMAL /HPF
BILIRUB UR QL STRIP: NEGATIVE
CLUE CELLS: ABNORMAL
COLOR UR AUTO: YELLOW
GLUCOSE UR STRIP-MCNC: NEGATIVE MG/DL
HGB UR QL STRIP: ABNORMAL
KETONES UR STRIP-MCNC: NEGATIVE MG/DL
LEUKOCYTE ESTERASE UR QL STRIP: ABNORMAL
NITRATE UR QL: NEGATIVE
PH UR STRIP: 6.5 [PH] (ref 5–7)
RBC #/AREA URNS AUTO: ABNORMAL /HPF
SP GR UR STRIP: 1.01 (ref 1–1.03)
SQUAMOUS #/AREA URNS AUTO: ABNORMAL /LPF
TRICHOMONAS, WET PREP: ABNORMAL
UROBILINOGEN UR STRIP-ACNC: 0.2 E.U./DL
WBC #/AREA URNS AUTO: ABNORMAL /HPF
WBC'S/HIGH POWER FIELD, WET PREP: ABNORMAL
YEAST, WET PREP: ABNORMAL

## 2022-08-23 PROCEDURE — 81001 URINALYSIS AUTO W/SCOPE: CPT | Performed by: NURSE PRACTITIONER

## 2022-08-23 PROCEDURE — 87210 SMEAR WET MOUNT SALINE/INK: CPT | Performed by: NURSE PRACTITIONER

## 2022-08-23 PROCEDURE — 99213 OFFICE O/P EST LOW 20 MIN: CPT | Performed by: NURSE PRACTITIONER

## 2022-08-23 PROCEDURE — 87086 URINE CULTURE/COLONY COUNT: CPT | Performed by: NURSE PRACTITIONER

## 2022-08-23 RX ORDER — NITROFURANTOIN 25; 75 MG/1; MG/1
100 CAPSULE ORAL 2 TIMES DAILY
Qty: 10 CAPSULE | Refills: 0 | Status: SHIPPED | OUTPATIENT
Start: 2022-08-23 | End: 2022-08-28

## 2022-08-23 NOTE — PROGRESS NOTES
Assessment & Plan     Acute cystitis with hematuria    - nitroFURantoin macrocrystal-monohydrate (MACROBID) 100 MG capsule  Dispense: 10 capsule; Refill: 0    UTI symptoms    - UA Macro with Reflex to Micro and Culture - lab collect  - Wet prep - lab collect  - Wet prep - lab collect  - UA Macro with Reflex to Micro and Culture - lab collect  - Urine Microscopic Exam  - Urine Culture     Reviewed normal wet prep during visit showing no vaginal infection. Reviewed UA during visit showing likely UTI. Prescription sent to pharmacy for macrobid twice daily for 5 days. Urine culture in process, will notify if patient should stop or change antibiotic. Patient declines a prescription for pyridium at this time. Increase fluid intake, decrease caffeine which is a bladder irritant. Follow-up with your primary care provider in 1-2 weeks after finishing antibiotic to make sure blood in your urine resolves    Establish care with PCP. Follow-up with PCP if symptoms persist for 3 days, and sooner if symptoms worsen or new symptoms develop.     Discussed red flag symptoms which warrant immediate visit in emergency room    All questions were answered and patient verbalized understanding. AVS reviewed with patient.     Lola Rico, DNP, APRN, CNP 8/23/2022 11:21 AM  [facility]        Yunior Alexis is a 52 year old female who presents to clinic today for the following health issues:  Chief Complaint   Patient presents with     Urinary Problem     Sx started yesterday, feels like she cant empty bladder.     UTI    Onset of symptoms was 1day(s).  Course of illness is same  Current and associated symptoms feels like she cannot empty her bladder, dysuria, urgency, nausea, elevated temp of 98.8F  Denies hematuria, fever, chills, abdominal pain, flank pain, emesis, vaginal dischage.   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   She drinks a cup of coffee in the Am.       Problem list, Medication list, Allergies, and Medical history reviewed in EPIC.    ROS:  Review of systems negative except for noted above        Objective    /80   Pulse 89   Temp 98.8  F (37.1  C) (Tympanic)   Wt 103.1 kg (227 lb 6.4 oz)   SpO2 96%   BMI 40.28 kg/m    Physical Exam  Constitutional:       General: She is not in acute distress.     Appearance: She is not toxic-appearing or diaphoretic.   Abdominal:      General: Bowel sounds are normal. There is no distension.      Palpations: Abdomen is soft.      Tenderness: There is no abdominal tenderness. There is no right CVA tenderness, left CVA tenderness or guarding.   Lymphadenopathy:      Cervical: No cervical adenopathy.   Neurological:      Mental Status: She is alert.          Labs:  Results for orders placed or performed in visit on 08/23/22   UA Macro with Reflex to Micro and Culture - lab collect     Status: Abnormal    Specimen: Urine, Clean Catch   Result Value Ref Range    Color Urine Yellow Colorless, Straw, Light Yellow, Yellow    Appearance Urine Cloudy (A) Clear    Glucose Urine Negative Negative mg/dL    Bilirubin Urine Negative Negative    Ketones Urine Negative Negative mg/dL    Specific Gravity Urine 1.010 1.003 - 1.035    Blood Urine Moderate (A) Negative    pH Urine 6.5 5.0 - 7.0    Protein Albumin Urine Negative Negative mg/dL    Urobilinogen Urine 0.2 0.2, 1.0 E.U./dL    Nitrite Urine Negative Negative    Leukocyte Esterase Urine Large (A) Negative   Urine Microscopic Exam     Status: Abnormal   Result Value Ref Range    Bacteria Urine Few (A) None Seen /HPF    RBC Urine 10-25 (A) 0-2 /HPF /HPF    WBC Urine 25-50 (A) 0-5 /HPF /HPF    Squamous Epithelials Urine Few (A) None Seen /LPF   Wet prep - lab collect     Status: Abnormal    Specimen: Vagina; Swab    Result Value Ref Range    Trichomonas Absent Absent    Yeast Absent Absent    Clue Cells Absent Absent    WBCs/high power field 2+ (A) None

## 2022-08-23 NOTE — LETTER
August 23, 2022      Vanesa Owen  [address] [place] [address]  [place] [address]        To Whom It May Concern:    Vanesa Owen  was seen on 8/23/22 and diagnosed with a urinary tract infection          Sincerely,        GILBERTO Davila

## 2022-08-23 NOTE — PATIENT INSTRUCTIONS
Establish care with primary care provider    Follow-up with your primary care provider in 1-2 weeks after finishing antibiotic to make sure blood in your urine resolves

## 2022-08-25 DIAGNOSIS — S14.3XXA INJURY OF LEFT BRACHIAL PLEXUS, INITIAL ENCOUNTER: ICD-10-CM

## 2022-08-25 LAB — BACTERIA UR CULT: NORMAL

## 2022-08-25 RX ORDER — GABAPENTIN 300 MG/1
CAPSULE ORAL
Qty: 180 CAPSULE | Refills: 0 | Status: SHIPPED | OUTPATIENT
Start: 2022-08-25 | End: 2023-05-10

## 2022-11-20 ENCOUNTER — HEALTH MAINTENANCE LETTER (OUTPATIENT)
Age: 52
End: 2022-11-20

## 2022-11-23 ENCOUNTER — OFFICE VISIT (OUTPATIENT)
Dept: PODIATRY | Facility: CLINIC | Age: 52
End: 2022-11-23
Payer: COMMERCIAL

## 2022-11-23 VITALS
BODY MASS INDEX: 40.21 KG/M2 | SYSTOLIC BLOOD PRESSURE: 154 MMHG | WEIGHT: 227 LBS | DIASTOLIC BLOOD PRESSURE: 88 MMHG | HEART RATE: 97 BPM

## 2022-11-23 DIAGNOSIS — Q66.6 PES VALGUS: ICD-10-CM

## 2022-11-23 DIAGNOSIS — M24.573 EQUINUS CONTRACTURE OF ANKLE: ICD-10-CM

## 2022-11-23 DIAGNOSIS — M72.2 PLANTAR FASCIITIS, LEFT: Primary | ICD-10-CM

## 2022-11-23 PROCEDURE — 99213 OFFICE O/P EST LOW 20 MIN: CPT | Mod: 25 | Performed by: PODIATRIST

## 2022-11-23 PROCEDURE — 20550 NJX 1 TENDON SHEATH/LIGAMENT: CPT | Mod: LT | Performed by: PODIATRIST

## 2022-11-23 RX ORDER — TRIAMCINOLONE ACETONIDE 40 MG/ML
40 INJECTION, SUSPENSION INTRA-ARTICULAR; INTRAMUSCULAR ONCE
Status: COMPLETED | OUTPATIENT
Start: 2022-11-23 | End: 2022-11-23

## 2022-11-23 RX ORDER — DEXAMETHASONE SODIUM PHOSPHATE 4 MG/ML
4 INJECTION, SOLUTION INTRA-ARTICULAR; INTRALESIONAL; INTRAMUSCULAR; INTRAVENOUS; SOFT TISSUE ONCE
Status: COMPLETED | OUTPATIENT
Start: 2022-11-23 | End: 2022-11-23

## 2022-11-23 RX ORDER — BUPIVACAINE HYDROCHLORIDE 5 MG/ML
1 INJECTION, SOLUTION PERINEURAL ONCE
Status: COMPLETED | OUTPATIENT
Start: 2022-11-23 | End: 2022-11-23

## 2022-11-23 RX ADMIN — TRIAMCINOLONE ACETONIDE 40 MG: 40 INJECTION, SUSPENSION INTRA-ARTICULAR; INTRAMUSCULAR at 15:05

## 2022-11-23 RX ADMIN — DEXAMETHASONE SODIUM PHOSPHATE 4 MG: 4 INJECTION, SOLUTION INTRA-ARTICULAR; INTRALESIONAL; INTRAMUSCULAR; INTRAVENOUS; SOFT TISSUE at 15:05

## 2022-11-23 RX ADMIN — BUPIVACAINE HYDROCHLORIDE 5 MG: 5 INJECTION, SOLUTION PERINEURAL at 15:04

## 2022-11-23 NOTE — PROGRESS NOTES
Assessment:      ICD-10-CM    1. Plantar fasciitis, left  M72.2 diclofenac (VOLTAREN) 50 MG EC tablet     INJECTION SINGLE TENDON SHEATH/LIGAMENT     dexamethasone (DECADRON) injection 4 mg     triamcinolone (KENALOG-40) injection 40 mg     Bupivacaine 0.5 % Injection      2. Pes valgus  Q66.6       3. Equinus contracture of ankle  M24.573            Plan:  Orders Placed This Encounter   Procedures     INJECTION SINGLE TENDON SHEATH/LIGAMENT         Discussed the etiology and treatment of the condition with the patient.  Imaging studies reviewed and discussed with the patient.  Discussed surgical and conservative options.    -Injection-  Procedure:  injection  PARQ session held, verbal consent obtained.  Sterile skin prep.    Location:  Left plantar fascia  Contents:  3ml total, marcaine, kenalog-40, dexamethasone phosphate.  Dressing applied.    Post-injection instructions reviewed including close attention to amount and duration of pain relief.    -NSAID- Rx po, topical  -Orthoses- SuperFeet.  -Activity- low impact,calf muscle stretching.  -PT- Rx today for Graston/ASTYM,eccentric training.  -Immobilization- boot with orthotic if needed      Return:  No follow-ups on file.    Gabriella Smith DPM                Chief Complaint:     Patient presents with:  Left Foot - Pain     left heel pain    HPI:  Vanesa Owen is a 52 year old year old female who presents for evaluation of left heel pain.    Pain location bottom of heel.  Ongoing 6 weeks   No ZEYAD  Pain is worse with WB, but remains pretty consistent   Tx; orthotics, advil prn   Hx: plantar fasciitis     NICU nurse, stands a lot  Injeciton in L heel 5+ yrs ago really helped    Past Medical & Surgical History:  Past Medical History:   Diagnosis Date     Anxiety      Depressive disorder      Herpes simplex with other ophthalmic complications      UTI (urinary tract infection)       Past Surgical History:   Procedure Laterality Date     EXCIS VAGINAL  CYST/TUMOR       SLING TRANSVAGINAL N/A 8/23/2016    Procedure: SLING TRANSVAGINAL;  Surgeon: Nam Sommers MD;  Location: [facility] OR     TONSILLECTOMY & ADENOIDECTOMY        Family History   Problem Relation Age of Onset     Diabetes Maternal Grandmother         Type II      Hypertension Maternal Grandmother      Alcohol/Drug Maternal Grandmother      Asthma Mother      Depression Mother      Glaucoma Mother      Depression Father 58     Cerebrovascular Disease Father      Alcohol/Drug Maternal Grandfather      Circulatory Paternal Grandmother      Glaucoma Paternal Grandmother      Asthma Brother      Breast Cancer Maternal Aunt      Macular Degeneration No family hx of         Social History:  ?  History   Smoking Status     Never   Smokeless Tobacco     Never     History   Drug Use No     Social History    Substance and Sexual Activity      Alcohol use: Yes        Comment: rare      Allergies:  ?   Allergies   Allergen Reactions     Sulfa Drugs Rash        Medications:    Current Outpatient Medications   Medication     busPIRone (BUSPAR) 5 MG tablet     cyclobenzaprine (FLEXERIL) 10 MG tablet     diclofenac (VOLTAREN) 50 MG EC tablet     fluticasone (FLONASE) 50 MCG/ACT nasal spray     gabapentin (NEURONTIN) 300 MG capsule     levonorgestrel (MIRENA) 20 MCG/24HR IUD     MULTI-VITAMIN OR TABS     omeprazole (PRILOSEC) 40 MG DR capsule     sertraline (ZOLOFT) 100 MG tablet     Current Facility-Administered Medications   Medication     betamethasone acet & sod phos (CELESTONE) injection 6 mg     betamethasone acet & sod phos (CELESTONE) injection 6 mg     ropivacaine (NAROPIN) injection 0.5 mL     ropivacaine (NAROPIN) injection 4 mL       Physical Exam:  ?  Vitals:  BP (!) 154/88 (BP Location: Left arm)   Pulse 97   Wt 103 kg (227 lb)   BMI 40.21 kg/m     General:  WD/WN, in NAD.  A&O x3.  Dermatologic:    Skin is intact, open lesions absent.   Skin texture, turgor is normal.  Vascular:  Pulses palpable  bilateral.  Digital capillary refill time normal left.  Skin temperature is normal to affected heel.  Generalized edema- none bilateral.  Focal edema- mild heel left.  Neurologic:    Gross sensation normal.  Gait and balance abnormal, antalgic, L.  Musculoskeletal:  Maximal pain to palpation of plantar heel left.  no pain to palpation of posterior heel, Achilles tendon left.  Ankle dorsiflexion <10 degrees with the knee extended, =0 degrees with the knee flexed.  STJ, MTJ ROM intact to affected extremity.  Muscle strength 5/5  foot and ankle to affected extremity.     Stance:  Foot type:  valgus

## 2022-11-23 NOTE — PATIENT INSTRUCTIONS
"PATIENT INSTRUCTIONS - Podiatry / Foot & Ankle Surgery      Aftercare for Steroid Injection  Activity:  -Resume normal activity as tolerated.  -Tendon, ligament, fascia injectionons- avoid high-impact activity for 1 week.  Icing:  -May apply to the injection site if needed.  Infection:  -Risk is generally low (<1%), but must be aware of the signs/symptoms.    -Both infection and an inflammatory reaction to the steroid (\"steroid flare\") will cause redness, warmth, and increased pain.   -If these symptoms develop within 12-24h & resolve within 2-3 days- \"steroid flare\"- ice (non-worrisome)  -If these symptoms develop after 24-48h, progressively get worse, & are associated with a fever- possible infection; call the clinic or present to urgent care immediately      Diclofenac / Voltaren Gel- Apply to affected area only.  Apply 3-4 times daily for the first 3-4 days, then 1-2 times daily as needed.  Over the counter (OTC), a prescription is not needed.  Available at most pharmacies, [employer], [employer].      Diclofenac / Voltaren - 1 pill twice daily x1 week.  Then take a 1 week break.  Repeat as needed.  Take with food & an acid blocker if stomach upset occurs.  Stop all other NSAIDs (aspirin, ibuprofen/Motrin, naproxen/ Aleve).      Calf muscle stretching 2-3x daily as instructed, both with the knees straight and the knees bent. Hold for 30-60 seconds.  For personal instruction on this,  please request a Physical Therapy referral from your doctor.      Low impact activity - cycling or swimming is best; then walking or elliptical.  Avoid running, jumping, and hard landings.    SuperFeet orthotics  SuperFeet are over the counter (OTC), you do not need a prescription.  Wear Superfeet orthotics in all of your shoes.  Remove the existing liner and replace it with SuperFeet.    SuperFeet are available on [employer], at [employer] and most [employer].      "

## 2022-11-23 NOTE — LETTER
11/23/2022         RE: Vanesa Owen  [address]   [place] [address]        Dear Colleague,    Thank you for referring your patient, Vanesa Owen, to the [facility]. Please see a copy of my visit note below.    Assessment:      ICD-10-CM    1. Plantar fasciitis, left  M72.2 diclofenac (VOLTAREN) 50 MG EC tablet     INJECTION SINGLE TENDON SHEATH/LIGAMENT     dexamethasone (DECADRON) injection 4 mg     triamcinolone (KENALOG-40) injection 40 mg     Bupivacaine 0.5 % Injection      2. Pes valgus  Q66.6       3. Equinus contracture of ankle  M24.573            Plan:  Orders Placed This Encounter   Procedures     INJECTION SINGLE TENDON SHEATH/LIGAMENT         Discussed the etiology and treatment of the condition with the patient.  Imaging studies reviewed and discussed with the patient.  Discussed surgical and conservative options.    -Injection-  Procedure:  injection  PARQ session held, verbal consent obtained.  Sterile skin prep.    Location:  Left plantar fascia  Contents:  3ml total, marcaine, kenalog-40, dexamethasone phosphate.  Dressing applied.    Post-injection instructions reviewed including close attention to amount and duration of pain relief.    -NSAID- Rx po, topical  -Orthoses- SuperFeet.  -Activity- low impact,calf muscle stretching.  -PT- Rx today for Graston/ASTYM,eccentric training.  -Immobilization- boot with orthotic if needed      Return:  No follow-ups on file.    Gabriella Smith DPM                Chief Complaint:     Patient presents with:  Left Foot - Pain     left heel pain    HPI:  Vanesa Owen is a 52 year old year old female who presents for evaluation of left heel pain.    Pain location bottom of heel.  Ongoing 6 weeks   No ZEYAD  Pain is worse with WB, but remains pretty consistent   Tx; orthotics, advil prn   Hx: plantar fasciitis     NICU nurse, stands a lot  Injeciton in L heel 5+ yrs ago really helped    Past Medical &  Surgical History:  Past Medical History:   Diagnosis Date     Anxiety      Depressive disorder      Herpes simplex with other ophthalmic complications      UTI (urinary tract infection)       Past Surgical History:   Procedure Laterality Date     EXCIS VAGINAL CYST/TUMOR       SLING TRANSVAGINAL N/A 8/23/2016    Procedure: SLING TRANSVAGINAL;  Surgeon: Nam Sommers MD;  Location: [facility] OR     TONSILLECTOMY & ADENOIDECTOMY        Family History   Problem Relation Age of Onset     Diabetes Maternal Grandmother         Type II      Hypertension Maternal Grandmother      Alcohol/Drug Maternal Grandmother      Asthma Mother      Depression Mother      Glaucoma Mother      Depression Father 58     Cerebrovascular Disease Father      Alcohol/Drug Maternal Grandfather      Circulatory Paternal Grandmother      Glaucoma Paternal Grandmother      Asthma Brother      Breast Cancer Maternal Aunt      Macular Degeneration No family hx of         Social History:  ?  History   Smoking Status     Never   Smokeless Tobacco     Never     History   Drug Use No     Social History    Substance and Sexual Activity      Alcohol use: Yes        Comment: rare      Allergies:  ?   Allergies   Allergen Reactions     Sulfa Drugs Rash        Medications:    Current Outpatient Medications   Medication     busPIRone (BUSPAR) 5 MG tablet     cyclobenzaprine (FLEXERIL) 10 MG tablet     diclofenac (VOLTAREN) 50 MG EC tablet     fluticasone (FLONASE) 50 MCG/ACT nasal spray     gabapentin (NEURONTIN) 300 MG capsule     levonorgestrel (MIRENA) 20 MCG/24HR IUD     MULTI-VITAMIN OR TABS     omeprazole (PRILOSEC) 40 MG DR capsule     sertraline (ZOLOFT) 100 MG tablet     Current Facility-Administered Medications   Medication     betamethasone acet & sod phos (CELESTONE) injection 6 mg     betamethasone acet & sod phos (CELESTONE) injection 6 mg     ropivacaine (NAROPIN) injection 0.5 mL     ropivacaine (NAROPIN) injection 4 mL       Physical Exam:   ?  Vitals:  BP (!) 154/88 (BP Location: Left arm)   Pulse 97   Wt 103 kg (227 lb)   BMI 40.21 kg/m     General:  WD/WN, in NAD.  A&O x3.  Dermatologic:    Skin is intact, open lesions absent.   Skin texture, turgor is normal.  Vascular:  Pulses palpable bilateral.  Digital capillary refill time normal left.  Skin temperature is normal to affected heel.  Generalized edema- none bilateral.  Focal edema- mild heel left.  Neurologic:    Gross sensation normal.  Gait and balance abnormal, antalgic, L.  Musculoskeletal:  Maximal pain to palpation of plantar heel left.  no pain to palpation of posterior heel, Achilles tendon left.  Ankle dorsiflexion <10 degrees with the knee extended, =0 degrees with the knee flexed.  STJ, MTJ ROM intact to affected extremity.  Muscle strength 5/5  foot and ankle to affected extremity.     Stance:  Foot type:  valgus            Again, thank you for allowing me to participate in the care of your patient.        Sincerely,        Gabriella Smith DPM

## 2022-11-29 PROBLEM — M54.12 CERVICAL RADICULOPATHY: Status: RESOLVED | Noted: 2022-03-15 | Resolved: 2022-11-29

## 2022-11-29 NOTE — PROGRESS NOTES
Discharge Note    Progress reporting period is from last progress note on 06/09/22 to Jul 21, 2022.    Vanesa failed to follow up and current status is unknown.  Please see information below for last relevant information on current status.  Patient seen for 22 visits.    SUBJECTIVE  Subjective changes noted by patient:  Pt notes that she is back to full time work and has been picking up shifts, so may work 12 hour shifts.  Does not really tend to worsen with her 8 hr day; may have increased symptoms by 1/10 units of pain by the end of a 12 hour shift.  Does feel that her current exercise program does help with the tightness.  Symptoms are felt across the anterior shoulder when present.  .  Current pain level is 0/10 (2/10 with movement; 3/10 after 12 hour shifts).     Previous pain level was  5/10.   Changes in function:  Yes (See Goal flowsheet attached for changes in current functional level)  Adverse reaction to treatment or activity: None    OBJECTIVE  Changes noted in objective findings: end range loss of motion into abduction.    Can feel symptoms into the arm with resisted abduction more so than other motions (strain)     ASSESSMENT/PLAN  Diagnosis: cervical radiculopathy; monitor left shoulder   Updated problem list and treatment plan:   Pain - HEP  Decreased ROM/flexibility - HEP  Decreased function - HEP  STG/LTGs have been met or progress has been made towards goals:  Yes, please see goal flowsheet for most current information  Assessment of Progress: current status is unknown.    Last current status: Pt is progressing slower than anticipated   Self Management Plans:  HEP  I have re-evaluated this patient and find that the nature, scope, duration and intensity of the therapy is appropriate for the medical condition of the patient.  Vanesa continues to require the following intervention to meet STG and LTG's:  HEP.    Recommendations:  Discharge with current home program.  Patient to follow up with MD as  needed.    Please refer to the daily flowsheet for treatment today, total treatment time and time spent performing 1:1 timed codes.

## 2022-12-07 ASSESSMENT — ENCOUNTER SYMPTOMS
FEVER: 0
BREAST MASS: 0
HEADACHES: 0
SHORTNESS OF BREATH: 0
DYSURIA: 0
WEAKNESS: 0
NERVOUS/ANXIOUS: 1
SORE THROAT: 0
EYE PAIN: 0
PALPITATIONS: 0
DIARRHEA: 0
PARESTHESIAS: 1
CONSTIPATION: 0
JOINT SWELLING: 0
ARTHRALGIAS: 1
HEMATOCHEZIA: 0
HEMATURIA: 0
HEARTBURN: 0
FREQUENCY: 0
NAUSEA: 0
ABDOMINAL PAIN: 0
CHILLS: 0
DIZZINESS: 1
MYALGIAS: 0
COUGH: 0

## 2022-12-08 ENCOUNTER — OFFICE VISIT (OUTPATIENT)
Dept: FAMILY MEDICINE | Facility: CLINIC | Age: 52
End: 2022-12-08
Payer: COMMERCIAL

## 2022-12-08 VITALS
WEIGHT: 223 LBS | OXYGEN SATURATION: 96 % | SYSTOLIC BLOOD PRESSURE: 132 MMHG | TEMPERATURE: 97.5 F | DIASTOLIC BLOOD PRESSURE: 84 MMHG | HEART RATE: 85 BPM | BODY MASS INDEX: 39.51 KG/M2 | RESPIRATION RATE: 18 BRPM | HEIGHT: 63 IN

## 2022-12-08 DIAGNOSIS — Z13.6 CARDIOVASCULAR SCREENING; LDL GOAL LESS THAN 160: ICD-10-CM

## 2022-12-08 DIAGNOSIS — Z11.59 NEED FOR HEPATITIS C SCREENING TEST: ICD-10-CM

## 2022-12-08 DIAGNOSIS — H91.92 HEARING LOSS OF LEFT EAR, UNSPECIFIED HEARING LOSS TYPE: ICD-10-CM

## 2022-12-08 DIAGNOSIS — F32.1 MODERATE MAJOR DEPRESSION (H): ICD-10-CM

## 2022-12-08 DIAGNOSIS — R42 VERTIGO: ICD-10-CM

## 2022-12-08 DIAGNOSIS — Z97.5 IUD (INTRAUTERINE DEVICE) IN PLACE: ICD-10-CM

## 2022-12-08 DIAGNOSIS — E66.812 CLASS 2 OBESITY WITHOUT SERIOUS COMORBIDITY WITH BODY MASS INDEX (BMI) OF 39.0 TO 39.9 IN ADULT, UNSPECIFIED OBESITY TYPE: ICD-10-CM

## 2022-12-08 DIAGNOSIS — Z12.11 SCREEN FOR COLON CANCER: ICD-10-CM

## 2022-12-08 DIAGNOSIS — Z23 HIGH PRIORITY FOR 2019-NCOV VACCINE: ICD-10-CM

## 2022-12-08 DIAGNOSIS — F41.9 ANXIETY: ICD-10-CM

## 2022-12-08 DIAGNOSIS — Z12.31 VISIT FOR SCREENING MAMMOGRAM: ICD-10-CM

## 2022-12-08 DIAGNOSIS — H80.92 OTOSCLEROSIS OF LEFT EAR: ICD-10-CM

## 2022-12-08 DIAGNOSIS — Z00.00 ROUTINE GENERAL MEDICAL EXAMINATION AT A HEALTH CARE FACILITY: Primary | ICD-10-CM

## 2022-12-08 DIAGNOSIS — F34.1 DYSTHYMIA: ICD-10-CM

## 2022-12-08 LAB
ANION GAP SERPL CALCULATED.3IONS-SCNC: 4 MMOL/L (ref 3–14)
BUN SERPL-MCNC: 14 MG/DL (ref 7–30)
CALCIUM SERPL-MCNC: 9.2 MG/DL (ref 8.5–10.1)
CHLORIDE BLD-SCNC: 107 MMOL/L (ref 94–109)
CHOLEST SERPL-MCNC: 247 MG/DL
CO2 SERPL-SCNC: 31 MMOL/L (ref 20–32)
CREAT SERPL-MCNC: 0.72 MG/DL (ref 0.52–1.04)
FASTING STATUS PATIENT QL REPORTED: YES
GFR SERPL CREATININE-BSD FRML MDRD: >90 ML/MIN/1.73M2
GLUCOSE BLD-MCNC: 94 MG/DL (ref 70–99)
HDLC SERPL-MCNC: 79 MG/DL
LDLC SERPL CALC-MCNC: 149 MG/DL
NONHDLC SERPL-MCNC: 168 MG/DL
POTASSIUM BLD-SCNC: 3.8 MMOL/L (ref 3.4–5.3)
SODIUM SERPL-SCNC: 142 MMOL/L (ref 133–144)
TRIGL SERPL-MCNC: 96 MG/DL

## 2022-12-08 PROCEDURE — 0124A COVID-19 VACCINE BIVALENT BOOSTER 12+ (PFIZER): CPT | Performed by: PHYSICIAN ASSISTANT

## 2022-12-08 PROCEDURE — 99214 OFFICE O/P EST MOD 30 MIN: CPT | Mod: 25 | Performed by: PHYSICIAN ASSISTANT

## 2022-12-08 PROCEDURE — 80061 LIPID PANEL: CPT | Performed by: PHYSICIAN ASSISTANT

## 2022-12-08 PROCEDURE — 99396 PREV VISIT EST AGE 40-64: CPT | Performed by: PHYSICIAN ASSISTANT

## 2022-12-08 PROCEDURE — 36415 COLL VENOUS BLD VENIPUNCTURE: CPT | Performed by: PHYSICIAN ASSISTANT

## 2022-12-08 PROCEDURE — 91312 COVID-19 VACCINE BIVALENT BOOSTER 12+ (PFIZER): CPT | Performed by: PHYSICIAN ASSISTANT

## 2022-12-08 PROCEDURE — 86803 HEPATITIS C AB TEST: CPT | Performed by: PHYSICIAN ASSISTANT

## 2022-12-08 PROCEDURE — 80048 BASIC METABOLIC PNL TOTAL CA: CPT | Performed by: PHYSICIAN ASSISTANT

## 2022-12-08 RX ORDER — BUSPIRONE HYDROCHLORIDE 5 MG/1
TABLET ORAL
Qty: 180 TABLET | Refills: 1 | Status: SHIPPED | OUTPATIENT
Start: 2022-12-08 | End: 2023-09-26

## 2022-12-08 RX ORDER — SERTRALINE HYDROCHLORIDE 100 MG/1
200 TABLET, FILM COATED ORAL DAILY
Qty: 180 TABLET | Refills: 1 | Status: SHIPPED | OUTPATIENT
Start: 2022-12-08 | End: 2023-06-22

## 2022-12-08 ASSESSMENT — ENCOUNTER SYMPTOMS
PALPITATIONS: 0
MYALGIAS: 0
HEMATOCHEZIA: 0
HEMATURIA: 0
HEARTBURN: 0
EYE PAIN: 0
NERVOUS/ANXIOUS: 1
WEAKNESS: 0
NAUSEA: 0
CONSTIPATION: 0
FREQUENCY: 0
FEVER: 0
BREAST MASS: 0
PARESTHESIAS: 1
SHORTNESS OF BREATH: 0
CHILLS: 0
DYSURIA: 0
DIZZINESS: 1
SORE THROAT: 0
ARTHRALGIAS: 1
JOINT SWELLING: 0
DIARRHEA: 0
COUGH: 0
ABDOMINAL PAIN: 0
HEADACHES: 0

## 2022-12-08 ASSESSMENT — PATIENT HEALTH QUESTIONNAIRE - PHQ9
10. IF YOU CHECKED OFF ANY PROBLEMS, HOW DIFFICULT HAVE THESE PROBLEMS MADE IT FOR YOU TO DO YOUR WORK, TAKE CARE OF THINGS AT HOME, OR GET ALONG WITH OTHER PEOPLE: NOT DIFFICULT AT ALL
SUM OF ALL RESPONSES TO PHQ QUESTIONS 1-9: 1
SUM OF ALL RESPONSES TO PHQ QUESTIONS 1-9: 1

## 2022-12-08 NOTE — PROGRESS NOTES
SUBJECTIVE:   CC: Vanesa is an 52 year old who presents for preventive health visit.   Patient has been advised of split billing requirements and indicates understanding: Yes  Healthy Habits:     Getting at least 3 servings of Calcium per day:  NO    Bi-annual eye exam:  NO    Dental care twice a year:  NO    Sleep apnea or symptoms of sleep apnea:  None    Diet:  Regular (no restrictions)    Frequency of exercise:  None    Taking medications regularly:  Yes    PHQ-2 Total Score: 0    Additional concerns today:  Yes    Patient arrived for Annual Physical, not due for PAP but due for Colonoscopy and Mammogram. Pt also has concerns with her weight, lost Mom 2 years ago to covid and says she just feels like she has let herself go.     She works as a NICU nurse at [employer], work has been stressful (potential upcoming strike and just the busy environment of the NICU) and family life has also been stressful.  Trying to help care for Dad now and also in the middle of a move to [place].     She is not exercising as much (now that it is cold no longer walking with dog).   Her dog is also getting up there in age and not sure how much longer she has with him.   She has not done counseling, but can see how it would be helpful (just doesn't have time currently).  She does have a good social support system.  Meds are stable at this time (zoloft and buspar)    She is very frustrated with her weight and knows she needs to get back into healthier lifestyle changes.  But also would like sometime to kickstart that process.      Had lost about 30 lbs a few years ago with herbal life/cardio drumming.        Has an IUD, after chart reviewed it shows it was placed 6/14/18 (updated date on medication).     She c/o vertigo symptoms that come and go.  She states she has had this looked at in the past.  Seems to be worse when she turns her head.  Symptoms come and go.  She has not had any brain imaging done.     She also c/o decreased  hearing on left due to otosclerosis.  She plans to have hearing aid at some point.         Today's PHQ-2 Score:   PHQ-2 ( 1999 Pfizer) 12/7/2022   Q1: Little interest or pleasure in doing things 0   Q2: Feeling down, depressed or hopeless 0   PHQ-2 Score 0   PHQ-2 Total Score (12-17 Years)- Positive if 3 or more points; Administer PHQ-A if positive -   Q1: Little interest or pleasure in doing things Not at all   Q2: Feeling down, depressed or hopeless Not at all   PHQ-2 Score 0       Have you ever done Advance Care Planning? (For example, a Health Directive, POLST, or a discussion with a medical provider or your loved ones about your wishes):     Social History     Tobacco Use     Smoking status: Never     Smokeless tobacco: Never   Substance Use Topics     Alcohol use: Yes     Comment: rare         Alcohol Use 12/7/2022   Prescreen: >3 drinks/day or >7 drinks/week? No   Prescreen: >3 drinks/day or >7 drinks/week? -       Reviewed orders with patient.  Reviewed health maintenance and updated orders accordingly - Yes  Labs reviewed in EPIC  BP Readings from Last 3 Encounters:   12/08/22 132/84   11/23/22 (!) 154/88   08/23/22 115/80    Wt Readings from Last 3 Encounters:   12/08/22 101.2 kg (223 lb)   11/23/22 103 kg (227 lb)   08/23/22 103.1 kg (227 lb 6.4 oz)                    Breast Cancer Screening:    FHS-7:   Breast CA Risk Assessment (FHS-7) 12/7/2022   Did any of your first-degree relatives have breast or ovarian cancer? No   Did any of your relatives have bilateral breast cancer? No   Did any man in your family have breast cancer? No   Did any woman in your family have breast and ovarian cancer? Yes   Did any woman in your family have breast cancer before age 50 y? No   Do you have 2 or more relatives with breast and/or ovarian cancer? Yes   Do you have 2 or more relatives with breast and/or bowel cancer? No       Mammogram Screening: Recommended annual mammography  Pertinent mammograms are reviewed under  the imaging tab.    History of abnormal Pap smear: NO - age 30-65 PAP every 5 years with negative HPV co-testing recommended  PAP / HPV Latest Ref Rng & Units 7/22/2020 7/18/2016 8/9/2013   PAP (Historical) - NIL NIL NIL   HPV16 NEG:Negative Negative Negative -   HPV18 NEG:Negative Negative Negative -   HRHPV NEG:Negative Negative Negative -     Reviewed and updated as needed this visit by clinical staff   Tobacco  Allergies  Meds  Problems  Med Hx  Surg Hx  Fam Hx          Reviewed and updated as needed this visit by Provider   Tobacco  Allergies  Meds  Problems  Med Hx  Surg Hx  Fam Hx             Review of Systems   Constitutional: Negative for chills and fever.   HENT: Positive for hearing loss. Negative for congestion, ear pain and sore throat.    Eyes: Negative for pain and visual disturbance.   Respiratory: Negative for cough and shortness of breath.    Cardiovascular: Negative for chest pain, palpitations and peripheral edema.   Gastrointestinal: Negative for abdominal pain, constipation, diarrhea, heartburn, hematochezia and nausea.   Breasts:  Positive for tenderness. Negative for breast mass and discharge.   Genitourinary: Negative for dysuria, frequency, genital sores, hematuria, pelvic pain, urgency, vaginal bleeding and vaginal discharge.   Musculoskeletal: Positive for arthralgias. Negative for joint swelling and myalgias.   Skin: Negative for rash.   Neurological: Positive for dizziness and paresthesias. Negative for weakness and headaches.   Psychiatric/Behavioral: Negative for mood changes. The patient is nervous/anxious.      CONSTITUTIONAL: NEGATIVE for fever, chills, change in weight  INTEGUMENTARY/SKIN: NEGATIVE for worrisome rashes, moles or lesions  EYES: NEGATIVE for vision changes or irritation  ENT: NEGATIVE for ear, mouth and throat problems  RESP: NEGATIVE for significant cough or SOB  BREAST: NEGATIVE for masses, tenderness or discharge  CV: NEGATIVE for chest pain,  "palpitations or peripheral edema  GI: NEGATIVE for nausea, abdominal pain, heartburn, or change in bowel habits  : NEGATIVE for unusual urinary or vaginal symptoms. No vaginal bleeding.  MUSCULOSKELETAL: NEGATIVE for significant arthralgias or myalgia  NEURO: NEGATIVE for weakness, dizziness or paresthesias  PSYCHIATRIC: NEGATIVE for changes in mood or affect      OBJECTIVE:   /84 (BP Location: Left arm, Patient Position: Chair, Cuff Size: Adult Large)   Pulse 85   Temp 97.5  F (36.4  C) (Tympanic)   Resp 18   Ht 1.6 m (5' 3\")   Wt 101.2 kg (223 lb)   SpO2 96%   BMI 39.50 kg/m    Physical Exam  GENERAL APPEARANCE: healthy, alert and no distress  EYES: Eyes grossly normal to inspection, PERRL and conjunctivae and sclerae normal  HENT: ear canals and TM's normal, nose and mouth without ulcers or lesions, oropharynx clear and oral mucous membranes moist  NECK: no adenopathy, no asymmetry, masses, or scars and thyroid normal to palpation  RESP: lungs clear to auscultation - no rales, rhonchi or wheezes  BREAST: normal without masses, tenderness or nipple discharge and no palpable axillary masses or adenopathy  CV: regular rate and rhythm, normal S1 S2, no S3 or S4, no murmur, click or rub, no peripheral edema and peripheral pulses strong  ABDOMEN: soft, nontender, no hepatosplenomegaly, no masses and bowel sounds normal   (female): normal female external genitalia, normal urethral meatus, vaginal mucosal atrophy noted, normal cervix, adnexae, and uterus without masses or abnormal discharge, IUD string visualized at cervical os.   MS: no musculoskeletal defects are noted and gait is age appropriate without ataxia  SKIN: no suspicious lesions or rashes  NEURO: Normal strength and tone, sensory exam grossly normal, mentation intact and speech normal  PSYCH: mentation appears normal and affect normal/bright        ASSESSMENT/PLAN:   (Z00.00) Routine general medical examination at a health care facility  " (primary encounter diagnosis)  Comment:      HEALTH CARE MAINTENANCE              Reviewed USPTF recommendations and anticipatory guidance.              See orders.       (E66.9,  Z68.39) Class 2 obesity without serious comorbidity with body mass index (BMI) of 39.0 to 39.9 in adult, unspecified obesity type  Comment: Obesity without co-morbidity:    Discussed weight loss strategies (she will get back to eating a healthy, low carb diet and restarting exercise).  She is also interested in starting medication.     Discussed GLP1 agonist class medications with patient   -Serious reactions including pancreatitis, anaphylaxis, papillary thyroid cancer, thyroid cell and medullary thyroid carcinoma risk discussed. Patient does not have any personal or family history of thyroid malignancies.     Plan: Basic metabolic panel  (Ca, Cl, CO2, Creat,         Gluc, K, Na, BUN), semaglutide (OZEMPIC) 2         MG/1.5ML SOPN pen            (H80.92) Otosclerosis of left ear  Comment: will f/u with specialist when ready to pursue this.   Plan:     (H91.92) Hearing loss of left ear, unspecified hearing loss type  Comment: will f/u with specialist when ready to pursue this.   Plan:     (Z12.11) Screen for colon cancer  Comment: I discussed the importance of colorectal cancer screening, including the risks and benefits of the various procedures, including colonoscopy, cologuard and annual FOB immunoassay test.  Patient has opted to do the colonoscopy.       Plan: Colonoscopy Screening  Referral            (R42) Vertigo  Comment: I suggest home repositioning exercises and f/u if no improvement.  Did not have time to fully address today, may consider neuroimaging down the road if symptoms persist.   Plan:     (Z11.59) Need for hepatitis C screening test  Comment: Discussed CDC guidelines on preventative screening for this condition.    Patient would like screening done.      Plan: Hepatitis C Screen Reflex to HCV RNA Quant and      "    Genotype            (Z13.6) CARDIOVASCULAR SCREENING; LDL GOAL LESS THAN 160  Comment: due for screening.   Plan: Lipid panel reflex to direct LDL Fasting            (Z12.31) Visit for screening mammogram  Comment: I discussed in detail with Vanesa Owen the importance of breast cancer screening through monthly self breast exams and annual breast exams in the clinic.    Plan: MA SCREENING DIGITAL BILAT - Future  (s+30)            (Z23) High priority for 2019-nCoV vaccine  Comment: due  Plan: COVID-19,PF,PFIZER BOOSTER BIVALENT 12+Yrs            (F32.1) Moderate major depression (H)  Comment: stable, will leave meds as is.  Offered referral to therapist, she declined at this time (life is quite busy).  May also look at work to see if there are any resiliency programs  Plan: busPIRone (BUSPAR) 5 MG tablet, sertraline         (ZOLOFT) 100 MG tablet            (F41.9) Anxiety  Comment: stable, will leave meds as is.  Offered referral to therapist, she declined at this time (life is quite busy).  May also look at work to see if there are any resiliency programs  Plan: busPIRone (BUSPAR) 5 MG tablet            (Z97.5) IUD (intrauterine device) in place  Comment: IUD in place, discussed new extended use of Mirena to 8 years.  May consider removing this around age 52-53 (may consider rechecking FSH.   Plan: levonorgestrel (MIRENA) 20 MCG/DAY IUD           (F34.1) Dysthymia  Comment: stable.   Plan: sertraline (ZOLOFT) 100 MG tablet              Patient has been advised of split billing requirements and indicates understanding: Yes      COUNSELING:  Reviewed preventive health counseling, as reflected in patient instructions       Regular exercise       Healthy diet/nutrition      BMI:   Estimated body mass index is 39.5 kg/m  as calculated from the following:    Height as of this encounter: 1.6 m (5' 3\").    Weight as of this encounter: 101.2 kg (223 lb).   Weight management plan: Discussed healthy diet and " exercise guidelines      She reports that she has never smoked. She has never used smokeless tobacco.      Gabi Sotelo PA-C  [facility]  Answers for HPI/ROS submitted by the patient on 12/8/2022  If you checked off any problems, how difficult have these problems made it for you to do your work, take care of things at home, or get along with other people?: Not difficult at all  PHQ9 TOTAL SCORE: 1

## 2022-12-08 NOTE — PATIENT INSTRUCTIONS
"GLP-1 agonist medications are prescribed to help manage diabetes and also for weight loss.      Serious (but uncommon) reactions including pancreatitis, anaphylaxis, papillary thyroid cancer, thyroid cell and medullary thyroid carcinoma risk discussed. If you or a family member have a history of thyroid malignancy, this medication is not advisable.     Most people do quite well with this class of medication and more common side effects are typically GI (upset stomach, diarrhea, nausea).  We taper you up onto the medication slowly to reduce these side effects.     Insurance does not always cover this class of medications if it is used for weight loss only.  I encourage you to reach out to your insurance company to see if there is a preferred medication in this class (examples include ozempic, victoza, bydureon, byetta, trulicity).      You should also check to see if there are any prescription savings cards to help with the cost.     From ozempic's website:   \"Request or activate your Ozempic  Savings Card  If you have private or commercial insurance, such as insurance you receive through an employer, you may be eligible to pay as little as $25 for a 1-, 2-, or 3-month prescription (maximum savings of $150 per 1-month prescription, $300 per 2-month prescription, or $450 per 3-month prescription). To receive the offer, prescription must be for a 1-, 2-, or 3-month supply.a Offer is valid for up to 24 months from the date of savings card activation.\"    Or try GoodRx or NiceRx.      Reach out if you have any questions or concerns.     Preventive Health Recommendations  Female Ages 50 - 64    Yearly exam: See your health care provider every year in order to  Review health changes.   Discuss preventive care.    Review your medicines if your doctor has prescribed any.    Get a Pap test every three years (unless you have an abnormal result and your provider advises testing more often).  If you get Pap tests with HPV test, " you only need to test every 5 years, unless you have an abnormal result.   You do not need a Pap test if your uterus was removed (hysterectomy) and you have not had cancer.  You should be tested each year for STDs (sexually transmitted diseases) if you're at risk.   Have a mammogram every 1 to 2 years.  Have a colonoscopy at age 50, or have a yearly FIT test (stool test). These exams screen for colon cancer.    Have a cholesterol test every 5 years, or more often if advised.  Have a diabetes test (fasting glucose) every three years. If you are at risk for diabetes, you should have this test more often.   If you are at risk for osteoporosis (brittle bone disease), think about having a bone density scan (DEXA).    Shots: Get a flu shot each year. Get a tetanus shot every 10 years.    Nutrition:   Eat at least 5 servings of fruits and vegetables each day.  Eat whole-grain bread, whole-wheat pasta and brown rice instead of white grains and rice.  Get adequate Calcium and Vitamin D.     Lifestyle  Exercise at least 150 minutes a week (30 minutes a day, 5 days a week). This will help you control your weight and prevent disease.  Limit alcohol to one drink per day.  No smoking.   Wear sunscreen to prevent skin cancer.   See your dentist every six months for an exam and cleaning.  See your eye doctor every 1 to 2 years.

## 2022-12-09 LAB — HCV AB SERPL QL IA: NONREACTIVE

## 2023-02-01 ENCOUNTER — HOSPITAL ENCOUNTER (OUTPATIENT)
Dept: MAMMOGRAPHY | Facility: CLINIC | Age: 53
Discharge: HOME | End: 2023-02-01
Attending: PHYSICIAN ASSISTANT | Admitting: PHYSICIAN ASSISTANT
Payer: COMMERCIAL

## 2023-02-01 DIAGNOSIS — Z12.31 VISIT FOR SCREENING MAMMOGRAM: ICD-10-CM

## 2023-02-01 PROCEDURE — 77067 SCR MAMMO BI INCL CAD: CPT

## 2023-02-06 ENCOUNTER — MYC MEDICAL ADVICE (OUTPATIENT)
Dept: FAMILY MEDICINE | Facility: CLINIC | Age: 53
End: 2023-02-06
Payer: COMMERCIAL

## 2023-02-06 DIAGNOSIS — E66.812 CLASS 2 OBESITY WITHOUT SERIOUS COMORBIDITY WITH BODY MASS INDEX (BMI) OF 39.0 TO 39.9 IN ADULT, UNSPECIFIED OBESITY TYPE: ICD-10-CM

## 2023-02-14 ENCOUNTER — TELEPHONE (OUTPATIENT)
Dept: GASTROENTEROLOGY | Facility: CLINIC | Age: 53
End: 2023-02-14
Payer: COMMERCIAL

## 2023-02-14 NOTE — TELEPHONE ENCOUNTER
Screening Questions  BLUE  KIND OF PREP RED  LOCATION [review exclusion criteria] GREEN  SEDATION TYPE        Yes Are you active on mychart?       Sindt Ordering/Referring Provider?        [facility] What type of coverage do you have?      N Have you had a positive covid test in the last 14 days?     39.50 1. BMI  [BMI 40+ - review exclusion criteria]    Yes  2. Are you able to give consent for your medical care? [IF NO,RN REVIEW]          N  3. Are you taking any prescription pain medications on a routine schedule   (ex narcotics: oxycodone, roxicodone, oxycontin,  and percocet)? [RN Review]        NA  3a. EXTENDED PREP What kind of prescription?     N 4. Do you have any chemical dependencies such as alcohol, street drugs, or methadone?        **If yes 3- 5 , please schedule with MAC sedation.**          IF YES TO ANY 6 - 10 - HOSPITAL SETTING ONLY.     N 6.   Do you need assistance transferring?     N 7.   Have you had a heart or lung transplant?    N 8.   Are you currently on dialysis?   N 9.   Do you use daily home oxygen?   N 10. Do you take nitroglycerin?   10a. NA If yes, how often?     11. [FEMALES]  NA Are you currently pregnant?    11a. NA If yes, how many weeks? [ Greater than 12 weeks, OR NEEDED]    N 12. Do you have Pulmonary Hypertension? *NEED PAC APPT AT [facility] w/ [facility]*     N-but does have IUD 13. [review exclusion criteria]  Do you have any implantable devices in your body (pacemaker, defib, LVAD)?    N 14. In the past 6 months, have you had any heart related issues including cardiomyopathy or heart attack?     14a. N If yes, did it require cardiac stenting if so when?     N 15. Have you had a stroke or Transient ischemic attack (TIA - aka  mini stroke ) within 6 months?      N 16. Do you have mod to severe Obstructive Sleep Apnea?  [Hospital only]    N 17. Do you have SEVERE AND UNCONTROLLED asthma? *NEED PAC APPT AT [facility] w/MAC*     N 18. Are you currently taking any blood thinners?     18a. If yes,  "inform patient to \"follow up w/ ordering provider for bridging instructions.\"    N 19. Do you take the medication Phentermine?    19a. If yes, \"Hold for 7 days before procedure.  Please consult your prescribing provider if you have questions about holding this medication.\"     N  20. Do you have chronic kidney disease?      N  21. Do you have a diagnosis of diabetes?     N  22. On a regular basis do you go 3-5 days between bowel movements?      23. Preferred LOCAL Pharmacy for Pre Prescription    [ LIST ONLY ONE PHARMACY]        [facility] - [id number] [facility]        - CLOSING REMINDERS -    Informed patient they will need an adult    Cannot take any type of public or medical transportation alone    Conscious Sedation- Needs  for 6 hours after the procedure       MAC/General-Needs  for 24 hours after procedure    Pre-Procedure Covid test to be completed [[facility] PCR Testing Required]    Confirmed Nurse will call to complete assessment       - SCHEDULING DETAILS -  N Hospital Setting Required? If yes, what is the exclusion?: LOUIS Bernstein  Surgeon    4/13/23  Date of Procedure  Lower Endoscopy [Colonoscopy]  Type of Procedure Scheduled  [facility]cation   STANDARD [facility]-If you answer yes to questions #8, #20, #21Which Colonoscopy Prep was Sent?     CS Sedation Type     No PAC / Pre-op Required                 "

## 2023-03-23 ENCOUNTER — MYC MEDICAL ADVICE (OUTPATIENT)
Dept: FAMILY MEDICINE | Facility: CLINIC | Age: 53
End: 2023-03-23

## 2023-03-23 ENCOUNTER — OFFICE VISIT (OUTPATIENT)
Dept: FAMILY MEDICINE | Facility: CLINIC | Age: 53
End: 2023-03-23
Payer: COMMERCIAL

## 2023-03-23 VITALS
SYSTOLIC BLOOD PRESSURE: 115 MMHG | DIASTOLIC BLOOD PRESSURE: 83 MMHG | TEMPERATURE: 98.8 F | RESPIRATION RATE: 16 BRPM | HEIGHT: 63 IN | OXYGEN SATURATION: 94 % | BODY MASS INDEX: 38.7 KG/M2 | HEART RATE: 93 BPM | WEIGHT: 218.4 LBS

## 2023-03-23 DIAGNOSIS — J01.10 ACUTE NON-RECURRENT FRONTAL SINUSITIS: Primary | ICD-10-CM

## 2023-03-23 DIAGNOSIS — R51.9 ACUTE INTRACTABLE HEADACHE, UNSPECIFIED HEADACHE TYPE: ICD-10-CM

## 2023-03-23 DIAGNOSIS — R07.0 THROAT PAIN: ICD-10-CM

## 2023-03-23 DIAGNOSIS — E66.812 CLASS 2 OBESITY WITHOUT SERIOUS COMORBIDITY WITH BODY MASS INDEX (BMI) OF 39.0 TO 39.9 IN ADULT, UNSPECIFIED OBESITY TYPE: ICD-10-CM

## 2023-03-23 LAB
DEPRECATED S PYO AG THROAT QL EIA: NEGATIVE
FLUAV AG SPEC QL IA: NEGATIVE
FLUBV AG SPEC QL IA: NEGATIVE
GROUP A STREP BY PCR: NOT DETECTED

## 2023-03-23 PROCEDURE — 99214 OFFICE O/P EST MOD 30 MIN: CPT | Mod: CS | Performed by: PHYSICIAN ASSISTANT

## 2023-03-23 PROCEDURE — U0005 INFEC AGEN DETEC AMPLI PROBE: HCPCS | Performed by: PHYSICIAN ASSISTANT

## 2023-03-23 PROCEDURE — U0003 INFECTIOUS AGENT DETECTION BY NUCLEIC ACID (DNA OR RNA); SEVERE ACUTE RESPIRATORY SYNDROME CORONAVIRUS 2 (SARS-COV-2) (CORONAVIRUS DISEASE [COVID-19]), AMPLIFIED PROBE TECHNIQUE, MAKING USE OF HIGH THROUGHPUT TECHNOLOGIES AS DESCRIBED BY CMS-2020-01-R: HCPCS | Performed by: PHYSICIAN ASSISTANT

## 2023-03-23 PROCEDURE — 87651 STREP A DNA AMP PROBE: CPT | Performed by: PHYSICIAN ASSISTANT

## 2023-03-23 PROCEDURE — 87804 INFLUENZA ASSAY W/OPTIC: CPT | Performed by: PHYSICIAN ASSISTANT

## 2023-03-23 RX ORDER — PREDNISONE 20 MG/1
20 TABLET ORAL 2 TIMES DAILY
Qty: 10 TABLET | Refills: 0 | Status: SHIPPED | OUTPATIENT
Start: 2023-03-23 | End: 2023-03-28

## 2023-03-23 RX ORDER — DOXYCYCLINE 100 MG/1
100 CAPSULE ORAL 2 TIMES DAILY
Qty: 14 CAPSULE | Refills: 0 | Status: SHIPPED | OUTPATIENT
Start: 2023-03-30 | End: 2023-04-06

## 2023-03-23 ASSESSMENT — PAIN SCALES - GENERAL: PAINLEVEL: SEVERE PAIN (6)

## 2023-03-23 NOTE — PATIENT INSTRUCTIONS
Anyi Alexis,    Thank you for allowing [facility] to manage your care.    I am unsure of the cause of your symptoms, but your exam is reassuring. We will see what our workup shows.     If you develop worsening/changing symptoms at any time, please call 911 or go to the emergency department for evaluation.    I sent your prescriptions to your pharmacy.    Fill the doxycycline and start it if you are not improving in the next week and your swabs are negative.    Prednisone Discharge Instructions:    Please take the steroid, Prednisone, for the full course as prescribed.  Take Prednisone with food or milk to minimize stomach upset.      Side effects of Prednisone include difficulty sleeping, increased appetite, weight gain, and changes in mood.  If you are diabetic, please monitor your blood sugar regularly while taking this medicine as Prednisone can cause high blood sugar.    Please allow 1-2 business days for our office to contact you in regards to your laboratory/radiological studies.  If not done so, I encourage you to login into [employer] ([email or web address] to review your results as well.     Drink 8-10 glasses of fluid daily to stay well-hydrated.    Take a probiotic pill, eat live culture yogurt (Greek yogurt or Activia), drink kefir daily for one week if you have to take the antibiotics to encourage growth of good bacteria in your system.    If you have any questions or concerns, please feel free to call us at [phone]    Sincerely,    Jimmy Villanueva PA-C    Did you know?      You can schedule a video visit for follow-up appointments as well as future appointments for certain conditions.  Please see the below link.     [email or web address]    If you have not already done so,  I encourage you to sign up for [employer] ([email or web address]).  This will allow you to review your results, securely communicate with a provider, and  schedule virtual visits as well.

## 2023-03-23 NOTE — PROGRESS NOTES
Assessment & Plan   Problem List Items Addressed This Visit    None  Visit Diagnoses     Acute non-recurrent frontal sinusitis    -  Primary    Relevant Medications    doxycycline hyclate (VIBRAMYCIN) 100 MG capsule (Start on 3/30/2023)    predniSONE (DELTASONE) 20 MG tablet    Acute intractable headache, unspecified headache type        Relevant Medications    doxycycline hyclate (VIBRAMYCIN) 100 MG capsule (Start on 3/30/2023)    predniSONE (DELTASONE) 20 MG tablet    Throat pain        Relevant Orders    Streptococcus A Rapid Screen w/Reflex to PCR - Clinic Collect (Completed)    Symptomatic COVID-19 Virus (Coronavirus) by PCR Nose    Influenza A & B Antigen - Clinic Collect (Completed)    Group A Streptococcus PCR Throat Swab         Impression is likely viral sinusitis vs URI including COVID-19. Will order COVID-19 PCR. Strep and flu neg. Appears well and non-toxic and I have low suspicion for impending airway obstruction or respiratory distress at this point.  She will push p.o. fluids, use over-the-counter meds for symptoms, complete a course of prednisone and add doxycycline if not improving in the next week to cover bacterial causes and follow-up with us in 2 weeks if not improving or urgent care/the ER if symptoms worsen/change at any time.    Complete history and physical exam as below. Afebrile with normal vital signs.    DDx and Dx discussed with and explained to the pt to their satisfaction.  All questions were answered at this time. Pt expressed understanding of and agreement with this dx, tx, and plan. No further workup warranted and standard medication warnings given. I have given the patient a list of pertinent indications for re-evaluation. Will go to the Emergency Department if symptoms worsen or new concerning symptoms arise. Patient left in no apparent distress.     Ordering of each unique test  Prescription drug management  36 minutes spent on the date of the encounter doing chart review,  "history and exam, documentation and further activities per the note     See Patient Instructions    ALEKSEY Brown  [facility] PILY Alexis is a 53 year old, presenting for the following health issues:  Ent Problem and Headache    Additional Questions 3/23/2023   Roomed by Capri Shaver CMA   Accompanied by No one     Patient Reported Additional Medications 3/23/2023   Patient reports taking the following new medications None     History of Present Illness       Headaches:   Since the patient's last clinic visit, headaches are: worsened  The patient is getting headaches:  Occasionally  She is not able to do normal daily activities when she has a migraine.  The patient is taking the following rescue/relief medications:  Ibuprofen (Advil, Motrin)   Patient states \"I get only a small amount of relief\" from the rescue/relief medications.   The patient is taking the following medications to prevent migraines:  No medications to prevent migraines  In the past 4 weeks, the patient has gone to an Urgent Care or Emergency Room 0 times times due to headaches.    She eats 0-1 servings of fruits and vegetables daily.She consumes 1 sweetened beverage(s) daily.She exercises with enough effort to increase her heart rate 10 to 19 minutes per day.  She exercises with enough effort to increase her heart rate 3 or less days per week.   She is taking medications regularly.     2 days ago had a frontal bilateral headache of slow onset. 6/10 now. Non-radiating. Nausea last night. Nasal congestion and mild sore throat. Had COVID years ago and unfortunately lost her mother to the illness. Works as a NICU RN. Coworkers have had COVID.    Review of Systems   Constitutional, HEENT, cardiovascular, pulmonary, gi and gu systems are negative, except as otherwise noted.      Objective    /83   Pulse 93   Temp 98.8  F (37.1  C) (Tympanic)   Resp 16   Ht 1.6 m (5' 2.99\")   Wt 99.1 kg (218 lb 6.4 oz)  "  LMP  (Approximate)   SpO2 94%   BMI 38.70 kg/m    Body mass index is 38.7 kg/m .  Physical Exam  Vitals and nursing note reviewed.   Constitutional:       General: She is not in acute distress.     Appearance: She is not ill-appearing or diaphoretic.   HENT:      Head: Normocephalic and atraumatic.      Comments: Mild tenderness to forehead. Remainder of skull and facial bones non-tender.  No overlying signs of trauma or infection.       Right Ear: Tympanic membrane, ear canal and external ear normal.      Left Ear: Tympanic membrane, ear canal and external ear normal.      Nose: Nose normal.      Mouth/Throat:      Mouth: Mucous membranes are moist.      Comments: No trismus, voice abnormalities or asymmetry to the oropharynx.  Eyes:      Conjunctiva/sclera: Conjunctivae normal.   Neck:      Comments: No masses or tenderness to anterior neck/throat.  Cardiovascular:      Rate and Rhythm: Normal rate and regular rhythm.      Heart sounds: Normal heart sounds. No murmur heard.    No friction rub. No gallop.   Pulmonary:      Effort: Pulmonary effort is normal. No respiratory distress.      Breath sounds: Normal breath sounds. No stridor. No wheezing, rhonchi or rales.   Abdominal:      General: Bowel sounds are normal. There is no distension.      Palpations: Abdomen is soft. There is no mass.      Tenderness: There is no abdominal tenderness. There is no guarding or rebound.      Hernia: No hernia is present.   Musculoskeletal:      Cervical back: Normal range of motion and neck supple. No rigidity.   Lymphadenopathy:      Cervical: No cervical adenopathy.   Skin:     General: Skin is warm and dry.   Neurological:      General: No focal deficit present.      Mental Status: She is alert. Mental status is at baseline.      Comments: Negative finger-nose-finger, heel-to-shin and pronator drift.  Face symmetric.  Speech clear. CN 2-12 intact. Normal strength and sensation in face and upper/lower extremities  bilaterally.   Psychiatric:         Mood and Affect: Mood normal.         Behavior: Behavior normal.          Results for orders placed or performed in visit on 03/23/23   Streptococcus A Rapid Screen w/Reflex to PCR - Clinic Collect     Status: Normal    Specimen: Throat; Swab   Result Value Ref Range    Group A Strep antigen Negative Negative   Influenza A & B Antigen - Clinic Collect     Status: Normal    Specimen: Nose; Swab   Result Value Ref Range    Influenza A antigen Negative Negative    Influenza B antigen Negative Negative    Narrative    Test results must be correlated with clinical data. If necessary, results should be confirmed by a molecular assay or viral culture.

## 2023-03-24 LAB — SARS-COV-2 RNA RESP QL NAA+PROBE: NEGATIVE

## 2023-03-29 ENCOUNTER — TELEPHONE (OUTPATIENT)
Dept: GASTROENTEROLOGY | Facility: CLINIC | Age: 53
End: 2023-03-29
Payer: COMMERCIAL

## 2023-03-29 DIAGNOSIS — Z12.11 SCREEN FOR COLON CANCER: Primary | ICD-10-CM

## 2023-03-29 RX ORDER — BISACODYL 5 MG/1
TABLET, DELAYED RELEASE ORAL
Qty: 4 TABLET | Refills: 0 | Status: SHIPPED | OUTPATIENT
Start: 2023-03-29 | End: 2023-04-13

## 2023-03-29 NOTE — TELEPHONE ENCOUNTER
Attempted to contact patient regarding upcoming Colonoscopy  procedure on 04.13.2023 for pre assessment questions. Patient answered but it was not a good time to talk.  She will call back later.     Discuss Covid policy and designated  policy.    Pre op exam? N/A    Arrival time: 0615. Procedure time: 0700    Facility location: [facility]; [address], [place] [address]    Sedation type: Conscious sedation     Anticoagulants: No    Electronic implanted devices? No    Diabetic? No    Indication for procedure: screening colonoscopy    Bowel prep recommendation: Standard Golytely      Prep instructions sent via [employer]. Bowel prep script sent to [facility] - [id number] [facility]      Marci Muñoz RN  Endoscopy Procedure Pre Assessment RN

## 2023-04-04 NOTE — TELEPHONE ENCOUNTER
Second attempt for pre-assessment prior to upcoming colonoscopy     No answer.  Left message to return call [phone] #[phone]    Sent [employer] [facility] message.    Marci Muñoz RN  Endoscopy Procedure Pre Assessment RN

## 2023-04-05 NOTE — TELEPHONE ENCOUNTER
Attempted to reach the patient to complete pre-assessment. Third attempt. No answer. Left message.     Shy Davis RN   Endoscopy Procedure Pre Assessment RN

## 2023-04-07 NOTE — TELEPHONE ENCOUNTER
Pre assessment questions completed for upcoming Colonoscopy  procedure scheduled on 4/13/23    COVID policy reviewed.     Pre-op exam? N/A    Reviewed procedural arrival time 0615, procedure time 0700 and facility location [facility]; [address], [place] [address]    Designated  policy reviewed. Instructed to have someone stay 6 hours post procedure.     Anticoagulation/blood thinners? No    Electronic implanted devices? No    Diabetic? No    Reviewed procedure prep instructions.     Patient verbalized understanding and had no questions or concerns at this time.    Shy Davis, RN  Endoscopy Procedure Pre Assessment RN

## 2023-04-09 ENCOUNTER — MYC MEDICAL ADVICE (OUTPATIENT)
Dept: FAMILY MEDICINE | Facility: CLINIC | Age: 53
End: 2023-04-09
Payer: COMMERCIAL

## 2023-04-13 ENCOUNTER — HOSPITAL ENCOUNTER (OUTPATIENT)
Facility: AMBULATORY SURGERY CENTER | Age: 53
Discharge: HOME | End: 2023-04-13
Attending: INTERNAL MEDICINE | Admitting: INTERNAL MEDICINE
Payer: COMMERCIAL

## 2023-04-13 VITALS
SYSTOLIC BLOOD PRESSURE: 108 MMHG | RESPIRATION RATE: 16 BRPM | HEART RATE: 77 BPM | TEMPERATURE: 97.9 F | DIASTOLIC BLOOD PRESSURE: 75 MMHG | OXYGEN SATURATION: 100 %

## 2023-04-13 LAB — COLONOSCOPY: NORMAL

## 2023-04-13 PROCEDURE — G8918 PT W/O PREOP ORDER IV AB PRO: HCPCS

## 2023-04-13 PROCEDURE — G8907 PT DOC NO EVENTS ON DISCHARG: HCPCS

## 2023-04-13 PROCEDURE — 45378 DIAGNOSTIC COLONOSCOPY: CPT

## 2023-04-13 RX ORDER — FENTANYL CITRATE 50 UG/ML
INJECTION, SOLUTION INTRAMUSCULAR; INTRAVENOUS PRN
Status: DISCONTINUED | OUTPATIENT
Start: 2023-04-13 | End: 2023-04-13 | Stop reason: HOSPADM

## 2023-04-13 RX ORDER — NALOXONE HYDROCHLORIDE 0.4 MG/ML
0.4 INJECTION, SOLUTION INTRAMUSCULAR; INTRAVENOUS; SUBCUTANEOUS
Status: DISCONTINUED | OUTPATIENT
Start: 2023-04-13 | End: 2023-04-14 | Stop reason: HOSPADM

## 2023-04-13 RX ORDER — NALOXONE HYDROCHLORIDE 0.4 MG/ML
0.2 INJECTION, SOLUTION INTRAMUSCULAR; INTRAVENOUS; SUBCUTANEOUS
Status: DISCONTINUED | OUTPATIENT
Start: 2023-04-13 | End: 2023-04-14 | Stop reason: HOSPADM

## 2023-04-13 RX ORDER — LIDOCAINE 40 MG/G
CREAM TOPICAL
Status: DISCONTINUED | OUTPATIENT
Start: 2023-04-13 | End: 2023-04-14 | Stop reason: HOSPADM

## 2023-04-13 RX ORDER — ONDANSETRON 2 MG/ML
4 INJECTION INTRAMUSCULAR; INTRAVENOUS EVERY 6 HOURS PRN
Status: DISCONTINUED | OUTPATIENT
Start: 2023-04-13 | End: 2023-04-14 | Stop reason: HOSPADM

## 2023-04-13 RX ORDER — FLUMAZENIL 0.1 MG/ML
0.2 INJECTION, SOLUTION INTRAVENOUS
Status: ACTIVE | OUTPATIENT
Start: 2023-04-13 | End: 2023-04-13

## 2023-04-13 RX ORDER — PROCHLORPERAZINE MALEATE 10 MG
10 TABLET ORAL EVERY 6 HOURS PRN
Status: DISCONTINUED | OUTPATIENT
Start: 2023-04-13 | End: 2023-04-14 | Stop reason: HOSPADM

## 2023-04-13 RX ORDER — ONDANSETRON 4 MG/1
4 TABLET, ORALLY DISINTEGRATING ORAL EVERY 6 HOURS PRN
Status: DISCONTINUED | OUTPATIENT
Start: 2023-04-13 | End: 2023-04-14 | Stop reason: HOSPADM

## 2023-04-13 RX ORDER — ONDANSETRON 2 MG/ML
4 INJECTION INTRAMUSCULAR; INTRAVENOUS
Status: COMPLETED | OUTPATIENT
Start: 2023-04-13 | End: 2023-04-13

## 2023-04-13 RX ORDER — SODIUM CHLORIDE, SODIUM LACTATE, POTASSIUM CHLORIDE, CALCIUM CHLORIDE 600; 310; 30; 20 MG/100ML; MG/100ML; MG/100ML; MG/100ML
INJECTION, SOLUTION INTRAVENOUS CONTINUOUS PRN
Status: COMPLETED | OUTPATIENT
Start: 2023-04-13 | End: 2023-04-13

## 2023-04-13 RX ADMIN — ONDANSETRON 4 MG: 2 INJECTION INTRAMUSCULAR; INTRAVENOUS at 06:59

## 2023-04-13 NOTE — H&P
[facility] Anesthesia Pre-op History and Physical    Vanesa Owen MRN# [id number]   Age: 53 year old YOB: 1970            Date of Exam 4/13/2023           Primary care provider: Gabi Sotelo         Chief Complaint and/or Reason for Procedure:     CRC screening - first colonoscopy         Active problem list:     Patient Active Problem List    Diagnosis Date Noted     Hearing loss of left ear, unspecified hearing loss type 12/08/2022     Priority: Medium     Iris nevus, right 07/17/2020     Priority: Medium     Pain in joint, ankle and foot, right 10/23/2018     Priority: Medium     Gastroesophageal reflux disease without esophagitis 09/19/2018     Priority: Medium     Obesity (BMI 35.0-39.9) with comorbidity (H) 09/19/2018     Priority: Medium     Hip pain, left 11/15/2017     Priority: Medium     Obesity 05/19/2015     Priority: Medium     CARDIOVASCULAR SCREENING; LDL GOAL LESS THAN 160 10/31/2010     Priority: Medium     genital herpes, mainly cervical.  06/17/2010     Priority: Medium     August 12, 2011  initial outbreak severe, now on suppressive daily therapy. No reoccurrence. Will continue, one year refill.   [facility] update changed this record. Please review for accuracy       Gastric hyperacidity 06/17/2010     Priority: Medium     June 17, 2010 using prn PPI, probably secondary to NSAID use.        Lumbar back pain 04/09/2010     Priority: Medium     April 9, 2010 probable SI joint dysfunction. No improvement with PT, SI joint steroid injection, now seeing chiropractor.   June 17, 2010 no improvement, will refer to [facility] for another series of cortisone injections. Using daily ibuprofen, will check renal function.   July 1, 2010 no improvement. Will refer to Dr. Oconnell.   August 12, 2011 no improvement, will obtain MRI.        Vitamin D deficiency 04/05/2010     Priority: Medium     June 17, 2010 taking oral supplements, 5000 units daily, recheck vitamin D level in August.    August 23, 2011 normal level in September 2010. Continue maintenance dosing.   (Problem list name updated by automated process. Provider to review and confirm.)       Presence of intrauterine contraceptive device 08/25/2008     Priority: Medium     Mirena placed 6/14/2018       Moderate major depression (H) 05/21/2008     Priority: Medium     August 12, 2011 doing well on zoloft, 200 mg daily. Follow up 6 months.           [facility] 05/04/2012     Priority: Low     EMERGENCY CARE PLAN  August 8, 2013: No current Care Coordination follow up planned. Please refer if Care Coordination services are needed.    Presenting Problem Signs and Symptoms Treatment Plan   Questions or concerns   during clinic hours   I will call my clinic directly:  [address] [id number] [phone].   Questions or concerns outside clinic hours   I will call the 24 hour nurse line at   [phone] or [facility].   Need to schedule an appointment   I will call the 24 hour scheduling team at [phone] or my clinic directly at [phone].    Same day treatment     I will call my clinic first, nurse line if after hours, urgent care and express care if needed.   Clinic care coordination services (regular clinic hours)     I will call a clinic care coordinator directly:     Babak Patel RN  Mon, Tues, Fri - [phone]  Wed, Thurs - [phone]    Yolande Ahn :    [phone]    Or call my clinic at [phone] and ask to speak with care coordination.   Crisis Services: Behavioral or Mental Health  [facility] 24 Hour Phone Line  [phone]    [facility] 24 Hour Crisis Services  [phone]    [facility] (Behavioral Health Providers) Network [phone]    [facility]   [phone]       Emergency treatment -- Immediately    CAll [address]                     Medications (include herbals and vitamins):   Any Plavix use in the last 7 days? No     Current Outpatient  Medications   Medication Sig     busPIRone (BUSPAR) 5 MG tablet TAKE 1 TABLET BY MOUTH TWO TIMES A DAY .     gabapentin (NEURONTIN) 300 MG capsule Start twice daily, can increase to three times per day     MULTI-VITAMIN OR TABS 1 TABLET DAILY     omeprazole (PRILOSEC) 40 MG DR capsule TAKE ONE CAPSULE BY MOUTH EVERY DAY. TAKE 30 TO 60 MINUTES BEFORE A MEAL     sertraline (ZOLOFT) 100 MG tablet Take 2 tablets (200 mg) by mouth daily     cyclobenzaprine (FLEXERIL) 10 MG tablet Take 1 tablet (10 mg) by mouth nightly as needed for muscle spasms (Patient not taking: Reported on 3/23/2023)     fluticasone (FLONASE) 50 MCG/ACT nasal spray Spray 1 spray into both nostrils daily     levonorgestrel (MIRENA) 20 MCG/DAY IUD 1 each (20 mcg) by Intrauterine route continuous     semaglutide (OZEMPIC) 2 MG/1.5ML SOPN pen Inject 0.5 mg Subcutaneous every 7 days     Current Facility-Administered Medications   Medication     betamethasone acet & sod phos (CELESTONE) injection 6 mg     betamethasone acet & sod phos (CELESTONE) injection 6 mg     lidocaine (LMX4) kit     lidocaine 1 % 0.1-1 mL     ondansetron (ZOFRAN) injection 4 mg     ropivacaine (NAROPIN) injection 0.5 mL     ropivacaine (NAROPIN) injection 4 mL     sodium chloride (PF) 0.9% PF flush 3 mL     sodium chloride (PF) 0.9% PF flush 3 mL             Allergies:      Allergies   Allergen Reactions     Sulfa Drugs Rash     Allergy to Latex? No  Allergy to tape?   No  Intolerances:             Physical Exam:   All vitals have been reviewed  Patient Vitals for the past 8 hrs:   BP Temp Temp src Pulse Resp SpO2   04/13/23 0653 102/74 97.9  F (36.6  C) Temporal 78 16 96 %     No intake/output data recorded.  Lungs:   No increased work of breathing, good air exchange, clear to auscultation bilaterally, no crackles or wheezing     Cardiovascular:   normal S1 and S2             Lab / Radiology Results:            Anesthetic risk and/or ASA classification:     2  Catrachita Bernstein DO

## 2023-04-21 ENCOUNTER — TELEPHONE (OUTPATIENT)
Dept: FAMILY MEDICINE | Facility: CLINIC | Age: 53
End: 2023-04-21
Payer: COMMERCIAL

## 2023-04-21 NOTE — TELEPHONE ENCOUNTER
Prior Authorization Retail Medication Request    Medication/Dose: Ozempic  ICD code (if different than what is on RX):    Previously Tried and Failed:    Rationale:  REQUIRES PRIOR PRESCRIPTION FOR METFORMIN, PIOGLITAZONE, SULFONYLUREA, OR COMBINATION PRODUCT CONTAINING ANY AFOREMENTIONED AGENTS WITHIN THE PAST 365 DAYS. FOR   THE INDICATION OF TYPE 2 DIABETES      Insurance Name:  Divya  Insurance ID:  [id number]       Pharmacy Information (if different than what is on RX)  Name:    Phone:        Thank you,  Justine Worley [facility]  [phone]

## 2023-04-26 NOTE — TELEPHONE ENCOUNTER
PA Initiation    Medication: Ozempic  Insurance Company: [employer] - Phone [phone] Fax [phone]  Pharmacy Filling the Rx: [facility] - [id number] [facility]  Filling Pharmacy Phone: [phone]  Filling Pharmacy Fax: [phone]  Start Date: 4/26/2023

## 2023-04-28 NOTE — TELEPHONE ENCOUNTER
PRIOR AUTHORIZATION DENIED    Medication: Ozempic    Denial Date: 4/28/2023    Denial Rationale: Medication is only covered for diagnosis of type 2 diabetes. Patient also has to first try/fail metformin, a sulfonylurea, or pioglitazone.            Appeal Information: If provider would like to appeal this decision please attach a detailed letter of medical necessity to the patient's chart and route the encounter back to the PA Team.

## 2023-05-01 ENCOUNTER — MYC MEDICAL ADVICE (OUTPATIENT)
Dept: FAMILY MEDICINE | Facility: CLINIC | Age: 53
End: 2023-05-01
Payer: COMMERCIAL

## 2023-05-01 NOTE — TELEPHONE ENCOUNTER
Please call Vanesa, her insurance no longer covers ozempic.  Please schedule a follow-up visit (phone or video ok) to discuss alternatives.     Gabi Sotelo PA-C

## 2023-05-04 NOTE — TELEPHONE ENCOUNTER
Virtual visit was ok'd by me in previous message and I have plenty of virtual slots next week (wed or Thurs)   Gabi Sotelo PA-C      5/1/23  1:24 PM  Note  Please call Vanesa, her insurance no longer covers ozempic.  Please schedule a follow-up visit (phone or video ok) to discuss alternatives.      Gabi Sotelo PA-C

## 2023-05-10 ENCOUNTER — OFFICE VISIT (OUTPATIENT)
Dept: FAMILY MEDICINE | Facility: CLINIC | Age: 53
End: 2023-05-10
Payer: COMMERCIAL

## 2023-05-10 ENCOUNTER — TELEPHONE (OUTPATIENT)
Dept: FAMILY MEDICINE | Facility: CLINIC | Age: 53
End: 2023-05-10

## 2023-05-10 VITALS
OXYGEN SATURATION: 96 % | BODY MASS INDEX: 37.21 KG/M2 | HEIGHT: 63 IN | DIASTOLIC BLOOD PRESSURE: 70 MMHG | WEIGHT: 210 LBS | SYSTOLIC BLOOD PRESSURE: 112 MMHG | TEMPERATURE: 98 F | HEART RATE: 81 BPM | RESPIRATION RATE: 16 BRPM

## 2023-05-10 DIAGNOSIS — E66.01 MORBID OBESITY (H): Primary | ICD-10-CM

## 2023-05-10 DIAGNOSIS — K21.9 GASTROESOPHAGEAL REFLUX DISEASE WITHOUT ESOPHAGITIS: ICD-10-CM

## 2023-05-10 PROCEDURE — 99214 OFFICE O/P EST MOD 30 MIN: CPT | Performed by: PHYSICIAN ASSISTANT

## 2023-05-10 ASSESSMENT — PATIENT HEALTH QUESTIONNAIRE - PHQ9
10. IF YOU CHECKED OFF ANY PROBLEMS, HOW DIFFICULT HAVE THESE PROBLEMS MADE IT FOR YOU TO DO YOUR WORK, TAKE CARE OF THINGS AT HOME, OR GET ALONG WITH OTHER PEOPLE: NOT DIFFICULT AT ALL
SUM OF ALL RESPONSES TO PHQ QUESTIONS 1-9: 0
SUM OF ALL RESPONSES TO PHQ QUESTIONS 1-9: 0

## 2023-05-10 ASSESSMENT — ANXIETY QUESTIONNAIRES
2. NOT BEING ABLE TO STOP OR CONTROL WORRYING: NOT AT ALL
GAD7 TOTAL SCORE: 0
3. WORRYING TOO MUCH ABOUT DIFFERENT THINGS: NOT AT ALL
GAD7 TOTAL SCORE: 0
8. IF YOU CHECKED OFF ANY PROBLEMS, HOW DIFFICULT HAVE THESE MADE IT FOR YOU TO DO YOUR WORK, TAKE CARE OF THINGS AT HOME, OR GET ALONG WITH OTHER PEOPLE?: NOT DIFFICULT AT ALL
IF YOU CHECKED OFF ANY PROBLEMS ON THIS QUESTIONNAIRE, HOW DIFFICULT HAVE THESE PROBLEMS MADE IT FOR YOU TO DO YOUR WORK, TAKE CARE OF THINGS AT HOME, OR GET ALONG WITH OTHER PEOPLE: NOT DIFFICULT AT ALL
4. TROUBLE RELAXING: NOT AT ALL
7. FEELING AFRAID AS IF SOMETHING AWFUL MIGHT HAPPEN: NOT AT ALL
1. FEELING NERVOUS, ANXIOUS, OR ON EDGE: NOT AT ALL
5. BEING SO RESTLESS THAT IT IS HARD TO SIT STILL: NOT AT ALL
GAD7 TOTAL SCORE: 0
7. FEELING AFRAID AS IF SOMETHING AWFUL MIGHT HAPPEN: NOT AT ALL
6. BECOMING EASILY ANNOYED OR IRRITABLE: NOT AT ALL

## 2023-05-10 NOTE — PROGRESS NOTES
Assessment & Plan     Obesity (BMI 35.0-39.9) with comorbidity   Has tolerated ozempic, however insurance no longer covers it.     Insurance does not always cover this class of medications if it is used for weight loss only.  I encourage you to reach out to your insurance company to see if there is a preferred medication in this class (examples include ozempic, victoza, bydureon, byetta, trulicity).      You should also check to see if there are any prescription savings cards to help with the cost.     Check on Wegovy website for a  coupon or Savings Card      Or try GoodRx or NiceRx.      Reach out if you have any questions or concerns.   If this is not covered, we can try Saxenda (similar class).     If this class of weight loss meds are not covered, can try phentermine (controlled substance, can use for 3 months) or topamax (ok to use long term).       - Semaglutide-Weight Management (WEGOVY) 0.5 MG/0.5ML pen; Inject 0.5 mg Subcutaneous once a week    Gastroesophageal reflux disease without esophagitis  Weight loss would be beneficial for GERD.   - Semaglutide-Weight Management (WEGOVY) 0.5 MG/0.5ML pen; Inject 0.5 mg Subcutaneous once a week      22 minutes spent by me on the date of the encounter doing chart review, history and exam, documentation and further activities per the note       FUTURE APPOINTMENTS:       - Follow-up visit in 7 months annual physical    aGbi Sotelo PA-C  [facility] PILY Alexis is a 53 year old, presenting for the following health issues:  Recheck Medication        3/23/2023    11:07 AM   Additional Questions   Roomed by Capri Shaver CMA   Accompanied by No one     Patient with history of anxiety/depression and was prescribed Ozempic for weight loss arrived to review medications.     History of Present Illness       Reason for visit:  Ozempic is no longer covered by my insurance    She eats 0-1 servings of fruits and vegetables  daily.She consumes 1 sweetened beverage(s) daily.She exercises with enough effort to increase her heart rate 20 to 29 minutes per day.  She exercises with enough effort to increase her heart rate 4 days per week.   She is taking medications regularly.    Today's PHQ-9         PHQ-9 Total Score: 0    PHQ-9 Q9 Thoughts of better off dead/self-harm past 2 weeks :   Not at all    How difficult have these problems made it for you to do your work, take care of things at home, or get along with other people: Not difficult at all  Today's VIKASH-7 Score: 0     Medication Followup of Ozempic     Taking Medication as prescribed: NO- switched insurances at the beginning of the year and is no longer covered, would like to discuss alternative    Side Effects:  None  Medication Helping Symptoms:  Yes - was working great per pt   Has lost 20 lbs in the past year.   Trying to walking    Sometimes will have pain in right upper abdomen (comes and goes about once every 1-2 weeks).      Off gabapentin for a few days this was started due to a brachial plexus injury (on left) that occurred at work.  Has had issues with knee pain and SI joint and gabapentin      Depression and Anxiety Follow-Up    How are you doing with your depression since your last visit? No change    How are you doing with your anxiety since your last visit?  No change    Are you having other symptoms that might be associated with depression or anxiety? No    Have you had a significant life event? No     Do you have any concerns with your use of alcohol or other drugs? No    Social History     Tobacco Use     Smoking status: Never     Smokeless tobacco: Never   Vaping Use     Vaping status: Never Used   Substance Use Topics     Alcohol use: Yes     Comment: rare     Drug use: No         8/23/2021    11:38 AM 12/8/2022    10:57 AM 5/10/2023    10:50 AM   PHQ   PHQ-9 Total Score 5 1 0   Q9: Thoughts of better off dead/self-harm past 2 weeks Not at all Not at all Not at all  "        12/9/2020     3:31 PM 8/23/2021    11:38 AM 5/10/2023    10:51 AM   VIKASH-7 SCORE   Total Score   0 (minimal anxiety)   Total Score 5 4 0         5/10/2023    10:50 AM   Last PHQ-9   1.  Little interest or pleasure in doing things 0   2.  Feeling down, depressed, or hopeless 0   3.  Trouble falling or staying asleep, or sleeping too much 0   4.  Feeling tired or having little energy 0   5.  Poor appetite or overeating 0   6.  Feeling bad about yourself 0   7.  Trouble concentrating 0   8.  Moving slowly or restless 0   Q9: Thoughts of better off dead/self-harm past 2 weeks 0   PHQ-9 Total Score 0         5/10/2023    10:51 AM   VIKASH-7    1. Feeling nervous, anxious, or on edge 0   2. Not being able to stop or control worrying 0   3. Worrying too much about different things 0   4. Trouble relaxing 0   5. Being so restless that it is hard to sit still 0   6. Becoming easily annoyed or irritable 0   7. Feeling afraid, as if something awful might happen 0   VIKASH-7 Total Score 0   If you checked any problems, how difficult have they made it for you to do your work, take care of things at home, or get along with other people? Not difficult at all       Suicide Assessment Five-step Evaluation and Treatment (SAFE-T)          Review of Systems   Constitutional, HEENT, cardiovascular, pulmonary, gi and gu systems are negative, except as otherwise noted.      Objective    /70 (BP Location: Right arm, Patient Position: Chair, Cuff Size: Adult Large)   Pulse 81   Temp 98  F (36.7  C) (Tympanic)   Resp 16   Ht 1.6 m (5' 2.99\")   Wt 95.3 kg (210 lb)   LMP  (Approximate)   SpO2 96%   BMI 37.21 kg/m    Body mass index is 37.21 kg/m .  Physical Exam   GENERAL: healthy, alert and no distress                    "

## 2023-05-10 NOTE — PATIENT INSTRUCTIONS
GLP-1 agonist medications are prescribed to help manage diabetes and also for weight loss.      Serious (but uncommon) reactions including pancreatitis, anaphylaxis, papillary thyroid cancer, thyroid cell and medullary thyroid carcinoma risk discussed. If you or a family member have a history of thyroid malignancy, this medication is not advisable.     Most people do quite well with this class of medication and more common side effects are typically GI (upset stomach, diarrhea, nausea).  We taper you up onto the medication slowly to reduce these side effects.     Insurance does not always cover this class of medications if it is used for weight loss only.  I encourage you to reach out to your insurance company to see if there is a preferred medication in this class (examples include ozempic, victoza, bydureon, byetta, trulicity).      You should also check to see if there are any prescription savings cards to help with the cost.     Check on Wegovy website for a  coupon or Savings Card      Or try GoodRx or NiceRx.      Reach out if you have any questions or concerns.   If this is not covered, we can try Saxenda (similar class).     If this class of weight loss meds are not covered, can try phentermine (controlled substance, can use for 3 months) or topamax (ok to use long term).

## 2023-05-10 NOTE — TELEPHONE ENCOUNTER
Prior Authorization Retail Medication Request    Medication/Dose: Wegovy 0.5mg/0.5ml injection  ICD code (if different than what is on RX):    Previously Tried and Failed:   Rationale:     Insurance Name:  [employer]   Insurance ID:  [id number]      Thank you,  Joanie Ferreira [facility]

## 2023-05-16 NOTE — TELEPHONE ENCOUNTER
PA Initiation    Medication: Wegovy 0.5mg/0.5ml injection  Insurance Company: [employer] - Phone [phone] Fax [phone]  Pharmacy Filling the Rx:    Filling Pharmacy Phone:    Filling Pharmacy Fax:    Start Date: 5/16/2023      Vanesa Owen (Tapia: [id number])

## 2023-06-07 NOTE — TELEPHONE ENCOUNTER
Prior Authorization Approval    Medication:    Authorization Effective Date: 5/23/2023  Authorization Expiration Date: 12/19/2023  Approved Dose/Quantity: UD  Reference #: [id number]   Insurance Company: [employer] - [email or web address] [phone] Fax [phone]  Expected CoPay:       CoPay Card Available:      Financial Assistance Needed:   Which Pharmacy is filling the prescription:    Pharmacy Notified:    Patient Notified:

## 2023-06-20 ENCOUNTER — MYC REFILL (OUTPATIENT)
Dept: FAMILY MEDICINE | Facility: CLINIC | Age: 53
End: 2023-06-20
Payer: COMMERCIAL

## 2023-06-20 DIAGNOSIS — E66.01 MORBID OBESITY (H): ICD-10-CM

## 2023-06-20 DIAGNOSIS — K21.9 GASTROESOPHAGEAL REFLUX DISEASE WITHOUT ESOPHAGITIS: ICD-10-CM

## 2023-06-22 ENCOUNTER — MYC MEDICAL ADVICE (OUTPATIENT)
Dept: FAMILY MEDICINE | Facility: CLINIC | Age: 53
End: 2023-06-22
Payer: COMMERCIAL

## 2023-06-22 DIAGNOSIS — F34.1 DYSTHYMIA: ICD-10-CM

## 2023-06-22 DIAGNOSIS — F32.1 MODERATE MAJOR DEPRESSION (H): ICD-10-CM

## 2023-06-22 DIAGNOSIS — R09.81 NASAL CONGESTION: ICD-10-CM

## 2023-06-22 RX ORDER — SERTRALINE HYDROCHLORIDE 100 MG/1
TABLET, FILM COATED ORAL
Qty: 180 TABLET | Refills: 0 | Status: SHIPPED | OUTPATIENT
Start: 2023-06-22 | End: 2023-09-26

## 2023-06-22 RX ORDER — FLUTICASONE PROPIONATE 50 MCG
SPRAY, SUSPENSION (ML) NASAL
Qty: 16 G | Refills: 3 | Status: SHIPPED | OUTPATIENT
Start: 2023-06-22

## 2023-07-24 ENCOUNTER — MYC REFILL (OUTPATIENT)
Dept: FAMILY MEDICINE | Facility: CLINIC | Age: 53
End: 2023-07-24
Payer: COMMERCIAL

## 2023-07-24 DIAGNOSIS — K21.9 GASTROESOPHAGEAL REFLUX DISEASE WITHOUT ESOPHAGITIS: ICD-10-CM

## 2023-07-24 DIAGNOSIS — E66.01 MORBID OBESITY (H): ICD-10-CM

## 2023-08-02 ENCOUNTER — OFFICE VISIT (OUTPATIENT)
Dept: OPTOMETRY | Facility: CLINIC | Age: 53
End: 2023-08-02
Payer: COMMERCIAL

## 2023-08-02 DIAGNOSIS — H02.889 MEIBOMIAN GLAND DYSFUNCTION: ICD-10-CM

## 2023-08-02 DIAGNOSIS — H04.123 DRY EYE SYNDROME OF BOTH EYES: ICD-10-CM

## 2023-08-02 DIAGNOSIS — D31.41 IRIS NEVUS, RIGHT: ICD-10-CM

## 2023-08-02 DIAGNOSIS — H52.223 REGULAR ASTIGMATISM OF BOTH EYES: ICD-10-CM

## 2023-08-02 DIAGNOSIS — H52.4 PRESBYOPIA: ICD-10-CM

## 2023-08-02 DIAGNOSIS — Z01.00 EXAMINATION OF EYES AND VISION: Primary | ICD-10-CM

## 2023-08-02 PROCEDURE — 92015 DETERMINE REFRACTIVE STATE: CPT | Performed by: OPTOMETRIST

## 2023-08-02 PROCEDURE — 92014 COMPRE OPH EXAM EST PT 1/>: CPT | Performed by: OPTOMETRIST

## 2023-08-02 ASSESSMENT — CONF VISUAL FIELD
OS_INFERIOR_NASAL_RESTRICTION: 0
OD_SUPERIOR_TEMPORAL_RESTRICTION: 0
OS_SUPERIOR_TEMPORAL_RESTRICTION: 0
OD_SUPERIOR_NASAL_RESTRICTION: 0
OD_INFERIOR_NASAL_RESTRICTION: 0
OS_NORMAL: 1
OD_INFERIOR_TEMPORAL_RESTRICTION: 0
OS_SUPERIOR_NASAL_RESTRICTION: 0
OS_INFERIOR_TEMPORAL_RESTRICTION: 0
OD_NORMAL: 1

## 2023-08-02 ASSESSMENT — REFRACTION_MANIFEST
OD_AXIS: 160
OS_ADD: +2.25
OD_CYLINDER: +1.25
OD_ADD: +2.25
OD_SPHERE: -1.50
METHOD_AUTOREFRACTION: 1
OS_CYLINDER: +1.25
OS_SPHERE: -0.50
OS_AXIS: 165

## 2023-08-02 ASSESSMENT — KERATOMETRY
OS_K2POWER_DIOPTERS: 43.75
OD_AXISANGLE2_DEGREES: 056
OD_K1POWER_DIOPTERS: 43.75
OD_K2POWER_DIOPTERS: 44.50
OS_K1POWER_DIOPTERS: 43.50
OD_AXISANGLE_DEGREES: 146
OS_AXISANGLE2_DEGREES: 081
OS_AXISANGLE_DEGREES: 171

## 2023-08-02 ASSESSMENT — VISUAL ACUITY
OD_SC+: -2
OD_SC: 20/50
OS_SC+: -1
OS_CC+: -2
OS_SC: 20/30
OD_CC+: -2
CORRECTION_TYPE: GLASSES
OD_CC: 20/20-1
METHOD: SNELLEN - LINEAR
OS_CC: 20/25
OD_CC: 20/20
OS_CC: 20/20-1

## 2023-08-02 ASSESSMENT — TONOMETRY
OS_IOP_MMHG: 20
IOP_METHOD: TONOPEN
OD_IOP_MMHG: 21

## 2023-08-02 ASSESSMENT — CUP TO DISC RATIO
OS_RATIO: 0.2
OD_RATIO: 0.2

## 2023-08-02 ASSESSMENT — EXTERNAL EXAM - RIGHT EYE: OD_EXAM: NORMAL

## 2023-08-02 ASSESSMENT — REFRACTION_WEARINGRX
SPECS_TYPE: PAL
OS_SPHERE: -0.50
OS_AXIS: 165
OD_ADD: +1.75
OD_SPHERE: -1.50
OS_CYLINDER: +1.25
OS_ADD: +1.75
OD_AXIS: 160
OD_CYLINDER: +1.50

## 2023-08-02 ASSESSMENT — SLIT LAMP EXAM - LIDS
COMMENTS: DERMATOCHALASIS, MEIBOMIAN GLAND DYSFUNCTION
COMMENTS: DERMATOCHALASIS, MEIBOMIAN GLAND DYSFUNCTION

## 2023-08-02 ASSESSMENT — EXTERNAL EXAM - LEFT EYE: OS_EXAM: NORMAL

## 2023-08-02 NOTE — PATIENT INSTRUCTIONS
Eyeglass prescription given.  Work space glasses are an option.    Referral to the [facility] eye clinic for baseline photos.    Heat to the eyes daily for 10-15 minutes nightly with warm washcloth or reusable gel masks from the pharmacy or  Feedback heat masks can be purchased at [employer].    Artificial tears- 1 drop both eyes 2-4 x daily.    We Love Eyes- tea tree oil eyelid  foaming cleanser to cleanse eyelids 2 x day or eye make up oil at night.  [email or web address] for amazon .com.    Return in 1 year for a complete eye exam or sooner if needed..    Joel Buck, OD    The affects of the dilating drops last for 4- 6 hours.  You will be more sensitive to light and vision will be blurry up close.  Do not drive if you do not feel comfortable.  Mydriatic sunglasses were given if needed.      Optometry Providers       Clinic Locations                                 Telephone Number   Dr. Lindsay Mata [facility] [phone]     [facility] Hours:                [facility] Hours:       [facility] Hours:   [address]   [address]     [address] [place] [address]   [place] [address]    [place] [address]  Phone: [phone]                    Phone: [phone]     Phone: [phone]                      Monday 8:00-6:00                          Monday 8:00-6:00                          Monday 8:00-6:00              Tuesday 8:00-6:00                          Tuesday 8:00-6:00                          Tuesday 8:00-6:00              Wednesday 8:00-6:00                  Wednesday 8:00-6:00                   Wednesday 8:00-6:00      Thursday 8:00-6:00                        Thursday 8:00-6:00                         Thursday 8:00-6:00            Friday 8:00-5:00                              Friday  8:00-5:00                              Friday 8:00-5:00    [facility] Hours:   [address] [place], [place] [id number] [phone]    Monday 9:00-6:00  Tuesday 9:00-6:00  Wednesday 9:00-6:00  Thursday 9:00-6:00  Friday 9:00-5:00  As always, Thank you for trusting us with your health care needs!

## 2023-08-02 NOTE — PROGRESS NOTES
Chief Complaint   Patient presents with    Annual Eye Exam        Patient has questions on spot on od eye she has had all her life- has not changed.     Last Eye Exam: 7-  Dilated Previously: Yes    What are you currently using to see?  Glasses, patient does not like rx in last pair of glasses,patient struggles with the glasses       Distance Vision Acuity: Noticed gradual change in both eyes    Near Vision Acuity: Not satisfied,small print harder    Eye Comfort: dry  Do you use eye drops? : Yes: systane and refresh not often  Occupation or Hobbies: NICU RN     Cristiane Gleason Optometric Assistant, A.B.O.C.      Medical, surgical and family histories reviewed and updated 8/2/2023.       OBJECTIVE: See Ophthalmology exam    ASSESSMENT:    ICD-10-CM    1. Examination of eyes and vision  Z01.00 EYE EXAM (SIMPLE-NONBILLABLE)      2. Presbyopia  H52.4 REFRACTION      3. Regular astigmatism of both eyes  H52.223 REFRACTION      4. Iris nevus, right  D31.41 EYE EXAM (SIMPLE-NONBILLABLE)     Adult Eye  Referral      5. Meibomian gland dysfunction  H02.889 EYE EXAM (SIMPLE-NONBILLABLE)      6. Dry eye syndrome of both eyes  H04.123 EYE EXAM (SIMPLE-NONBILLABLE)          PLAN:     Patient Instructions   Eyeglass prescription given.  Work space glasses are an option.    Referral to the [facility] eye clinic for baseline photos.    Heat to the eyes daily for 10-15 minutes nightly with warm washcloth or reusable gel masks from the pharmacy or  mangofizz jobs heat masks can be purchased at Amazon.    Artificial tears- 1 drop both eyes 2-4 x daily.    We Love Eyes- tea tree oil eyelid  foaming cleanser to cleanse eyelids 2 x day or eye make up oil at night.  www.[email or web address] for amazon .com.    Return in 1 year for a complete eye exam or sooner if needed..    Joel Buck, OD

## 2023-08-21 ENCOUNTER — MYC MEDICAL ADVICE (OUTPATIENT)
Dept: FAMILY MEDICINE | Facility: CLINIC | Age: 53
End: 2023-08-21
Payer: COMMERCIAL

## 2023-08-21 DIAGNOSIS — E66.01 MORBID OBESITY (H): ICD-10-CM

## 2023-08-21 DIAGNOSIS — K21.9 GASTROESOPHAGEAL REFLUX DISEASE WITHOUT ESOPHAGITIS: ICD-10-CM

## 2023-09-14 ENCOUNTER — MYC REFILL (OUTPATIENT)
Dept: FAMILY MEDICINE | Facility: CLINIC | Age: 53
End: 2023-09-14
Payer: COMMERCIAL

## 2023-09-14 DIAGNOSIS — K21.9 GASTROESOPHAGEAL REFLUX DISEASE WITHOUT ESOPHAGITIS: ICD-10-CM

## 2023-09-14 DIAGNOSIS — E66.01 MORBID OBESITY (H): ICD-10-CM

## 2023-09-26 ENCOUNTER — MYC REFILL (OUTPATIENT)
Dept: FAMILY MEDICINE | Facility: CLINIC | Age: 53
End: 2023-09-26
Payer: COMMERCIAL

## 2023-09-26 DIAGNOSIS — F32.1 MODERATE MAJOR DEPRESSION (H): ICD-10-CM

## 2023-09-26 DIAGNOSIS — F34.1 DYSTHYMIA: ICD-10-CM

## 2023-09-26 DIAGNOSIS — F41.9 ANXIETY: ICD-10-CM

## 2023-09-26 RX ORDER — BUSPIRONE HYDROCHLORIDE 5 MG/1
TABLET ORAL
Qty: 180 TABLET | Refills: 1 | Status: SHIPPED | OUTPATIENT
Start: 2023-09-26 | End: 2024-07-29

## 2023-09-26 RX ORDER — SERTRALINE HYDROCHLORIDE 100 MG/1
200 TABLET, FILM COATED ORAL DAILY
Qty: 180 TABLET | Refills: 0 | Status: SHIPPED | OUTPATIENT
Start: 2023-09-26 | End: 2023-12-27

## 2023-09-26 RX ORDER — SERTRALINE HYDROCHLORIDE 100 MG/1
TABLET, FILM COATED ORAL
Qty: 180 TABLET | Refills: 0 | OUTPATIENT
Start: 2023-09-26

## 2023-10-16 ENCOUNTER — MYC MEDICAL ADVICE (OUTPATIENT)
Dept: FAMILY MEDICINE | Facility: CLINIC | Age: 53
End: 2023-10-16
Payer: COMMERCIAL

## 2023-10-29 ASSESSMENT — PATIENT HEALTH QUESTIONNAIRE - PHQ9
10. IF YOU CHECKED OFF ANY PROBLEMS, HOW DIFFICULT HAVE THESE PROBLEMS MADE IT FOR YOU TO DO YOUR WORK, TAKE CARE OF THINGS AT HOME, OR GET ALONG WITH OTHER PEOPLE: SOMEWHAT DIFFICULT
SUM OF ALL RESPONSES TO PHQ QUESTIONS 1-9: 6
SUM OF ALL RESPONSES TO PHQ QUESTIONS 1-9: 6

## 2023-10-30 ENCOUNTER — OFFICE VISIT (OUTPATIENT)
Dept: FAMILY MEDICINE | Facility: CLINIC | Age: 53
End: 2023-10-30
Payer: COMMERCIAL

## 2023-10-30 VITALS
BODY MASS INDEX: 37.21 KG/M2 | TEMPERATURE: 98.5 F | SYSTOLIC BLOOD PRESSURE: 118 MMHG | HEIGHT: 63 IN | RESPIRATION RATE: 16 BRPM | DIASTOLIC BLOOD PRESSURE: 72 MMHG | HEART RATE: 90 BPM | OXYGEN SATURATION: 99 % | WEIGHT: 210 LBS

## 2023-10-30 DIAGNOSIS — E66.812 CLASS 2 OBESITY WITHOUT SERIOUS COMORBIDITY WITH BODY MASS INDEX (BMI) OF 39.0 TO 39.9 IN ADULT, UNSPECIFIED OBESITY TYPE: ICD-10-CM

## 2023-10-30 DIAGNOSIS — J06.9 VIRAL UPPER RESPIRATORY TRACT INFECTION: ICD-10-CM

## 2023-10-30 DIAGNOSIS — R07.0 THROAT PAIN: ICD-10-CM

## 2023-10-30 DIAGNOSIS — M15.0 PRIMARY OSTEOARTHRITIS INVOLVING MULTIPLE JOINTS: Primary | ICD-10-CM

## 2023-10-30 DIAGNOSIS — M65.30 TRIGGER FINGER, ACQUIRED: ICD-10-CM

## 2023-10-30 LAB
DEPRECATED S PYO AG THROAT QL EIA: NEGATIVE
GROUP A STREP BY PCR: NOT DETECTED

## 2023-10-30 PROCEDURE — 87651 STREP A DNA AMP PROBE: CPT | Performed by: PHYSICIAN ASSISTANT

## 2023-10-30 PROCEDURE — 87635 SARS-COV-2 COVID-19 AMP PRB: CPT | Performed by: PHYSICIAN ASSISTANT

## 2023-10-30 PROCEDURE — 99214 OFFICE O/P EST MOD 30 MIN: CPT | Performed by: PHYSICIAN ASSISTANT

## 2023-10-30 NOTE — PATIENT INSTRUCTIONS
Trigger finger: I suggest using a finger splint at night.  Limit repetitive/overuse of the hand/finger.  Daily stretching.     Other joint pains:  stretching, heat, massage.  ibuprofen 600-800 mg twice per day for 7-10 days.   May try glucosamine (joint supplement).     Weight loss: will try higher dose of wegovy.  May look into weight watchers sequence program.

## 2023-10-30 NOTE — PROGRESS NOTES
Assessment & Plan   (M15.9) Primary osteoarthritis involving multiple joints  (primary encounter diagnosis)  Comment:     Reviewed previous xray of hip from about 2 years ago which showed arthritis then.   Discussed management of osteoarthritis with stretching, heat, massage.  Recommend ibuprofen 600-800 mg twice per day for 7-10 days.  Topical arthritis creams advised.   May try glucosamine (joint supplement).   Plan:     (M65.30) Trigger finger, acquired  Comment:     I discussed the pathophysiology of this condition as well as treatment options.  I advised her to rest the area (no excessive repetitive movements), apply ice and use anti-inflammatory medications to help reduce inflammation of the tendon.  We did discuss the use of steroid injections to help reduce the swelling, patient may f/u with ortho for that if necessary.  Patient education materials given during examination today.    Plan:     (J06.9) Viral upper respiratory tract infection  Comment: URI (Upper Respiratory Infection)  (primary encounter diagnosis)    I discussed the pathophysiology of upper respiratory infections and likely viral etiology.   Discussed general respiratory tract infection care including importance of hydration, rest, over the counter therapies and techniques to prevent future infection as well as transmission to others.  Discussed signs or symptoms that would indicate need for recheck.    Patient was instructed to f/u or call if symptoms worsen or fail to improve as anticipated.   Plan: Symptomatic COVID-19 Virus (Coronavirus) by PCR        Nose            (R07.0) Throat pain  Comment: Rapid strep was neg, will call if culture is positive   Plan: Streptococcus A Rapid Screen w/Reflex to PCR -         Clinic Collect, Group A Streptococcus PCR         Throat Swab, Symptomatic COVID-19 Virus         (Coronavirus) by PCR Nose            (E66.9,  Z68.39) Class 2 obesity without serious comorbidity with body mass index (BMI) of  "39.0 to 39.9 in adult, unspecified obesity type  Comment: she has been unable to  script due to shortage.  Weight loss will help with osteoarthritis as well.  Will try higher dose (1 mg) as she tolerated 0.5 mg dose fine.    Plan: Semaglutide-Weight Management (WEGOVY) 1         MG/0.5ML pen                  35 minutes spent by me on the date of the encounter doing chart review, history and exam, documentation and further activities per the note       BMI:   Estimated body mass index is 37.21 kg/m  as calculated from the following:    Height as of this encounter: 1.6 m (5' 2.99\").    Weight as of this encounter: 95.3 kg (210 lb).   Weight management plan: Discussed healthy diet and exercise guidelines        Gabi Sotelo PA-C  [facility] PILY Alexis is a 53 year old, presenting for the following health issues:  Musculoskeletal Problem      Patient arrived to discuss ongoing Right Hip Pain. Patient has seen Chiropractor and PT for hip with no long term relief, not taking any OTC pain relievers.     Patient also having what she describes as a Sinus Infection, some sore throat, denies cough or fever, has been taking Clariten D every 12 hours and Flonase.   Symptoms present for 3 days, pain in frontal and maxillary sinuses.     Collected Strep Swab.    History of Present Illness       Reason for visit:  Numbness is fingers, interm. Rt. wrist pain,Out of Wegovy with weight gain. sharp pains down legs,  Symptom onset:  More than a month  Symptoms include:  Listed above  Symptom intensity:  Moderate  Symptom progression:  Worsening  Had these symptoms before:  Yes  Has tried/received treatment for these symptoms:  No  What makes it worse:  Work. standing long periods of time, bottling babies  What makes it better:  Luan consumes 2 sweetened beverage(s) daily.She exercises with enough effort to increase her heart rate 9 or less minutes per day.  She exercises with enough " "effort to increase her heart rate 3 or less days per week.   She is taking medications regularly.     Right hand and wrist pain (has had physical therapy in the past for her hand).  She is waking up with numbness in the morning.   Right index finger is starting to get stuck in flexed position.  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  Also with some neck pain and did physical therapy for that.     Has not used many meds to help with this.      Also with hip pain that has been present since her son.   Sometimes will feel like hip pop's out and feels \"crepity\" in many joints.      Still unable to get wegovy from pharmacy.                Review of Systems   Constitutional, HEENT, cardiovascular, pulmonary, GI, , musculoskeletal, neuro, skin, endocrine and psych systems are negative, except as otherwise noted.      Objective    /72 (BP Location: Right arm, Patient Position: Chair, Cuff Size: Adult Large)   Pulse 90   Temp 98.5  F (36.9  C) (Tympanic)   Resp 16   Ht 1.6 m (5' 2.99\")   Wt 95.3 kg (210 lb)   SpO2 99%   BMI 37.21 kg/m    Body mass index is 37.21 kg/m .  Physical Exam   GENERAL: alert and no distress, appears to be feeling slightly sick/run down.  HENT: ear canals and TM's normal, nose and mouth without ulcers or lesions  NECK: no adenopathy, no asymmetry, masses, or scars and thyroid normal to palpation, no tenderness over sinuses noted.  RESP: lungs clear to auscultation - no rales, rhonchi or wheezes  CV: regular rate and rhythm, normal S1 S2, no S3 or S4, no murmur, click or rub, no peripheral edema and peripheral pulses strong  ABDOMEN: soft, nontender, no hepatosplenomegaly, no masses and bowel sounds normal  MS: no gross musculoskeletal defects noted, no edema  Musculoskeletal: normal gait.  Full ROM and 5/5 motor strength bilateral upper and lower extremities.  No kykphosis, lordosis and full ROM of lumbar spine.     Hip examination: normal, full ROM (tightness in " muscles noted).   No erythema, inflammation or warmth noted.   Dorsalis pedis pulses intact bilaterally.  Neurologic:  Deep tendon reflexes 2+/4 and symmetrical.  Sensation intact bilateral upper and lower extremities.  Negative straight leg raise bilaterally.      Wrist:  bilateral wrist swith no swelling or inflammation noted.  Full ROM and strength of wrist and elbow.  No bony abnormalities noted.   Tinnels:  negative  Phalens: negative    Radial pulses and capillary refill normal.     Hand:  full ROM of fingers, no clicking or sticking noted.     Results for orders placed or performed in visit on 10/30/23 (from the past 24 hour(s))   Streptococcus A Rapid Screen w/Reflex to PCR - Clinic Collect    Specimen: Throat; Swab   Result Value Ref Range    Group A Strep antigen Negative Negative

## 2023-10-31 LAB — SARS-COV-2 RNA RESP QL NAA+PROBE: NEGATIVE

## 2023-11-12 ENCOUNTER — TRANSFERRED RECORDS (OUTPATIENT)
Dept: HEALTH INFORMATION MANAGEMENT | Facility: CLINIC | Age: 53
End: 2023-11-12
Payer: COMMERCIAL

## 2023-12-11 ENCOUNTER — OFFICE VISIT (OUTPATIENT)
Dept: ORTHOPEDICS | Facility: CLINIC | Age: 53
End: 2023-12-11
Payer: COMMERCIAL

## 2023-12-11 ENCOUNTER — ANCILLARY PROCEDURE (OUTPATIENT)
Dept: GENERAL RADIOLOGY | Facility: CLINIC | Age: 53
End: 2023-12-11
Attending: PEDIATRICS
Payer: COMMERCIAL

## 2023-12-11 VITALS — WEIGHT: 210 LBS | BODY MASS INDEX: 38.64 KG/M2 | HEIGHT: 62 IN

## 2023-12-11 DIAGNOSIS — G89.29 CHRONIC PAIN OF LEFT KNEE: ICD-10-CM

## 2023-12-11 DIAGNOSIS — G89.29 CHRONIC PAIN OF LEFT KNEE: Primary | ICD-10-CM

## 2023-12-11 DIAGNOSIS — M25.562 CHRONIC PAIN OF LEFT KNEE: ICD-10-CM

## 2023-12-11 DIAGNOSIS — E66.812 CLASS 2 OBESITY WITHOUT SERIOUS COMORBIDITY WITH BODY MASS INDEX (BMI) OF 39.0 TO 39.9 IN ADULT, UNSPECIFIED OBESITY TYPE: ICD-10-CM

## 2023-12-11 DIAGNOSIS — M25.562 CHRONIC PAIN OF LEFT KNEE: Primary | ICD-10-CM

## 2023-12-11 PROCEDURE — 73562 X-RAY EXAM OF KNEE 3: CPT | Mod: TC | Performed by: RADIOLOGY

## 2023-12-11 PROCEDURE — 99214 OFFICE O/P EST MOD 30 MIN: CPT | Performed by: PEDIATRICS

## 2023-12-11 RX ORDER — SEMAGLUTIDE 1 MG/.5ML
1 INJECTION, SOLUTION SUBCUTANEOUS
Qty: 2 ML | Refills: 1 | Status: SHIPPED | OUTPATIENT
Start: 2023-12-11 | End: 2024-02-14

## 2023-12-11 NOTE — LETTER
12/11/2023         RE: Vanesa Owen  [address]  [place] [address]        Dear Colleague,    Thank you for referring your patient, Vanesa Owen, to the [facility] [place]. Please see a copy of my visit note below.    ASSESSMENT & PLAN    Vanesa was seen today for pain.    Diagnoses and all orders for this visit:    Chronic pain of left knee  -     XR Knee Standing AP [place] Bilat Lat Left; Future  -     MR Knee Left w/o Contrast; Future        See Patient Instructions  Patient Instructions   Reviewed nature of the left knee issues.  Suspect more patellofemoral in nature, with sense of instability potentially more of a functional issue.  However, with joint line tenderness, description of swelling, areas of pain and ongoing for some time, structural issues not excluded.  We discussed considerations around additional imaging with MRI, rehab approach, use of compression/support.  We also briefly discussed anti-inflammatory medications and potential for an injection.  Following discussion, plan MRI of the left knee next given duration of symptoms and assessment noted above.  Simple home exercises reviewed today, working on motion, stretching to begin.    Advanced imaging is done by appointment. Please call [facility] ([facility]/[place]) [phone] , [facility] ([facility]/[place]) [phone] , or [facility] ([facility]) [phone]  to schedule your MRI.     Some insurance companies may require a prior authorization to be completed which can delay the time until you are able to schedule your appointment.       If you are active on [employer], you may have access to your test results before your provider is able to review the study and advise on next steps.      Please make a follow up appointment in the clinic no sooner than 2 days following your MRI by calling [phone].   "Alternatively, if interested in phone or [employer] message with results and potential next steps, contact clinic after study has been completed.        If you have any further questions for your physician or physician s care team you can contact them thru [employer] or by calling [phone].      DO MEHDI [facility]    -----  Chief Complaint   Patient presents with     Left Knee - Pain       SUBJECTIVE  Vanesa Owen is a/an 53 year old female who is seen as a self referral for evaluation of left knee pain.     The patient is seen by themselves.    Onset: couple years(s) ago, worsening. Reports insidious onset without acute precipitating event.  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  Additional history:  Pain more lateral, anterior/inferior, and tightness posteriorly.  With this episode has increased over past ~3 months.  ? Mild swelling.  + left knee joint noise, but does not change pain.  Notes left knee buckling more with standing at work, and can feel like knee will buckle; not necessarily painful at the time.  Currently knee is more stiff than has been in past.  Limping.      REVIEW OF SYSTEMS:  Review of Systems    OBJECTIVE:  Ht 1.575 m (5' 2\")   Wt 95.3 kg (210 lb)   BMI 38.41 kg/m     General: healthy, alert and in no distress  Skin: no suspicious lesions or rash.  CV: distal perfusion " intact   Resp: normal respiratory effort without conversational dyspnea   Psych: normal mood and affect  Gait: mild antalgic  Neuro: Normal light sensory exam of extremity       Left Knee exam    Inspection:   Appearance of trace effusion   no ecchymosis    ROM:      Full active and passive ROM with flexion and extension    Patellar Motion:      Crepitus noted in the patellofemoral joint slight roughness bilat    Tender:      lateral patellar border       medial joint line       lateral joint line mild       Some pain with patellar translation    Special Tests:      some pain with Osmin       neg (-) Lachman       neg (-) anterior drawer       neg (-) posterior drawer       neg (-) varus for laxity, some lateral pain       neg (-) valgus       No change in pain with forced extension        RADIOLOGY:  Final results and radiologist's interpretation, available in the [facility] health record.  Images were reviewed with the patient in the office today.  My personal interpretation of the performed imaging: no acute bony abnormality noted. Mild hypertrophic change about the patella.      XR Knee Standing AP [place] Bilat Lat Left    Narrative    XR KNEE STANDING AP [place] BILAT LAT LEFT   12/11/2023 11:01 AM     HISTORY: Chronic pain of left knee; Chronic pain of left knee  COMPARISON: None.       Impression    IMPRESSION: No fracture. Minimal patellofemoral degenerative  arthritis. No effusion.    WANDA BARBOZA MD         SYSTEM ID:  [id number]                    Again, thank you for allowing me to participate in the care of your patient.        Sincerely,        Manfred Bass DO

## 2023-12-11 NOTE — PATIENT INSTRUCTIONS
Reviewed nature of the left knee issues.  Suspect more patellofemoral in nature, with sense of instability potentially more of a functional issue.  However, with joint line tenderness, description of swelling, areas of pain and ongoing for some time, structural issues not excluded.  We discussed considerations around additional imaging with MRI, rehab approach, use of compression/support.  We also briefly discussed anti-inflammatory medications and potential for an injection.  Following discussion, plan MRI of the left knee next given duration of symptoms and assessment noted above.  Simple home exercises reviewed today, working on motion, stretching to begin.    Advanced imaging is done by appointment. Please call [facility] ([facility]/[place]) [phone] , [facility] ([facility]/[place]) [phone] , or [facility] ([facility]) [phone]  to schedule your MRI.     Some insurance companies may require a prior authorization to be completed which can delay the time until you are able to schedule your appointment.       If you are active on [employer], you may have access to your test results before your provider is able to review the study and advise on next steps.      Please make a follow up appointment in the clinic no sooner than 2 days following your MRI by calling [phone].  Alternatively, if interested in phone or [employer] message with results and potential next steps, contact clinic after study has been completed.        If you have any further questions for your physician or physician s care team you can contact them thru [employer] or by calling [phone].

## 2023-12-11 NOTE — PROGRESS NOTES
ASSESSMENT & PLAN    Vanesa was seen today for pain.    Diagnoses and all orders for this visit:    Chronic pain of left knee  -     XR Knee Standing AP [place] Bilat Lat Left; Future  -     MR Knee Left w/o Contrast; Future        See Patient Instructions  Patient Instructions   Reviewed nature of the left knee issues.  Suspect more patellofemoral in nature, with sense of instability potentially more of a functional issue.  However, with joint line tenderness, description of swelling, areas of pain and ongoing for some time, structural issues not excluded.  We discussed considerations around additional imaging with MRI, rehab approach, use of compression/support.  We also briefly discussed anti-inflammatory medications and potential for an injection.  Following discussion, plan MRI of the left knee next given duration of symptoms and assessment noted above.  Simple home exercises reviewed today, working on motion, stretching to begin.    Advanced imaging is done by appointment. Please call [facility] ([facility]/[place]) [phone] , [facility] ([facility]/[place]) [phone] , or [facility] ([facility]) [phone]  to schedule your MRI.     Some insurance companies may require a prior authorization to be completed which can delay the time until you are able to schedule your appointment.       If you are active on [employer], you may have access to your test results before your provider is able to review the study and advise on next steps.      Please make a follow up appointment in the clinic no sooner than 2 days following your MRI by calling [phone].  Alternatively, if interested in phone or memloom message with results and potential next steps, contact clinic after study has been completed.        If you have any further questions for your physician or physician s care team you can contact them thru [employer]loom or by calling  "[phone].      Manfred Bass DO  [facility]    -----  Chief Complaint   Patient presents with    Left Knee - Pain       SUBJECTIVE  Vanesa Owen is a/an 53 year old female who is seen as a self referral for evaluation of left knee pain.     The patient is seen by themselves.    Onset: couple years(s) ago, worsening. Reports insidious onset without acute precipitating event.  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  Additional history:  Pain more lateral, anterior/inferior, and tightness posteriorly.  With this episode has increased over past ~3 months.  ? Mild swelling.  + left knee joint noise, but does not change pain.  Notes left knee buckling more with standing at work, and can feel like knee will buckle; not necessarily painful at the time.  Currently knee is more stiff than has been in past.  Limping.      REVIEW OF SYSTEMS:  Review of Systems    OBJECTIVE:  Ht 1.575 m (5' 2\")   Wt 95.3 kg (210 lb)   BMI 38.41 kg/m     General: healthy, alert and in no distress  Skin: no suspicious lesions or rash.  CV: distal perfusion intact   Resp: normal respiratory effort without conversational dyspnea   Psych: normal mood and affect  Gait: mild antalgic  Neuro: Normal light sensory exam of extremity       Left Knee exam    Inspection:   Appearance of trace effusion   no ecchymosis    ROM:      Full active " and passive ROM with flexion and extension    Patellar Motion:      Crepitus noted in the patellofemoral joint slight roughness bilat    Tender:      lateral patellar border       medial joint line       lateral joint line mild       Some pain with patellar translation    Special Tests:      some pain with Osmin       neg (-) Lachman       neg (-) anterior drawer       neg (-) posterior drawer       neg (-) varus for laxity, some lateral pain       neg (-) valgus       No change in pain with forced extension        RADIOLOGY:  Final results and radiologist's interpretation, available in the [facility] health record.  Images were reviewed with the patient in the office today.  My personal interpretation of the performed imaging: no acute bony abnormality noted. Mild hypertrophic change about the patella.      XR Knee Standing AP [place] Bilat Lat Left    Narrative    XR KNEE STANDING AP [place] BILAT LAT LEFT   12/11/2023 11:01 AM     HISTORY: Chronic pain of left knee; Chronic pain of left knee  COMPARISON: None.       Impression    IMPRESSION: No fracture. Minimal patellofemoral degenerative  arthritis. No effusion.    WANDA BARBOZA MD         SYSTEM ID:  [id number]

## 2023-12-12 ENCOUNTER — TELEPHONE (OUTPATIENT)
Dept: FAMILY MEDICINE | Facility: CLINIC | Age: 53
End: 2023-12-12
Payer: COMMERCIAL

## 2023-12-12 NOTE — TELEPHONE ENCOUNTER
Central Prior Authorization Team   Phone: [phone]    PRIOR AUTHORIZATION DENIED    Medication: WEGOVY 1 MG/0.5ML SC SOAJ  Insurance Company: [employer] - Phone [phone] Fax [phone]  Denial Date: 12/12/2023  Denial Reason(s): CONTINUATION OF COVERAGE REQUIRES A 5% OR GREATER WEIGHT LOSS FROM BASELINE      Appeal Information: IF PROVIDER WOULD LIKE TO APPEAL THIS DECISION PLEASE PROVIDE THE PA TEAM WITH A LETTER OF MEDICAL NECESSITY      Patient Notified: No

## 2023-12-14 NOTE — TELEPHONE ENCOUNTER
Please call Vanesa,   Recheck on weight loss is needed for continued coverage of your medication (per insurance).   Please schedule a visit (phone/video ok if she can weigh herself prior to the appt) or office visit.     Gabi Sotelo PA-C

## 2023-12-26 ENCOUNTER — ANCILLARY PROCEDURE (OUTPATIENT)
Dept: MRI IMAGING | Facility: CLINIC | Age: 53
End: 2023-12-26
Attending: PEDIATRICS
Payer: COMMERCIAL

## 2023-12-26 DIAGNOSIS — F32.1 MODERATE MAJOR DEPRESSION (H): ICD-10-CM

## 2023-12-26 DIAGNOSIS — M25.562 CHRONIC PAIN OF LEFT KNEE: ICD-10-CM

## 2023-12-26 DIAGNOSIS — F34.1 DYSTHYMIA: ICD-10-CM

## 2023-12-26 DIAGNOSIS — K31.89 GASTRIC HYPERACIDITY: ICD-10-CM

## 2023-12-26 DIAGNOSIS — G89.29 CHRONIC PAIN OF LEFT KNEE: ICD-10-CM

## 2023-12-27 RX ORDER — OMEPRAZOLE 40 MG/1
CAPSULE, DELAYED RELEASE ORAL
Qty: 90 CAPSULE | Refills: 0 | Status: SHIPPED | OUTPATIENT
Start: 2023-12-27 | End: 2024-03-20

## 2023-12-27 RX ORDER — SERTRALINE HYDROCHLORIDE 100 MG/1
200 TABLET, FILM COATED ORAL DAILY
Qty: 180 TABLET | Refills: 0 | Status: SHIPPED | OUTPATIENT
Start: 2023-12-27 | End: 2024-03-20

## 2024-01-03 ENCOUNTER — VIRTUAL VISIT (OUTPATIENT)
Dept: FAMILY MEDICINE | Facility: CLINIC | Age: 54
End: 2024-01-03
Payer: COMMERCIAL

## 2024-01-03 DIAGNOSIS — M54.50 LUMBAR BACK PAIN: ICD-10-CM

## 2024-01-03 DIAGNOSIS — E66.01 MORBID OBESITY (H): Primary | ICD-10-CM

## 2024-01-03 PROCEDURE — 99442 PR PHYSICIAN TELEPHONE EVALUATION 11-20 MIN: CPT | Performed by: PHYSICIAN ASSISTANT

## 2024-01-03 RX ORDER — PHENTERMINE HYDROCHLORIDE 15 MG/1
15 CAPSULE ORAL EVERY MORNING
Qty: 30 CAPSULE | Refills: 0 | Status: SHIPPED | OUTPATIENT
Start: 2024-01-03 | End: 2024-02-14 | Stop reason: SINTOL

## 2024-01-03 NOTE — PROGRESS NOTES
Vanesa is a 53 year old who is being evaluated via a billable telephone visit.      What phone number would you like to be contacted at? [phone]   How would you like to obtain your AVS? Cristóbal    Distant Location (provider location):  On-site    Assessment & Plan     Obesity (BMI 35.0-39.9) with comorbidity (H)  We discussed alternative weight loss med options, such as phentermine or topamax.    She was interested in starting phentermine.     We had a long discussion about utilizing medication for weight loss and the importance of lifestyle changes while on medication.  We discussed possible side effects of phentermine, including but not limited to elevated blood pressure, tachycardia, dizziness, headache, or palpitations.  Patient verbalized understanding of the risks and would like to proceed with medication.     - phentermine (ADIPEX-P) 15 MG capsule; Take 1 capsule (15 mg) by mouth every morning    Lumbar back pain  Back pain would improve with weight loss.   - phentermine (ADIPEX-P) 15 MG capsule; Take 1 capsule (15 mg) by mouth every morning      20 minutes spent by me on the date of the encounter doing chart review, history and exam, documentation and further activities per the note           SAMSON [facility] Alexis is a 53 year old, presenting for the following health issues:  Weight Problem        1/3/2024     9:09 AM   Additional Questions   Roomed by Peggy   Accompanied by Self         1/3/2024     9:09 AM   Patient Reported Additional Medications   Patient reports taking the following new medications na       HPI     Medication Followup of Wegovy  Taking Medication as prescribed: Yes - Taking as prescribed but pt received letter in mail stating Wegovy will no longer be covered in 2024, pt wondering what her next steps are.   Side Effects:  None  Medication Helping Symptoms:  Yes    Recorded weight from 12/11/23; 210lbs  Todays pt reported  weight; 195lbs    She has 3 more doses of Wegovy and insurance will not cover unless BMI > 40.      Previous BMI on 4/21/2022 was 40.76.  Current BMI 38.41.     She does also work on eating a healthy diet and staying active.           Review of Systems   Constitutional, HEENT, cardiovascular, pulmonary, gi and gu systems are negative, except as otherwise noted.      Objective           Vitals:  No vitals were obtained today due to virtual visit.    Physical Exam   healthy, alert, and no distress  PSYCH: Alert and oriented times 3; coherent speech, normal   rate and volume, able to articulate logical thoughts, able   to abstract reason, no tangential thoughts, no hallucinations   or delusions  Her affect is normal  RESP: No cough, no audible wheezing, able to talk in full sentences  Remainder of exam unable to be completed due to telephone visits                Phone call duration: 16 minutes

## 2024-01-08 ENCOUNTER — TRANSFERRED RECORDS (OUTPATIENT)
Dept: HEALTH INFORMATION MANAGEMENT | Facility: CLINIC | Age: 54
End: 2024-01-08
Payer: COMMERCIAL

## 2024-01-18 ENCOUNTER — TELEPHONE (OUTPATIENT)
Dept: ORTHOPEDICS | Facility: CLINIC | Age: 54
End: 2024-01-18
Payer: COMMERCIAL

## 2024-01-18 NOTE — TELEPHONE ENCOUNTER
MR left knee without contrast 12/26/2023 2:48 PM     Techniques: Multiplanar multisequence imaging of the left knee was  obtained without administration of intra-articular or intravenous  contrast using routing protocol.     History: medial knee tenderness, lateral and posterior knee pain;  Chronic pain of left knee; Chronic pain of left knee     Comparison: 12/11/2023     Findings:     Motion partially degrading images.     MENISCI:  Medial meniscus: High-grade tearing of the posterior root ligament of  the medial meniscus and horizontal type tear of the body and posterior  horn of medial meniscus, likely degenerative.  Lateral meniscus: Intact.     LIGAMENTS  Cruciate ligaments: Intact.  Medial supporting structures: Intact.  Lateral supporting structures: Fluid extending along the  posterolateral joint capsule, compatible with posterior lateral  capsular tear/defect presence. Moderate grade sprain fibular  collateral ligament. Biceps femoris tendon, conjoined tendon,  popliteal fibular ligament, anterior iliotibial band are intact.     EXTENSOR MECHANISM  Intact. Nonspecific edema suprapatellar fat pad without associated  posterior convexity. Anterior predominant subcutaneous edema.     FLUID  Small joint effusion. No substantial Baker's cyst. Trace pes anserine  and tibial collateral ligament bursal fluid     OSSEOUS and ARTICULAR STRUCTURES  Bones: No fracture, contusion, or osseous lesion is seen. Mild  osteophytosis. Small subcortical cystic change at the lateral meniscus  anterior root ligament attachment.     Patellofemoral compartment: Low-grade chondral loss of the lateral  patellar facet articular cartilage. Moderate grade central trochlear  cartilage loss. Likely small delamination at the inferior aspect of  medial trochlea.     Medial compartment: Signal heterogeneity with apparent areas of  low-grade chondral loss in the medial femoral condyle.     Lateral compartment: No high-grade hyaline cartilage  disease.     ANCILLARY FINDINGS  All the bifurcation of the popliteal artery with anterior tibial  artery coursing deep to the popliteus muscle belly.                                                                      Impression:  1. High-grade tearing of the posterior root ligament of the medial  meniscus and horizontal type tear of the body and posterior horn of  medial meniscus, likely degenerative.  2. Posterolateral capsular tear/defect.  3. 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      PARRISH ESQUIVEL

## 2024-02-03 ENCOUNTER — HEALTH MAINTENANCE LETTER (OUTPATIENT)
Age: 54
End: 2024-02-03

## 2024-02-14 ENCOUNTER — OFFICE VISIT (OUTPATIENT)
Dept: FAMILY MEDICINE | Facility: CLINIC | Age: 54
End: 2024-02-14
Payer: COMMERCIAL

## 2024-02-14 ENCOUNTER — MYC MEDICAL ADVICE (OUTPATIENT)
Dept: FAMILY MEDICINE | Facility: CLINIC | Age: 54
End: 2024-02-14

## 2024-02-14 VITALS
SYSTOLIC BLOOD PRESSURE: 122 MMHG | BODY MASS INDEX: 35.05 KG/M2 | OXYGEN SATURATION: 96 % | WEIGHT: 197.8 LBS | RESPIRATION RATE: 18 BRPM | TEMPERATURE: 96.9 F | HEIGHT: 63 IN | DIASTOLIC BLOOD PRESSURE: 70 MMHG | HEART RATE: 104 BPM

## 2024-02-14 DIAGNOSIS — Z00.00 ROUTINE GENERAL MEDICAL EXAMINATION AT A HEALTH CARE FACILITY: Primary | ICD-10-CM

## 2024-02-14 DIAGNOSIS — Z12.4 CERVICAL CANCER SCREENING: ICD-10-CM

## 2024-02-14 DIAGNOSIS — K21.9 GASTROESOPHAGEAL REFLUX DISEASE WITHOUT ESOPHAGITIS: ICD-10-CM

## 2024-02-14 DIAGNOSIS — N89.8 VAGINAL ODOR: ICD-10-CM

## 2024-02-14 DIAGNOSIS — R82.90 ABNORMAL URINE ODOR: Primary | ICD-10-CM

## 2024-02-14 DIAGNOSIS — Z12.31 VISIT FOR SCREENING MAMMOGRAM: ICD-10-CM

## 2024-02-14 DIAGNOSIS — Z13.6 CARDIOVASCULAR SCREENING; LDL GOAL LESS THAN 160: ICD-10-CM

## 2024-02-14 DIAGNOSIS — E66.01 MORBID OBESITY (H): ICD-10-CM

## 2024-02-14 DIAGNOSIS — M17.0 PRIMARY OSTEOARTHRITIS OF BOTH KNEES: ICD-10-CM

## 2024-02-14 DIAGNOSIS — Z97.5 IUD (INTRAUTERINE DEVICE) IN PLACE: ICD-10-CM

## 2024-02-14 LAB
CLUE CELLS: ABNORMAL
TRICHOMONAS, WET PREP: ABNORMAL
WBC'S/HIGH POWER FIELD, WET PREP: ABNORMAL
YEAST, WET PREP: ABNORMAL

## 2024-02-14 PROCEDURE — G0145 SCR C/V CYTO,THINLAYER,RESCR: HCPCS | Performed by: PHYSICIAN ASSISTANT

## 2024-02-14 PROCEDURE — 90480 ADMN SARSCOV2 VAC 1/ONLY CMP: CPT | Performed by: PHYSICIAN ASSISTANT

## 2024-02-14 PROCEDURE — 91320 SARSCV2 VAC 30MCG TRS-SUC IM: CPT | Performed by: PHYSICIAN ASSISTANT

## 2024-02-14 PROCEDURE — 87210 SMEAR WET MOUNT SALINE/INK: CPT | Performed by: PHYSICIAN ASSISTANT

## 2024-02-14 PROCEDURE — 90471 IMMUNIZATION ADMIN: CPT | Performed by: PHYSICIAN ASSISTANT

## 2024-02-14 PROCEDURE — 90632 HEPA VACCINE ADULT IM: CPT | Performed by: PHYSICIAN ASSISTANT

## 2024-02-14 PROCEDURE — 99396 PREV VISIT EST AGE 40-64: CPT | Mod: 25 | Performed by: PHYSICIAN ASSISTANT

## 2024-02-14 PROCEDURE — 87624 HPV HI-RISK TYP POOLED RSLT: CPT | Performed by: PHYSICIAN ASSISTANT

## 2024-02-14 PROCEDURE — 90746 HEPB VACCINE 3 DOSE ADULT IM: CPT | Performed by: PHYSICIAN ASSISTANT

## 2024-02-14 PROCEDURE — 99214 OFFICE O/P EST MOD 30 MIN: CPT | Mod: 25 | Performed by: PHYSICIAN ASSISTANT

## 2024-02-14 PROCEDURE — 90472 IMMUNIZATION ADMIN EACH ADD: CPT | Performed by: PHYSICIAN ASSISTANT

## 2024-02-14 SDOH — HEALTH STABILITY: PHYSICAL HEALTH: ON AVERAGE, HOW MANY DAYS PER WEEK DO YOU ENGAGE IN MODERATE TO STRENUOUS EXERCISE (LIKE A BRISK WALK)?: 2 DAYS

## 2024-02-14 ASSESSMENT — PATIENT HEALTH QUESTIONNAIRE - PHQ9
SUM OF ALL RESPONSES TO PHQ QUESTIONS 1-9: 0
SUM OF ALL RESPONSES TO PHQ QUESTIONS 1-9: 0

## 2024-02-14 ASSESSMENT — PAIN SCALES - GENERAL: PAINLEVEL: NO PAIN (0)

## 2024-02-14 ASSESSMENT — SOCIAL DETERMINANTS OF HEALTH (SDOH): HOW OFTEN DO YOU GET TOGETHER WITH FRIENDS OR RELATIVES?: NEVER

## 2024-02-14 NOTE — PATIENT INSTRUCTIONS
I recommend you get a shingles vaccine at the pharmacy (after your trip)      Please call [phone] to schedule the mammogram          Preventive Care Advice   This is general advice given by our system to help you stay healthy. However, your care team may have specific advice just for you. Please talk to your care team about your preventive care needs.  Nutrition  Eat 5 or more servings of fruits and vegetables each day.  Try wheat bread, brown rice and whole grain pasta (instead of white bread, rice, and pasta).  Get enough calcium and vitamin D. Check the label on foods and aim for 100% of the RDA (recommended daily allowance).  Lifestyle  Exercise at least 150 minutes each week  (30 minutes a day, 5 days a week).  Do muscle strengthening activities 2 days a week. These help control your weight and prevent disease.  No smoking.  Wear sunscreen to prevent skin cancer.  Have a dental exam and cleaning every 6 months.  Yearly exams  See your health care team every year to talk about:  Any changes in your health.  Any medicines your care team has prescribed.  Preventive care, family planning, and ways to prevent chronic diseases.  Shots (vaccines)   HPV shots (up to age 26), if you've never had them before.  Hepatitis B shots (up to age 59), if you've never had them before.  COVID-19 shot: Get this shot when it's due.  Flu shot: Get a flu shot every year.  Tetanus shot: Get a tetanus shot every 10 years.  Pneumococcal, hepatitis A, and RSV shots: Ask your care team if you need these based on your risk.  Shingles shot (for age 50 and up)  General health tests  Diabetes screening:  Starting at age 35, Get screened for diabetes at least every 3 years.  If you are younger than age 35, ask your care team if you should be screened for diabetes.  Cholesterol test: At age 39, start having a cholesterol test every 5 years, or more often if advised.  Bone density scan (DEXA): At age 50, ask your care team if you should have  this scan for osteoporosis (brittle bones).  Hepatitis C: Get tested at least once in your life.  STIs (sexually transmitted infections)  Before age 24: Ask your care team if you should be screened for STIs.  After age 24: Get screened for STIs if you're at risk. You are at risk for STIs (including HIV) if:  You are sexually active with more than one person.  You don't use condoms every time.  You or a partner was diagnosed with a sexually transmitted infection.  If you are at risk for HIV, ask about PrEP medicine to prevent HIV.  Get tested for HIV at least once in your life, whether you are at risk for HIV or not.  Cancer screening tests  Cervical cancer screening: If you have a cervix, begin getting regular cervical cancer screening tests starting at age 21.  Breast cancer scan (mammogram): If you've ever had breasts, begin having regular mammograms starting at age 40. This is a scan to check for breast cancer.  Colon cancer screening: It is important to start screening for colon cancer at age 45.  Have a colonoscopy test every 10 years (or more often if you're at risk) Or, ask your provider about stool tests like a FIT test every year or Cologuard test every 3 years.  To learn more about your testing options, visit:   https://[email or web address]/[email or web address].  For help making a decision, visit:   https://bit.ly/oc35617.  Prostate cancer screening test: If you have a prostate, ask your care team if a prostate cancer screening test (PSA) at age 55 is right for you.  Lung cancer screening: If you are a current or former smoker ages 50 to 80, ask your care team if ongoing lung cancer screenings are right for you.  For informational purposes only. Not to replace the advice of your health care provider. Copyright   2023 [employer]. All rights reserved. Clinically reviewed by the [facility] Transitions Program. [employer] 021175 - REV 01/24.    Preventing Falls: Care Instructions  Injuries and health  problems such as trouble walking or poor eyesight can increase your risk of falling. So can some medicines. But there are things you can do to help prevent falls. You can exercise to get stronger. You can also arrange your home to make it safer.    Talk to your doctor about the medicines you take. Ask if any of them increase the risk of falls and whether they can be changed or stopped.   Try to exercise regularly. It can help improve your strength and balance. This can help lower your risk of falling.     Practice fall safety and prevention.    Wear low-heeled shoes that fit well and give your feet good support. Talk to your doctor if you have foot problems that make this hard.  Carry a cellphone or wear a medical alert device that you can use to call for help.  Use stepladders instead of chairs to reach high objects. Don't climb if you're at risk for falls. Ask for help, if needed.  Wear the correct eyeglasses, if you need them.    Make your home safer.    Remove rugs, cords, clutter, and furniture from walkways.  Keep your house well lit. Use night-lights in hallways and bathrooms.  Install and use sturdy handrails on stairways.  Wear nonskid footwear, even inside. Don't walk barefoot or in socks without shoes.    Be safe outside.    Use handrails, curb cuts, and ramps whenever possible.  Keep your hands free by using a shoulder bag or backpack.  Try to walk in well-lit areas. Watch out for uneven ground, changes in pavement, and debris.  Be careful in the winter. Walk on the grass or gravel when sidewalks are slippery. Use de-icer on steps and walkways. Add non-slip devices to shoes.    Put grab bars and nonskid mats in your shower or tub and near the toilet. Try to use a shower chair or bath bench when bathing.   Get into a tub or shower by putting in your weaker leg first. Get out with your strong side first. Have a phone or medical alert device in the bathroom with you.   Where can you learn more?  Go to  "https://www.[employer]Up.net/patiented  Enter G117 in the search box to learn more about \"Preventing Falls: Care Instructions.\"  Current as of: July 18, 2023               Content Version: 13.8 2006-2023 [employer].   Care instructions adapted under license by your healthcare professional. If you have questions about a medical condition or this instruction, always ask your healthcare professional. [employer] disclaims any warranty or liability for your use of this information.      Relationships for Good Health  Relationships are important for our health and happiness. Social isolation, loneliness and lack of support are bad for your health. Studies show that loneliness can harm health and limit your life span as much as high blood pressure and smoking.   Take some time to reflect on your relationships. Then answer these questions:  Are there people in your life that cause you stress or drain your energy? What can you do to set limits?  ________________________________________________________________________________________________________________________________________________________________________________________________________________________________________________________________________________________________________________________________________________  Who do you enjoy spending time with? Who can you go to for support?  ________________________________________________________________________________________________________________________________________________________________________________________________________________________________________________________________________________________________________________________________________________  What can you do to improve your relationships with " others?  __________________________________________________________________________________________________________________________________________________________________________________________________________________  ______________________________________________________________________________________________________________________________  What do you like most about your relationships with others?  ________________________________________________________________________________________________________________________________________________________________________________________________________________________________________________________________________________________________________________________________________________  My goal: ______________________________________________________________________  I will ______________________________________________________________________________________________________________________________________________________________________________________________    For informational purposes only. Not to replace the advice of your health care provider. Copyright   2018 [facility]. All rights reserved. Clinically reviewed by [facility]  Team. [employer] 185840 - Rev 04/21.    Learning About Stress  What is stress?     Stress is your body's response to a hard situation. Your body can have a physical, emotional, or mental response. Stress is a fact of life for most people, and it affects everyone differently. What causes stress for you may not be stressful for someone else.  A lot of things can cause stress. You may feel stress when you go on a job interview, take a test, or run a race. This kind of short-term stress is normal and even useful. It can help you if you need to work hard or react quickly. For example, stress can help you finish an important job on time.  Long-term stress is caused by ongoing stressful situations or events. Examples  of long-term stress include long-term health problems, ongoing problems at work, or conflicts in your family. Long-term stress can harm your health.  How does stress affect your health?  When you are stressed, your body responds as though you are in danger. It makes hormones that speed up your heart, make you breathe faster, and give you a burst of energy. This is called the fight-or-flight stress response. If the stress is over quickly, your body goes back to normal and no harm is done.  But if stress happens too often or lasts too long, it can have bad effects. Long-term stress can make you more likely to get sick, and it can make symptoms of some diseases worse. If you tense up when you are stressed, you may develop neck, shoulder, or low back pain. Stress is linked to high blood pressure and heart disease.  Stress also harms your emotional health. It can make you snow, tense, or depressed. Your relationships may suffer, and you may not do well at work or school.  What can you do to manage stress?  You can try these things to help manage stress:   Do something active. Exercise or activity can help reduce stress. Walking is a great way to get started. Even everyday activities such as housecleaning or yard work can help.  Try yoga or nito chi. These techniques combine exercise and meditation. You may need some training at first to learn them.  Do something you enjoy. For example, listen to music or go to a movie. Practice your hobby or do volunteer work.  Meditate. This can help you relax, because you are not worrying about what happened before or what may happen in the future.  Do guided imagery. Imagine yourself in any setting that helps you feel calm. You can use online videos, books, or a teacher to guide you.  Do breathing exercises. For example:  From a standing position, bend forward from the waist with your knees slightly bent. Let your arms dangle close to the floor.  Breathe in slowly and deeply as you  "return to a standing position. Roll up slowly and lift your head last.  Hold your breath for just a few seconds in the standing position.  Breathe out slowly and bend forward from the waist.  Let your feelings out. Talk, laugh, cry, and express anger when you need to. Talking with supportive friends or family, a counselor, or a pal leader about your feelings is a healthy way to relieve stress. Avoid discussing your feelings with people who make you feel worse.  Write. It may help to write about things that are bothering you. This helps you find out how much stress you feel and what is causing it. When you know this, you can find better ways to cope.  What can you do to prevent stress?  You might try some of these things to help prevent stress:  Manage your time. This helps you find time to do the things you want and need to do.  Get enough sleep. Your body recovers from the stresses of the day while you are sleeping.  Get support. Your family, friends, and community can make a difference in how you experience stress.  Limit your news feed. Avoid or limit time on social media or news that may make you feel stressed.  Do something active. Exercise or activity can help reduce stress. Walking is a great way to get started.  Where can you learn more?  Go to https://www.[employer].net/patiented  Enter N032 in the search box to learn more about \"Learning About Stress.\"  Current as of: February 26, 2023               Content Version: 13.8    8126-6062 [employer].   Care instructions adapted under license by your healthcare professional. If you have questions about a medical condition or this instruction, always ask your healthcare professional. [employer] disclaims any warranty or liability for your use of this information.      "

## 2024-02-14 NOTE — NURSING NOTE
Prior to immunization administration, verified patients identity using patient s name and date of birth. Please see Immunization Activity for additional information.     Screening Questionnaire for Adult Immunization    Are you sick today?   No   Do you have allergies to medications, food, a vaccine component or latex?   YES, SULFA   Have you ever had a serious reaction after receiving a vaccination?   No   Do you have a long-term health problem with heart, lung, kidney, or metabolic disease (e.g., diabetes), asthma, a blood disorder, no spleen, complement component deficiency, a cochlear implant, or a spinal fluid leak?  Are you on long-term aspirin therapy?   No   Do you have cancer, leukemia, HIV/AIDS, or any other immune system problem?   No   Do you have a parent, brother, or sister with an immune system problem?   No   In the past 3 months, have you taken medications that affect  your immune system, such as prednisone, other steroids, or anticancer drugs; drugs for the treatment of rheumatoid arthritis, Crohn s disease, or psoriasis; or have you had radiation treatments?   No   Have you had a seizure, or a brain or other nervous system problem?   No   During the past year, have you received a transfusion of blood or blood    products, or been given immune (gamma) globulin or antiviral drug?   No   For women: Are you pregnant or is there a chance you could become       pregnant during the next month?   No   Have you received any vaccinations in the past 4 weeks?   No     Immunization questionnaire answers were all negative.      Patient instructed to remain in clinic for 15 minutes afterwards, and to report any adverse reactions.     Screening performed by Julio Wheatley MA on 2/14/2024 at 5:20 PM.

## 2024-02-14 NOTE — PROGRESS NOTES
Preventive Care Visit  [facility] PILY Sotelo PA-C, Family Medicine  Feb 14, 2024    Assessment & Plan     Routine general medical examination at a health care facility      HEALTH CARE MAINTENANCE              Reviewed USPTF recommendations and anticipatory guidance.              See orders.     Going on a trip soon (in about 3 weeks), vaccinations given today (covid, Hep A and Hep B booster).     Cervical cancer screening  I discussed the new pap recommendations regarding screening.  Explained the rationale for increased intervals between paps.  Questions asked and answered.  She does agree to this regimen.     - Pap Screen with HPV - recommended age 30 - 65 years    Visit for screening mammogram  I discussed in detail with Vanesa Owen the importance of breast cancer screening through monthly self breast exams and annual breast exams in the clinic.   - MA SCREENING DIGITAL BILAT - Future  (s+30); Future    Obesity (BMI 35.0-39.9) with comorbidity (H)  Doing well with weight loss efforts and tolerating medication without any significant side effects.    Wt Readings from Last 4 Encounters:   02/14/24 89.7 kg (197 lb 12.8 oz)   12/11/23 95.3 kg (210 lb)   10/30/23 95.3 kg (210 lb)   05/10/23 95.3 kg (210 lb)   12/8/22           101.2 kg (223 lbs)  BMI 39.50      She tolerated ozempic well and was finally able to lose weight.  Unfortunately insurance no longer covers GLP1 agonist.     Continues to work on lifestyle changes for weight loss including healthy diet and regular exercise.      She did go to the weight watchers sequence program, however that was not covered by her insurance.      They did discuss starting wellbutrin to help with cravings.     She has been unable to get Wegovy (due to shortage), will try zepbound instead.     - Adult Comprehensive Weight Management  Referral; Future  - Basic metabolic panel  (Ca, Cl, CO2, Creat, Gluc, K, Na, BUN); Future  - TSH  with free T4 reflex; Future  - tirzepatide-Weight Management (ZEPBOUND) 2.5 MG/0.5ML prefilled pen; Inject 0.5 mLs (2.5 mg) Subcutaneous every 7 days    Primary osteoarthritis of both knees  Obesity Co-morbidity   - Adult Comprehensive Weight Management  Referral; Future  - tirzepatide-Weight Management (ZEPBOUND) 2.5 MG/0.5ML prefilled pen; Inject 0.5 mLs (2.5 mg) Subcutaneous every 7 days    Gastroesophageal reflux disease without esophagitis  Obesity Co-morbidity  - Adult Comprehensive Weight Management  Referral; Future  - tirzepatide-Weight Management (ZEPBOUND) 2.5 MG/0.5ML prefilled pen; Inject 0.5 mLs (2.5 mg) Subcutaneous every 7 days    CARDIOVASCULAR SCREENING; LDL GOAL LESS THAN 160  Due to recheck levels.   - Lipid panel reflex to direct LDL Fasting; Future    IUD (intrauterine device) in place  Doing well.  Will leave in for full 8 years.     Vaginal odor  Wet prep normal.  Will check a UA  - Wet prep - Clinic Collect      44 minutes spent by me on the date of the encounter doing chart review, history and exam, documentation and further activities per the note      Counseling  Appropriate preventive services were discussed with this patient, including applicable screening as appropriate for fall prevention, nutrition, physical activity, Tobacco-use cessation, weight loss and cognition.  Checklist reviewing preventive services available has been given to the patient.  Reviewed patient's diet, addressing concerns and/or questions.   She is at risk for lack of exercise and has been provided with information to increase physical activity for the benefit of her well-being.   Patient is at risk for social isolation and has been provided with information about the benefit of social connection.   The patient was instructed to see the dentist every 6 months.   She is at risk for psychosocial distress and has been provided with information to reduce risk.     Patient has been advised of split  billing requirements and indicates understanding: Yes        Yunior Alexis is a 53 year old, presenting for the following:  Physical        2/14/2024     3:57 PM   Additional Questions   Roomed by HIMANSHU Kim         2/14/2024     3:57 PM   Patient Reported Additional Medications   Patient reports taking the following new medications None per patient        Health Care Directive  Patient does not have a Health Care Directive or Living Will: Did not discuss today.     Healthy Habits:     Getting at least 3 servings of Calcium per day:  Yes    Bi-annual eye exam:  Yes    Dental care twice a year:  Yes    Sleep apnea or symptoms of sleep apnea:  None    Diet:  Regular (no restrictions)    Frequency of exercise:  4-5 days/week    Duration of exercise:  Less than 15 minutes    Taking medications regularly:  Yes    Barriers to taking medications:  None    Medication side effects:  None    Additional concerns today:  Yes (Questions on phentermine)                2/14/2024   General Health   How would you rate your overall physical health? (!) FAIR   Feel stress (tense, anxious, or unable to sleep) Only a little   (!) STRESS CONCERN      2/14/2024   Nutrition   Three or more servings of calcium each day? (!) NO   Diet: Regular (no restrictions)   How many servings of fruit and vegetables per day? (!) 0-1   How many sweetened beverages each day? 0-1         2/14/2024   Exercise   Days per week of moderate/strenous exercise 2 days   (!) EXERCISE CONCERN      2/14/2024   Social Factors   Frequency of gathering with friends or relatives Never   Worry food won't last until get money to buy more No   Food not last or not have enough money for food? No   Do you have housing?  Yes   Are you worried about losing your housing? No   Lack of transportation? No   Unable to get utilities (heat,electricity)? No   (!) SOCIAL CONNECTIONS CONCERN      2/14/2024   Fall Risk   Fallen 2 or more times in the past year? No   Trouble with  walking or balance? Yes   Reason Gait Speed Test Not Completed Patient declines   Reason for decline Pt declined due to knee injury not actual fall risk          2/14/2024   Dental   Dentist two times every year? (!) NO         2/14/2024   TB Screening   Were you born outside of [place]?  No       Today's PHQ-9 Score:       2/14/2024     3:41 PM   PHQ-9 SCORE   PHQ-9 Total Score MyChart 0   PHQ-9 Total Score 0         2/14/2024   Substance Use   Alcohol more than 3/day or more than 7/wk No   Do you use any other substances recreationally? No     Social History     Tobacco Use    Smoking status: Never    Smokeless tobacco: Never   Vaping Use    Vaping Use: Never used   Substance Use Topics    Alcohol use: Yes     Comment: rare    Drug use: No           12/7/2022   LAST FHS-7 RESULTS   1st degree relative breast or ovarian cancer No   Any relative bilateral breast cancer No   Any male have breast cancer No   Any woman have breast and ovarian cancer Yes   Any woman with breast cancer before 50yrs No   2 or more relatives with breast and/or ovarian cancer Yes   2 or more relatives with breast and/or bowel cancer No        Mammogram Screening - Mammogram every 1-2 years updated in Health Maintenance based on mutual decision making        2/14/2024   STI Screening   New sexual partner(s) since last STI/HIV test? No     History of abnormal Pap smear: NO - age 30-65 PAP every 5 years with negative HPV co-testing recommended        Latest Ref Rng & Units 7/22/2020    11:48 AM 7/22/2020    11:45 AM 7/18/2016     1:33 PM   PAP / HPV   PAP (Historical)   NIL     HPV 16 DNA NEG^Negative Negative   Negative    HPV 18 DNA NEG^Negative Negative   Negative    Other HR HPV NEG^Negative Negative   Negative      The 10-year ASCVD risk score (Bartolome HANSEN, et al., 2019) is: 1.1%    Values used to calculate the score:      Age: 53 years      Sex: Female      Is Non- : No      Diabetic: No      Tobacco smoker: No       "Systolic Blood Pressure: 122 mmHg      Is BP treated: No      HDL Cholesterol: 79 mg/dL      Total Cholesterol: 247 mg/dL           Reviewed and updated as needed this visit by Provider                    Past Medical History:   Diagnosis Date    Anxiety     Depressive disorder     Herpes simplex with other ophthalmic complications     UTI (urinary tract infection)      Past Surgical History:   Procedure Laterality Date    COLONOSCOPY WITH CO2 INSUFFLATION N/A 4/13/2023    Procedure: COLONOSCOPY, WITH CO2 INSUFFLATION;  Surgeon: Catrachita Bernstein DO;  Location: [facility] OR    EXCIS VAGINAL CYST/TUMOR      SLING TRANSVAGINAL N/A 08/23/2016    Procedure: SLING TRANSVAGINAL;  Surgeon: Nam Sommers MD;  Location: [facility] OR    TONSILLECTOMY & ADENOIDECTOMY           Review of Systems  Constitutional, neuro, ENT, endocrine, pulmonary, cardiac, gastrointestinal, genitourinary, musculoskeletal, integument and psychiatric systems are negative, except as otherwise noted.     Objective    Exam  /70   Pulse 104   Temp 96.9  F (36.1  C) (Temporal)   Resp 18   Ht 1.588 m (5' 2.5\")   Wt 89.7 kg (197 lb 12.8 oz)   SpO2 96%   BMI 35.60 kg/m     Estimated body mass index is 35.6 kg/m  as calculated from the following:    Height as of this encounter: 1.588 m (5' 2.5\").    Weight as of this encounter: 89.7 kg (197 lb 12.8 oz).    Physical Exam  GENERAL: alert and no distress  EYES: Eyes grossly normal to inspection, PERRL and conjunctivae and sclerae normal  HENT: ear canals and TM's normal, nose and mouth without ulcers or lesions  NECK: no adenopathy, no asymmetry, masses, or scars  RESP: lungs clear to auscultation - no rales, rhonchi or wheezes  BREAST: normal without masses, tenderness or nipple discharge and no palpable axillary masses or adenopathy  CV: regular rate and rhythm, normal S1 S2, no S3 or S4, no murmur, click or rub, no peripheral edema  ABDOMEN: soft, nontender, no hepatosplenomegaly, no masses and bowel " sounds normal   (female): normal female external genitalia, normal urethral meatus, normal vaginal mucosa, IUD string visualized at cervical os (longer than normal).   MS: no gross musculoskeletal defects noted, no edema  SKIN: no suspicious lesions or rashes  NEURO: Normal strength and tone, mentation intact and speech normal  PSYCH: mentation appears normal, affect normal/bright      Signed Electronically by: Gabi Sotelo PA-C    Answers submitted by the patient for this visit:  Patient Health Questionnaire (Submitted on 2/14/2024)  PHQ9 TOTAL SCORE: 0

## 2024-02-15 NOTE — TELEPHONE ENCOUNTER
Looks like patient was seen yesterday for an OV.  Not sure if this was discussed at visit.  Should patient make another appointment.    Janna TORRESN RN  Triage Nurse  [facility]

## 2024-02-16 ENCOUNTER — LAB (OUTPATIENT)
Dept: LAB | Facility: CLINIC | Age: 54
End: 2024-02-16
Payer: COMMERCIAL

## 2024-02-16 DIAGNOSIS — Z13.6 CARDIOVASCULAR SCREENING; LDL GOAL LESS THAN 160: ICD-10-CM

## 2024-02-16 DIAGNOSIS — E66.01 MORBID OBESITY (H): ICD-10-CM

## 2024-02-16 DIAGNOSIS — R82.90 ABNORMAL URINE ODOR: ICD-10-CM

## 2024-02-16 LAB
ALBUMIN UR-MCNC: NEGATIVE MG/DL
APPEARANCE UR: CLEAR
BILIRUB UR QL STRIP: NEGATIVE
COLOR UR AUTO: YELLOW
GLUCOSE UR STRIP-MCNC: NEGATIVE MG/DL
HGB UR QL STRIP: NEGATIVE
KETONES UR STRIP-MCNC: NEGATIVE MG/DL
LEUKOCYTE ESTERASE UR QL STRIP: NEGATIVE
NITRATE UR QL: NEGATIVE
PH UR STRIP: 6.5 [PH] (ref 5–7)
SP GR UR STRIP: 1.02 (ref 1–1.03)
UROBILINOGEN UR STRIP-ACNC: 0.2 E.U./DL

## 2024-02-16 PROCEDURE — 84443 ASSAY THYROID STIM HORMONE: CPT

## 2024-02-16 PROCEDURE — 36415 COLL VENOUS BLD VENIPUNCTURE: CPT

## 2024-02-16 PROCEDURE — 80061 LIPID PANEL: CPT

## 2024-02-16 PROCEDURE — 81003 URINALYSIS AUTO W/O SCOPE: CPT

## 2024-02-16 PROCEDURE — 80048 BASIC METABOLIC PNL TOTAL CA: CPT

## 2024-02-17 LAB
ANION GAP SERPL CALCULATED.3IONS-SCNC: 10 MMOL/L (ref 7–15)
BUN SERPL-MCNC: 14.1 MG/DL (ref 6–20)
CALCIUM SERPL-MCNC: 9.3 MG/DL (ref 8.6–10)
CHLORIDE SERPL-SCNC: 104 MMOL/L (ref 98–107)
CHOLEST SERPL-MCNC: 230 MG/DL
CREAT SERPL-MCNC: 0.78 MG/DL (ref 0.51–0.95)
DEPRECATED HCO3 PLAS-SCNC: 26 MMOL/L (ref 22–29)
EGFRCR SERPLBLD CKD-EPI 2021: >90 ML/MIN/1.73M2
FASTING STATUS PATIENT QL REPORTED: YES
GLUCOSE SERPL-MCNC: 87 MG/DL (ref 70–99)
HDLC SERPL-MCNC: 52 MG/DL
LDLC SERPL CALC-MCNC: 148 MG/DL
NONHDLC SERPL-MCNC: 178 MG/DL
POTASSIUM SERPL-SCNC: 4.8 MMOL/L (ref 3.4–5.3)
SODIUM SERPL-SCNC: 140 MMOL/L (ref 135–145)
TRIGL SERPL-MCNC: 152 MG/DL
TSH SERPL DL<=0.005 MIU/L-ACNC: 1.26 UIU/ML (ref 0.3–4.2)

## 2024-02-19 LAB
BKR LAB AP GYN ADEQUACY: NORMAL
BKR LAB AP GYN INTERPRETATION: NORMAL
BKR LAB AP HPV REFLEX: NORMAL
BKR LAB AP PREVIOUS ABNORMAL: NORMAL
PATH REPORT.COMMENTS IMP SPEC: NORMAL
PATH REPORT.COMMENTS IMP SPEC: NORMAL
PATH REPORT.RELEVANT HX SPEC: NORMAL

## 2024-02-23 ENCOUNTER — TELEPHONE (OUTPATIENT)
Dept: FAMILY MEDICINE | Facility: CLINIC | Age: 54
End: 2024-02-23
Payer: COMMERCIAL

## 2024-02-23 DIAGNOSIS — E66.01 MORBID OBESITY (H): Primary | ICD-10-CM

## 2024-02-23 LAB
HUMAN PAPILLOMA VIRUS 16 DNA: NEGATIVE
HUMAN PAPILLOMA VIRUS 18 DNA: NEGATIVE
HUMAN PAPILLOMA VIRUS FINAL DIAGNOSIS: NORMAL
HUMAN PAPILLOMA VIRUS OTHER HR: NEGATIVE

## 2024-02-23 NOTE — TELEPHONE ENCOUNTER
Patient wanted me to share insurance regection with you and maybe get a referral to the [place] pharmacist that could prescribe Zepbound with our insurance.     ZEPBOUND IS A WEIGHT LOSS DRUG: REFER TO [facility] PROGRAM, CALL [phone] FOR COVERAGE DETERMINATION, THEN WILL NEED TO GET FROM CORRECT PROVIDER AND FILL AT SPECIALTY, OR CAN USE ZEPBOUND  CARD AND PAY ABOUT $500 PER MONTH WITHOUT INSURANCE

## 2024-02-28 ENCOUNTER — OFFICE VISIT (OUTPATIENT)
Dept: FAMILY MEDICINE | Facility: CLINIC | Age: 54
End: 2024-02-28
Payer: COMMERCIAL

## 2024-02-28 ENCOUNTER — TELEPHONE (OUTPATIENT)
Dept: FAMILY MEDICINE | Facility: CLINIC | Age: 54
End: 2024-02-28

## 2024-02-28 VITALS
WEIGHT: 196 LBS | SYSTOLIC BLOOD PRESSURE: 122 MMHG | BODY MASS INDEX: 34.73 KG/M2 | HEIGHT: 63 IN | TEMPERATURE: 97.8 F | RESPIRATION RATE: 16 BRPM | HEART RATE: 74 BPM | DIASTOLIC BLOOD PRESSURE: 74 MMHG | OXYGEN SATURATION: 100 %

## 2024-02-28 DIAGNOSIS — Z97.5 IUD (INTRAUTERINE DEVICE) IN PLACE: ICD-10-CM

## 2024-02-28 DIAGNOSIS — E78.9 BORDERLINE HIGH CHOLESTEROL: ICD-10-CM

## 2024-02-28 DIAGNOSIS — Z82.49 FAMILY HISTORY OF ARTERIOSCLEROTIC CARDIOVASCULAR DISEASE: ICD-10-CM

## 2024-02-28 DIAGNOSIS — S83.242D ACUTE TEAR MEDIAL MENISCUS, LEFT, SUBSEQUENT ENCOUNTER: ICD-10-CM

## 2024-02-28 DIAGNOSIS — Z86.14 H/O METHICILLIN RESISTANT STAPHYLOCOCCUS AUREUS: ICD-10-CM

## 2024-02-28 DIAGNOSIS — M15.0 PRIMARY OSTEOARTHRITIS INVOLVING MULTIPLE JOINTS: ICD-10-CM

## 2024-02-28 DIAGNOSIS — Z01.818 PREOP GENERAL PHYSICAL EXAM: Primary | ICD-10-CM

## 2024-02-28 PROCEDURE — 99214 OFFICE O/P EST MOD 30 MIN: CPT | Performed by: PHYSICIAN ASSISTANT

## 2024-02-28 PROCEDURE — 87081 CULTURE SCREEN ONLY: CPT | Performed by: PHYSICIAN ASSISTANT

## 2024-02-28 RX ORDER — ATORVASTATIN CALCIUM 10 MG/1
10 TABLET, FILM COATED ORAL DAILY
Qty: 90 TABLET | Refills: 1 | Status: SHIPPED | OUTPATIENT
Start: 2024-02-28 | End: 2024-07-29

## 2024-02-28 ASSESSMENT — PAIN SCALES - GENERAL: PAINLEVEL: SEVERE PAIN (7)

## 2024-02-28 ASSESSMENT — PATIENT HEALTH QUESTIONNAIRE - PHQ9
SUM OF ALL RESPONSES TO PHQ QUESTIONS 1-9: 0
SUM OF ALL RESPONSES TO PHQ QUESTIONS 1-9: 0
10. IF YOU CHECKED OFF ANY PROBLEMS, HOW DIFFICULT HAVE THESE PROBLEMS MADE IT FOR YOU TO DO YOUR WORK, TAKE CARE OF THINGS AT HOME, OR GET ALONG WITH OTHER PEOPLE: NOT DIFFICULT AT ALL

## 2024-02-28 NOTE — TELEPHONE ENCOUNTER
Hello,    We received a referral for this patient to schedule an MTM appointment for [facility] weight management. In order for us to schedule the visit, the patient has to meet either one of the two criteria:    BMI over 40kg at start of medication  2.   BMI over 30kg with diagnosis of fatty liver    It does not look like the patient qualifies based on the criteria in the chart. We have been told to refer patient's back to the provider for alternative options if they do not meet criteria as we would be unable to schedule them an MTM visit. If the patient does meet criteria, we would need that documentation in the patient's chart.     Thank you,   Elliott Escobar, MTM

## 2024-02-28 NOTE — PROGRESS NOTES
Preoperative Evaluation  [facility] [place]  [address]  [place] [address]  Phone: [phone]  Primary Provider: Miguel Domingo  Pre-op Performing Provider: MIGUEL DOMINGO  Feb 28, 2024       Vanesa is a 53 year old, presenting for the following:  Pre-Op Exam        2/28/2024     9:02 AM   Additional Questions   Roomed by Peggy   Accompanied by Self         2/28/2024     9:02 AM   Patient Reported Additional Medications   Patient reports taking the following new medications na     Surgical Information  Surgery/Procedure: Left Knee Meniscectomy  Surgery Location: [facility]   Surgeon: Rios Sams MD  Surgery Date: 03/20/24  Time of Surgery: TBD   Where patient plans to recover: At home with family  Fax number for surgical facility: [phone]    Assessment & Plan     The proposed surgical procedure is considered LOW risk.    Preop general physical exam  Cleared for surgery .     Acute tear medial meniscus, left, subsequent encounter  Mensicus repair planned, no hardware implantation expected.     Primary osteoarthritis involving multiple joints  Working on weight loss (has appt with weight loss clinic, number given for [place] pharmacist).     H/O methicillin resistant Staphylococcus aureus  This was found by dermatology years ago and has never been re-tested.  I discussed with Vanesa, that regardless of the result, the given procedure is low risk (as there is no plan for hardware implantation).      If cultures return positive, will plan the following (from uptodate):  ?Nasal decolonization with mupirocin ointment (2%) applied to nares twice daily for 5 to 10 days, and  ?Topical body decolonization (one of the following):  Chlorhexidine gluconate (2% or 4% solution): daily washes or use of a disposable impregnated cloth for 5 to 14 days, or  Dilute bleach baths (one teaspoon bleach per gallon of water, or one-fourth cup bleach per one-fourth tub [approximately 13 gallons  "of water] for 15 minutes twice weekly) for approximately three months  Following decolonization, surveillance cultures are not necessary in the absence of active infection [8].\"    - MRSA Culture    IUD (intrauterine device) in place  IUD in place.     Borderline high cholesterol  We reviewed last lipid panel, in addition to family history.  Discussed use of statin to help further reduce cardiovascular risk (given positive h/o stroke in both Mom and Dad).    Reviewed potential side effects.    Recheck labs in 6 months.   - atorvastatin (LIPITOR) 10 MG tablet; Take 1 tablet (10 mg) by mouth daily  - Lipid panel reflex to direct LDL Fasting; Future  - Comprehensive metabolic panel (BMP + Alb, Alk Phos, ALT, AST, Total. Bili, TP); Future    Family history of arteriosclerotic cardiovascular disease  Mom with h/o a fib.  Heart exam normal today, rhythm is regular.  We did discuss use of an apple watch to monitor for possible a fib (must be set up on watch to monitor) or a more formal holter monitor.  We reviewed signs and symptoms to monitor for that may suggest a fib, she is asymptomatic.     - atorvastatin (LIPITOR) 10 MG tablet; Take 1 tablet (10 mg) by mouth daily  - Lipid panel reflex to direct LDL Fasting; Future  - Comprehensive metabolic panel (BMP + Alb, Alk Phos, ALT, AST, Total. Bili, TP); Future            - No identified additional risk factors other than previously addressed    Antiplatelet or Anticoagulation Medication Instructions   - Patient is on no antiplatelet or anticoagulation medications.    Additional Medication Instructions  Patient is to take all scheduled medications on the day of surgery    Recommendation  APPROVAL GIVEN to proceed with proposed procedure, without further diagnostic evaluation.          Subjective       HPI related to upcoming procedure: Arthritis and meniscus tear, has tried cortisone shot without significant improvement. Surgery desired.     She saw derm in the past (a few " years ago) and found to have MRSA in nares.  Started on bactroban in nares and used this consistently at first and now sporadically.  She wonders if this has cleared.           2/28/2024     8:45 AM   Preop Questions   1. Have you ever had a heart attack or stroke? No   2. Have you ever had surgery on your heart or blood vessels, such as a stent placement, a coronary artery bypass, or surgery on an artery in your head, neck, heart, or legs? No   3. Do you have chest pain with activity? No   4. Do you have a history of  heart failure? No   5. Do you currently have a cold, bronchitis or symptoms of other infection? No   6. Do you have a cough, shortness of breath, or wheezing? No   7. Do you or anyone in your family have previous history of blood clots? YES - Both pts Mother and Father    8. Do you or does anyone in your family have a serious bleeding problem such as prolonged bleeding following surgeries or cuts? No   9. Have you ever had problems with anemia or been told to take iron pills? No   10. Have you had any abnormal blood loss such as black, tarry or bloody stools, or abnormal vaginal bleeding? No   11. Have you ever had a blood transfusion? No   12. Are you willing to have a blood transfusion if it is medically needed before, during, or after your surgery? Yes   13. Have you or any of your relatives ever had problems with anesthesia? No   14. Do you have sleep apnea, excessive snoring or daytime drowsiness? No   15. Do you have any artifical heart valves or other implanted medical devices like a pacemaker, defibrillator, or continuous glucose monitor? No   16. Do you have artificial joints? No   17. Are you allergic to latex? No   18. Is there any chance that you may be pregnant? No       Health Care Directive  Patient does not have a Health Care Directive or Living Will: Discussed advance care planning with patient; information given to patient to review.    Preoperative Review of    reviewed - no  record of controlled substances prescribed.      Status of Chronic Conditions:  See problem list for active medical problems.  Problems all longstanding and stable, except as noted/documented.  See ROS for pertinent symptoms related to these conditions.    Patient Active Problem List    Diagnosis Date Noted    Borderline high cholesterol 02/28/2024     Priority: Medium    Family history of arteriosclerotic cardiovascular disease 02/28/2024     Priority: Medium    H/O methicillin resistant Staphylococcus aureus 02/28/2024     Priority: Medium    Acute tear medial meniscus, left, subsequent encounter 02/28/2024     Priority: Medium    Primary osteoarthritis involving multiple joints 10/30/2023     Priority: Medium    Trigger finger, acquired 10/30/2023     Priority: Medium    Hearing loss of left ear, unspecified hearing loss type 12/08/2022     Priority: Medium    Iris nevus, right 07/17/2020     Priority: Medium    Pain in joint, ankle and foot, right 10/23/2018     Priority: Medium    Gastroesophageal reflux disease without esophagitis 09/19/2018     Priority: Medium    Obesity (BMI 35.0-39.9) with comorbidity (H) 09/19/2018     Priority: Medium    Hip pain, left 11/15/2017     Priority: Medium    Obesity 05/19/2015     Priority: Medium    CARDIOVASCULAR SCREENING; LDL GOAL LESS THAN 160 10/31/2010     Priority: Medium    genital herpes, mainly cervical.  06/17/2010     Priority: Medium     August 12, 2011  initial outbreak severe, now on suppressive daily therapy. No reoccurrence. Will continue, one year refill.   O update changed this record. Please review for accuracy      Gastric hyperacidity 06/17/2010     Priority: Medium     June 17, 2010 using prn PPI, probably secondary to NSAID use.       Lumbar back pain 04/09/2010     Priority: Medium     April 9, 2010 probable SI joint dysfunction. No improvement with PT, SI joint steroid injection, now seeing chiropractor.   June 17, 2010 no improvement, will refer  to [facility] for another series of cortisone injections. Using daily ibuprofen, will check renal function.   July 1, 2010 no improvement. Will refer to Dr. Oconnell.   August 12, 2011 no improvement, will obtain MRI.       Vitamin D deficiency 04/05/2010     Priority: Medium     June 17, 2010 taking oral supplements, 5000 units daily, recheck vitamin D level in August.   August 23, 2011 normal level in September 2010. Continue maintenance dosing.   (Problem list name updated by automated process. Provider to review and confirm.)      IUD (intrauterine device) in place 08/25/2008     Priority: Medium     Mirena placed 6/14/2018      Moderate major depression (H) 05/21/2008     Priority: Medium     August 12, 2011 doing well on zoloft, 200 mg daily. Follow up 6 months.          [facility] 05/04/2012     Priority: Low     EMERGENCY CARE PLAN  August 8, 2013: No current Care Coordination follow up planned. Please refer if Care Coordination services are needed.    Presenting Problem Signs and Symptoms Treatment Plan   Questions or concerns   during clinic hours   I will call my clinic directly:  [address] [address]  [phone].   Questions or concerns outside clinic hours   I will call the 24 hour nurse line at   [phone] or [facility].   Need to schedule an appointment   I will call the 24 hour scheduling team at [phone] or my clinic directly at [phone].    Same day treatment     I will call my clinic first, nurse line if after hours, urgent care and express care if needed.     Clinic care coordination services (regular clinic hours)     I will call a clinic care coordinator directly:     Babak Patel RN  Mon, Tues, Fri - [phone]  Wed, Thurs - [phone]    NAVDEEP White:    [phone]    Or call my clinic at [phone] and ask to speak with care coordination.     Crisis Services: Behavioral or Mental Health  [facility] 24 Hour Phone  Line  [phone]    [facility] 24 Hour Crisis Services  [phone]    [facility] (Behavioral Health Providers) Network [phone]    [facility]   [phone]         Emergency treatment -- Immediately    [facility] 911             Past Medical History:   Diagnosis Date    Anxiety     Depressive disorder     Herpes simplex with other ophthalmic complications     UTI (urinary tract infection)      Past Surgical History:   Procedure Laterality Date    COLONOSCOPY WITH CO2 INSUFFLATION N/A 4/13/2023    Procedure: COLONOSCOPY, WITH CO2 INSUFFLATION;  Surgeon: Catrachita Bernstein DO;  Location: [facility] OR    EXCIS VAGINAL CYST/TUMOR      SLING TRANSVAGINAL N/A 08/23/2016    Procedure: SLING TRANSVAGINAL;  Surgeon: Nam Sommers MD;  Location: [facility] OR    TONSILLECTOMY & ADENOIDECTOMY       Current Outpatient Medications   Medication Sig Dispense Refill    atorvastatin (LIPITOR) 10 MG tablet Take 1 tablet (10 mg) by mouth daily 90 tablet 1    busPIRone (BUSPAR) 5 MG tablet TAKE ONE TABLET BY MOUTH TWICE A  tablet 1    cyclobenzaprine (FLEXERIL) 10 MG tablet Take 1 tablet (10 mg) by mouth nightly as needed for muscle spasms 30 tablet 0    fluticasone (FLONASE) 50 MCG/ACT nasal spray INSTILL ONE SPRAY INTO BOTH NOSTRILS DAILY 16 g 3    levonorgestrel (MIRENA) 20 MCG/DAY IUD 1 each (20 mcg) by Intrauterine route continuous      MULTI-VITAMIN OR TABS 1 TABLET DAILY      omeprazole (PRILOSEC) 40 MG DR capsule TAKE ONE CAPSULE BY MOUTH EVERY DAY. TAKE 30 TO 60 MINUTES BEFORE A MEAL 90 capsule 0    sertraline (ZOLOFT) 100 MG tablet TAKE TWO TABLETS BY MOUTH ONCE DAILY 180 tablet 0       Allergies   Allergen Reactions    Sulfa Antibiotics Rash        Social History     Tobacco Use    Smoking status: Never    Smokeless tobacco: Never   Substance Use Topics    Alcohol use: Yes     Comment: rare     Family History   Problem Relation Age of Onset    Thyroid Disease Mother     Cerebrovascular Disease Mother         " stroke triggered by a fib    Asthma Mother     Depression Mother     Glaucoma Mother     Cancer Father     Depression Father 58    Cerebrovascular Disease Father         stroke triggered by an injury    Asthma Brother     Diabetes Maternal Grandmother         Type II     Hypertension Maternal Grandmother     Alcohol/Drug Maternal Grandmother     Alcohol/Drug Maternal Grandfather     Circulatory Paternal Grandmother     Glaucoma Paternal Grandmother     Breast Cancer Maternal Aunt     Macular Degeneration No family hx of      History   Drug Use No         Review of Systems    Review of Systems  Constitutional, neuro, ENT, endocrine, pulmonary, cardiac, gastrointestinal, genitourinary, musculoskeletal, integument and psychiatric systems are negative, except as otherwise noted.    Objective    /74 (BP Location: Left arm, Patient Position: Chair, Cuff Size: Adult Regular)   Pulse 74   Temp 97.8  F (36.6  C) (Tympanic)   Resp 16   Ht 1.6 m (5' 2.99\")   Wt 88.9 kg (196 lb)   SpO2 100%   BMI 34.73 kg/m     Estimated body mass index is 34.73 kg/m  as calculated from the following:    Height as of this encounter: 1.6 m (5' 2.99\").    Weight as of this encounter: 88.9 kg (196 lb).  Physical Exam  GENERAL: alert and no distress  EYES: Eyes grossly normal to inspection, PERRL and conjunctivae and sclerae normal  HENT: ear canals and TM's normal, nose and mouth without ulcers or lesions  NECK: no adenopathy, no asymmetry, masses, or scars  RESP: lungs clear to auscultation - no rales, rhonchi or wheezes  CV: regular rate and rhythm, normal S1 S2, no S3 or S4, no murmur, click or rub, no peripheral edema  ABDOMEN: soft, nontender, no hepatosplenomegaly, no masses and bowel sounds normal  MS: no gross musculoskeletal defects noted, no edema    Recent Labs   Lab Test 02/16/24  1210 12/08/22  1228    142   POTASSIUM 4.8 3.8   CR 0.78 0.72        Diagnostics  Labs pending at this time.  Results will be reviewed " when available.   No EKG required, no history of coronary heart disease, significant arrhythmia, peripheral arterial disease or other structural heart disease.    Revised Cardiac Risk Index (RCRI)  The patient has the following serious cardiovascular risks for perioperative complications:   - No serious cardiac risks = 0 points     RCRI Interpretation: 0 points: Class I (very low risk - 0.4% complication rate)         Signed Electronically by: Gabi Sotelo PA-C  Copy of this evaluation report is provided to requesting physician.        Answers submitted by the patient for this visit:  Patient Health Questionnaire (Submitted on 2/28/2024)  If you checked off any problems, how difficult have these problems made it for you to do your work, take care of things at home, or get along with other people?: Not difficult at all  PHQ9 TOTAL SCORE: 0

## 2024-02-28 NOTE — PATIENT INSTRUCTIONS
[facility] Medication Therapy Management department will contact you to schedule an appointment.  You may also schedule the appointment by calling [phone] or toll-free at [phone].   Preparing for Your Surgery  Getting started  A nurse will call you to review your health history and instructions. They will give you an arrival time based on your scheduled surgery time. Please be ready to share:  Your doctor's clinic name and phone number  Your medical, surgical, and anesthesia history  A list of allergies and sensitivities  A list of medicines, including herbal treatments and over-the-counter drugs  Whether the patient has a legal guardian (ask how to send us the papers in advance)  Please tell us if you're pregnant--or if there's any chance you might be pregnant. Some surgeries may injure a fetus (unborn baby), so they require a pregnancy test. Surgeries that are safe for a fetus don't always need a test, and you can choose whether to have one.   If you have a child who's having surgery, please ask for a copy of Preparing for Your Child's Surgery.    Preparing for surgery  Within 10 to 30 days of surgery: Have a pre-op exam (sometimes called an H&P, or History and Physical). This can be done at a clinic or pre-operative center.  If you're having a , you may not need this exam. Talk to your care team.  At your pre-op exam, talk to your care team about all medicines you take. If you need to stop any medicines before surgery, ask when to start taking them again.  We do this for your safety. Many medicines can make you bleed too much during surgery. Some change how well surgery (anesthesia) drugs work.  Call your insurance company to let them know you're having surgery. (If you don't have insurance, call [phone].)  Call your clinic if there's any change in your health. This includes signs of a cold or flu (sore throat, runny nose, cough, rash, fever). It also includes a scrape or  scratch near the surgery site.  If you have questions on the day of surgery, call your hospital or surgery center.  Eating and drinking guidelines  For your safety: Unless your surgeon tells you otherwise, follow the guidelines below.  Eat and drink as usual until 8 hours before you arrive for surgery. After that, no food or milk.  Drink clear liquids until 2 hours before you arrive. These are liquids you can see through, like water, Gatorade, and Propel Water. They also include plain black coffee and tea (no cream or milk), candy, and breath mints. You can spit out gum when you arrive.  If you drink alcohol: Stop drinking it the night before surgery.  If your care team tells you to take medicine on the morning of surgery, it's okay to take it with a sip of water.  Preventing infection  Shower or bathe the night before and morning of your surgery. Follow the instructions your clinic gave you. (If no instructions, use regular soap.)  Don't shave or clip hair near your surgery site. We'll remove the hair if needed.  Don't smoke or vape the morning of surgery. You may chew nicotine gum up to 2 hours before surgery. A nicotine patch is okay.  Note: Some surgeries require you to completely quit smoking and nicotine. Check with your surgeon.  Your care team will make every effort to keep you safe from infection. We will:  Clean our hands often with soap and water (or an alcohol-based hand rub).  Clean the skin at your surgery site with a special soap that kills germs.  Give you a special gown to keep you warm. (Cold raises the risk of infection.)  Wear special hair covers, masks, gowns and gloves during surgery.  Give antibiotic medicine, if prescribed. Not all surgeries need antibiotics.  What to bring on the day of surgery  Photo ID and insurance card  Copy of your health care directive, if you have one  Glasses and hearing aids (bring cases)  You can't wear contacts during surgery  Inhaler and eye drops, if you use  them (tell us about these when you arrive)  CPAP machine or breathing device, if you use them  A few personal items, if spending the night  If you have . . .  A pacemaker, ICD (cardiac defibrillator) or other implant: Bring the ID card.  An implanted stimulator: Bring the remote control.  A legal guardian: Bring a copy of the certified (court-stamped) guardianship papers.  Please remove any jewelry, including body piercings. Leave jewelry and other valuables at home.  If you're going home the day of surgery  You must have a responsible adult drive you home. They should stay with you overnight as well.  If you don't have someone to stay with you, and you aren't safe to go home alone, we may keep you overnight. Insurance often won't pay for this.  After surgery  If it's hard to control your pain or you need more pain medicine, please call your surgeon's office.  Questions?   If you have any questions for your care team, list them here: _________________________________________________________________________________________________________________________________________________________________________ ____________________________________ ____________________________________ ____________________________________  For informational purposes only. Not to replace the advice of your health care provider. Copyright   2003, 2019 [facility]. All rights reserved. Clinically reviewed by Maryjo Simpson MD. [employer] [id number] - REV 12/22.    How to Take Your Medication Before Surgery  - Take all of your medications before surgery as usual

## 2024-03-01 LAB — BACTERIA SPEC CULT: NORMAL

## 2024-03-20 ENCOUNTER — MYC REFILL (OUTPATIENT)
Dept: FAMILY MEDICINE | Facility: CLINIC | Age: 54
End: 2024-03-20
Payer: COMMERCIAL

## 2024-03-20 DIAGNOSIS — F32.1 MODERATE MAJOR DEPRESSION (H): ICD-10-CM

## 2024-03-20 DIAGNOSIS — F34.1 DYSTHYMIA: ICD-10-CM

## 2024-03-20 DIAGNOSIS — K31.89 GASTRIC HYPERACIDITY: ICD-10-CM

## 2024-03-20 RX ORDER — SERTRALINE HYDROCHLORIDE 100 MG/1
200 TABLET, FILM COATED ORAL DAILY
Qty: 180 TABLET | Refills: 0 | Status: SHIPPED | OUTPATIENT
Start: 2024-03-20 | End: 2024-06-25

## 2024-03-29 ENCOUNTER — TRANSFERRED RECORDS (OUTPATIENT)
Dept: HEALTH INFORMATION MANAGEMENT | Facility: CLINIC | Age: 54
End: 2024-03-29
Payer: COMMERCIAL

## 2024-04-12 ENCOUNTER — TRANSFERRED RECORDS (OUTPATIENT)
Dept: HEALTH INFORMATION MANAGEMENT | Facility: CLINIC | Age: 54
End: 2024-04-12
Payer: COMMERCIAL

## 2024-04-12 ENCOUNTER — TELEPHONE (OUTPATIENT)
Dept: ENDOCRINOLOGY | Facility: CLINIC | Age: 54
End: 2024-04-12
Payer: COMMERCIAL

## 2024-04-13 ENCOUNTER — HEALTH MAINTENANCE LETTER (OUTPATIENT)
Age: 54
End: 2024-04-13

## 2024-04-15 ENCOUNTER — TELEPHONE (OUTPATIENT)
Dept: ENDOCRINOLOGY | Facility: CLINIC | Age: 54
End: 2024-04-15

## 2024-04-15 ENCOUNTER — OFFICE VISIT (OUTPATIENT)
Dept: ENDOCRINOLOGY | Facility: CLINIC | Age: 54
End: 2024-04-15
Attending: PHYSICIAN ASSISTANT
Payer: COMMERCIAL

## 2024-04-15 ENCOUNTER — TELEPHONE (OUTPATIENT)
Dept: CARDIOLOGY | Facility: CLINIC | Age: 54
End: 2024-04-15

## 2024-04-15 VITALS
HEIGHT: 63 IN | HEART RATE: 80 BPM | OXYGEN SATURATION: 98 % | BODY MASS INDEX: 35.44 KG/M2 | DIASTOLIC BLOOD PRESSURE: 93 MMHG | SYSTOLIC BLOOD PRESSURE: 129 MMHG | WEIGHT: 200 LBS

## 2024-04-15 DIAGNOSIS — E66.813 CLASS 3 SEVERE OBESITY WITH SERIOUS COMORBIDITY AND BODY MASS INDEX (BMI) OF 40.0 TO 44.9 IN ADULT, UNSPECIFIED OBESITY TYPE (H): Primary | ICD-10-CM

## 2024-04-15 DIAGNOSIS — E66.01 CLASS 3 SEVERE OBESITY WITH SERIOUS COMORBIDITY AND BODY MASS INDEX (BMI) OF 40.0 TO 44.9 IN ADULT, UNSPECIFIED OBESITY TYPE (H): Primary | ICD-10-CM

## 2024-04-15 DIAGNOSIS — K21.9 GASTROESOPHAGEAL REFLUX DISEASE WITHOUT ESOPHAGITIS: ICD-10-CM

## 2024-04-15 PROCEDURE — 99205 OFFICE O/P NEW HI 60 MIN: CPT

## 2024-04-15 RX ORDER — IVERMECTIN 10 MG/G
CREAM TOPICAL
COMMUNITY
Start: 2023-11-08 | End: 2024-07-05

## 2024-04-15 RX ORDER — ADAPALENE GEL USP, 0.3% 3 MG/G
GEL TOPICAL
COMMUNITY
Start: 2023-11-08

## 2024-04-15 RX ORDER — AZELAIC ACID 0.15 G/G
GEL TOPICAL
COMMUNITY
Start: 2023-08-01

## 2024-04-15 ASSESSMENT — PAIN SCALES - GENERAL: PAINLEVEL: NO PAIN (0)

## 2024-04-15 NOTE — TELEPHONE ENCOUNTER
Patient confirmed scheduled appointment:  Date: 11/7/24  Time: 12:30 pm  Visit type: ASIYA FERNANDEZ  Provider: Joaan Rockwell  Location: [facility] 4th [facility]  Testing/imaging: n/a  Additional notes: n/a

## 2024-04-15 NOTE — LETTER
"4/15/2024       RE: Vanesa Owen  [address] [facility] [address]     Dear Colleague,    Thank you for referring your patient, Vanesa Owen, to the [facility] [place] [facility]. Please see a copy of my visit note below.    60 minutes spent by me on the date of the encounter doing chart review, history and exam, documentation and further activities per the note    New Medical Weight Management Consult    PATIENT:  Vanesa Owen  MRN:         [id number]  :         1970  MAIKOL:         4/15/2024        I had the pleasure of seeing your patient, Vanesa Owen. Full intake/assessment was done to determine barriers to weight loss success and develop a treatment plan. Vanesa Owen is a 54 year old female interested in treatment of medical problems associated with excess weight. She has a height of 5' 2.5\", a weight of 200 lbs 0 oz, and the calculated Body mass index is 36 kg/m .        Assessment & Plan  Problem List Items Addressed This Visit       Class 3 severe obesity with serious comorbidity and body mass index (BMI) of 40.0 to 44.9 in adult (H) - Primary     Overweight onset in college. Weight gain gradual. Weight influenced by stress. Has not been able to lose weight for most of adult life with life style changes. Was started on Ozempic and then transitioned to Wegovy - starting BMI 40.21 (2022). Was able to lose 40lbs, but has sense gained 10lbs with discontinuation of wegovy 3 months ago due to insurance coverage.     She is [employer] employee, with a starting BMI of 40.21, so does still qualify for GLP-1. Will restart after [facility] pharmacist visit.     Previously on Ozempic and then Wegovy. No side effects. Was helpful with weight loss. Has iris off for 3 months due to insurance coverage.          Relevant Orders    Med Therapy Management Referral    Gastroesophageal reflux disease " "without esophagitis        Restart Wegovy once met with [facility] pharmacist   [facility] pharmacist next available   Dietitian next available   Joana Swann in November           Vanesa Owen is a 54 year old female who presents to clinic today for the following health issues.     She has the following co-morbidities:        4/14/2024    11:32 AM   --   I have the following health issues associated with obesity High Cholesterol    Osteoarthritis (joint disease)   I have the following symptoms associated with obesity Knee Pain    Depression    Back Pain    Fatigue    Hip Pain     GERD - well controlled on omeprazole. Followed by PCP     HLD - just started on statin. Followed by PCP    Anxiety and depression - mood is stable on medications. Followed by PCP           4/14/2024    11:32 AM   Referring Provider   Please name the provider who referred you to Medical Weight Management  If you do not know, please answer \"I Don't Know\" Gabi Sotelo           4/14/2024    11:32 AM   Weight History   How concerned are you about your weight? Very Concerned   I became overweight In College   The following factors have contributed to my weight gain Eating Wrong Types of Food    Lack of Exercise    Genetic (Runs in the Family)    Stress   I have tried the following methods to lose weight Watching Portions or Calories    Exercise    Weight Watchers    Slim Fast or Other Liquid Diets    Medications   My lowest weight since age 20 was 120   My highest weight since age 20 was 230   The most weight I have ever lost was (lbs) 30   I have the following family history of obesity/being overweight My mother is overweight    My father is overweight    Many of my relatives are overweight   How has your weight changed over the last year? Gained   How many pounds? 60     Overweight onset in nursing school. Previously stable around 180lbs. Wegith gain has been gradual. Some increase weight through the pandemic and passing of mother. At the time had a " max weigth around 230lbs. Started Ozempic 12/2022, with a starting BMI 40.21 on 11/23/2022. Then transitioned to Wegovy. Lost around 40lbs and was happy with results. Last took around 3 months ago and has regained around 10lbs. Previously was only able to lose weight through [facility] 15years ago, and lost 20lbs. Otherwise has not been able to lose weight - increase activity, low calorie diet. Significant family hx of weight concern. Wants to lose weight to improve overall health.     AOMs:   - Wegovy/Ozempic - side effects fatigue minimal, was tolerable. Was helpful with weight loss - helpful with fullness and food noise. Has been off for around 3 months, insurance no longer covered.         4/14/2024    11:32 AM   Diet Recall Review with Patient   If you do eat breakfast, what types of food do you eat? protein cookies, coffee, or herbalife shake and tea   If you do eat lunch, what types of food do you typically eat? skip periodically, lean cuisine, yogurt, or sandwich, at work personal pizza, or herbalife shake and tea   If you do eat supper, what types of food do you typically eat? fast food sandwich, herbalife shake, protein waffles, hamburger with cheese, fries, chicken salad   If you do snack, what types of food do you typically eat? Druze rice cakes, toast peanut butter   How many glasses of juice do you drink in a typical day? 0   How many of glasses of milk do you drink in a typical day? 0   How many 8oz glasses of sugar containing drinks such as Junior-Aid/sweet tea do you drink in a day? 0   How many cans/bottles of sugar pop/soda/tea/sports drinks do you drink in a day? 1   How many cans/bottles of diet pop/soda/tea or sports drink do you drink in a day? 1   How often do you have a drink of alcohol? 2-4 Times a Month   If you do drink, how many drinks might you have in a day? 1 or 2     Has a hard time having a full meal. Feels like she has small snacks through out the day. Is not consistent with meals. Does not  like to cook. No increase hunger. Increase thoughts of food. Craves chocolate. Can get full, and stay full. Transitioned to diet pop, only having 1. Eats out 2-3xweek. Drinks water, tea, coffee (caribou),           4/14/2024    11:32 AM   Eating Habits   Generally, my meals include foods like these bread, pasta, rice, potatoes, corn, crackers, sweet dessert, pop, or juice Almost Everyday   Generally, my meals include foods like these fried meats, brats, burgers, french fries, pizza, cheese, chips, or ice cream A Few Times a Week   Eat fast food (like McDonalds, Burger Avel, [employer]) Less Than Weekly   Eat at a buffet or sit-down restaurant Less Than Weekly   Eat most of my meals in front of the TV or computer Almost Everyday   Often skip meals, eat at random times, have no regular eating times Everyday   Rarely sit down for a meal but snack or graze throughout Almost Everyday   Eat extra snacks between meals Almost Everyday   Eat most of my food at the end of the day Almost Everyday   Eat in the middle of the night or wake up at night to eat Never   Eat extra snacks to prevent or correct low blood sugar Never   Eat to prevent acid reflux or stomach pain Never   Worry about not having enough food to eat Never   I eat when I am depressed Less Than Weekly   I eat when I am stressed Less Than Weekly   I eat when I am bored A Few Times a Week   I eat when I am anxious Less Than Weekly   I eat when I am happy or as a reward A Few Times a Week   I feel hungry all the time even if I just have eaten Once a Week   Feeling full is important to me Almost Everyday   I finish all the food on my plate even if I am already full Almost Everyday   I can't resist eating delicious food or walk past the good food/smell Everyday   I eat/snack without noticing that I am eating Never   I eat when I am preparing the meal Almost Everyday   I eat more than usual when I see others eating Less Than Weekly   I have trouble not eating sweets,  ice cream, cookies, or chips if they are around the house Everyday   I think about food all day Everyday   What foods, if any, do you crave? Sweets/Candy/Chocolate   Please list any other foods you crave? cheese, pasta, bread           4/14/2024    11:32 AM   Amount of Food   I feel out of control when eating Almost Everyday   I eat a large amount of food, like a loaf of bread, a box of cookies, a pint/quart of ice cream, all at once Monthly   I eat a large amount of food even when I am not hungry Weekly   I eat rapidly Weekly   I eat alone because I feel embarrassed and do not want others to see how much I have eaten Almost Everyday   I eat until I am uncomfortably full Monthly   I feel bad, disgusted, or guilty after I overeat Almost Everyday           4/14/2024    11:32 AM   Activity/Exercise History   How much of a typical 12 hour day do you spend sitting? Less Than Half the Day   How much of a typical 12 hour day do you spend lying down? Less Than Half the Day   How much of a typical day do you spend walking/standing? Half the Day   How many hours (not including work) do you spend on the TV/Video Games/Computer/Tablet/Phone? 4-5 Hours   How many times a week are you active for the purpose of exercise? 2-3 Times a Week   What keeps you from being more active? Lack of Time    Too tired    Worried People Will Look At Me   How many total minutes do you spend doing some activity for the purpose of exercising when you exercise? 15-30 Minutes     Active at work. Getting back into walking outside now. Hoping to get elipitcical. Left knee meniscal repair, still going to PT. Home excerises. Pain can limit at time.     PAST MEDICAL HISTORY:  Past Medical History:   Diagnosis Date    Anxiety     Depressive disorder     Herpes simplex with other ophthalmic complications     UTI (urinary tract infection)            4/14/2024    11:32 AM   Work/Social History Reviewed With Patient   My employment status is Full-Time   My job  is RN in the NICU   How much of your job is spent on the computer or phone? Less Than 50%   How many hours do you spend commuting to work daily? 3 hours   What is your marital status? /In a Relationship   If in a relationship, is your significant other overweight? No   If you have children, are they overweight? Yes   Who do you live with? my  and son   Who does the food shopping? myself and      Full time at the NICU on the [employer].  with 2 children. Supportive partner.     ETOH - 1-2xmonth  No nicotine, drugs, or THC         4/14/2024    11:32 AM   Mental Health History Reviewed With Patient   Have you ever been physically or sexually abused? No   How often in the past 2 weeks have you felt little interest or pleasure in doing things? Not at all   Over the past 2 weeks how often have you felt down, depressed, or hopeless? Not at all           4/14/2024    11:32 AM   Sleep History Reviewed With Patient   How many hours do you sleep at night? 6       MEDICATIONS:   Current Outpatient Medications   Medication Sig Dispense Refill    adapalene (DIFFERIN) 0.3 % external gel APPLY PEA SIZED AMOUNT TO ENTIRE FACE EVERY OTHER NIGHT, INCREASING TO NIGHTLY AS TOLERATED. FOLLOW WITH MOISTURIZER.      atorvastatin (LIPITOR) 10 MG tablet Take 1 tablet (10 mg) by mouth daily 90 tablet 1    azelaic acid (FINACIA) 15 % external gel APPLY THIN LAYER TO ENTIRE FACE ONCE DAILY IN THE MORNING      busPIRone (BUSPAR) 5 MG tablet TAKE ONE TABLET BY MOUTH TWICE A  tablet 1    fluticasone (FLONASE) 50 MCG/ACT nasal spray INSTILL ONE SPRAY INTO BOTH NOSTRILS DAILY 16 g 3    levonorgestrel (MIRENA) 20 MCG/DAY IUD 1 each (20 mcg) by Intrauterine route continuous      MULTI-VITAMIN OR TABS 1 TABLET DAILY      omeprazole (PRILOSEC) 20 MG DR capsule TAKE ONE CAPSULE BY MOUTH EVERY DAY. TAKE 30 TO 60 MINUTES BEFORE A MEAL 90 capsule 0    sertraline (ZOLOFT) 100 MG tablet Take 2 tablets (200 mg) by mouth daily  "180 tablet 0    SOOLANTRA 1 % cream APPLY A THIN LAYER TO THE FACE ONCE DAILY IN THE MORNING, ONGOING.         ALLERGIES:   Allergies   Allergen Reactions    Sulfa Antibiotics Rash           8/12/2011     4:00 PM   VICKIE Score (Last Two)   VICKIE Raw Score 44   Activation Score 70.8   VICKIE Level 4         Objective   BP (!) 129/93 (BP Location: Left arm, Patient Position: Sitting, Cuff Size: Adult Large)   Pulse 80   Ht 1.588 m (5' 2.5\")   Wt 90.7 kg (200 lb)   SpO2 98%   BMI 36.00 kg/m      Physical Exam   GENERAL: alert and no distress  EYES: Eyes grossly normal to inspection.  No discharge or erythema, or obvious scleral/conjunctival abnormalities.  RESP: No audible wheeze, cough, or visible cyanosis.    SKIN: Visible skin clear. No significant rash, abnormal pigmentation or lesions.  NEURO: Cranial nerves grossly intact.  Mentation and speech appropriate for age.  PSYCH: Appropriate affect, tone, and pace of words     Anti-obesity medication ROS:    HEENT  Hx of glaucoma: No    Cardiovascular  CAD:No  HTN:No    Gastrointestinal  GERD:Yes  Constipation:No  Liver Dz:No  H/O Pancreatitis:Yes    Psychiatric  Bipolar: No  Anxiety:Yes  Depression:Yes  History of alcohol/drug abuse: No  Hx of eating disorder:No    Endocrine  Personal or family hx of MTC or MEN2:No  Diabetes/prediabetes: No    Neurologic:  Hx of seizures: No  Hx of migraines: No  Memory Impairment: No      History of kidney stones: No  Kidney disease: No  Current birth control:  IUD    Taking Opioid/Narcotic: No        Sincerely,    Joana Rockwell PA-C    "

## 2024-04-15 NOTE — Clinical Note
TEAGANI - [employer] employee. Previously on Ozempic, then Wegovy. Has been off for 3 months. Starting BMI of 40.21 on 11/23/2022. Would like to restart Wegovy

## 2024-04-15 NOTE — TELEPHONE ENCOUNTER
Patient confirmed scheduled appointment:  Date: 6/3/24  Time: 10 am  Visit type: NEW-[facility]  Provider: Suzie Bright (or other), added to waitlist  Location: virtual  Testing/imaging: n/a  Additional notes: n/a

## 2024-04-15 NOTE — NURSING NOTE
"(   Chief Complaint   Patient presents with    New Patient     New MWM, BMI of 35    )    ( Weight: 90.7 kg (200 lb) )  ( Height: 158.8 cm (5' 2.5\") )  ( BMI (Calculated): 36 )  (   )  ( Cumulative weight loss (lbs): 0 )  (   )  (   )  (   )  (   )    ( BP: (!) 129/93 )  (   )  (   )  (   )  ( Pulse: 80 )  (   )  ( SpO2: 98 % )    (   Patient Active Problem List   Diagnosis    Moderate major depression (H)    IUD (intrauterine device) in place    Vitamin D deficiency    Lumbar back pain    genital herpes, mainly cervical.     Gastric hyperacidity    CARDIOVASCULAR SCREENING; LDL GOAL LESS THAN 160    HCH    Obesity    Hip pain, left    Gastroesophageal reflux disease without esophagitis    Obesity (BMI 35.0-39.9) with comorbidity (H)    Pain in joint, ankle and foot, right    Iris nevus, right    Hearing loss of left ear, unspecified hearing loss type    Primary osteoarthritis involving multiple joints    Trigger finger, acquired    Borderline high cholesterol    Family history of arteriosclerotic cardiovascular disease    H/O methicillin resistant Staphylococcus aureus    Acute tear medial meniscus, left, subsequent encounter    )  (   Current Outpatient Medications   Medication Sig Dispense Refill    atorvastatin (LIPITOR) 10 MG tablet Take 1 tablet (10 mg) by mouth daily 90 tablet 1    busPIRone (BUSPAR) 5 MG tablet TAKE ONE TABLET BY MOUTH TWICE A  tablet 1    cyclobenzaprine (FLEXERIL) 10 MG tablet Take 1 tablet (10 mg) by mouth nightly as needed for muscle spasms 30 tablet 0    fluticasone (FLONASE) 50 MCG/ACT nasal spray INSTILL ONE SPRAY INTO BOTH NOSTRILS DAILY 16 g 3    levonorgestrel (MIRENA) 20 MCG/DAY IUD 1 each (20 mcg) by Intrauterine route continuous      MULTI-VITAMIN OR TABS 1 TABLET DAILY      omeprazole (PRILOSEC) 20 MG DR capsule TAKE ONE CAPSULE BY MOUTH EVERY DAY. TAKE 30 TO 60 MINUTES BEFORE A MEAL 90 capsule 0    sertraline (ZOLOFT) 100 MG tablet Take 2 tablets (200 mg) by mouth daily " 180 tablet 0    )  ( Diabetes Eval:    )    ( Pain Eval:  No Pain (0) )    ( Wound Eval:       )    (   History   Smoking Status    Never   Smokeless Tobacco    Never    )    ( Signed By:  Damian Mtz, EMT; April 15, 2024; 10:06 AM )

## 2024-04-15 NOTE — PROGRESS NOTES
"60 minutes spent by me on the date of the encounter doing chart review, history and exam, documentation and further activities per the note    New Medical Weight Management Consult    PATIENT:  Vanesa Owen  MRN:         [id number]  :         1970  MAIKOL:         4/15/2024        I had the pleasure of seeing your patient, Vanesa Owen. Full intake/assessment was done to determine barriers to weight loss success and develop a treatment plan. Vanesa Owen is a 54 year old female interested in treatment of medical problems associated with excess weight. She has a height of 5' 2.5\", a weight of 200 lbs 0 oz, and the calculated Body mass index is 36 kg/m .        Assessment & Plan   Problem List Items Addressed This Visit       Class 3 severe obesity with serious comorbidity and body mass index (BMI) of 40.0 to 44.9 in adult (H) - Primary     Overweight onset in college. Weight gain gradual. Weight influenced by stress. Has not been able to lose weight for most of adult life with life style changes. Was started on Ozempic and then transitioned to Wegovy - starting BMI 40.21 (2022). Was able to lose 40lbs, but has sense gained 10lbs with discontinuation of wegovy 3 months ago due to insurance coverage.     She is [employer] employee, with a starting BMI of 40.21, so does still qualify for GLP-1. Will restart after [facility]TM pharmacist visit.     Previously on Ozempic and then Wegovy. No side effects. Was helpful with weight loss. Has iris off for 3 months due to insurance coverage.          Relevant Orders    Med Therapy Management Referral    Gastroesophageal reflux disease without esophagitis        Restart Wegovy once met with [facility] pharmacist   [facility] pharmacist next available   Dietitian next available   Joana Swann in November           Vanesa Owen is a 54 year old female who presents to clinic today for the following health issues.     She has the following co-morbidities:        2024    " "11:32 AM   --   I have the following health issues associated with obesity High Cholesterol    Osteoarthritis (joint disease)   I have the following symptoms associated with obesity Knee Pain    Depression    Back Pain    Fatigue    Hip Pain     GERD - well controlled on omeprazole. Followed by PCP     HLD - just started on statin. Followed by PCP    Anxiety and depression - mood is stable on medications. Followed by PCP           4/14/2024    11:32 AM   Referring Provider   Please name the provider who referred you to Medical Weight Management  If you do not know, please answer \"I Don't Know\" Gabi Deepak           4/14/2024    11:32 AM   Weight History   How concerned are you about your weight? Very Concerned   I became overweight In College   The following factors have contributed to my weight gain Eating Wrong Types of Food    Lack of Exercise    Genetic (Runs in the Family)    Stress   I have tried the following methods to lose weight Watching Portions or Calories    Exercise    Weight Watchers    Slim Fast or Other Liquid Diets    Medications   My lowest weight since age 20 was 120   My highest weight since age 20 was 230   The most weight I have ever lost was (lbs) 30   I have the following family history of obesity/being overweight My mother is overweight    My father is overweight    Many of my relatives are overweight   How has your weight changed over the last year? Gained   How many pounds? 60     Overweight onset in nursing school. Previously stable around 180lbs. Wegith gain has been gradual. Some increase weight through the pandemic and passing of mother. At the time had a max weigth around 230lbs. Started Ozempic 12/2022, with a starting BMI 40.21 on 11/23/2022. Then transitioned to Wegovy. Lost around 40lbs and was happy with results. Last took around 3 months ago and has regained around 10lbs. Previously was only able to lose weight through [facility] 15years ago, and lost 20lbs. Otherwise has not been " able to lose weight - increase activity, low calorie diet. Significant family hx of weight concern. Wants to lose weight to improve overall health.     AOMs:   - Wegovy/Ozempic - side effects fatigue minimal, was tolerable. Was helpful with weight loss - helpful with fullness and food noise. Has been off for around 3 months, insurance no longer covered.         4/14/2024    11:32 AM   Diet Recall Review with Patient   If you do eat breakfast, what types of food do you eat? protein cookies, coffee, or herbalife shake and tea   If you do eat lunch, what types of food do you typically eat? skip periodically, lean cuisine, yogurt, or sandwich, at work personal pizza, or herbalife shake and tea   If you do eat supper, what types of food do you typically eat? fast food sandwich, herbalife shake, protein waffles, hamburger with cheese, fries, chicken salad   If you do snack, what types of food do you typically eat? Gnosticist rice cakes, toast peanut butter   How many glasses of juice do you drink in a typical day? 0   How many of glasses of milk do you drink in a typical day? 0   How many 8oz glasses of sugar containing drinks such as Junior-Aid/sweet tea do you drink in a day? 0   How many cans/bottles of sugar pop/soda/tea/sports drinks do you drink in a day? 1   How many cans/bottles of diet pop/soda/tea or sports drink do you drink in a day? 1   How often do you have a drink of alcohol? 2-4 Times a Month   If you do drink, how many drinks might you have in a day? 1 or 2     Has a hard time having a full meal. Feels like she has small snacks through out the day. Is not consistent with meals. Does not like to cook. No increase hunger. Increase thoughts of food. Craves chocolate. Can get full, and stay full. Transitioned to diet pop, only having 1. Eats out 2-3xweek. Drinks water, tea, coffee (caribou),           4/14/2024    11:32 AM   Eating Habits   Generally, my meals include foods like these bread, pasta, rice, potatoes,  corn, crackers, sweet dessert, pop, or juice Almost Everyday   Generally, my meals include foods like these fried meats, brats, burgers, french fries, pizza, cheese, chips, or ice cream A Few Times a Week   Eat fast food (like McDonalds, Burger Avel, Taco Bell) Less Than Weekly   Eat at a buffet or sit-down restaurant Less Than Weekly   Eat most of my meals in front of the TV or computer Almost Everyday   Often skip meals, eat at random times, have no regular eating times Everyday   Rarely sit down for a meal but snack or graze throughout Almost Everyday   Eat extra snacks between meals Almost Everyday   Eat most of my food at the end of the day Almost Everyday   Eat in the middle of the night or wake up at night to eat Never   Eat extra snacks to prevent or correct low blood sugar Never   Eat to prevent acid reflux or stomach pain Never   Worry about not having enough food to eat Never   I eat when I am depressed Less Than Weekly   I eat when I am stressed Less Than Weekly   I eat when I am bored A Few Times a Week   I eat when I am anxious Less Than Weekly   I eat when I am happy or as a reward A Few Times a Week   I feel hungry all the time even if I just have eaten Once a Week   Feeling full is important to me Almost Everyday   I finish all the food on my plate even if I am already full Almost Everyday   I can't resist eating delicious food or walk past the good food/smell Everyday   I eat/snack without noticing that I am eating Never   I eat when I am preparing the meal Almost Everyday   I eat more than usual when I see others eating Less Than Weekly   I have trouble not eating sweets, ice cream, cookies, or chips if they are around the house Everyday   I think about food all day Everyday   What foods, if any, do you crave? Sweets/Candy/Chocolate   Please list any other foods you crave? cheese, pasta, bread           4/14/2024    11:32 AM   Amount of Food   I feel out of control when eating Almost Everyday   I  eat a large amount of food, like a loaf of bread, a box of cookies, a pint/quart of ice cream, all at once Monthly   I eat a large amount of food even when I am not hungry Weekly   I eat rapidly Weekly   I eat alone because I feel embarrassed and do not want others to see how much I have eaten Almost Everyday   I eat until I am uncomfortably full Monthly   I feel bad, disgusted, or guilty after I overeat Almost Everyday           4/14/2024    11:32 AM   Activity/Exercise History   How much of a typical 12 hour day do you spend sitting? Less Than Half the Day   How much of a typical 12 hour day do you spend lying down? Less Than Half the Day   How much of a typical day do you spend walking/standing? Half the Day   How many hours (not including work) do you spend on the TV/Video Games/Computer/Tablet/Phone? 4-5 Hours   How many times a week are you active for the purpose of exercise? 2-3 Times a Week   What keeps you from being more active? Lack of Time    Too tired    Worried People Will Look At Me   How many total minutes do you spend doing some activity for the purpose of exercising when you exercise? 15-30 Minutes     Active at work. Getting back into walking outside now. Hoping to get elipitcical. Left knee meniscal repair, still going to PT. Home excerises. Pain can limit at time.     PAST MEDICAL HISTORY:  Past Medical History:   Diagnosis Date    Anxiety     Depressive disorder     Herpes simplex with other ophthalmic complications     UTI (urinary tract infection)            4/14/2024    11:32 AM   Work/Social History Reviewed With Patient   My employment status is Full-Time   My job is RN in the NICU   How much of your job is spent on the computer or phone? Less Than 50%   How many hours do you spend commuting to work daily? 3 hours   What is your marital status? /In a Relationship   If in a relationship, is your significant other overweight? No   If you have children, are they overweight? Yes   Who  do you live with? my  and son   Who does the food shopping? myself and      Full time at the NICU on the [employer].  with 2 children. Supportive partner.     ETOH - 1-2xmonth  No nicotine, drugs, or THC         4/14/2024    11:32 AM   Mental Health History Reviewed With Patient   Have you ever been physically or sexually abused? No   How often in the past 2 weeks have you felt little interest or pleasure in doing things? Not at all   Over the past 2 weeks how often have you felt down, depressed, or hopeless? Not at all           4/14/2024    11:32 AM   Sleep History Reviewed With Patient   How many hours do you sleep at night? 6       MEDICATIONS:   Current Outpatient Medications   Medication Sig Dispense Refill    adapalene (DIFFERIN) 0.3 % external gel APPLY PEA SIZED AMOUNT TO ENTIRE FACE EVERY OTHER NIGHT, INCREASING TO NIGHTLY AS TOLERATED. FOLLOW WITH MOISTURIZER.      atorvastatin (LIPITOR) 10 MG tablet Take 1 tablet (10 mg) by mouth daily 90 tablet 1    azelaic acid (FINACIA) 15 % external gel APPLY THIN LAYER TO ENTIRE FACE ONCE DAILY IN THE MORNING      busPIRone (BUSPAR) 5 MG tablet TAKE ONE TABLET BY MOUTH TWICE A  tablet 1    fluticasone (FLONASE) 50 MCG/ACT nasal spray INSTILL ONE SPRAY INTO BOTH NOSTRILS DAILY 16 g 3    levonorgestrel (MIRENA) 20 MCG/DAY IUD 1 each (20 mcg) by Intrauterine route continuous      MULTI-VITAMIN OR TABS 1 TABLET DAILY      omeprazole (PRILOSEC) 20 MG DR capsule TAKE ONE CAPSULE BY MOUTH EVERY DAY. TAKE 30 TO 60 MINUTES BEFORE A MEAL 90 capsule 0    sertraline (ZOLOFT) 100 MG tablet Take 2 tablets (200 mg) by mouth daily 180 tablet 0    SOOLANTRA 1 % cream APPLY A THIN LAYER TO THE FACE ONCE DAILY IN THE MORNING, ONGOING.         ALLERGIES:   Allergies   Allergen Reactions    Sulfa Antibiotics Rash           8/12/2011     4:00 PM   VICKIE Score (Last Two)   VICKIE Raw Score 44   Activation Score 70.8   VICKIE Level 4         Objective    BP (!) 129/93 (BP  "Location: Left arm, Patient Position: Sitting, Cuff Size: Adult Large)   Pulse 80   Ht 1.588 m (5' 2.5\")   Wt 90.7 kg (200 lb)   SpO2 98%   BMI 36.00 kg/m      Physical Exam   GENERAL: alert and no distress  EYES: Eyes grossly normal to inspection.  No discharge or erythema, or obvious scleral/conjunctival abnormalities.  RESP: No audible wheeze, cough, or visible cyanosis.    SKIN: Visible skin clear. No significant rash, abnormal pigmentation or lesions.  NEURO: Cranial nerves grossly intact.  Mentation and speech appropriate for age.  PSYCH: Appropriate affect, tone, and pace of words     Anti-obesity medication ROS:    HEENT  Hx of glaucoma: No    Cardiovascular  CAD:No  HTN:No    Gastrointestinal  GERD:Yes  Constipation:No  Liver Dz:No  H/O Pancreatitis:Yes    Psychiatric  Bipolar: No  Anxiety:Yes  Depression:Yes  History of alcohol/drug abuse: No  Hx of eating disorder:No    Endocrine  Personal or family hx of MTC or MEN2:No  Diabetes/prediabetes: No    Neurologic:  Hx of seizures: No  Hx of migraines: No  Memory Impairment: No      History of kidney stones: No  Kidney disease: No  Current birth control:  IUD    Taking Opioid/Narcotic: No        Sincerely,    Joana Rockwell PA-C  "

## 2024-04-15 NOTE — TELEPHONE ENCOUNTER
Patient confirmed scheduled appointment:  Date: 4/24/24  Time: 11:30 am  Visit type: NEW [facility] Nutrition  Provider: Gabi Shea  Location: virtual  Testing/imaging: n/a  Additional notes: n/a

## 2024-04-19 NOTE — ASSESSMENT & PLAN NOTE
Overweight onset in college. Weight gain gradual. Weight influenced by stress. Has not been able to lose weight for most of adult life with life style changes. Was started on Ozempic and then transitioned to Wegovy - starting BMI 40.21 (11/23/2022). Was able to lose 40lbs, but has sense gained 10lbs with discontinuation of wegovy 3 months ago due to insurance coverage.     She is [employer] employee, with a starting BMI of 40.21, so does still qualify for GLP-1. Will restart after [facility]TM pharmacist visit.     Previously on Ozempic and then Wegovy. No side effects. Was helpful with weight loss. Has iris off for 3 months due to insurance coverage.

## 2024-04-19 NOTE — PATIENT INSTRUCTIONS
"Sebastian Alexis, it was nice to meet you today!  Thank you for allowing us the privilege of caring for you. We hope we provided you with the excellent service you deserve.   Please let us know if there is anything else we can do for you so that we can be sure you are completely satisfied with your care experience.    To ensure the quality of our services you may be receiving a patient satisfaction survey from an independent patient satisfaction monitoring company.    The greatest compliment you can give is a \"Likely to Recommend\"    Your visit was with Joana Rockwell PA-C today.    Instructions per today's visit:     Restart Wegovy once met with MTM pharmacist   MTM pharmacist next available   Dietitian next available   Joana Swann in November     ___________________________________________________________________________  Important contact and scheduling information:  Please call our contact center at [phone] to schedule your next appointments.  For any nursing questions or concerns call Sara Burrows LPN at [phone] or Lou Rajan RN at [phone]  Please call during clinic hours Monday through Friday 8:00a - 4:00p if you have questions or you can contact us via [employer] at anytime and we will reply during clinic hours.    Lab results will be communicated through My Chart or letter (if My Chart not used). Please call the clinic if you have not received communication after 1 week or if you have any questions.?  Clinic Fax: [phone]  __________________________________________________________________________    If labs were ordered today:    Please make an appointment to have them drawn at your convenience.     To schedule the Lab Appointment using [employer]:  Select \"Schedule an Appointment\"  Select \"Lab Only\"  For \"A couple of questions\", select \"Other\"  For \"Which locations work for you?, select the location and set up the appointment    To schedule by phone call [phone] to schedule a lab only " appointment at any [facility] lab.  ___________________________________________________________________________  Work with [facility] !  Virtual Sessions are Available through [facility] Weight Management Clinics    To learn more, call to schedule a free, Health  Q&A appointment: [phone]     What is Health Coaching?  Do you know what you are supposed to do, but you just aren't doing it?  Then, HEALTH COACHING may help you!   Get unstuck and move forward with the support of a professionally trained NBC-HWC (National Board-Certified Health and ) who uses evidence-based approaches to help you move forward with healthy lifestyle changes in the areas of weight loss, stress management and overall well-being.    Health Coaches help you identify goals that will work best for you. Health Coaches provide support and encouragement with overcoming barriers and help you to find inspiration and motivation to lead a healthy lifestyle.    Option one:  Health Coaching 3-Pack; Three, 30-minute Health Coaching Visits, for $99  Visits are done virtually (phone or video)  This is a self pay service; we do not accept insurance for birgit coaching.    Option two:   The 24 week Plan; 11 Health Coaching Visits, and a 7 months subscription to [employer]-- on-demand fitness, nutrition and mindfulness classes, for $499 (employee discounts may be available). Participants will also meet regularly with a weight management Medical Provider and a Registered/Licensed Dietician.  This is a self-pay service; we do not accept insurance for health coaching.    To Schedule a free Health  Q&A appointment to learn more,  call [phone].  ____________________________________________________________________  [facility]  Healthy Lifestyle Group    Healthy Lifestyle Group  This is a 60 minute virtual coaching group for those who want to lead a healthier lifestyle. Come  "together to set goals and overcome barriers in a supportive group environment. We will address the four pillars of health--nutrition, exercise, sleep and emotional well-being.  This group is highly recommended for those who are participating in the 24 week Healthy Lifestyle Plan and our Health Coaching sessions.    WHEN: This group meets the first Friday of the month, 12:30 PM - 1:30 PM online, via a zoom meeting.      FACILITATOR: Led by National Board Certified Health and , Antoinette Mcmahon [facility].    TO REGISTER: Please call the Call Center at [phone] to register. You will get an appointment to attend in [employer]. Fifteen minutes prior to the meeting, complete the e-check in and you will get the link to join the meeting.  There is no charge to attend this group and space is limited.      2023 and 2024 Meeting Topics and Dates:    November 3: Introduction to Mindfulness (Learn simple and effective mindfulness practices and how it can benefit you)    December 8: Let's Talk (guided discussion on our wins and challenges)    January 5: New Years Vision: Manifest your Best 2024! (Guided imagery,  journaling and discussion)    February 2: Let's Talk    March 1: 10 Percent Happier by Zenon Dalton (Book Bites; a guided discussion on the nuggets of wisdom from favorite wellness books; no need to read the book but highly encouraged)    April 5: Let's Talk    May 3: \"Essentialism; The Disciplined Pursuit of Less by Alvaro Mendoza (book bites discussion)    June 7: Let's Talk    July 5: NO MEETING, off for the 4th of July Holiday    August 2: The Blue Zones, Secrets for Living a Longer Life by Zenon Carballo (book bites discussion)      If you would like bariatric surgery specific support group info please let your care team know.         Thank you,   [facility] Comprehensive Weight Management Team      WEGOVY (semaglutide)    What is Wegovy?    Wegovy (semaglutide) injection 2.4 mg is an injectable " prescription medicine used for adults with obesity (BMI ?30) or overweight (excess weight) (BMI ?27) who also have weight-related medical problems to help them lose weight and keep the weight off.    1.  Start Wegovy (semaglutide) 0.25 mg once weekly for 4 weeks, then if tolerating increase to 0.5 mg weekly for 4 weeks, then if tolerating increase to 1 mg weekly for 4 weeks, then if tolerating increase to 1.7 mg weekly for 4 weeks, then if tolerating increase to 2.4 mg weekly thereafter.    -Each Wegovy pen is a once weekly single-dose prefilled pen with a pen injector already built within the pen. Discard the Wegovy pen after use in sharps container.     2. Storage: make sure that when you get the prescription that you store the prescription in the refrigerator until it is time to use the Wegovy pen.  Once it is time to use the Wegovy pen, you can keep the pen at room temperature and it is good for up to 28 days at room temperature.     3.  Potential common side effects: nausea, headache, diarrhea, stomach upset.  If these become unmanageable or concerning symptoms, please make sure to call or mychart.    4. Wegovy should be discontinued for ?2 months prior to becoming pregnant.     Prior Authorization Process for Weight Management Medications: You are being prescribed a medication that will likely need to undergo a prior authorization.  A prior authorization is when the clinic needs to fill out a form that is sent to your insurance company to obtain coverage for that medication. The prescription will NOT automatically go to your pharmacy today if it needs a Prior Authorization. If the medication prior authorization is approved, the care team will contact you and the prescription will be released to your pharmacy. If denied, we will work to try and appeal the prior authorization if possible. The initial prior authorization process takes up to 5-10 business days and appeals can take up to 30 days. If you do not hear  from us at the end of that time, you may contact the clinic.    Go to site: Wegovy video to learn more and watch instruction videos.    For any questions or concerns please send a edPULSE message to our team or call our weight management call center at [phone] during regular business hours. For questions during evenings or weekends your messages will be addressed during the next business day.  For emergencies please call 911 or seek immediate medical care.

## 2024-04-23 ENCOUNTER — VIRTUAL VISIT (OUTPATIENT)
Dept: CARDIOLOGY | Facility: CLINIC | Age: 54
End: 2024-04-23
Payer: COMMERCIAL

## 2024-04-23 VITALS — WEIGHT: 201 LBS | BODY MASS INDEX: 35.61 KG/M2 | HEIGHT: 63 IN

## 2024-04-23 DIAGNOSIS — F33.41 RECURRENT MAJOR DEPRESSIVE DISORDER, IN PARTIAL REMISSION (H): ICD-10-CM

## 2024-04-23 DIAGNOSIS — K21.9 GASTROESOPHAGEAL REFLUX DISEASE WITHOUT ESOPHAGITIS: ICD-10-CM

## 2024-04-23 DIAGNOSIS — J30.2 SEASONAL ALLERGIES: ICD-10-CM

## 2024-04-23 DIAGNOSIS — E78.2 MIXED HYPERLIPIDEMIA: ICD-10-CM

## 2024-04-23 DIAGNOSIS — E66.01 CLASS 3 SEVERE OBESITY WITH SERIOUS COMORBIDITY AND BODY MASS INDEX (BMI) OF 40.0 TO 44.9 IN ADULT, UNSPECIFIED OBESITY TYPE (H): Primary | ICD-10-CM

## 2024-04-23 DIAGNOSIS — Z78.9 TAKES DIETARY SUPPLEMENTS: ICD-10-CM

## 2024-04-23 DIAGNOSIS — F41.1 GENERALIZED ANXIETY DISORDER: ICD-10-CM

## 2024-04-23 DIAGNOSIS — E66.813 CLASS 3 SEVERE OBESITY WITH SERIOUS COMORBIDITY AND BODY MASS INDEX (BMI) OF 40.0 TO 44.9 IN ADULT, UNSPECIFIED OBESITY TYPE (H): Primary | ICD-10-CM

## 2024-04-23 DIAGNOSIS — L72.0 MILIA: ICD-10-CM

## 2024-04-23 RX ORDER — BUPROPION HYDROCHLORIDE 150 MG/1
150 TABLET ORAL EVERY MORNING
Qty: 30 TABLET | Refills: 2 | Status: SHIPPED | OUTPATIENT
Start: 2024-04-23 | End: 2024-07-05

## 2024-04-23 RX ORDER — FAMOTIDINE 20 MG/1
20 TABLET, FILM COATED ORAL 2 TIMES DAILY PRN
Qty: 60 TABLET | Refills: 11 | Status: SHIPPED | OUTPATIENT
Start: 2024-04-23

## 2024-04-23 ASSESSMENT — PAIN SCALES - GENERAL: PAINLEVEL: NO PAIN (0)

## 2024-04-23 NOTE — Clinical Note
MK patient - restarted on Wegovy. Also added bupropion due to depression and Wegovy not entirely effective for appetite control in the past. R/[facility] insurance requirements - used Loly's CPA --FYI!

## 2024-04-23 NOTE — Clinical Note
Thank you for the referral to the Comprehensive [facility] Clinic clinic, Gabi! A note for a few things we covered - including depression (added bupropion to help with appetite/cravings and depression) and also acid reflux help. We also restarted Wegovy as you know was effective for Vanesa in the past. She knows that she should follow up with you regarding Lipid retesting as well.   We will continue to share progress with you!  Suzie Bright, Pharm D., MPH  Medication Therapy Management Pharmacist  [facility]

## 2024-04-23 NOTE — PROGRESS NOTES
Medication Therapy Management (MTM) Encounter    ASSESSMENT:                            Medication Adherence/Access: See below for considerations    Obesity   Class II Obesity (BMI 35 to 39.9): Patient would benefit from additional pharmacotherapy for weight management. Given class III obesity prior to starting GLP1/GIP and weight regain, recommend restart GLP1/GIP therapy (low dose as patient has been off of this treatment for sevearl months)  as data to support most significant weight loss and patient has no contraindications. Patient also likely to benefit from reduction in food noise and increased satiety. Given current environmental stress and patient noting appetite only moderately managed with GLP1/GIP in the past - recommend initiating low dose bupropion as well today.      For patients that are under [employer] insurance coverage, it is mandated by insurance that each qualifying patient meet with hospital based Weight Management Medication Therapy Management pharmacist to continue therapy coverage. The following patient meets the below coverage criteria and can therefore continue GLP-1/GIP agonist therapy for Weight Management:    Adult  BMI >40 with or without comorbidities   at time of initiating GLP-1/GIP agonist therapy Approved for 29 weeks  Met Updated Initial Criteria   At least 5% weight loss of baseline body weight  Approved for 12 months        Anxiety/depression: Anxiety well managed currently. Depression needs improvement. Patient at max dose sertraline. Would benefit from addition of bupropion for appetite and depressoin management.    GERD: recommend addition of histamine receptor blocker for as needed acid reflux breakthrough. Goal to taper off proton pump inhibitor in the future, for now, continue lower dose 20 mg omeprazole as having occasional breakthrough symptoms.       Milia/Rosacea: may consider expression of contents of milia as current regimen not working well for  "patient. May also consider topical metronidazole or oral doxycycline if azelaic acid not effective. Defer to Dermatology upcoming appointment.    Allergies: stable     Hyperlipidemia: due for lipid panel after starting atorvastatin mod intensity dose. May consider switching to rosuvastatin or increasing atorvastatin dose if further lipid lowering needed - defer to PCP.    Supplements: stable     PLAN:                            [place] employees with [employer] insurance ([facility]) are restricted to using the [facility] for Saxenda, Wegovy, and Zepbound    [facility]  [place], [place] - [address]  Phone: [phone]    Next steps:  After processing the prescription and saving to your profile, the pharmacy will give you an automated call to inform you that they have your prescription on file. This typically occurs within 1-2 days after the prescription is sent but may take 3-5 business days during high volume times.  You will need to call the pharmacy back to set up the first delivery of every new prescription or new medication dose.   Once you are on a stable dose, you can inquire with the pharmacy about signing up for \"Text to Order through [employer]\", \"Auto Fill\", and/or using the \"My [employer] Rx\" shy.   Start Wegovy 0.25 mg injection once weekly. Take this medication on the same day each week for 4 weeks. If tolerating, you may increase to 0.5 mg once weekly on week 5.      As a reminder, here is a great link for how to administer your Wegovy:  www.MainOne/taking-wegovy/how-to-use-the-wegovy-pen.html    If cost is prohibitive, you may try using Wegovy coupon (www.wegovy.com/coverage-and-savings)    Additionally, we will start a low dose of bupropion 150 mg XL once daily to help with depression and stress management in addition to appetite improvements. Monitor for any improvement in your mental health as well with starting this. "     Continue omeprazole 20 mg once daily for acid reflux. I also sent a prescription for famotidine 20 mg which is a bit faster acting and will help with break through acid symptoms - you can safely take this up to twice daily with omeprazole. Our goal will be to transition over to the famotidine eventually, for now, just use as needed for breakthrough acid reflux symptoms.     Follow-up: 6/3 with both Joana Rockwell PA-C and Suzie Bright, [facility] with weight management clinic.    SUBJECTIVE/OBJECTIVE:                          Vanesa Owen is a 54 year old female contacted via secure video for an initial visit. She was referred to me from [facility] insurance requirements and Joana Rockwell PA-C .      Reason for visit: [facility] insurance requirements - Medication Therapy Management GLP1/GIP Management .    Allergies/ADRs: Reviewed in chart  Past Medical History: Reviewed in chart  Tobacco: She reports that she has never smoked. She has never used smokeless tobacco.  Alcohol: Less than 1 beverages / week  Personal Health Goals: feel better and manage weight for daughter's wedding in June. Fam history of ASCVD - wants to prevent this.    Medication Adherence/Access: Medication barriers: obtaining medication from insurance.     Obesity   Class II Obesity (BMI 35 to 39.9):     Patient met with Joana Rockwell PA-C 4/15/24 for [facility] weight management visit. Patient denies personal or family history of MEN Type2, MTC, Pancreatitis; using IUD for contraception. Notes she could use improvement in mindless eating, even with Wegovy, this was difficult to manage - stress of planning wedding and missing patient's mother during this fun time (loss due to covid) which is increasing food intake, she notes.    Not currently taking any meds for weight management.  Patient reports successful weight management and significant improvement in food noise with Wegovy in the past, but as of Jan 1 difficulty accessing due to  "insurance requirements. Would like to restart if able - tolerated well and helped patient to lose 40lbs with Wegovy, but after discontinuation due to insurance issues, has gained back 10 lbs and has goal to feel and look good for daughter's wedding in 2 months. Recently tore meniscus and goal to reduce weight to help heal this and make mobility easier. Patient denies personal or family history of MEN Type2, MTC, Pancreatitis.     Nutrition/Eating Habits: feels nutrition could use improvement. Eats quick foods: breakfast bar + coffee in the morning,   Herbalife shake in the day - whatever is quick for evening meal. Is looking forward to dietitian visit tomorrow with [facility].   Exercise/Activity: somewhat limited due to torn meniscus..      Wt Readings from Last 4 Encounters:   04/24/24 90.7 kg (200 lb)   04/23/24 91.2 kg (201 lb)   04/15/24 90.7 kg (200 lb)   02/28/24 88.9 kg (196 lb)     Estimated body mass index is 35.61 kg/m  as calculated from the following:    Height as of this encounter: 1.6 m (5' 3\").    Weight as of this encounter: 91.2 kg (201 lb).    Starting Weight/BMI (11/23/22): 227 lbs (40.21 kg/m2)  Current weight: 201 lb  Lab Results   Component Value Date    GLC 87 02/16/2024     02/16/2024    POTASSIUM 4.8 02/16/2024    HUI 9.3 02/16/2024    CHLORIDE 104 02/16/2024    CO2 26 02/16/2024    BUN 14.1 02/16/2024    CR 0.78 02/16/2024    GFRESTIMATED >90 02/16/2024    CHOL 230 (H) 02/16/2024     (H) 02/16/2024    HDL 52 02/16/2024    TRIG 152 (H) 02/16/2024    TSH 1.26 02/16/2024     Anxiety/depression:  Sertaline 100 mg - 2 tabs once daily  Buspirone 5mg - 1 tab once- twice daily (often forgets morning tab)    Anxiety very well managed. 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 Depression is not managed as well at this time - sad about losing her mother due to covid and as a " "nurse. Especially sad with daughter's wedding approaching and not have her mother attend. Would be interested in helping depression if something available.    GERD:   Omeprazole 20 mg once daily     Patient reports occasional breakthrough acid reflux since decreasing omeprazole from 40 mg once daily to 20mg.   Patient feels that current regimen is not effective.  Patient has not tried a trial off of therapy and is interested in doing so.      Milia/Rosacea:   Adapalene 0.3% gel  Azelaic acid 15%  Soolantra 1% cream    Patient is frustrated in current regimen. Doesn't work well for condition and would like to have improved by daughter's wedding if possible. Follows with derm - has upcoming appointment.    Allergies:   Flonase 50 mcg/act - 1 spray both nostrils daily as needed    Uses seasonally. Works well. Denies side effects.    Hyperlipidemia:   atorvastatin 10mg daily    Started statin in Feb after LDL elevation and fam cardiac history.   Patient reports no current medication side effects.    The 10-year ASCVD risk score (Bartolome HANSEN, et al., 2019) is: 1.9%    Values used to calculate the score:      Age: 54 years      Sex: Female      Is Non- : No      Diabetic: No      Tobacco smoker: No      Systolic Blood Pressure: 129 mmHg      Is BP treated: No      HDL Cholesterol: 52 mg/dL      Total Cholesterol: 230 mg/dL    Recent Labs   Lab Test 02/16/24  1210 12/08/22  1228   CHOL 230* 247*   HDL 52 79   * 149*   TRIG 152* 96     Supplements:   Multivitamin once daily    No reported issues at this time.       Today's Vitals: Ht 1.6 m (5' 3\")   Wt 91.2 kg (201 lb)   BMI 35.61 kg/m    ----------------      I spent 40 minutes with this patient today. All changes were made via collaborative practice agreement with Joana Rockwell and Loly Ledezma PA-C . A copy of the visit note was provided to the patient's provider(s).    A summary of these recommendations was sent via [employer]ag.    Suzie " Kaila, Pharm D., MPH    Medication Therapy Management Pharmacist   [facility]      Telemedicine Visit Details  Type of service:  Video Conference via AmWell  Start Time: 8:35 AM  End Time:  9:15 AM     Medication Therapy Recommendations  Class 3 severe obesity with serious comorbidity and body mass index (BMI) of 40.0 to 44.9 in adult (H)    Current Medication: Semaglutide-Weight Management (WEGOVY) 0.25 MG/0.5ML pen   Rationale: Untreated condition - Needs additional medication therapy - Indication   Recommendation: Start Medication   Status: Accepted per CPA         Gastroesophageal reflux disease without esophagitis    Current Medication: omeprazole (PRILOSEC) 20 MG DR capsule   Rationale: Synergistic therapy - Needs additional medication therapy - Indication   Recommendation: Start Medication - famotidine 20 MG tablet   Status: Accepted per CPA         Major depression    Current Medication: sertraline (ZOLOFT) 100 MG tablet   Rationale: Synergistic therapy - Needs additional medication therapy - Indication   Recommendation: Start Medication - buPROPion 150 MG 24 hr tablet   Status: Accepted per CPA

## 2024-04-23 NOTE — LETTER
4/23/2024      RE: Vanesa Owen  [address] [address] [facility] [address]       Dear Colleague,    Thank you for the opportunity to participate in the care of your patient, Vanesa Owen, at the [facility] [place] [facility]. Please see a copy of my visit note below.    Medication Therapy Management (MTM) Encounter    ASSESSMENT:                            Medication Adherence/Access: See below for considerations    Obesity   Class II Obesity (BMI 35 to 39.9): Patient would benefit from additional pharmacotherapy for weight management. Given class III obesity prior to starting GLP1/GIP and weight regain, recommend restart GLP1/GIP therapy (low dose as patient has been off of this treatment for sevearl months)  as data to support most significant weight loss and patient has no contraindications. Patient also likely to benefit from reduction in food noise and increased satiety. Given current environmental stress and patient noting appetite only moderately managed with GLP1/GIP in the past - recommend initiating low dose bupropion as well today.      For patients that are under [employer] insurance coverage, it is mandated by insurance that each qualifying patient meet with hospital based Weight Management Medication Therapy Management pharmacist to continue therapy coverage. The following patient meets the below coverage criteria and can therefore continue GLP-1/GIP agonist therapy for Weight Management:    Adult  BMI >40 with or without comorbidities   at time of initiating GLP-1/GIP agonist therapy Approved for 29 weeks  Met Updated Initial Criteria   At least 5% weight loss of baseline body weight  Approved for 12 months        Anxiety/depression: Anxiety well managed currently. Depression needs improvement. Patient at max dose sertraline. Would benefit from addition of bupropion for appetite and depressoin  "management.    GERD: recommend addition of histamine receptor blocker for as needed acid reflux breakthrough. Goal to taper off proton pump inhibitor in the future, for now, continue lower dose 20 mg omeprazole as having occasional breakthrough symptoms.       Milia/Rosacea: may consider expression of contents of milia as current regimen not working well for patient. May also consider topical metronidazole or oral doxycycline if azelaic acid not effective. Defer to Dermatology upcoming appointment.    Allergies: stable     Hyperlipidemia: due for lipid panel after starting atorvastatin mod intensity dose. May consider switching to rosuvastatin or increasing atorvastatin dose if further lipid lowering needed - defer to PCP.    Supplements: stable     PLAN:                            [employer] employees with [employer] insurance ([facility]) are restricted to using the [facility] for Saxenda, Wegovy, and Zepbound    [facility]  [place], [place] - [address]  Phone: [phone]    Next steps:  After processing the prescription and saving to your profile, the pharmacy will give you an automated call to inform you that they have your prescription on file. This typically occurs within 1-2 days after the prescription is sent but may take 3-5 business days during high volume times.  You will need to call the pharmacy back to set up the first delivery of every new prescription or new medication dose.   Once you are on a stable dose, you can inquire with the pharmacy about signing up for \"Text to Order through [employer]\", \"Auto Fill\", and/or using the \"My [employer] Rx\" shy.   Start Wegovy 0.25 mg injection once weekly. Take this medication on the same day each week for 4 weeks. If tolerating, you may increase to 0.5 mg once weekly on week 5.      As a reminder, here is a great link for how to administer your " Wegovy:  www.wegovy.com/taking-wegovy/how-to-use-the-wegovy-pen.html    If cost is prohibitive, you may try using Wegovy coupon (www.wegovy.com/coverage-and-savings)    Additionally, we will start a low dose of bupropion 150 mg XL once daily to help with depression and stress management in addition to appetite improvements. Monitor for any improvement in your mental health as well with starting this.     Continue omeprazole 20 mg once daily for acid reflux. I also sent a prescription for famotidine 20 mg which is a bit faster acting and will help with break through acid symptoms - you can safely take this up to twice daily with omeprazole. Our goal will be to transition over to the famotidine eventually, for now, just use as needed for breakthrough acid reflux symptoms.     Follow-up: 6/3 with both Jaona Rockwell PA-C and Suzie Bright [facility] with weight management clinic.    SUBJECTIVE/OBJECTIVE:                          Vanesa Owen is a 54 year old female contacted via secure video for an initial visit. She was referred to me from [facility] insurance requirements and Joana Rockwell PA-C .      Reason for visit: [facility] insurance requirements - Medication Therapy Management GLP1/GIP Management .    Allergies/ADRs: Reviewed in chart  Past Medical History: Reviewed in chart  Tobacco: She reports that she has never smoked. She has never used smokeless tobacco.  Alcohol: Less than 1 beverages / week  Personal Health Goals: feel better and manage weight for daughter's wedding in June. Fam history of ASCVD - wants to prevent this.    Medication Adherence/Access: Medication barriers: obtaining medication from insurance.     Obesity  Class II Obesity (BMI 35 to 39.9):     Patient met with Joana Rockwell PA-C 4/15/24 for [facility] weight management visit. Patient denies personal or family history of MEN Type2, MTC, Pancreatitis; using IUD for contraception. Notes she could use improvement in mindless eating,  "even with Wegovy, this was difficult to manage - stress of planning wedding and missing patient's mother during this fun time (loss due to covid) which is increasing food intake, she notes.    Not currently taking any meds for weight management.  Patient reports successful weight management and significant improvement in food noise with Wegovy in the past, but as of Jan 1 difficulty accessing due to insurance requirements. Would like to restart if able - tolerated well and helped patient to lose 40lbs with Wegovy, but after discontinuation due to insurance issues, has gained back 10 lbs and has goal to feel and look good for daughter's wedding in 2 months. Recently tore meniscus and goal to reduce weight to help heal this and make mobility easier. Patient denies personal or family history of MEN Type2, MTC, Pancreatitis.     Nutrition/Eating Habits: feels nutrition could use improvement. Eats quick foods: breakfast bar + coffee in the morning,   Herbalife shake in the day - whatever is quick for evening meal. Is looking forward to dietitian visit tomorrow with [facility].   Exercise/Activity: somewhat limited due to torn meniscus..      Wt Readings from Last 4 Encounters:   04/24/24 90.7 kg (200 lb)   04/23/24 91.2 kg (201 lb)   04/15/24 90.7 kg (200 lb)   02/28/24 88.9 kg (196 lb)     Estimated body mass index is 35.61 kg/m  as calculated from the following:    Height as of this encounter: 1.6 m (5' 3\").    Weight as of this encounter: 91.2 kg (201 lb).    Starting Weight/BMI (11/23/22): 227 lbs (40.21 kg/m2)  Current weight: 201 lb  Lab Results   Component Value Date    GLC 87 02/16/2024     02/16/2024    POTASSIUM 4.8 02/16/2024    HUI 9.3 02/16/2024    CHLORIDE 104 02/16/2024    CO2 26 02/16/2024    BUN 14.1 02/16/2024    CR 0.78 02/16/2024    GFRESTIMATED >90 02/16/2024    CHOL 230 (H) 02/16/2024     (H) 02/16/2024    HDL 52 02/16/2024    TRIG 152 (H) 02/16/2024    TSH " 1.26 02/16/2024     Anxiety/depression:  Sertaline 100 mg - 2 tabs once daily  Buspirone 5mg - 1 tab once- twice daily (often forgets morning tab)    Anxiety very well managed. 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 Depression is not managed as well at this time - sad about losing her mother due to covid and as a nurse. Especially sad with daughter's wedding approaching and not have her mother attend. Would be interested in helping depression if something available.    GERD:   Omeprazole 20 mg once daily     Patient reports occasional breakthrough acid reflux since decreasing omeprazole from 40 mg once daily to 20mg.   Patient feels that current regimen is not effective.  Patient has not tried a trial off of therapy and is interested in doing so.      Milia/Rosacea:   Adapalene 0.3% gel  Azelaic acid 15%  Soolantra 1% cream    Patient is frustrated in current regimen. Doesn't work well for condition and would like to have improved by daughter's wedding if possible. Follows with derm - has upcoming appointment.    Allergies:   Flonase 50 mcg/act - 1 spray both nostrils daily as needed    Uses seasonally. Works well. Denies side effects.    Hyperlipidemia:   atorvastatin 10mg daily    Started statin in Feb after LDL elevation and fam cardiac history.   Patient reports no current medication side effects.    The 10-year ASCVD risk score (Bartolome HANSEN, et al., 2019) is: 1.9%    Values used to calculate the score:      Age: 54 years      Sex: Female      Is Non- : No      Diabetic: No      Tobacco smoker: No      Systolic Blood Pressure: 129 mmHg      Is BP treated: No      HDL Cholesterol: 52 mg/dL      Total Cholesterol: 230 mg/dL    Recent Labs   Lab Test 02/16/24  1210 12/08/22  1228   CHOL 230* 247*   HDL 52 79   * 149*   TRIG 152* 96     Supplements:   Multivitamin once daily    No reported issues at  "this time.       Today's Vitals: Ht 1.6 m (5' 3\")   Wt 91.2 kg (201 lb)   BMI 35.61 kg/m    ----------------      I spent 40 minutes with this patient today. All changes were made via collaborative practice agreement with Joana Rockwell and Loly Ledezma PA-C . A copy of the visit note was provided to the patient's provider(s).    A summary of these recommendations was sent via Senzari.    Suzie Bright, Pharm D., MPH    Medication Therapy Management Pharmacist   [facility]      Telemedicine Visit Details  Type of service:  Video Conference via [employer]  Start Time: 8:35 AM  End Time:  9:15 AM     Medication Therapy Recommendations  Class 3 severe obesity with serious comorbidity and body mass index (BMI) of 40.0 to 44.9 in adult (H)    Current Medication: Semaglutide-Weight Management (WEGOVY) 0.25 MG/0.5ML pen   Rationale: Untreated condition - Needs additional medication therapy - Indication   Recommendation: Start Medication   Status: Accepted per CPA         Gastroesophageal reflux disease without esophagitis    Current Medication: omeprazole (PRILOSEC) 20 MG DR capsule   Rationale: Synergistic therapy - Needs additional medication therapy - Indication   Recommendation: Start Medication - famotidine 20 MG tablet   Status: Accepted per CPA         Major depression    Current Medication: sertraline (ZOLOFT) 100 MG tablet   Rationale: Synergistic therapy - Needs additional medication therapy - Indication   Recommendation: Start Medication - buPROPion 150 MG 24 hr tablet   Status: Accepted per CPA                Please do not hesitate to contact me if you have any questions/concerns.     Sincerely,     Suzie Bright, [facility]  "

## 2024-04-23 NOTE — NURSING NOTE
Is the patient currently in the state of [place]? YES    Visit mode:VIDEO    If the visit is dropped, the patient can be reconnected by: VIDEO VISIT: Text to cell phone:   Telephone Information:   Mobile [phone]       Will anyone else be joining the visit? NO  (If patient encounters technical issues they should call [phone])    How would you like to obtain your AVS? MyChart    Are changes needed to the allergy or medication list? No    Are refills needed on medications prescribed by this physician? NO    Reason for visit: Consult    Manfred KRISHNA

## 2024-04-23 NOTE — PROGRESS NOTES
"Video-Visit Details    Type of service:  Video Visit    Video Start Time: 11:33 AM   Video End Time: 11:53 AM    Originating Location (pt. Location): Home    Distant Location (provider location): Offsite (providers home) [facility] [place]     Platform used for Video Visit: KoolSpan    New Weight Management Nutrition Consultation    Vanesa Owen is a 54 year old female presents today for new weight management nutrition consultation.  Patient referred by ALEKSEY Bullard on 2024.    Patient with Co-morbidities of obesity includin/14/2024    11:32 AM   --   I have the following health issues associated with obesity High Cholesterol    Osteoarthritis (joint disease)   I have the following symptoms associated with obesity Knee Pain    Depression    Back Pain    Fatigue    Hip Pain     Anthropometrics:  Initial consult weight: 201 lb on 24.   Estimated body mass index is 35.61 kg/m  as calculated from the following:    Height as of 24: 1.6 m (5' 3\").    Weight as of 24: 91.2 kg (201 lb).    Medications for Weight Loss:  Was started on Ozempic and then transitioned to Wegovy - starting BMI 40.21 (2022). Was able to lose 40lbs, but has sense gained 10lbs with discontinuation of wegovy 3 months ago due to insurance coverage.     Met with [facility] pharmacist earlier today and plan is to re-start Wegovy.     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     NUTRITION HISTORY  Food allergies: NKFA  Food intolerances: None   Vitamin/Mineral Supplements: MVI   Previous methods of diet modification for weight loss: watching portions/calories, weight watchers, exercise  RD before: None     Doesn't enjoy cooking. Gets home and is tired. Lives with  and son, both eat very healthy. Grills at time. Does love vegetables and fruits.     Diet recall:   Has a hard time having a full meal. Feels like she has small snacks through out the " day. Is not consistent with meals. Does not like to cook. No increase hunger. Increase thoughts of food. Craves chocolate. Can get full, and stay full. Transitioned to diet pop, only having 1. Eats out 2-3x meals on average. Drinks water, tea, coffee (caribou),     Working:   Breakfast - breakfast bar, coffee   Lunch - sometimes will get a break and sometimes won't. Yogurt + lean cuisine, carrots, waldo rice cakes  Dinner - Herbal Life shake   Snacks - piece of toast with peanut butter, Nutella with bong crackers, tea with Splenda.   Hydration: 1 can of diet coke daily.         4/14/2024    11:32 AM   Diet Recall Review with Patient   If you do eat breakfast, what types of food do you eat? protein cookies, coffee, or herbalife shake and tea   If you do eat lunch, what types of food do you typically eat? skip periodically, lean cuisine, yogurt, or sandwich, at work personal pizza, or herbalife shake and tea   If you do eat supper, what types of food do you typically eat? fast food sandwich, herbalife shake, protein waffles, hamburger with cheese, fries, chicken salad   If you do snack, what types of food do you typically eat? Moravian rice cakes, toast peanut butter   How many glasses of juice do you drink in a typical day? 0   How many of glasses of milk do you drink in a typical day? 0   How many 8oz glasses of sugar containing drinks such as Junior-Aid/sweet tea do you drink in a day? 0   How many cans/bottles of sugar pop/soda/tea/sports drinks do you drink in a day? 1   How many cans/bottles of diet pop/soda/tea or sports drink do you drink in a day? 1   How often do you have a drink of alcohol? 2-4 Times a Month   If you do drink, how many drinks might you have in a day? 1 or 2           4/14/2024    11:32 AM   Eating Habits   Generally, my meals include foods like these bread, pasta, rice, potatoes, corn, crackers, sweet dessert, pop, or juice Almost Everyday   Generally, my meals include foods like these fried  meats, brats, burgers, french fries, pizza, cheese, chips, or ice cream A Few Times a Week   Eat fast food (like McDonalds, Burger Avel, Taco Bell) Less Than Weekly   Eat at a buffet or sit-down restaurant Less Than Weekly   Eat most of my meals in front of the TV or computer Almost Everyday   Often skip meals, eat at random times, have no regular eating times Everyday   Rarely sit down for a meal but snack or graze throughout Almost Everyday   Eat extra snacks between meals Almost Everyday   Eat most of my food at the end of the day Almost Everyday   Eat in the middle of the night or wake up at night to eat Never   Eat extra snacks to prevent or correct low blood sugar Never   Eat to prevent acid reflux or stomach pain Never   Worry about not having enough food to eat Never   I eat when I am depressed Less Than Weekly   I eat when I am stressed Less Than Weekly   I eat when I am bored A Few Times a Week   I eat when I am anxious Less Than Weekly   I eat when I am happy or as a reward A Few Times a Week   I feel hungry all the time even if I just have eaten Once a Week   Feeling full is important to me Almost Everyday   I finish all the food on my plate even if I am already full Almost Everyday   I can't resist eating delicious food or walk past the good food/smell Everyday   I eat/snack without noticing that I am eating Never   I eat when I am preparing the meal Almost Everyday   I eat more than usual when I see others eating Less Than Weekly   I have trouble not eating sweets, ice cream, cookies, or chips if they are around the house Everyday   I think about food all day Everyday   What foods, if any, do you crave? Sweets/Candy/Chocolate   Please list any other foods you crave? cheese, pasta, bread           4/14/2024    11:32 AM   Amount of Food   I feel out of control when eating Almost Everyday   I eat a large amount of food, like a loaf of bread, a box of cookies, a pint/quart of ice cream, all at once Monthly    I eat a large amount of food even when I am not hungry Weekly   I eat rapidly Weekly   I eat alone because I feel embarrassed and do not want others to see how much I have eaten Almost Everyday   I eat until I am uncomfortably full Monthly   I feel bad, disgusted, or guilty after I overeat Almost Everyday       Physical Activity:  Active at work. Getting back into walking outside. Hoping to get elliptical.         4/14/2024    11:32 AM   Activity/Exercise History   How much of a typical 12 hour day do you spend sitting? Less Than Half the Day   How much of a typical 12 hour day do you spend lying down? Less Than Half the Day   How much of a typical day do you spend walking/standing? Half the Day   How many hours (not including work) do you spend on the TV/Video Games/Computer/Tablet/Phone? 4-5 Hours   How many times a week are you active for the purpose of exercise? 2-3 Times a Week   What keeps you from being more active? Lack of Time    Too tired    Worried People Will Look At Me   How many total minutes do you spend doing some activity for the purpose of exercising when you exercise? 15-30 Minutes     Nutrition Prescription  Recommended energy/nutrient modification.    Nutrition Diagnosis  Obesity r/t long history of positive energy balance aeb BMI >30.    Nutrition Intervention  Provided education on nutrition considerations when on GLP1 medication including, changes in meal/snack volumes; meeting protein, hydration and micronutrient needs; limiting high-fat foods; and diet/lifestyle strategies for preventing constipation. Materials/education provided on hypocaloric diet for weight loss. Volumetric eating to help satiety level on fewer calories; portion control and healthy food choices (Plate Method and Volumetrics handouts). Patient demonstrates understanding. Co-developed goals to work towards. Provided pt with list of goals and resources below via [employer].     Expected Engagement: good    Nutrition  "Goals  1) Aim to consume at least 60-90 grams of protein daily.   2) Increase fiber containing foods in diet, try to aim for 2-3 servings of fruits/vegetables daily.     The Plate Method  [email or web address]    Protein Sources   [email or web address]     Quick/Easy Protein Sources:  Hard boiled eggs  Part-skim cheese sticks  Baby Bell cheese rounds  Low-fat/low-sugar Greek yogurt  Low-fat cottage cheese  Lean deli meat (chicken/turkey/ham)  Roasted chickpeas or lentils  Nuts   Turkey meat stick  Protein shake/bar  \"P3\" snack (cheese, nuts, deli meat)  Aldi's \"Protein Bread\"   \"Egglife\" egg white wrap    Tuna/salmon can/packet     Non-meat protein Ideas  Quinoa  Eggs  Dairy (Cottage cheese, low fat cheese, greek yogurt)   Nuts  Beans  Lentils  Protein pasta   Nutritional yeast  Garden of life raw meal powder  Liquid aminos  Homemade meats - a taco meat could be made with chopped cauliflower/mushroom as an example   Hummus (could do homemade if preferred)  Hemp hearts   Tofu  Seitan     Complex Carbohydrates high in protein  Quinoa  Brown Rice  Chick Pea  Buckwheat  Sweet Potatoes  Lentils  Legumes  Huddleston Beans  Black Beans   Farro   Kidney Beans     Carbohydrates  [email or web address]     Mindful Eating  [email or web address]     Summary of Volumetrics Eating Plan  [email or web address]     Meal Delivery   Minimal prep:  Https://get.everyplate.com  Blueapron.com  Hungryroot.com  Hellofresh.com  Architurn    Ready-to-eat:  Https://www."[employer].com/r/home (meat and veggie focus for meals)  Https://Vantrix.Free & Clear/ (plant based)  https://www.Green & Pleasant.Free & Clear/our-menu ([facility])  https://www.freshly.com/plans-and-menu  https://www.[email or web address] Plumb/[email or web address]/home?[email or web address]    Follow-Up: 1-2 months or as needed.     Time spent with patient: 20 minutes.  Gabi Shea RD, LD    "

## 2024-04-24 ENCOUNTER — VIRTUAL VISIT (OUTPATIENT)
Dept: ENDOCRINOLOGY | Facility: CLINIC | Age: 54
End: 2024-04-24
Payer: COMMERCIAL

## 2024-04-24 VITALS — WEIGHT: 200 LBS | HEIGHT: 63 IN | BODY MASS INDEX: 35.44 KG/M2

## 2024-04-24 DIAGNOSIS — E66.01 CLASS 3 SEVERE OBESITY WITH SERIOUS COMORBIDITY AND BODY MASS INDEX (BMI) OF 40.0 TO 44.9 IN ADULT, UNSPECIFIED OBESITY TYPE (H): ICD-10-CM

## 2024-04-24 DIAGNOSIS — E66.813 CLASS 3 SEVERE OBESITY WITH SERIOUS COMORBIDITY AND BODY MASS INDEX (BMI) OF 40.0 TO 44.9 IN ADULT, UNSPECIFIED OBESITY TYPE (H): ICD-10-CM

## 2024-04-24 DIAGNOSIS — Z71.3 NUTRITIONAL COUNSELING: Primary | ICD-10-CM

## 2024-04-24 PROCEDURE — 97802 MEDICAL NUTRITION INDIV IN: CPT | Mod: 95

## 2024-04-24 PROCEDURE — 99207 PR NO CHARGE LOS: CPT | Mod: 95

## 2024-04-24 ASSESSMENT — PAIN SCALES - GENERAL: PAINLEVEL: NO PAIN (0)

## 2024-04-24 NOTE — NURSING NOTE
Is the patient currently in the state of [place]? YES    Visit mode:VIDEO    If the visit is dropped, the patient can be reconnected by: VIDEO VISIT: Text to cell phone:   Telephone Information:   Mobile [phone]       Will anyone else be joining the visit? NO  (If patient encounters technical issues they should call [phone]    How would you like to obtain your AVS? MyChart    Are changes needed to the allergy or medication list? Pt stated no changes to allergies and Pt stated no med changes    Reason for visit: Consult    Cherry FULLERF

## 2024-04-24 NOTE — LETTER
"2024       RE: Vanesa Owen  [address] [facility] [address]     Dear Colleague,    Thank you for referring your patient, Vanesa Owen, to the [facility] [place] [facility]. Please see a copy of my visit note below.    Video-Visit Details    Type of service:  Video Visit    Video Start Time: 11:33 AM   Video End Time: 11:53 AM    Originating Location (pt. Location): Home    Distant Location (provider location): Offsite (providers home) [facility] [place]     Platform used for Video Visit: [employer]    New Weight Management Nutrition Consultation    Vanesa Owen is a 54 year old female presents today for new weight management nutrition consultation.  Patient referred by ALEKSEY Bullard on 2024.    Patient with Co-morbidities of obesity includin/14/2024    11:32 AM   --   I have the following health issues associated with obesity High Cholesterol    Osteoarthritis (joint disease)   I have the following symptoms associated with obesity Knee Pain    Depression    Back Pain    Fatigue    Hip Pain     Anthropometrics:  Initial consult weight: 201 lb on 24.   Estimated body mass index is 35.61 kg/m  as calculated from the following:    Height as of 24: 1.6 m (5' 3\").    Weight as of 24: 91.2 kg (201 lb).    Medications for Weight Loss:  Was started on Ozempic and then transitioned to Wegovy - starting BMI 40.21 (2022). Was able to lose 40lbs, but has sense gained 10lbs with discontinuation of wegovy 3 months ago due to insurance coverage.     Met with [facility] pharmacist earlier today and plan is to re-start Wegovy.     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     NUTRITION HISTORY  Food allergies: NKFA  Food intolerances: None   Vitamin/Mineral Supplements: MVI   Previous methods of diet modification for weight " loss: watching portions/calories, weight watchers, exercise  RD before: None     Doesn't enjoy cooking. Gets home and is tired. Lives with  and son, both eat very healthy. Grills at time. Does love vegetables and fruits.     Diet recall:   Has a hard time having a full meal. Feels like she has small snacks through out the day. Is not consistent with meals. Does not like to cook. No increase hunger. Increase thoughts of food. Craves chocolate. Can get full, and stay full. Transitioned to diet pop, only having 1. Eats out 2-3x meals on average. Drinks water, tea, coffee (caribou),     Working:   Breakfast - breakfast bar, coffee   Lunch - sometimes will get a break and sometimes won't. Yogurt + lean cuisine, carrots, waldo rice cakes  Dinner - Herbal Life shake   Snacks - piece of toast with peanut butter, Nutella with bong crackers, tea with Splenda.   Hydration: 1 can of diet coke daily.         4/14/2024    11:32 AM   Diet Recall Review with Patient   If you do eat breakfast, what types of food do you eat? protein cookies, coffee, or herbalife shake and tea   If you do eat lunch, what types of food do you typically eat? skip periodically, lean cuisine, yogurt, or sandwich, at work personal pizza, or herbalife shake and tea   If you do eat supper, what types of food do you typically eat? fast food sandwich, herbalife shake, protein waffles, hamburger with cheese, fries, chicken salad   If you do snack, what types of food do you typically eat? Zoroastrian rice cakes, toast peanut butter   How many glasses of juice do you drink in a typical day? 0   How many of glasses of milk do you drink in a typical day? 0   How many 8oz glasses of sugar containing drinks such as Junior-Aid/sweet tea do you drink in a day? 0   How many cans/bottles of sugar pop/soda/tea/sports drinks do you drink in a day? 1   How many cans/bottles of diet pop/soda/tea or sports drink do you drink in a day? 1   How often do you have a drink of  alcohol? 2-4 Times a Month   If you do drink, how many drinks might you have in a day? 1 or 2           4/14/2024    11:32 AM   Eating Habits   Generally, my meals include foods like these bread, pasta, rice, potatoes, corn, crackers, sweet dessert, pop, or juice Almost Everyday   Generally, my meals include foods like these fried meats, brats, burgers, french fries, pizza, cheese, chips, or ice cream A Few Times a Week   Eat fast food (like McDonalds, Burger Avel, Taco Bell) Less Than Weekly   Eat at a buffet or sit-down restaurant Less Than Weekly   Eat most of my meals in front of the TV or computer Almost Everyday   Often skip meals, eat at random times, have no regular eating times Everyday   Rarely sit down for a meal but snack or graze throughout Almost Everyday   Eat extra snacks between meals Almost Everyday   Eat most of my food at the end of the day Almost Everyday   Eat in the middle of the night or wake up at night to eat Never   Eat extra snacks to prevent or correct low blood sugar Never   Eat to prevent acid reflux or stomach pain Never   Worry about not having enough food to eat Never   I eat when I am depressed Less Than Weekly   I eat when I am stressed Less Than Weekly   I eat when I am bored A Few Times a Week   I eat when I am anxious Less Than Weekly   I eat when I am happy or as a reward A Few Times a Week   I feel hungry all the time even if I just have eaten Once a Week   Feeling full is important to me Almost Everyday   I finish all the food on my plate even if I am already full Almost Everyday   I can't resist eating delicious food or walk past the good food/smell Everyday   I eat/snack without noticing that I am eating Never   I eat when I am preparing the meal Almost Everyday   I eat more than usual when I see others eating Less Than Weekly   I have trouble not eating sweets, ice cream, cookies, or chips if they are around the house Everyday   I think about food all day Everyday   What  foods, if any, do you crave? Sweets/Candy/Chocolate   Please list any other foods you crave? cheese, pasta, bread           4/14/2024    11:32 AM   Amount of Food   I feel out of control when eating Almost Everyday   I eat a large amount of food, like a loaf of bread, a box of cookies, a pint/quart of ice cream, all at once Monthly   I eat a large amount of food even when I am not hungry Weekly   I eat rapidly Weekly   I eat alone because I feel embarrassed and do not want others to see how much I have eaten Almost Everyday   I eat until I am uncomfortably full Monthly   I feel bad, disgusted, or guilty after I overeat Almost Everyday       Physical Activity:  Active at work. Getting back into walking outside. Hoping to get elliptical.         4/14/2024    11:32 AM   Activity/Exercise History   How much of a typical 12 hour day do you spend sitting? Less Than Half the Day   How much of a typical 12 hour day do you spend lying down? Less Than Half the Day   How much of a typical day do you spend walking/standing? Half the Day   How many hours (not including work) do you spend on the TV/Video Games/Computer/Tablet/Phone? 4-5 Hours   How many times a week are you active for the purpose of exercise? 2-3 Times a Week   What keeps you from being more active? Lack of Time    Too tired    Worried People Will Look At Me   How many total minutes do you spend doing some activity for the purpose of exercising when you exercise? 15-30 Minutes     Nutrition Prescription  Recommended energy/nutrient modification.    Nutrition Diagnosis  Obesity r/t long history of positive energy balance aeb BMI >30.    Nutrition Intervention  Provided education on nutrition considerations when on GLP1 medication including, changes in meal/snack volumes; meeting protein, hydration and micronutrient needs; limiting high-fat foods; and diet/lifestyle strategies for preventing constipation. Materials/education provided on hypocaloric diet for weight  "loss. Volumetric eating to help satiety level on fewer calories; portion control and healthy food choices (Plate Method and Volumetrics handouts). Patient demonstrates understanding. Co-developed goals to work towards. Provided pt with list of goals and resources below via Rad.     Expected Engagement: good    Nutrition Goals  1) Aim to consume at least 60-90 grams of protein daily.   2) Increase fiber containing foods in diet, try to aim for 2-3 servings of fruits/vegetables daily.     The Plate Method  [email or web address]    Protein Sources   [email or web address]     Quick/Easy Protein Sources:  Hard boiled eggs  Part-skim cheese sticks  Baby Bell cheese rounds  Low-fat/low-sugar Greek yogurt  Low-fat cottage cheese  Lean deli meat (chicken/turkey/ham)  Roasted chickpeas or lentils  Nuts   Turkey meat stick  Protein shake/bar  \"P3\" snack (cheese, nuts, deli meat)  Aldi's \"Protein Bread\"   \"Egglife\" egg white wrap    Tuna/salmon can/packet     Non-meat protein Ideas  Quinoa  Eggs  Dairy (Cottage cheese, low fat cheese, greek yogurt)   Nuts  Beans  Lentils  Protein pasta   Nutritional yeast  Garden of life raw meal powder  Liquid aminos  Homemade meats - a taco meat could be made with chopped cauliflower/mushroom as an example   Hummus (could do homemade if preferred)  Hemp hearts   Tofu  Seitan     Complex Carbohydrates high in protein  Quinoa  Brown Rice  Chick Pea  Buckwheat  Sweet Potatoes  Lentils  Legumes  Huddleston Beans  Black Beans   Farro   Kidney Beans     Carbohydrates  [email or web address]     Mindful Eating  [email or web address]     Summary of Volumetrics Eating Plan  [email or web address]     Meal Delivery   Minimal prep:  Https://get.everyplate.com  Blueapron.com  Hungryroot.com  Hellofresh.com  Homechef.com    Ready-to-eat:  Https://www.Mr Banana.com/r/home (meat and veggie focus for meals)  Https://sprinly.com/ (plant " based)  https://www.originmeals.com/our-menu ([facility])  https://www.Klarnacom/plans-and-menu  https://www.Yoursphere Media/landing/home?[email or web address]    Follow-Up: 1-2 months or as needed.     Time spent with patient: 20 minutes.  Gabi Shea RD, LD

## 2024-04-24 NOTE — PATIENT INSTRUCTIONS
"Hi Vanesa,     It was nice to meet you today!    Follow-up with RD in 1-2 months or as needed.     Thank you,    Gabi Shea, RD, LD  If you would like to schedule or reschedule an appointment with the RD, please call [phone]    Nutrition Goals  1) Aim to consume at least 60-90 grams of protein daily.   2) Increase fiber containing foods in diet, try to aim for 2-3 servings of fruits/vegetables daily.     The Plate Method  [email or web address]    Protein Sources   http://Malcovery Security/[email or web address].pdf     Quick/Easy Protein Sources:  Hard boiled eggs  Part-skim cheese sticks  Baby Bell cheese rounds  Low-fat/low-sugar Greek yogurt  Low-fat cottage cheese  Lean deli meat (chicken/turkey/ham)  Roasted chickpeas or lentils  Nuts   Turkey meat stick  Protein shake/bar  \"P3\" snack (cheese, nuts, deli meat)  Aldi's \"Protein Bread\"   \"Egglife\" egg white wrap    Tuna/salmon can/packet     Non-meat protein Ideas  Quinoa  Eggs  Dairy (Cottage cheese, low fat cheese, greek yogurt)   Nuts  Beans  Lentils  Protein pasta   Nutritional yeast  Garden of life raw meal powder  Liquid aminos  Homemade meats - a taco meat could be made with chopped cauliflower/mushroom as an example   Hummus (could do homemade if preferred)  Hemp hearts   Tofu  Seitan     Complex Carbohydrates high in protein  Quinoa  Brown Rice  Chick Pea  Buckwheat  Sweet Potatoes  Lentils  Legumes  [place] Beans  Black Beans   Farro   Kidney Beans     Carbohydrates  [email or web address]     Mindful Eating  http://Malcovery Security/677185.pdf     Summary of Volumetrics Eating Plan  http://[email or web address]     Meal Delivery   Minimal prep:  Https://get.everyplate.com  Blueapron.com  Hungryroot.com  Hellofresh.com  Homechef.com    Ready-to-eat:  Https://www.Anvato.com/r/home (meat and veggie focus for meals)  Https://Miradore/ (plant based)  https://www.originmeals.com/our-menu (local " [place])  https://www.[employer]/plans-and-menu  https://www.Invistics.[employer]/landing/home?[email or web address]    COMPREHENSIVE WEIGHT MANAGEMENT PROGRAM  VIRTUAL SUPPORT GROUPS    At [facility], our Comprehensive Weight Management program offers on-line support groups for patients who are working on weight loss and considering, preparing for, or have had weight loss surgery.     There is no cost for this opportunity.  You are invited to attend the?Virtual Support Groups?provided by any of the following locations:    [facility] via Microsoft Teams with Belén King RN  2.   [place] via Recovers with Benjamin Hitchcock, PhD, LP  3.   [place] via Recovers with Antoinette Conde RN  4.   [facility] via a Zoom Meeting with DEMETRIO Wisdom    The following Support Group information can also be found on our website:  https://www.[facility].org/treatments/weight-loss-and-weight-loss-surgery-support-groups      [facility] Weight Loss Surgery Support Group  The support group is a patient-lead forum that meets monthly to share experiences, encouragement and education. It is open to those who have had weight loss surgery, are scheduled for surgery, or are considering surgery.   WHEN: This group meets on the 3rd Wednesday of each month from 5:00PM - 6:00PM virtually using Microsoft Teams.   FACILITATOR: Led by Belén Ramos RD, NARINDER, RN, the program's Clinical Coordinator.   TO REGISTER: Please contact the clinic via Beats Music or call the nurse line directly at [phone] to inform our staff that you would like an invite sent to you and to let us know the email you would like the invite sent to. Prior to the meeting, a link with directions on how to join the meeting will be sent to you.    2024 Meetings   January 17  February 21  March 20  April 17  May 15  Corrine 19      [facility] Bariatric  "Care Support Group?  This is open to all pre- and post- operative bariatric surgery patients as well as their support system.   WHEN: This group meets the 3rd Tuesday of each month from 6:30 PM - 8:00 PM virtually using Microsoft Teams.   FACILITATOR: Led by Benjamin Hitchcock, Ph.D who is a Licensed Psychologist with the [facility] Comprehensive Weight Management Program.   TO REGISTER: Please send an email to Benjamin Hitchcock, Ph.D.,  at?[email or web address]?if you would like an invitation to the group. Prior to the meeting, a link with directions on how to join the meeting will be sent to you.    2024 Meetings January 16: \"Medication Management and Bariatric Surgery\", Sachi Brown, PharmD, Pharmacy Resident at [facility]  February 20: \"A Bariatric Surgery Patient's Perspective\", MARISSA Morrissey, [facility], Behavioral Health Clinician at [facility]  March 19  April 16  May 21  Corrine 18: \"Nutritional Labeling\", Dietitian speaker to be announced.  November 19: \"Holiday Eating\", Dietitian speaker to be announced.    [facility] and [facility] Post-Operative Bariatric Surgery Support Group  This is a support group for [facility] bariatric patients (and those external to [facility]) who have had bariatric surgery and are at least 3 months post-surgery.  WHEN: This support group meets the 4th Wednesday of the month from 11:00 AM - 12:00 PM virtually using Microsoft Teams.   FACILITATOR: Led by Certified Bariatric Nurse, Antoinette Conde RN.   TO REGISTER: Please send an email to Antoinette at [email or web address] if you would like an invitation to the group.  Prior to the meeting, a link with directions on how to join the meeting will be sent to you.    2024 Meetings  January 24  February 28  March 27  April 24  May 22  Corrine 26    [facility] Healthy Lifestyle Group?  This is a " "60 minute virtual coaching group for those who want to lead a healthier lifestyle. Come together to set goals and overcome barriers in a supportive group environment. We will address the four pillars of health: nutrition, exercise, sleep and emotional well-being.  This group is highly recommended for those who are participating in the 24 week Healthy Lifestyle Plan and our Health Coaching sessions.  WHEN: This group meets the 1st Friday of the month, 12:30 PM - 1:30 PM online, via a zoom meeting.    FACILITATOR: Led by National Board Certified Health and , Antoinette Mcmahon, [facility].   TO REGISTER: Please call the Call Center at [phone] to register. You will get an appointment to attend in [place]. Fifteen minutes prior to the meeting, complete the e-check in and you will get the link to join the meeting.    There is no charge to attend this group and space is limited.     2024 Meetings  January 5: \"New Years Vision: Manifest your Best 2024!\" (guided imagery,  journaling and discussion)  February 2: \"Let's Talk\"  March 1: \"10 Percent Happier\" by Zenon Dalton (Book Bites - a guided discussion on the nuggets of wisdom from favorite wellness books, no need to read the book but highly encouraged)  April 5: \"Let's Talk\"  May 3: \"Essentialism: The Disciplined Pursuit of Less\" by Alvaro Burrell (Book Bites discussion)  June 7: \"Let's Talk\"  July 5: NO MEETING, off for the 4th of July Holiday  August 2: \"The Blue Zones, Secrets for Living a Longer Life\" by Zenon Carballo (Book Bites discussion)        "

## 2024-04-25 ENCOUNTER — TELEPHONE (OUTPATIENT)
Dept: SURGERY | Facility: CLINIC | Age: 54
End: 2024-04-25
Payer: COMMERCIAL

## 2024-04-25 NOTE — TELEPHONE ENCOUNTER
[facility]  Fax:[phone]  Spec: [phone]  MO: [phone]

## 2024-04-26 NOTE — TELEPHONE ENCOUNTER
Prior Authorization Retail Medication Request    Medication/Dose: [facility] insurance requirements Wegovy 0.25 mg - 2.4 mg (all doses) as indicated by supply, safety, and efficacy.   Diagnosis and ICD code (if different than what is on RX):  obesity III prior to starting GLP1/GIP   New/renewal/insurance change PA/secondary ins. PA:  Previously Tried and Failed:  diet and exercise; successful with Wegovy   Rationale:  Patient met with [facility] Medication Therapy Management Pharmacist 04/23/2024 per [facility] insurance requirements. Patient denies personal or family history of MEN Type2, MTC, Pancreatitis.       For patients that are under [place] [employer] insurance coverage, it is mandated by insurance that each qualifying patient meet with hospital based Weight Management Medication Therapy Management pharmacist to continue therapy coverage. The following patient meets the below coverage criteria and can therefore continue GLP-1/GIP agonist therapy for Weight Management:    Adult  BMI >40 with or without comorbidities   at time of initiating GLP-1/GIP agonist therapy Approved for 29 weeks  Met Updated Initial Criteria   At least 5% weight loss of baseline body weight  Approved for 12 months        Insurance     Primary Visit Coverage    Payer Plan Sponsor Code Group Number Group Name   [facility] CHOICE [id number] [facility]     Primary Visit Coverage Subscriber    ID Name N Address   [id number]  [address] [place] [address]         Pharmacy Information (if different than what is on RX)  Name:  [facility]    Phone: [phone]  - Press 2 to leave a refill request on a secured voicemail  - Press 3 to place an automated refill request  - Press 4 to speak directly to a member of the pharmacy staff    Hours: Monday through Friday 8am to 7pm and Saturday 8am to 4pm        Suzie Brihgt  Pharm D., MPH    Medication Therapy Management Pharmacist   [facility]

## 2024-04-26 NOTE — TELEPHONE ENCOUNTER
PA Initiation    Medication: WEGOVY 0.25 MG/0.5ML SC SOAJ  Insurance Company: [employer] - Phone [phone] Fax [phone]  Pharmacy Filling the Rx: [facility] - [place], [place] - [facility] [address]  Filling Pharmacy Phone: [phone]  Filling Pharmacy Fax: [phone]  Start Date: 4/26/2024     Key: [id number]

## 2024-04-29 NOTE — TELEPHONE ENCOUNTER
Prior Authorization Approval    Medication: WEGOVY 0.25 MG/0.5ML SC SOAJ  Authorization Effective Date: 4/29/2024  Authorization Expiration Date: 11/18/2024  Approved Dose/Quantity: uud   Reference #: Key: [id number]   Insurance Company: [employer] - Phone [phone] Fax [phone]  Expected CoPay: $    CoPay Card Available:      Financial Assistance Needed:    Which Pharmacy is filling the prescription: [facility] - [place], [place] - [address]

## 2024-04-29 NOTE — PATIENT INSTRUCTIONS
"Recommendations from MTM Pharmacist visit:                                                    MTM (medication therapy management) is a service provided by a clinical pharmacist designed to help you get the most of out of your medicines.  You may be sent a phone or email survey evaluating today's visit.  Please provide feedback you have for the service he received today if you are able.      [place] employees with [employer] insurance ([facility]) are restricted to using the [place] [facility] for Saxenda, Wegovy, and Zepbound    [facility]  [place], [place] - [address]  Phone: [phone]    Next steps:  After processing the prescription and saving to your profile, the pharmacy will give you an automated call to inform you that they have your prescription on file. This typically occurs within 1-2 days after the prescription is sent but may take 3-5 business days during high volume times.  You will need to call the pharmacy back to set up the first delivery of every new prescription or new medication dose.   Once you are on a stable dose, you can inquire with the pharmacy about signing up for \"Text to Order through [employer]", \"Auto Fill\", and/or using the \"My [employer] Rx\" shy.   Start Wegovy 0.25 mg injection once weekly. Take this medication on the same day each week for 4 weeks. If tolerating, you may increase to 0.5 mg once weekly on week 5.      As a reminder, here is a great link for how to administer your Wegovy:  www.[employer]/taking-wegovy/how-to-use-the-wegovy-pen.html    If cost is prohibitive, you may try using Wegovy coupon (www.wegovy.com/coverage-and-savings)    Additionally, we will start a low dose of bupropion 150 mg XL once daily to help with depression and stress management in addition to appetite improvements. Monitor for any improvement in your mental health as well with starting this.     Continue omeprazole 20 mg once daily " "for acid reflux. I also sent a prescription for famotidine 20 mg which is a bit faster acting and will help with break through acid symptoms - you can safely take this up to twice daily with omeprazole. Our goal will be to transition over to the famotidine eventually, for now, just use as needed for breakthrough acid reflux symptoms.     Follow-up: 6/3 with both Joana Rockwell PA-C and Suzie Bright [facility] with weight management clinic.        It was great speaking with you today.  I value your experience and would be very thankful for your time in providing feedback in our clinic survey. In the next few days, you may receive an email or text message from [facility] [employer] with a link to a survey related to your  clinical pharmacist.\"     To schedule another MT appointment, please call the clinic directly ([facility] Phone Number: [phone] (schedules for [facility] - providers, dietitians, health coaches) or you may call the MTM scheduling line at [phone] or toll-free at [phone].     My Clinical Pharmacist's contact information:                                                      Please feel free to contact me with any questions or concerns you have.      Suzie Bright, Pharm D., MPH    Medication Therapy Management Pharmacist   [facility] Weight Management [facility]           "

## 2024-05-09 ENCOUNTER — TRANSFERRED RECORDS (OUTPATIENT)
Dept: HEALTH INFORMATION MANAGEMENT | Facility: CLINIC | Age: 54
End: 2024-05-09
Payer: COMMERCIAL

## 2024-06-03 ENCOUNTER — VIRTUAL VISIT (OUTPATIENT)
Dept: CARDIOLOGY | Facility: CLINIC | Age: 54
End: 2024-06-03
Payer: COMMERCIAL

## 2024-06-03 VITALS — HEIGHT: 63 IN | BODY MASS INDEX: 35.08 KG/M2 | WEIGHT: 198 LBS

## 2024-06-03 DIAGNOSIS — E66.813 CLASS 3 SEVERE OBESITY WITH SERIOUS COMORBIDITY AND BODY MASS INDEX (BMI) OF 40.0 TO 44.9 IN ADULT, UNSPECIFIED OBESITY TYPE (H): Primary | ICD-10-CM

## 2024-06-03 DIAGNOSIS — E66.01 CLASS 3 SEVERE OBESITY WITH SERIOUS COMORBIDITY AND BODY MASS INDEX (BMI) OF 40.0 TO 44.9 IN ADULT, UNSPECIFIED OBESITY TYPE (H): Primary | ICD-10-CM

## 2024-06-03 RX ORDER — TRETINOIN 0.25 MG/G
CREAM TOPICAL
COMMUNITY
Start: 2024-05-13

## 2024-06-03 ASSESSMENT — PAIN SCALES - GENERAL: PAINLEVEL: NO PAIN (0)

## 2024-06-03 NOTE — LETTER
6/3/2024      RE: Vanesa Owen  [address] [facility] [address] [facility] [address]       Dear Colleague,    Thank you for the opportunity to participate in the care of your patient, Vanesa Owen, at the [facility]. Please see a copy of my visit note below.    Medication Therapy Management (MTM) Encounter    ASSESSMENT:                            Medication Adherence/Access: See below for considerations    Obesity   Class II Obesity (BMI 35 to 39.9): Patient would benefit from additional pharmacotherapy for weight management. Given class III obesity prior to starting GLP1/GIP and weight regain, recommend continue GLP1/GIP therapy titration  as data to support most significant weight loss and patient has no contraindications. Given history of some nausea on Wegovy in the past, recommend waiting to start Wegovy 0.5mg until after daughter's wedding on Saturday. Patient also likely to benefit from reduction in food noise and increased satiety. Continue bupropion 150mg XL for now - may consider increasing after situational stress of daughter's wedding resolves. Occasional ab pain described not consistent with pancreatitis - education provided on s/x to monitor for this.      For patients that are under [employer] Employee/[employer] insurance coverage, it is mandated by insurance that each qualifying patient meet with hospital based Weight Management Medication Therapy Management pharmacist to continue therapy coverage. The following patient meets the below coverage criteria and can therefore continue GLP-1/GIP agonist therapy for Weight Management:    Adult  BMI >40 with or without comorbidities   at time of initiating GLP-1/GIP agonist therapy Approved for 29 weeks  Met Updated Initial Criteria   At least 5% weight loss of baseline body weight  Approved for 12 months        PLAN:                            Start Wegovy 0.5mg weekly  starting next week 6/9 or 6/10 are ok to start after daughter's wedding.  After 4 weeks at this dose, you may refill 0.5mg Wegovy dose and continue this or increase to Wegovy 1mg weekly if you aren't having any side effects and would like further weight management.    To help with tolerability of Wegovy :  Eat small meals/snacks throughout the day (about every 2 hours)  Focus on getting protein in first with each meal and snack.   Drink plenty of water - goal 64 oz throughout the day  You may try Metamucil, Benefiber, or Citrucel to help feel more full (less nausea) and have softer, more consistent bowel movements.  To optimize weight management - work on incorporating resistance training/weight lifting to build muscle and improve overall metabolism of adipose tissue.      Monitor side pain for increase in severity or frequency. If becomes severe or you note vomiting, go to Emergency Department.    Follow-up: 8/5 with Suzie Bright [facility] using video visit.    SUBJECTIVE/OBJECTIVE:                          Vanesa Owen is a 54 year old female contacted via secure video for a follow-up visit.       Reason for visit: Medication Therapy Management - weight management .    Allergies/ADRs: Reviewed in chart  Past Medical History: Reviewed in chart  Tobacco: She reports that she has never smoked. She has never used smokeless tobacco.  Alcohol: Less than 1 beverages / week  Personal Health Goals: feel better and manage weight for daughter's wedding in June. Fam history of ASCVD - wants to prevent this.    Medication Adherence/Access: Medication barriers: obtaining medication from insurance.       Weight Management  Class II Obesity (BMI 35 to 39.9):   Wegovy 0.25 mg x 4 weeks - has 0.5mg Wegovy being shipped (due to take yesterday, but delayed in getting filled - has wedding on Sat, wonders if should take or wait until after wedding?)  Bupropion XL 150mg once daily     Patient met with Joana Rockwell PA-C 4/15/24  for [facility] weight management visit. Visit with Gabi Shea dietician -4/24/24 Patient denies personal or family history of MEN Type2, MTC, Pancreatitis; using IUD for contraception.     Occasional sharp right sided pain (patient unsure if related to Wegovy as may have started a few days prior to starting Wegovy, difficult for patient to remember)- not related to eating, not noted to change/improve with moving gas. About every other day. Not severe, not impeding work, no fever, malaise, no vomiting, not nauseous, just occasional short pain. Wondering if this is concerning?    Patient reports successful weight management and significant improvement in food noise with Wegovy in the past on mg but as of Jan 1 difficulty accessing due to insurance requirements. Had some nausea with Wegovy in the past - none now with restart. Notes she could use improvement in mindless eating, even with Wegovy, this was difficult to manage - stress of planning wedding and missing patient's mother during this fun time (loss due to covid) which is increasing food intake, she notes. Seeing small improvements in these food noise symptoms with starting Wegovy. Disappointed hasn't lost more than 2 lbs before daughter's wedding - but knows this is a lifestyle change that is worth being patient with.    Nutrition/Eating Habits: feels nutrition could use improvement. Eats quick foods: breakfast bar + coffee in the morning,   Herbalife shake in the day - whatever is quick for evening meal.      Working on goals with [facility] Dietician:  1) Aim to consume at least 60-90 grams of protein daily.   2) Increase fiber containing foods in diet, try to aim for 2-3 servings of fruits/vegetables daily.        Exercise/Activity: somewhat limited due to torn meniscus..working on improving.          Wt Readings from Last 4 Encounters:   06/03/24 89.8 kg (198 lb)   04/24/24 90.7 kg (200 lb)   04/23/24 91.2 kg (201 lb)  "  04/15/24 90.7 kg (200 lb)     Estimated body mass index is 35.07 kg/m  as calculated from the following:    Height as of this encounter: 1.6 m (5' 3\").    Weight as of this encounter: 89.8 kg (198 lb).    Anxiety/depression:  Sertaline 100 mg - 2 tabs once daily  Buspirone 5mg - 1 tab once- twice daily (often forgets morning tab)  Bupropion  mg qdm     Anxiety very well managed. Bupropion somewhat helpful with mood improvements. No  side effects reported.    Today's Vitals: Ht 1.6 m (5' 3\")   Wt 89.8 kg (198 lb)   BMI 35.07 kg/m    ----------------      I spent 12 minutes with this patient today. All changes were made via collaborative practice agreement with Loly Ledezma PA-C . A copy of the visit note was provided to the patient's provider(s).    A summary of these recommendations was sent via Smartjog.    Suzie Bright, Pharm D., MPH    Medication Therapy Management Pharmacist   [facility]      Telemedicine Visit Details  Type of service:  Video Conference via Raise  Start Time:  3:10 PM  End Time:  3:22 PM     Medication Therapy Recommendations  Class 3 severe obesity with serious comorbidity and body mass index (BMI) of 40.0 to 44.9 in adult (H)    Current Medication: Semaglutide-Weight Management (WEGOVY) 0.5 MG/0.5ML pen   Rationale: Dose too low - Dosage too low - Effectiveness   Recommendation: Increase Dose   Status: Accepted per CPA                Please do not hesitate to contact me if you have any questions/concerns.     Sincerely,     Suzie Bright, [facility]  "

## 2024-06-03 NOTE — PROGRESS NOTES
Medication Therapy Management (MTM) Encounter    ASSESSMENT:                            Medication Adherence/Access: See below for considerations    Obesity   Class II Obesity (BMI 35 to 39.9): Patient would benefit from additional pharmacotherapy for weight management. Given class III obesity prior to starting GLP1/GIP and weight regain, recommend continue GLP1/GIP therapy titration  as data to support most significant weight loss and patient has no contraindications. Given history of some nausea on Wegovy in the past, recommend waiting to start Wegovy 0.5mg until after daughter's wedding on Saturday. Patient also likely to benefit from reduction in food noise and increased satiety. Continue bupropion 150mg XL for now - may consider increasing after situational stress of daughter's wedding resolves. Occasional ab pain described not consistent with pancreatitis - education provided on s/x to monitor for this.      For patients that are under [employer] insurance coverage, it is mandated by insurance that each qualifying patient meet with hospital based Weight Management Medication Therapy Management pharmacist to continue therapy coverage. The following patient meets the below coverage criteria and can therefore continue GLP-1/GIP agonist therapy for Weight Management:    Adult  BMI >40 with or without comorbidities   at time of initiating GLP-1/GIP agonist therapy Approved for 29 weeks  Met Updated Initial Criteria   At least 5% weight loss of baseline body weight  Approved for 12 months        PLAN:                            Start Wegovy 0.5mg weekly starting next week 6/9 or 6/10 are ok to start after daughter's wedding.  After 4 weeks at this dose, you may refill 0.5mg Wegovy dose and continue this or increase to Wegovy 1mg weekly if you aren't having any side effects and would like further weight management.    To help with tolerability of Wegovy :  Eat small meals/snacks throughout the day  (about every 2 hours)  Focus on getting protein in first with each meal and snack.   Drink plenty of water - goal 64 oz throughout the day  You may try Metamucil, Benefiber, or Citrucel to help feel more full (less nausea) and have softer, more consistent bowel movements.  To optimize weight management - work on incorporating resistance training/weight lifting to build muscle and improve overall metabolism of adipose tissue.      Monitor side pain for increase in severity or frequency. If becomes severe or you note vomiting, go to Emergency Department.    Follow-up: 8/5 with Suzie Bright [facility] using video visit.    SUBJECTIVE/OBJECTIVE:                          Vanesa Owen is a 54 year old female contacted via secure video for a follow-up visit.       Reason for visit: Medication Therapy Management - weight management .    Allergies/ADRs: Reviewed in chart  Past Medical History: Reviewed in chart  Tobacco: She reports that she has never smoked. She has never used smokeless tobacco.  Alcohol: Less than 1 beverages / week  Personal Health Goals: feel better and manage weight for daughter's wedding in June. Fam history of ASCVD - wants to prevent this.    Medication Adherence/Access: Medication barriers: obtaining medication from insurance.       Weight Management   Class II Obesity (BMI 35 to 39.9):   Wegovy 0.25 mg x 4 weeks - has 0.5mg Wegovy being shipped (due to take yesterday, but delayed in getting filled - has wedding on Sat, wonders if should take or wait until after wedding?)  Bupropion XL 150mg once daily     Patient met with Joana Rockwell PA-C 4/15/24 for [facility] weight management visit. Visit with Gabi Shea dietemilia -4/24/24 Patient denies personal or family history of MEN Type2, MTC, Pancreatitis; using IUD for contraception.     Occasional sharp right sided pain (patient unsure if related to Wegovy as may have started a few days prior to starting Wegovy, difficult for patient to  "remember)- not related to eating, not noted to change/improve with moving gas. About every other day. Not severe, not impeding work, no fever, malaise, no vomiting, not nauseous, just occasional short pain. Wondering if this is concerning?    Patient reports successful weight management and significant improvement in food noise with Wegovy in the past on mg but as of Jan 1 difficulty accessing due to insurance requirements. Had some nausea with Wegovy in the past - none now with restart. Notes she could use improvement in mindless eating, even with Wegovy, this was difficult to manage - stress of planning wedding and missing patient's mother during this fun time (loss due to covid) which is increasing food intake, she notes. Seeing small improvements in these food noise symptoms with starting Wegovy. Disappointed hasn't lost more than 2 lbs before daughter's wedding - but knows this is a lifestyle change that is worth being patient with.    Nutrition/Eating Habits: feels nutrition could use improvement. Eats quick foods: breakfast bar + coffee in the morning,   Herbalife shake in the day - whatever is quick for evening meal.      Working on goals with [facility] Dietician:  1) Aim to consume at least 60-90 grams of protein daily.   2) Increase fiber containing foods in diet, try to aim for 2-3 servings of fruits/vegetables daily.        Exercise/Activity: somewhat limited due to torn meniscus..working on improving.          Wt Readings from Last 4 Encounters:   06/03/24 89.8 kg (198 lb)   04/24/24 90.7 kg (200 lb)   04/23/24 91.2 kg (201 lb)   04/15/24 90.7 kg (200 lb)     Estimated body mass index is 35.07 kg/m  as calculated from the following:    Height as of this encounter: 1.6 m (5' 3\").    Weight as of this encounter: 89.8 kg (198 lb).    Anxiety/depression:  Sertaline 100 mg - 2 tabs once daily  Buspirone 5mg - 1 tab once- twice daily (often forgets morning tab)  Bupropion  " "mg qdm     Anxiety very well managed. Bupropion somewhat helpful with mood improvements. No  side effects reported.    Today's Vitals: Ht 1.6 m (5' 3\")   Wt 89.8 kg (198 lb)   BMI 35.07 kg/m    ----------------      I spent 12 minutes with this patient today. All changes were made via collaborative practice agreement with Loly Ledezma PA-C . A copy of the visit note was provided to the patient's provider(s).    A summary of these recommendations was sent via [employer].    Suzie Bright, Pharm D., MPH    Medication Therapy Management Pharmacist   [facility]      Telemedicine Visit Details  Type of service:  Video Conference via [employer]  Start Time:  3:10 PM  End Time:  3:22 PM     Medication Therapy Recommendations  Class 3 severe obesity with serious comorbidity and body mass index (BMI) of 40.0 to 44.9 in adult (H)    Current Medication: Semaglutide-Weight Management (WEGOVY) 0.5 MG/0.5ML pen   Rationale: Dose too low - Dosage too low - Effectiveness   Recommendation: Increase Dose   Status: Accepted per CPA            "

## 2024-06-03 NOTE — NURSING NOTE
Is the patient currently in the state of [place]? YES    Visit mode:VIDEO    If the visit is dropped, the patient can be reconnected by: VIDEO VISIT: Text to cell phone:   Telephone Information:   Mobile [phone]       Will anyone else be joining the visit? NO  (If patient encounters technical issues they should call [phone]    How would you like to obtain your AVS? MyChart    Are changes needed to the allergy or medication list? No    Are refills needed on medications prescribed by this physician? NO     Reason for visit: RECHECK    Wt other than 24 hrs:    Pain more than one location:  no  Isabel KRISHNA

## 2024-06-03 NOTE — PATIENT INSTRUCTIONS
"Recommendations from MTM Pharmacist visit:                                                    MTM (medication therapy management) is a service provided by a clinical pharmacist designed to help you get the most of out of your medicines.  You may be sent a phone or email survey evaluating today's visit.  Please provide feedback you have for the service he received today if you are able.      Start Wegovy 0.5mg weekly starting next week 6/9 or 6/10 are ok to start after daughter's wedding.  After 4 weeks at this dose, you may refill 0.5mg Wegovy dose and continue this or increase to Wegovy 1mg weekly if you aren't having any side effects and would like further weight management.    To help with tolerability of Wegovy :  Eat small meals/snacks throughout the day (about every 2 hours)  Focus on getting protein in first with each meal and snack.   Drink plenty of water - goal 64 oz throughout the day  You may try Metamucil, Benefiber, or Citrucel to help feel more full (less nausea) and have softer, more consistent bowel movements.  To optimize weight management - work on incorporating resistance training/weight lifting to build muscle and improve overall metabolism of adipose tissue.      Monitor side pain for increase in severity or frequency. If becomes severe or you note vomiting, go to Emergency Department.    Follow-up: 8/5 with Suzie Bright [facility] using video visit.        It was great speaking with you today.  I value your experience and would be very thankful for your time in providing feedback in our clinic survey. In the next few days, you may receive an email or text message from [employer] with a link to a survey related to your  clinical pharmacist.\"     To schedule another MTM appointment, please call the clinic directly ([facility] Phone Number: [phone] (schedules for [facility] and [facility] - providers, dietitians, health coaches) or you may call the " [facility] scheduling line at [phone] or toll-free at [phone].     My Clinical Pharmacist's contact information:                                                      Please feel free to contact me with any questions or concerns you have.      Suzie Bright, Pharm D., MPH    Medication Therapy Management Pharmacist   [facility]

## 2024-06-03 NOTE — Clinical Note
KELBY Church Vanesa is noting occasional pains in abdomen that are quick and sharp, but go away and aren't accompanied by any other symptoms. She has been on Wegovy 0.25 mg x 4 weeks and was on Wegovy 0.5mg without issue last year. I don't think pancreatitis, but education provided for worsening or severe symptoms. So plan is to continue on Wegovy - any concerns from the team?  (KK- our CPA used, FAIRPRASHANTH emp; MK - your patient) Suzie

## 2024-06-20 DIAGNOSIS — K31.89 GASTRIC HYPERACIDITY: ICD-10-CM

## 2024-06-24 ENCOUNTER — TRANSFERRED RECORDS (OUTPATIENT)
Dept: HEALTH INFORMATION MANAGEMENT | Facility: CLINIC | Age: 54
End: 2024-06-24
Payer: COMMERCIAL

## 2024-06-25 ENCOUNTER — MYC REFILL (OUTPATIENT)
Dept: FAMILY MEDICINE | Facility: CLINIC | Age: 54
End: 2024-06-25
Payer: COMMERCIAL

## 2024-06-25 DIAGNOSIS — F34.1 DYSTHYMIA: ICD-10-CM

## 2024-06-25 DIAGNOSIS — F32.1 MODERATE MAJOR DEPRESSION (H): ICD-10-CM

## 2024-06-26 RX ORDER — SERTRALINE HYDROCHLORIDE 100 MG/1
200 TABLET, FILM COATED ORAL DAILY
Qty: 180 TABLET | Refills: 0 | Status: SHIPPED | OUTPATIENT
Start: 2024-06-26 | End: 2024-09-24

## 2024-07-03 NOTE — NURSING NOTE
"Chief Complaint   Patient presents with     Pharyngitis       Initial /76  Pulse 75  Temp 98.3  F (36.8  C) (Oral)  Wt 198 lb 8 oz (90 kg)  SpO2 98%  BMI 35.16 kg/m2 Estimated body mass index is 35.16 kg/(m^2) as calculated from the following:    Height as of 4/18/17: 5' 3\" (1.6 m).    Weight as of this encounter: 198 lb 8 oz (90 kg).  Medication Reconciliation: complete     Jazmin Oswald CMA (AAMA)      " Colon and Rectal Surgery Consult Clinic Note    Date: 7/3/2024     Referring provider:  No referring provider defined for this encounter.     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 Patient is now s/p EUA, left lateral internal sphincterotomy in the bed of the fissure, excision of sentinel tag done by me on 24. I saw Yolanda in clinic 24 and she was having some discomfort around a swollen skin tag. I prescribed Flagyl TID x10 days for inflammation.     Today, Yolanda is feeling better but not 100%.  She feels the spasms and fissure pain is better.  The metronidazole helped her swelling but screwed up her bowels.  She is using kombucha and other prebiotic foods to work on this.  She still notes some perineal inflammation.    Medical history:  Past Medical History:   Diagnosis Date    Motion sickness     Other chronic pain     Pneumonia     PONV (postoperative nausea and vomiting)        Surgical history:  Past Surgical History:   Procedure Laterality Date    CYSTOSCOPY, INJECT BOTOX N/A 2024    Procedure: EXAM UNDER ANESTHEISA WITH BOTOX INJECTION;  Surgeon: Lorie Powers MD;  Location: [facility] OR    MAMMOPLASTY AUGMENTATION  2002    Original set, saline    RECTAL BOTOX INJECTION      x2    SPHINCTEROTOMY RECTUM N/A 2024    Procedure: LATERAL SPHINCTEROTOMY, exam under anesthesia;  Surgeon: Kell Clemons MD;  Location: [facility] OR    [facility] ENLARGE BREAST      Description: Breast Surgery Enlargement Procedure;  Recorded: 2008;       Problem list:    Patient Active Problem List    Diagnosis Date Noted    Anal fissure 2024     Priority: Medium    Anxiety       Priority: Medium     Created by Conversion  Replacement Utility updated for latest IMO load        Acne      Priority: Medium     Created by Conversion           Medications:  Current Outpatient Medications   Medication Sig Dispense Refill    acetaminophen (TYLENOL) 325 MG tablet Take 325-650 mg by mouth every 6 hours as needed for mild pain (Patient not taking: Reported on 6/12/2024)      menthol-zinc oxide (CALMOSEPTINE) 0.44-20.6 % OINT ointment Apply pea-size amount to anus and surrounding skin three times daily as needed for skin irritation. 71 g 0    NIFEdipine POWD powder daily (Patient not taking: Reported on 6/12/2024)      oxyCODONE (ROXICODONE) 5 MG tablet Take 1-2 tablets (5-10 mg) by mouth every 3 hours as needed for severe pain (Patient not taking: Reported on 6/12/2024) 12 tablet 0    PARoxetine (PAXIL) 10 MG tablet Take 10 mg by mouth every morning         Allergies:  Allergies   Allergen Reactions    Sulfa (Sulfonamide Antibiotics) [Sulfa Antibiotics] Hives       Family history:  Family History   Problem Relation Age of Onset    Breast Cancer Sister     Anesthesia Reaction No family hx of     Deep Vein Thrombosis (DVT) No family hx of        Social history:  Social History     Tobacco Use    Smoking status: Former     Current packs/day: 0.00     Types: Cigarettes     Quit date: 2009     Years since quitting: 15.5    Smokeless tobacco: Never   Substance Use Topics    Alcohol use: Yes     Alcohol/week: 5.0 standard drinks of alcohol     Types: 5 Glasses of wine per week     Comment: Wine approx 5x/wk    Marital status: .  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    There are no exam notes on file for this visit.     Physical Examination:  LMP  (LMP Unknown)   General: Well appearing, no acute distress    Perianal external examination: Exam was chaperoned by Shannon Adame, clinc assistant   Perianal skin: intact.  Lesions: There was a small area not epithelialized at previous  fissure/sphincterotomy site.  This was treated with silver nitrate  Eversion of buttocks: There was not evidence of an anal fissure. Details: N/A.  Skin tags or external hemorrhoids: Yes: Mild, no swelling..  Digital rectal examination: Was performed.   Sphincter tone: Good.  No mass/fluctuance abnormality    Assessment/Plan: 49 year old female with a significant past medical history of anal fissure s/p LIS.  Recommend continue with prebiotics;  as her bowel movements become more regular I think her remaining symptoms will improve.  I agree with discontinuing PFPT at this time.  She will follow up if the residual symptoms do no resolve with more time and bowel regulation.       20 minutes spent on the date of encounter performing chart review, history and exam, documentation and further activities as noted above.     Kell Clemons MD     Division of Colon & Rectal Surgery  [facility]         This note was created using speech recognition software and may contain unintended word substitutions.

## 2024-07-05 ENCOUNTER — OFFICE VISIT (OUTPATIENT)
Dept: FAMILY MEDICINE | Facility: CLINIC | Age: 54
End: 2024-07-05
Payer: COMMERCIAL

## 2024-07-05 VITALS
BODY MASS INDEX: 35.47 KG/M2 | RESPIRATION RATE: 18 BRPM | WEIGHT: 200.2 LBS | SYSTOLIC BLOOD PRESSURE: 110 MMHG | OXYGEN SATURATION: 95 % | HEIGHT: 63 IN | HEART RATE: 98 BPM | DIASTOLIC BLOOD PRESSURE: 66 MMHG | TEMPERATURE: 97.2 F

## 2024-07-05 DIAGNOSIS — F33.41 RECURRENT MAJOR DEPRESSIVE DISORDER, IN PARTIAL REMISSION (H): ICD-10-CM

## 2024-07-05 DIAGNOSIS — E66.813 CLASS 3 SEVERE OBESITY WITH SERIOUS COMORBIDITY AND BODY MASS INDEX (BMI) OF 40.0 TO 44.9 IN ADULT, UNSPECIFIED OBESITY TYPE (H): ICD-10-CM

## 2024-07-05 DIAGNOSIS — J02.9 SORE THROAT: Primary | ICD-10-CM

## 2024-07-05 DIAGNOSIS — E66.01 CLASS 3 SEVERE OBESITY WITH SERIOUS COMORBIDITY AND BODY MASS INDEX (BMI) OF 40.0 TO 44.9 IN ADULT, UNSPECIFIED OBESITY TYPE (H): ICD-10-CM

## 2024-07-05 LAB
DEPRECATED S PYO AG THROAT QL EIA: NEGATIVE
GROUP A STREP BY PCR: NOT DETECTED

## 2024-07-05 PROCEDURE — 87635 SARS-COV-2 COVID-19 AMP PRB: CPT | Performed by: NURSE PRACTITIONER

## 2024-07-05 PROCEDURE — 99214 OFFICE O/P EST MOD 30 MIN: CPT | Performed by: NURSE PRACTITIONER

## 2024-07-05 PROCEDURE — 87651 STREP A DNA AMP PROBE: CPT | Performed by: NURSE PRACTITIONER

## 2024-07-05 RX ORDER — BUPROPION HYDROCHLORIDE 150 MG/1
150 TABLET ORAL EVERY MORNING
Qty: 90 TABLET | Refills: 2 | Status: SHIPPED | OUTPATIENT
Start: 2024-07-05

## 2024-07-05 ASSESSMENT — PAIN SCALES - GENERAL: PAINLEVEL: MODERATE PAIN (4)

## 2024-07-05 NOTE — LETTER
July 5, 2024      Vanesa HARDING [place]  [address] [address] [address]        To Whom It May Concern:    Vanesa HARDING Rachaelkaylene  was seen on 07/05/2024.  Please excuse her  until 07/06/2024 due to illness.        Sincerely,        AUGUSTO THIBODEAUX

## 2024-07-05 NOTE — PROGRESS NOTES
"  Assessment & Plan     Sore throat    - Streptococcus A Rapid Screen w/Reflex to PCR - Clinic Collect  - Symptomatic COVID-19 Virus (Coronavirus) by PCR Nose; Future  - Group A Streptococcus PCR Throat Swab  - Symptomatic COVID-19 Virus (Coronavirus) by PCR Nose    Class 3 severe obesity with serious comorbidity and body mass index (BMI) of 40.0 to 44.9 in adult, unspecified obesity type (H)    - buPROPion (WELLBUTRIN XL) 150 MG 24 hr tablet; Take 1 tablet (150 mg) by mouth every morning    Recurrent major depressive disorder, in partial remission (H24)    - buPROPion (WELLBUTRIN XL) 150 MG 24 hr tablet; Take 1 tablet (150 mg) by mouth every morning          BMI  Estimated body mass index is 35.03 kg/m  as calculated from the following:    Height as of this encounter: 1.61 m (5' 3.39\").    Weight as of this encounter: 90.8 kg (200 lb 3.2 oz).         Work on weight loss  Regular exercise  See Patient Instructions    Yunior Alexis is a 54 year old, presenting for the following health issues:  Pharyngitis    Significant sore throat started yesterday, usually does not get sore throat.  Works in the NICU, several sick contacts  To make sure she does not have strep.  Home COVID test yesterday was negative.  Also having sinus headache and nasal congestion.  Low-grade fever.  Mental health is doing well feels Wellbutrin is working well for her.  Discussed symptom management.  Tips given on after visit summary.  Letter for work.  Follow-up if worsening symptoms.        7/5/2024     9:54 AM   Additional Questions   Roomed by Manisha RODGERS     History of Present Illness       Reason for visit:  Strep test  Symptom onset:  1-3 days ago  Symptoms include:  Throat  Symptom intensity:  Moderate  Symptom progression:  Staying the same  Had these symptoms before:  No  What makes it worse:  Dry  What makes it better:  Drinking fluids    She eats 0-1 servings of fruits and vegetables daily.She consumes 0 sweetened beverage(s) " "daily.She exercises with enough effort to increase her heart rate 10 to 19 minutes per day.  She exercises with enough effort to increase her heart rate 3 or less days per week.   She is taking medications regularly.       Pre-Provider Visit Orders - Rapid Strep  Does the patient have shortness of breath/trouble breathing, an earache, drooling/too much saliva, or any difficulty opening her mouth/moving neck?  No  Does the patient have a sore throat and either history of fever >100.4 in the previous 24 hours without a cough or recent exposure to a known case of strep throat? No    Acute Illness  Acute illness concerns: 99.1 low grade fever yesterday, sore throat  Onset/Duration: 1 day  Symptoms:  Fever: low grade 99.1  Chills/Sweats: No  Headache (location?): YES- 2 days taking Claritin  Sinus Pressure: YES  Conjunctivitis:  No  Ear Pain: no  Rhinorrhea: No  Congestion: YES  Sore Throat: YES  Cough: no  Wheeze: No  Decreased Appetite: yes a little bit  Nausea: No  Vomiting: No  Diarrhea: No  Dysuria/Freq.: No  Dysuria or Hematuria: No  Fatigue/Achiness: yes a  little bit  Sick/Strep Exposure: possible sick coworkers  Therapies tried and outcome: Claritin Advil Tylenol        Review of Systems  Constitutional, HEENT, cardiovascular, pulmonary, GI, , musculoskeletal, neuro, skin, endocrine and psych systems are negative, except as otherwise noted.      Objective    /66   Pulse 98   Temp 97.2  F (36.2  C) (Temporal)   Resp 18   Ht 1.61 m (5' 3.39\")   Wt 90.8 kg (200 lb 3.2 oz)   SpO2 95%   BMI 35.03 kg/m    Body mass index is 35.03 kg/m .  Physical Exam   GENERAL: alert and no distress  EYES: Eyes grossly normal to inspection  HENT: ear canals and TM's normal, nose and mouth without ulcers or lesions POSITIVE posterior oropharynx erythematous  NECK: no adenopathy  RESP: lungs clear to auscultation - no rales, rhonchi or wheezes  CV: regular rate and rhythm, normal S1 S2, no S3 or S4, no murmur, click or " rub, no peripheral edema  SKIN: no rashes  PSYCH: mentation appears normal, affect normal/bright    Strep test negative, culture pending    Home COVID test negative        Signed Electronically by: AUGUSTO THIBODEAUX     weakness

## 2024-07-06 LAB — SARS-COV-2 RNA RESP QL NAA+PROBE: POSITIVE

## 2024-07-08 NOTE — RESULT ENCOUNTER NOTE
Sebastian Alexis,    Thank you for your recent office visit.    Here are your recent results.  Your results showed that you were positive for COVID.  You should remain home from work until you have no cold symptoms.  If you have worsening symptoms please return for further evaluation.    Feel free to contact me via [employer] or call the clinic at [phone].    Sincerely,    KIRSTIN Vazquez, FNP-BC

## 2024-07-29 ENCOUNTER — MYC REFILL (OUTPATIENT)
Dept: FAMILY MEDICINE | Facility: CLINIC | Age: 54
End: 2024-07-29
Payer: COMMERCIAL

## 2024-07-29 DIAGNOSIS — F41.9 ANXIETY: ICD-10-CM

## 2024-07-29 DIAGNOSIS — Z82.49 FAMILY HISTORY OF ARTERIOSCLEROTIC CARDIOVASCULAR DISEASE: ICD-10-CM

## 2024-07-29 DIAGNOSIS — F32.1 MODERATE MAJOR DEPRESSION (H): ICD-10-CM

## 2024-07-29 DIAGNOSIS — E78.9 BORDERLINE HIGH CHOLESTEROL: ICD-10-CM

## 2024-07-30 RX ORDER — BUSPIRONE HYDROCHLORIDE 5 MG/1
TABLET ORAL
Qty: 180 TABLET | Refills: 1 | Status: SHIPPED | OUTPATIENT
Start: 2024-07-30

## 2024-07-30 RX ORDER — ATORVASTATIN CALCIUM 10 MG/1
10 TABLET, FILM COATED ORAL DAILY
Qty: 90 TABLET | Refills: 1 | Status: SHIPPED | OUTPATIENT
Start: 2024-07-30

## 2024-08-05 ENCOUNTER — VIRTUAL VISIT (OUTPATIENT)
Dept: CARDIOLOGY | Facility: CLINIC | Age: 54
End: 2024-08-05
Attending: PHYSICIAN ASSISTANT
Payer: COMMERCIAL

## 2024-08-05 ENCOUNTER — MYC MEDICAL ADVICE (OUTPATIENT)
Dept: FAMILY MEDICINE | Facility: CLINIC | Age: 54
End: 2024-08-05
Payer: COMMERCIAL

## 2024-08-05 VITALS — BODY MASS INDEX: 35.26 KG/M2 | WEIGHT: 199 LBS | HEIGHT: 63 IN

## 2024-08-05 DIAGNOSIS — E66.01 CLASS 3 SEVERE OBESITY WITH SERIOUS COMORBIDITY AND BODY MASS INDEX (BMI) OF 40.0 TO 44.9 IN ADULT, UNSPECIFIED OBESITY TYPE (H): ICD-10-CM

## 2024-08-05 DIAGNOSIS — E66.813 CLASS 3 SEVERE OBESITY WITH SERIOUS COMORBIDITY AND BODY MASS INDEX (BMI) OF 40.0 TO 44.9 IN ADULT, UNSPECIFIED OBESITY TYPE (H): ICD-10-CM

## 2024-08-05 RX ORDER — CLINDAMYCIN PHOSPHATE 10 UG/ML
LOTION TOPICAL EVERY MORNING
COMMUNITY
Start: 2023-11-08

## 2024-08-05 ASSESSMENT — PAIN SCALES - GENERAL: PAINLEVEL: NO PAIN (0)

## 2024-08-05 NOTE — Clinical Note
Cole Ashley,  I recommended that Vanesa reach out to you for work up, but wanted to follow up as well to let you know of our assessment at the [facility].  She has been having occasional right sided pain - started before Wegovy. Has fam history of gall bladder removal. Symptoms not worsened by semaglutide and has been on in the past. Pain is sharp and brief, not believed to be gas, has slowly gotten better with time, but still remains. It started before restarting Wegovy.   I do not believe it is pancreatitis so we continued Wegovy for now, warning patient if worsening symptoms, go in to be seen and may need to stop Wegovy to not make symptoms worse. She is agreeable and is planning a follow up to discuss with you, but wanted you to be aware of what we had discussed and that I recommended a food diary to see if that is of any assistance to you in your visit.  Please let me know if any questions or concerns ahead of or after visit! Suzie

## 2024-08-05 NOTE — LETTER
8/5/2024      RE: Vanesa Owen  [address] [facility] [address]       Dear Colleague,    Thank you for the opportunity to participate in the care of your patient, Vanesa Owen, at the [facility] [place] at [facility]. Please see a copy of my visit note below.    Medication Therapy Management (MTM) Encounter    ASSESSMENT:                            Medication Adherence/Access: See below for considerations    Weight management : given symptoms of side pain started before starting Wegovy and not worsened by continuing Wegovy. Not severe and not consistent with pancreatitis; has been on semaglutide before without issue.  do not believe occasional abdominal pain in side is secondary to Wegovy use. Given fam history of gall bladder removal - may need to hold Wegovy  if found to be dysfunctional until removed, but as Wegovy has not worsened symptoms, ok to continue for now. Do not expect Wegovy to worsen osteoarthritis pain as was patient concern. Recommend follow up with PCP as planned for further work up.    Patient would benefit from additional pharmacotherapy for weight management. Given class III obesity, recommend GLP1/GIP therapy as data to support most significant weight loss and patient has no contraindications. Patient also likely to benefit from reduction in food noise and increased satiety. Discussed dietary and behavioral modifications.       PLAN:                            Monitor for any worsening abdominal pain - follow up with Gabi Sotelo PA-C to explore this.    Keep food diary to help monitor for correlation between foods perhaps and pain.    Ok to increase Wegovy to 1mg weekly and monitor for any worsening abdominal pain (pancreatitis is our concern to monitor for and is emergent if you have severe abdominal pain that does not stop), though I do not believe it is related to Wegovy -- we are being cautious.      Continue to push hydration as well to help with Wegovy improvements.     Follow-up: 11/7 with Joana Rockwell PA-C   Around the first of the year with Suzie Bright, [facility] - sooner if concerns for pancreatitis arise with work up with Gabi Sotelo PA-C.    SUBJECTIVE/OBJECTIVE:                          Vanesa Owen is a 54 year old female seen for a follow-up visit.       Reason for visit: Medication Therapy Management - GLP1/GIP Management [facility] insurance requirements .    Allergies/ADRs: Reviewed in chart  Past Medical History: Reviewed in chart  Tobacco: She reports that she has never smoked. She has never used smokeless tobacco.  Alcohol: Less than 1 beverages / week  Personal Health Goals: feel better. Fam history of ASCVD - wants to prevent this.    Medication Adherence/Access: no issues reported -- prescription for Wegovy 1mg sent last week to [facility] to prevent lapse in treatment.     Weight Management  Class II Obesity (BMI 35 to 39.9):   Wegovy 0.5 mg x 4 weeks   Bupropion XL 150mg once daily     Patient met with Joana Rockwell PA-C 4/15/24 for [facility] weight management visit. Visit with Gabi Shea dietician -4/24/24    Previously reported Occasional very brief, sharp right sided pain (patient unsure if related to Wegovy as prior to starting Wegovy, difficult for patient to remember)- starting to wonder if related to eating, not noted to change/improve with moving gas. Decided to monitor and this has improved, still having very occasional sharp pain, but less frequent. Mother had gall bladder removed in the past and wonders if could be related this this? Planning to see PCP to  work up.    Happy with Wegovy overall, no increased symptoms above with Wegovy - pain not severe and not consistent. Cravings (sweets and chocolate) not well managed with Wegovy 0.5mg dose. Seeing some appetite reduction. Had been up to 2mg Ozempic in the past and while had  "better craving and food noise management, noted more nausea and tolerating Wegovy slow titration much better - no nausea.    Nutrition/Eating Habits:  Working on goals with [facility]etic[facility]:  1) Aim to consume at least 60-90 grams of protein daily.   2) Increase fiber containing foods in diet, try to aim for 2-3 servings of fruits/vegetables daily.    Exercise/Activity: somewhat limited due to torn meniscus..working on improving and is in physical therapy - wondering if Wegovy would have any negative impact on osteoarthritis or healing after torn meniscus?      Med history:  Patient denies personal or family history of MEN Type2, MTC, Pancreatitis; using IUD for contraception.     Initial Weight: 200 lb        Current weight today: 199 lbs 0 oz  Cumulative Weight Loss: -1 lb, -0.5% from baseline    Wt Readings from Last 4 Encounters:   08/05/24 90.3 kg (199 lb)   07/05/24 90.8 kg (200 lb 3.2 oz)   06/03/24 89.8 kg (198 lb)   04/24/24 90.7 kg (200 lb)     Estimated body mass index is 35.25 kg/m  as calculated from the following:    Height as of this encounter: 1.6 m (5' 3\").    Weight as of this encounter: 90.3 kg (199 lb).        Today's Vitals: Ht 1.6 m (5' 3\")   Wt 90.3 kg (199 lb)   BMI 35.25 kg/m    ----------------      I spent 18 minutes with this patient today. All changes were made via collaborative practice agreement with Loly Ledezma. A copy of the visit note was provided to the patient's provider(s).    A summary of these recommendations was sent via [employer].    Suzie Bright, Pharm D., MPH    Medication Therapy Management Pharmacist   [facility]      Telemedicine Visit Details  Type of service:  Video Conference via [employer]  Start Time:  11:31 AM  End Time:  11:49 AM     Medication Therapy Recommendations  Class 3 severe obesity with serious comorbidity and body mass index (BMI) of 40.0 to 44.9 in adult (H)    Current " Medication: Semaglutide-Weight Management (WEGOVY) 0.5 MG/0.5ML pen   Rationale: Dose too low - Dosage too low - Effectiveness   Recommendation: Increase Dose   Status: Accepted per CPA                Please do not hesitate to contact me if you have any questions/concerns.     Sincerely,     Suzie Bright, [facility]

## 2024-08-05 NOTE — NURSING NOTE
Current patient location: [address] [place] [id number]    Is the patient currently in the state of [place]? YES    Visit mode:VIDEO    If the visit is dropped, the patient can be reconnected by: VIDEO VISIT: Text to cell phone:   Telephone Information:   Mobile [phone]       Will anyone else be joining the visit? NO  (If patient encounters technical issues they should call [phone]    How would you like to obtain your AVS? MyChart    Are changes needed to the allergy or medication list? No    Are refills needed on medications prescribed by this physician? NO    Rooming Documentation:  Not applicable      Reason for visit: Medication Therapy Management    Bobbilynn Grossaint VVF

## 2024-08-05 NOTE — PATIENT INSTRUCTIONS
"Recommendations from MTM Pharmacist visit:                                                    MTM (medication therapy management) is a service provided by a clinical pharmacist designed to help you get the most of out of your medicines.  You may be sent a phone or email survey evaluating today's visit.  Please provide feedback you have for the service he received today if you are able.    Monitor for any worsening abdominal pain - follow up with Gabi Sotelo PA-C to explore this.    Keep food diary to help monitor for correlation between foods perhaps and pain.    Ok to increase Wegovy to 1mg weekly and monitor for any worsening abdominal pain (pancreatitis is our concern to monitor for and is emergent if you have severe abdominal pain that does not stop), though I do not believe it is related to Wegovy -- we are being cautious.       Continue to push hydration as well to help with Wegovy improvements.     Follow-up: 11/7 with Joana Rockwell PA-C   Around the first of the year with Suzie Bright formerly [facility] - sooner if concerns for pancreatitis arise with work up with Gabi Sotelo PA-C.        It was great speaking with you today.  I value your experience and would be very thankful for your time in providing feedback in our clinic survey. In the next few days, you may receive an email or text message from [employer] with a link to a survey related to your  clinical pharmacist.\"     To schedule another MTM appointment, please call the clinic directly ([facility] Phone Number: [phone] (schedules for [facility] and [facility] - providers, dietitians, health coaches) or you may call the MTM scheduling line at [phone] or toll-free at [phone].     My Clinical Pharmacist's contact information:                                                      Please feel free to contact me with any questions or concerns you have.      Suzie Bright, Pharm D., " MPH    Medication Therapy Management Pharmacist   [facility]          Meal Replacement Shake Options:   *Protein Shake Criteria: no more than 210 Calories, at least 20 grams of protein, and less than 10 grams of sugar   Premier Protein (160 Calories, 30 g protein)  Slim Fast Advanced Nutrition (180 Calories, 20 g protein)  Muscle Milk, lactose-free, 17 oz bottle (210 Calories, 30 g protein)  Integrated Supplements, no artificial sugars (110 Calories, 20 g protein)  Boost/Ensure Max (160 calories, 30 gm protein)   [facility] Protein Shakes (160-230 calories, 26-42 gm protein)  [facility] Protein Powder (180 calories, 30 gm protein)   Orgain Protein Shakes (130-160 calories, 20-26 gm protein)     Meal Replacement Bar Options:  Quest Protein Bars (190 Calories, 20 g protein)  Built Bar (170 Calories, 15-20 g protein)  One Protein Bar (210 calories, 20 g protein)  [place] Signature Protein Bar ([employer]) (190 Calories, 21 g protein)  Pure Protein Bars (180 Calories, 21 g protein)    Low Calorie Frozen Meal:  Healthy Choice Power Bowls  Lean Cuisine  Smart Ones  Eh Salcido      ---------------------------------------------------------------  Tips to Increase the Protein in Your Diet  You may need more protein in your diet to help you heal from an illness, surgery or wound. Extra protein can also help you gain weight. Here are some ideas for adding high-protein foods to your meals.  Meat and fish  Add chopped cooked meat to vegetables, salads, casseroles, soups, sauces and biscuit dough.  Use in omelets, soufflés, quiches, sandwich fillings and chicken or turkey stuffing.  Wrap in pie crust or biscuit dough to make a turnover.  Add to stuffed baked potatoes.  Make a dip with diced meat or flaked fish mixed with sour cream and spices.  Chopped, hard-cooked eggs  Add to salads.  Use for snacks and sandwich filling.  High-protein milk  To make high-protein milk, mix 1  quart whole milk with 1 cup powdered milk.  Add to cream soups, mashed potatoes, scrambled eggs, cereals and dried eggnog mix.  Use as an ingredient in puddings, custards, hot chocolate, milk shakes and pancakes.  Powdered milk  If you don't have any high-protein milk on hand, you can use powdered milk. Add 3 tablespoons to:  gravies, sauces, cream soups, mayonnaise  casseroles, meat patties, meatloaf, tuna salad, deviled ham  scalloped or mashed potatoes, creamed spinach  scrambled eggs, egg salad  cereals  yogurt, milk drinks, ice cream, frozen desserts, puddings, custards.  Add 4 to 6 tablespoons powdered milk to make:  cream sauces  breads, muffins, pancakes, waffles, cookies, cakes  cream pies, frostings, cake fillings  fruit cobblers, bread or rice pudding, gelatin desserts.  For high-protein eggnog, add 3 to 6 tablespoons powdered milk to prepared eggnog.  Hard or soft cheese  Melt on sandwiches, breads, tortillas, hamburgers, hot dogs, other meats, vegetables, eggs and pies.  Grate into soups, chili, sauces, casseroles, vegetables, potatoes, rice, noodles or meatloaf.  Eat with toast or crackers, or melt for beth dip.  Cottage cheese or ricotta cheese  Mix with or scoop on top of fruits and vegetables.  Add to casseroles, lasagna, spaghetti, noodles and egg dishes (omelets, scrambled eggs, soufflés).  Use in gelatin, pudding-type desserts, cheesecake and pancake batter.  Use to stuff crepes, pasta shells or manicotti.  Fruit yogurt  Blend with fruits for a fruit smoothie.  Use as a dip for fruits and vegetables.  Scoop on top of pancakes or waffles.  Tofu  Blend silken tofu with fruits and juices for a smoothie.  Add chunks of firm tofu to soups and stews, or crumble into meatloaf.  Blend dried onion soup mix into soft or silken tofu for dip.  Use pureed silken tofu for part of the mayonnaise, sour cream, cream cheese or ricotta cheese called for in recipes.  Beans  Use cooked beans or peas in soups,  casseroles, pasta, tacos and burritos.  Nuts and seeds  Note: For children under 3, discuss with the child's care team.  Use in casseroles, breads, muffins, pancakes, cookies and waffles.  Sprinkle on fruits, cereals, ice cream, yogurt, vegetables and salads.  Mix with raisins, dried fruits and chocolate chips for a snack.  Nut butters  Note: For children under 3, discuss with the child's care team.  Spread on sandwiches, toast, muffins, crackers, waffles, pancakes and fruit slices.  Use as a dip for raw vegetables.  Blend with milk drinks, or swirl through ice cream, yogurt or hot cereal.  Nutrition supplements (nutrition bars, drinks and powders)  Add powders to milk drinks and desserts.  Mix with ice cream, milk and fruit for a high-protein milk shake.    For informational purposes only. Not to replace the advice of your health care provider. Clinically reviewed by Amy Faulkner RD, NARINDER, and the Clinical Nutrition Service Line. Copyright   2005 [facility]. All rights reserved. [employer] [id number] - REV 04/24.      -----------------------------------------------------------------------------------------------------------------  Learning About High-Protein Foods  What foods are high in protein?     The foods you eat contain nutrients, such as vitamins and minerals. Protein is a nutrient. Your body needs the right amount to stay healthy and work as it should. You can use the list below to help you make choices about which foods to eat.  Here are some examples of foods that are high in protein.  Dairy and dairy alternatives  Cheese  Milk  Soy milk  Yogurt (especially Greek)  Meat  Beef  Chicken  Ham  Lamb  Lunch meat  Pork  Sausage  Turkey  Other protein foods  Beans (black, garbanzo, kidney, lima)  Eggs  Hummus  Lentils  Nuts  Peanut butter and other nut butters  Peas  Soybeans  Tofu  Veggie or soy pillo (Check the nutrition label for the amount of protein in each  "serving.)  Seafood  Anchovies  Cod  Crab  Halibut  Gardiner  Sardines  Shrimp  Tilapia  [place]  Tuna  Protein supplements  Bars (Check the nutrition label for the amount of protein in each serving.)  Drinks  Powders  Work with your doctor to find out how much of this nutrient you need. Depending on your health, you may need more or less of it in your diet.  Where can you learn more?  Go to https://www.[employer].net/patiented  Enter P335 in the search box to learn more about \"Learning About High-Protein Foods.\"  Current as of: September 20, 2023               Content Version: 14.0    5999-4036 [employer].   Care instructions adapted under license by your healthcare professional. If you have questions about a medical condition or this instruction, always ask your healthcare professional. [employer] disclaims any warranty or liability for your use of this information.         "

## 2024-08-05 NOTE — Clinical Note
Patient continues to have very occasional stabbing right abdominal pain, but has slightly improved and not worsened by Wegovy over past several months (this also was occurring before Wegovy started) patient will now follow up with PCP to have this worked up. It has improved, but not resolved. Patient mom had gall bladder removed so I warned patient we may need to hold Wegovy if it was determined that would need to be removed. Patient to keep us informed as she follows up with PCP.  Suzie

## 2024-08-05 NOTE — PROGRESS NOTES
Medication Therapy Management (MTM) Encounter    ASSESSMENT:                            Medication Adherence/Access: See below for considerations    Weight management : given symptoms of side pain started before starting Wegovy and not worsened by continuing Wegovy. Not severe and not consistent with pancreatitis; has been on semaglutide before without issue.  do not believe occasional abdominal pain in side is secondary to Wegovy use. Given fam history of gall bladder removal - may need to hold Wegovy  if found to be dysfunctional until removed, but as Wegovy has not worsened symptoms, ok to continue for now. Do not expect Wegovy to worsen osteoarthritis pain as was patient concern. Recommend follow up with PCP as planned for further work up.    Patient would benefit from additional pharmacotherapy for weight management. Given class III obesity, recommend GLP1/GIP therapy as data to support most significant weight loss and patient has no contraindications. Patient also likely to benefit from reduction in food noise and increased satiety. Discussed dietary and behavioral modifications.       PLAN:                            Monitor for any worsening abdominal pain - follow up with Gabi Sotelo PA-C to explore this.    Keep food diary to help monitor for correlation between foods perhaps and pain.    Ok to increase Wegovy to 1mg weekly and monitor for any worsening abdominal pain (pancreatitis is our concern to monitor for and is emergent if you have severe abdominal pain that does not stop), though I do not believe it is related to Wegovy -- we are being cautious.     Continue to push hydration as well to help with Wegovy improvements.     Follow-up: 11/7 with Joana Rockwell PA-C   Around the first of the year with Suzie Bright, [facility] - sooner if concerns for pancreatitis arise with work up with Gabi Sotelo PA-C.    SUBJECTIVE/OBJECTIVE:                          Vanesa Owen is a 54 year old  female seen for a follow-up visit.       Reason for visit: Medication Therapy Management - GLP1/GIP Management [facility] insurance requirements .    Allergies/ADRs: Reviewed in chart  Past Medical History: Reviewed in chart  Tobacco: She reports that she has never smoked. She has never used smokeless tobacco.  Alcohol: Less than 1 beverages / week  Personal Health Goals: feel better. Fam history of ASCVD - wants to prevent this.    Medication Adherence/Access: no issues reported -- prescription for Wegovy 1mg sent last week to [facility] to prevent lapse in treatment.     Weight Management   Class II Obesity (BMI 35 to 39.9):   Wegovy 0.5 mg x 4 weeks   Bupropion XL 150mg once daily     Patient met with Joana Rockwell PA-C 4/15/24 for [facility] weight management visit. Visit with rizwan Byrd -4/24/24    Previously reported Occasional very brief, sharp right sided pain (patient unsure if related to Wegovy as prior to starting Wegovy, difficult for patient to remember)- starting to wonder if related to eating, not noted to change/improve with moving gas. Decided to monitor and this has improved, still having very occasional sharp pain, but less frequent. Mother had gall bladder removed in the past and wonders if could be related this this? Planning to see PCP to  work up.    Happy with Wegovy overall, no increased symptoms above with Wegovy - pain not severe and not consistent. Cravings (sweets and chocolate) not well managed with Wegovy 0.5mg dose. Seeing some appetite reduction. Had been up to 2mg Ozempic in the past and while had better craving and food noise management, noted more nausea and tolerating Wegovy slow titration much better - no nausea.    Nutrition/Eating Habits:  Working on goals with [facility] Dietician:  1) Aim to consume at least 60-90 grams of protein daily.   2) Increase fiber containing foods in diet, try to aim for 2-3  "servings of fruits/vegetables daily.    Exercise/Activity: somewhat limited due to torn meniscus..working on improving and is in physical therapy - wondering if Wegovy would have any negative impact on osteoarthritis or healing after torn meniscus?      Med history:  Patient denies personal or family history of MEN Type2, MTC, Pancreatitis; using IUD for contraception.     Initial Weight: 200 lb        Current weight today: 199 lbs 0 oz  Cumulative Weight Loss: -1 lb, -0.5% from baseline    Wt Readings from Last 4 Encounters:   08/05/24 90.3 kg (199 lb)   07/05/24 90.8 kg (200 lb 3.2 oz)   06/03/24 89.8 kg (198 lb)   04/24/24 90.7 kg (200 lb)     Estimated body mass index is 35.25 kg/m  as calculated from the following:    Height as of this encounter: 1.6 m (5' 3\").    Weight as of this encounter: 90.3 kg (199 lb).        Today's Vitals: Ht 1.6 m (5' 3\")   Wt 90.3 kg (199 lb)   BMI 35.25 kg/m    ----------------      I spent 18 minutes with this patient today. All changes were made via collaborative practice agreement with Loly Ledezma. A copy of the visit note was provided to the patient's provider(s).    A summary of these recommendations was sent via [employer].    Suzie Bright, Pharm D., MPH    Medication Therapy Management Pharmacist   [facility]      Telemedicine Visit Details  Type of service:  Video Conference via [employer]  Start Time:  11:31 AM  End Time:  11:49 AM     Medication Therapy Recommendations  Class 3 severe obesity with serious comorbidity and body mass index (BMI) of 40.0 to 44.9 in adult (H)    Current Medication: Semaglutide-Weight Management (WEGOVY) 0.5 MG/0.5ML pen   Rationale: Dose too low - Dosage too low - Effectiveness   Recommendation: Increase Dose   Status: Accepted per CPA            "

## 2024-08-06 ENCOUNTER — NURSE TRIAGE (OUTPATIENT)
Dept: FAMILY MEDICINE | Facility: CLINIC | Age: 54
End: 2024-08-06
Payer: COMMERCIAL

## 2024-08-06 NOTE — TELEPHONE ENCOUNTER
Pt is reporting pain in right middle side. It comes every once in awhile. It does not last, but is a little sharp at times.   Pt is on Wegovy.   States that she spoke with pharmacist who suggested being seen by her PCP for symptoms.  Pt wants to make sure it isn't pancreatitis.  The pain started 6 months ago.   No shooting pain  It was a gradual onset  When it does occur, usually lasts for about 15-20 seconds. No pain right now.   Rates worst severity as 2/10.  No tenderness or pain to touch in her abdomen.  A couple of times it happens after eating.   Denies any chest pain, trouble breathing, fever, nausea, vomiting, back pain, diarrhea, urination pain.  Triage disposition: See in Office Within 2 Weeks   Appt made with PCP.    Reason for Disposition   Abdominal pain is a chronic symptom (recurrent or ongoing AND lasting > 4 weeks)    Additional Information   Negative: SEVERE difficulty breathing (e.g., struggling for each breath, speaks in single words)   Negative: Shock suspected (e.g., cold/pale/clammy skin, too weak to stand, low BP, rapid pulse)   Negative: Difficult to awaken or acting confused (e.g., disoriented, slurred speech)   Negative: Passed out (i.e., lost consciousness, collapsed and was not responding)   Negative: Visible sweat on face or sweat is dripping down   Negative: Sounds like a life-threatening emergency to the triager   Negative: Followed an abdomen (stomach) injury   Negative: Chest pain   Negative: Abdominal pain and pregnant < 20 weeks   Negative: Abdominal pain and pregnant 20 or more weeks   Negative: Abdomen bloating or swelling are main symptoms   Negative: SEVERE abdominal pain (e.g., excruciating)   Negative: Pain lasting > 10 minutes and over 50 years old   Negative: Pain lasting > 10 minutes and over 40 years old and associated chest, arm, neck, upper back, or jaw pain   Negative: Pain lasting > 10 minutes and over 35 years old and at least one cardiac risk factor (e.g.,  diabetes, high cholesterol, hypertension, obesity, smoker or strong family history of heart disease)   Negative: Pain lasting > 10 minutes and history of heart disease (i.e., heart attack, bypass surgery, angina, angioplasty, CHF)   Negative: Pain lasts > 10 minutes and difficulty breathing   Negative: Vomiting red blood or black (coffee ground) material  (Exception: Few streaks and only occurred once.)   Negative: Blood in bowel movements  (Exception: Blood on surface of BM with constipation.)   Negative: Black or tarry bowel movements  (Exception: Chronic-unchanged black-grey BMs AND is taking iron pills or Pepto-Bismol.)   Negative: MILD TO MODERATE constant pain lasting > 2 hours   Negative: MILD TO MODERATE pain and not relieved by antacid medicine   Negative: Vomiting bile (green color)   Negative: Patient sounds very sick or weak to the triager   Negative: Vomiting and abdomen looks much more swollen than usual   Negative: White of the eyes have turned yellow (i.e., jaundice)   Negative: Fever > 103 F (39.4 C)   Negative: Fever > 101 F (38.3 C) and over 60 years of age   Negative: Fever > 100.0 F (37.8 C) and has diabetes mellitus or a weak immune system (e.g., HIV positive, cancer chemotherapy, organ transplant, splenectomy, chronic steroids)   Negative: Fever > 100.0 F (37.8 C) and bedridden (e.g., CVA, chronic illness, recovering from surgery)   Negative: Patient wants to be seen   Negative: MODERATE pain that comes and goes (cramps) lasts > 24 hours   Negative: MILD pain that comes and goes (cramps) > 72 hours  (Exception: This same abdominal pain is a chronic symptom recurrent or ongoing AND present > 4 weeks.)   Negative: Unhealthy alcohol use, known or suspected   Negative: Pregnant 20 weeks or more and new hand or face swelling    Protocols used: Abdominal Pain - [facility]  Alena Giordano RN

## 2024-08-14 ENCOUNTER — OFFICE VISIT (OUTPATIENT)
Dept: FAMILY MEDICINE | Facility: CLINIC | Age: 54
End: 2024-08-14
Payer: COMMERCIAL

## 2024-08-14 VITALS
WEIGHT: 196 LBS | DIASTOLIC BLOOD PRESSURE: 72 MMHG | BODY MASS INDEX: 34.73 KG/M2 | RESPIRATION RATE: 16 BRPM | SYSTOLIC BLOOD PRESSURE: 104 MMHG | TEMPERATURE: 98.4 F | OXYGEN SATURATION: 98 % | HEART RATE: 83 BPM | HEIGHT: 63 IN

## 2024-08-14 DIAGNOSIS — T88.7XXA SIDE EFFECT OF MEDICATION: ICD-10-CM

## 2024-08-14 DIAGNOSIS — R10.11 RUQ ABDOMINAL PAIN: Primary | ICD-10-CM

## 2024-08-14 DIAGNOSIS — E78.9 BORDERLINE HIGH CHOLESTEROL: ICD-10-CM

## 2024-08-14 DIAGNOSIS — Z82.49 FAMILY HISTORY OF ARTERIOSCLEROTIC CARDIOVASCULAR DISEASE: ICD-10-CM

## 2024-08-14 PROCEDURE — 99213 OFFICE O/P EST LOW 20 MIN: CPT | Performed by: PHYSICIAN ASSISTANT

## 2024-08-14 PROCEDURE — 83690 ASSAY OF LIPASE: CPT | Performed by: PHYSICIAN ASSISTANT

## 2024-08-14 PROCEDURE — 80053 COMPREHEN METABOLIC PANEL: CPT | Performed by: PHYSICIAN ASSISTANT

## 2024-08-14 PROCEDURE — 36415 COLL VENOUS BLD VENIPUNCTURE: CPT | Performed by: PHYSICIAN ASSISTANT

## 2024-08-14 PROCEDURE — 82150 ASSAY OF AMYLASE: CPT | Performed by: PHYSICIAN ASSISTANT

## 2024-08-14 PROCEDURE — 80061 LIPID PANEL: CPT | Performed by: PHYSICIAN ASSISTANT

## 2024-08-14 ASSESSMENT — PATIENT HEALTH QUESTIONNAIRE - PHQ9
SUM OF ALL RESPONSES TO PHQ QUESTIONS 1-9: 0
10. IF YOU CHECKED OFF ANY PROBLEMS, HOW DIFFICULT HAVE THESE PROBLEMS MADE IT FOR YOU TO DO YOUR WORK, TAKE CARE OF THINGS AT HOME, OR GET ALONG WITH OTHER PEOPLE: NOT DIFFICULT AT ALL
SUM OF ALL RESPONSES TO PHQ QUESTIONS 1-9: 0

## 2024-08-14 NOTE — PATIENT INSTRUCTIONS
Please call [facility] Scheduling at [phone]  to set up the ultrasound of your liver.

## 2024-08-14 NOTE — PROGRESS NOTES
"  Assessment & Plan     RUQ abdominal pain  Will evaluate further with US to r/o liver/gallbladder disease.  Will check pancreatic enzymes given GLP1 use (this is less likely).   If labs normal, symptoms are consistent with a medication side effect, I suggest low carb diet while on GLP1 agonist to reduce side effects.   - Amylase  - Lipase  - US Abdomen Limited; Future    Side effect of medication  Will check amylase/lipase.    If labs normal, symptoms are consistent with a medication side effect, I suggest low carb diet while on GLP1 agonist to reduce side effects.     - Amylase  - Lipase    Borderline high cholesterol  Due to recheck, she is fasting today.  - Comprehensive metabolic panel (BMP + Alb, Alk Phos, ALT, AST, Total. Bili, TP)  - Lipid panel reflex to direct LDL Fasting    Family history of arteriosclerotic cardiovascular disease  Will check labs.   - Comprehensive metabolic panel (BMP + Alb, Alk Phos, ALT, AST, Total. Bili, TP)  - Lipid panel reflex to direct LDL Fasting          BMI  Estimated body mass index is 34.29 kg/m  as calculated from the following:    Height as of this encounter: 1.61 m (5' 3.39\").    Weight as of this encounter: 88.9 kg (196 lb).   Weight management plan: Discussed healthy diet and exercise guidelines  Has been working with specialist on weight loss.         Yunior Alexis is a 54 year old, presenting for the following health issues:  Abdominal Pain        8/14/2024    10:30 AM   Additional Questions   Roomed by Peggy   Accompanied by Self         8/14/2024    10:30 AM   Patient Reported Additional Medications   Patient reports taking the following new medications NA       Patient arrived to discuss intermittent Right-Sided Abdominal Pain over the last 6 months.     Patient has not tried antyhting OTC for pain relief as pain comes and goes quickly, usually lasting a few seconds. Patient reports she has had problems with her Gallbladder in the past, has not had " "Gallbladder surgery. Has also had Colonoscopy on 04/13/23 with normal results. Notes some changes in Bowel Movements but chalked it up as a side effect to Wegovy.       Recently increased wegovy to 1.0 mg last week.    Abdominal pain was present for 6 months.  Symptoms come on randomly.    Feels a twinge of pain in right upper quadrant, that comes on after eating.     No nausea or vomiting.    No fevers.    Pain will last for a few seconds and resolve on it's own.   Was occurring about every other day previously, now it is down to every few days.   Pain does not wake her up at night.   Pain intensity rated 3/10.      Symptoms are actually improving over the past week.       History of Present Illness       Reason for visit:  Rt side pain  Symptom onset:  More than a month  Symptoms include:  Sharp jab intermittently    She eats 0-1 servings of fruits and vegetables daily.She consumes 0 sweetened beverage(s) daily.She exercises with enough effort to increase her heart rate 10 to 19 minutes per day.  She exercises with enough effort to increase her heart rate 3 or less days per week. She is missing 1 dose(s) of medications per week.                 Review of Systems  Constitutional, HEENT, cardiovascular, pulmonary, gi and gu systems are negative, except as otherwise noted.      Objective    /72 (BP Location: Left arm, Patient Position: Chair, Cuff Size: Adult Large)   Pulse 83   Temp 98.4  F (36.9  C) (Tympanic)   Resp 16   Ht 1.61 m (5' 3.39\")   Wt 88.9 kg (196 lb)   SpO2 98%   BMI 34.29 kg/m    Body mass index is 34.29 kg/m .  Physical Exam   GENERAL: alert and no distress  ABDOMEN: soft, nontender, no hepatosplenomegaly, no masses and bowel sounds normal  MS: no gross musculoskeletal defects noted, no edema            Signed Electronically by: Gabi Sotelo PA-C    "

## 2024-08-15 LAB
ALBUMIN SERPL BCG-MCNC: 4.6 G/DL (ref 3.5–5.2)
ALP SERPL-CCNC: 79 U/L (ref 40–150)
ALT SERPL W P-5'-P-CCNC: 10 U/L (ref 0–50)
AMYLASE SERPL-CCNC: 57 U/L (ref 28–100)
ANION GAP SERPL CALCULATED.3IONS-SCNC: 11 MMOL/L (ref 7–15)
AST SERPL W P-5'-P-CCNC: 14 U/L (ref 0–45)
BILIRUB SERPL-MCNC: 0.2 MG/DL
BUN SERPL-MCNC: 15.6 MG/DL (ref 6–20)
CALCIUM SERPL-MCNC: 9.6 MG/DL (ref 8.8–10.4)
CHLORIDE SERPL-SCNC: 104 MMOL/L (ref 98–107)
CHOLEST SERPL-MCNC: 162 MG/DL
CREAT SERPL-MCNC: 0.78 MG/DL (ref 0.51–0.95)
EGFRCR SERPLBLD CKD-EPI 2021: >90 ML/MIN/1.73M2
FASTING STATUS PATIENT QL REPORTED: YES
FASTING STATUS PATIENT QL REPORTED: YES
GLUCOSE SERPL-MCNC: 90 MG/DL (ref 70–99)
HCO3 SERPL-SCNC: 25 MMOL/L (ref 22–29)
HDLC SERPL-MCNC: 54 MG/DL
LDLC SERPL CALC-MCNC: 86 MG/DL
LIPASE SERPL-CCNC: 42 U/L (ref 13–60)
NONHDLC SERPL-MCNC: 108 MG/DL
POTASSIUM SERPL-SCNC: 4.8 MMOL/L (ref 3.4–5.3)
PROT SERPL-MCNC: 6.8 G/DL (ref 6.4–8.3)
SODIUM SERPL-SCNC: 140 MMOL/L (ref 135–145)
TRIGL SERPL-MCNC: 110 MG/DL

## 2024-09-12 ENCOUNTER — HOSPITAL ENCOUNTER (OUTPATIENT)
Dept: MAMMOGRAPHY | Facility: CLINIC | Age: 54
Discharge: HOME | End: 2024-09-12
Attending: PHYSICIAN ASSISTANT | Admitting: PHYSICIAN ASSISTANT
Payer: COMMERCIAL

## 2024-09-12 DIAGNOSIS — Z12.31 VISIT FOR SCREENING MAMMOGRAM: ICD-10-CM

## 2024-09-12 PROCEDURE — 77063 BREAST TOMOSYNTHESIS BI: CPT

## 2024-09-24 ENCOUNTER — MYC REFILL (OUTPATIENT)
Dept: FAMILY MEDICINE | Facility: CLINIC | Age: 54
End: 2024-09-24
Payer: COMMERCIAL

## 2024-09-24 DIAGNOSIS — F34.1 DYSTHYMIA: ICD-10-CM

## 2024-09-24 DIAGNOSIS — F32.1 MODERATE MAJOR DEPRESSION (H): ICD-10-CM

## 2024-09-24 DIAGNOSIS — K31.89 GASTRIC HYPERACIDITY: ICD-10-CM

## 2024-09-24 RX ORDER — SERTRALINE HYDROCHLORIDE 100 MG/1
200 TABLET, FILM COATED ORAL DAILY
Qty: 180 TABLET | Refills: 0 | Status: SHIPPED | OUTPATIENT
Start: 2024-09-24

## 2024-09-26 ENCOUNTER — ANCILLARY PROCEDURE (OUTPATIENT)
Dept: ULTRASOUND IMAGING | Facility: CLINIC | Age: 54
End: 2024-09-26
Attending: PHYSICIAN ASSISTANT
Payer: COMMERCIAL

## 2024-09-26 DIAGNOSIS — K80.20 GALLSTONES: Primary | ICD-10-CM

## 2024-09-26 PROCEDURE — 76705 ECHO EXAM OF ABDOMEN: CPT | Mod: TC | Performed by: RADIOLOGY

## 2024-09-30 ENCOUNTER — OFFICE VISIT (OUTPATIENT)
Dept: SURGERY | Facility: CLINIC | Age: 54
End: 2024-09-30
Attending: PHYSICIAN ASSISTANT
Payer: COMMERCIAL

## 2024-09-30 VITALS
HEART RATE: 90 BPM | HEIGHT: 64 IN | BODY MASS INDEX: 33.46 KG/M2 | SYSTOLIC BLOOD PRESSURE: 107 MMHG | WEIGHT: 196 LBS | DIASTOLIC BLOOD PRESSURE: 72 MMHG | TEMPERATURE: 98.1 F | OXYGEN SATURATION: 97 %

## 2024-09-30 DIAGNOSIS — K80.20 GALLSTONES: Primary | ICD-10-CM

## 2024-09-30 DIAGNOSIS — E66.01 CLASS 3 SEVERE OBESITY WITH SERIOUS COMORBIDITY AND BODY MASS INDEX (BMI) OF 40.0 TO 44.9 IN ADULT, UNSPECIFIED OBESITY TYPE (H): ICD-10-CM

## 2024-09-30 DIAGNOSIS — E66.813 CLASS 3 SEVERE OBESITY WITH SERIOUS COMORBIDITY AND BODY MASS INDEX (BMI) OF 40.0 TO 44.9 IN ADULT, UNSPECIFIED OBESITY TYPE (H): ICD-10-CM

## 2024-09-30 PROCEDURE — 99214 OFFICE O/P EST MOD 30 MIN: CPT | Performed by: STUDENT IN AN ORGANIZED HEALTH CARE EDUCATION/TRAINING PROGRAM

## 2024-09-30 ASSESSMENT — PAIN SCALES - GENERAL: PAINLEVEL: NO PAIN (0)

## 2024-09-30 NOTE — PATIENT INSTRUCTIONS
Schedule for elective robotic-assisted cholecystectomy  Will need pre-op H&P with PCP prior to procedure   26-Feb-2019 22:03

## 2024-09-30 NOTE — LETTER
9/30/2024      Vanesa Owen  [address]  [place] [address]      Dear Colleague,    Thank you for referring your patient, Vanesa Owen, to the [facility]. Please see a copy of my visit note below.    Assessment and Plan:    Vanesa Owen is a 54 year old female seen in consultation for Abdominal pain, right upper quadrant at the request of Gabi Sotelo PA-C.    It is my impression that Vanesa has symptomatic cholelithiasis.   I have offered her a robotic-assisted laparoscopic cholecystectomy with possible intraoperative cholangiogram.      We have discussed the indication, alternatives, risks and expected recovery.  Specifically we have discussed incisions, scarring, postoperative infections, anesthesia, bleeding, blood transfusion, open conversion, common bile duct injury, injury to intra-abdominal organs, adhesions that can lead to bowel obstruction, retained common bile duct stone, bile leak, DVT, PE, hernia, post cholecystectomy diarrhea, postoperative dietary restrictions and physical limitations.  We have discussed the recommended interventions and treatments for these complications.  We also discussed the potential for development of acute cholecystitis while awaiting surgery. This may present as persistent pain, nausea, emesis, fevers, chills. If these symptoms develop, patient to seek care in emergency department for evaluation of possible urgent cholecystectomy. Patient expressed understanding and all questions have been answered to the best of my ability.         Comorbidities:  None    We will schedule surgery at the patient's convenience.      Chief complaint:  Abdominal pain, right upper quadrant    HPI:  Vanesa Owen is a 54 year old female who presents with intermittent right upper quadrant pain for several months.  The pain is not associated with eating any type of food.  Positive for associated symptoms of nausea.  Negative for  associated symptoms of vomiting, fever, chills, and sweats.  She does not have a history of jaundice or dark urine.  She  has not had pancreatitis in the past.        Past Medical History:   has a past medical history of Anxiety, Depressive disorder, Herpes simplex with other ophthalmic complications, and UTI (urinary tract infection).    Past Surgical History:  Past Surgical History:   Procedure Laterality Date     COLONOSCOPY WITH CO2 INSUFFLATION N/A 4/13/2023    Procedure: COLONOSCOPY, WITH CO2 INSUFFLATION;  Surgeon: Catrachita Bernstein DO;  Location: [facility] OR     EXCIS VAGINAL CYST/TUMOR       SLING TRANSVAGINAL N/A 08/23/2016    Procedure: SLING TRANSVAGINAL;  Surgeon: Nam Sommers MD;  Location: [facility]     TONSILLECTOMY & ADENOIDECTOMY         Social History:  Social History     Socioeconomic History     Marital status:      Spouse name: Not on file     Number of children: Not on file     Years of education: Not on file     Highest education level: Not on file   Occupational History     Not on file   Tobacco Use     Smoking status: Never     Smokeless tobacco: Never   Vaping Use     Vaping status: Never Used   Substance and Sexual Activity     Alcohol use: Yes     Comment: rare     Drug use: No     Sexual activity: Yes     Partners: Male     Birth control/protection: I.U.D.     Comment: mirena   Other Topics Concern     Parent/sibling w/ CABG, MI or angioplasty before 65F 55M? No   Social History Narrative     Not on file     Social Determinants of Health     Financial Resource Strain: Low Risk  (2/14/2024)    Financial Resource Strain      Within the past 12 months, have you or your family members you live with been unable to get utilities (heat, electricity) when it was really needed?: No   Food Insecurity: Low Risk  (2/14/2024)    Food Insecurity      Within the past 12 months, did you worry that your food would run out before you got money to buy more?: No      Within the past 12 months, did the  food you bought just not last and you didn t have money to get more?: No   Transportation Needs: Low Risk  (2/14/2024)    Transportation Needs      Within the past 12 months, has lack of transportation kept you from medical appointments, getting your medicines, non-medical meetings or appointments, work, or from getting things that you need?: No   Physical Activity: Unknown (2/14/2024)    Exercise Vital Sign      Days of Exercise per Week: 2 days      Minutes of Exercise per Session: Not on file   Stress: No Stress Concern Present (2/14/2024)    [employer] - Occupational Stress Questionnaire      Feeling of Stress : Only a little   Social Connections: Unknown (2/14/2024)    Social Connection and Isolation Panel [NHANES]      Frequency of Communication with Friends and Family: Not on file      Frequency of Social Gatherings with Friends and Family: Never      Attends Mu-ism Services: Not on file      Active Member of Clubs or Organizations: Not on file      Attends Club or Organization Meetings: Not on file      Marital Status: Not on file   Interpersonal Safety: Low Risk  (2/14/2024)    Interpersonal Safety      Do you feel physically and emotionally safe where you currently live?: Yes      Within the past 12 months, have you been hit, slapped, kicked or otherwise physically hurt by someone?: No      Within the past 12 months, have you been humiliated or emotionally abused in other ways by your partner or ex-partner?: No   Housing Stability: Low Risk  (2/14/2024)    Housing Stability      Do you have housing? : Yes      Are you worried about losing your housing?: No        Family History:  Family History   Problem Relation Age of Onset     Thyroid Disease Mother      Cerebrovascular Disease Mother         stroke triggered by a fib     Asthma Mother      Depression Mother      Glaucoma Mother      Cancer Father      Depression Father 58     Cerebrovascular Disease Father          "stroke triggered by an injury     Asthma Brother      Diabetes Maternal Grandmother         Type II      Hypertension Maternal Grandmother      Alcohol/Drug Maternal Grandmother      Alcohol/Drug Maternal Grandfather      Circulatory Paternal Grandmother      Glaucoma Paternal Grandmother      Breast Cancer Maternal Aunt      Macular Degeneration No family hx of      Family history reviewed and is not pertinent    Review of Systems:  The 10 point review of systems is negative other than noted in the HPI and above.    Physical Exam:  Vitals: /72   Pulse 90   Temp 98.1  F (36.7  C) (Tympanic)   Ht 1.626 m (5' 4\")   Wt 88.9 kg (196 lb)   SpO2 97%   BMI 33.64 kg/m    BMI= Body mass index is 33.64 kg/m .  General - Well developed, well nourished female in no apparent distress  HEENT:  Head normocephalic and atraumatic, pupils equal and round, conjunctivae clear, no scleral icterus, mucous membranes moist, external ears and nose normal  Neck: Supple without thyromegaly or masses  Lymphatic: No cervical, or supraclavicular lymphadenopathy  Pulmonary: Clear to auscultation bilaterally  Abdomen:   soft, obese, non-distended with no tenderness noted diffusely and Mohan's sign is absent. no masses palpated.  Extremities: Warm without edema  Musculoskeletal:  Normal station and gait  Neurologic: alert, speech is clear, moves all extremities with good strength  Psychiatric: Mood and affect appropriate  Skin: Without lesions, rashes or juandice    Relevant labs:    WBC -   Lab Results   Component Value Date    WBC 5.0 07/22/2020       HgB -   Lab Results   Component Value Date    HGB 13.7 07/22/2020       Plt-   Lab Results   Component Value Date     07/22/2020       Liver Function Studies -   Recent Labs   Lab Test 08/14/24  1054   PROTTOTAL 6.8   ALBUMIN 4.6   BILITOTAL 0.2   ALKPHOS 79   AST 14   ALT 10       Lipase-   Lab Results   Component Value Date    LIPASE 42 08/14/2024           Imaging:  All " imaging studies reviewed by me.    Ultrasound RUQ: positive cholelithiasis, negative gallbladder wall thickening, negative ductal dilatation, negative pericholecystic fluid, negative sonographic Mohan's sign.    Recent Results (from the past 744 hour(s))   MA Screen Bilateral w/Wilfredo    Narrative    BILATERAL FULL FIELD DIGITAL SCREENING MAMMOGRAM WITH TOMOSYNTHESIS    Performed on: 9/12/24    Compared to: 02/01/2023, 11/22/2019, 07/26/2018, and 07/25/2016    Technique:  This study was evaluated with the assistance of Computer-Aided   Detection.  Breast Tomosynthesis was used in interpretation.    Findings: The breasts are heterogeneously dense, which may obscure small   masses.  There is no radiographic evidence of malignancy.     Impression    IMPRESSION: ACR BI-RADS Category 1: Negative    BREAST CANCER SCREENING RECOMMENDATION: Routine yearly mammography   beginning at age 40 or as discussed with your provider.    The results and recommendations of this examination will be communicated   to the patient.        Jordi Zavala MD     US Abdomen Limited    Narrative    US ABDOMEN LIMITED 9/26/2024 10:04 AM    CLINICAL HISTORY: Right upper quadrant pain.  TECHNIQUE: Limited abdominal ultrasound.  COMPARISON: 7/8/2013.    FINDINGS:  GALLBLADDER: Large shadowing nonmobile gallstone within the  gallbladder neck. No gallbladder wall thickening or pericholecystic  fluid. Negative sonographic Mohan's sign.    BILE DUCTS: There is no biliary dilatation. The common duct measures 2  mm. Portions of the common duct could not be visualized due to  overlying bowel gas.    LIVER: Unremarkable. No evidence for hepatic steatosis. No focal  hepatic masses.    RIGHT KIDNEY: Unremarkable. No hydronephrosis.    PANCREAS: The visualized portions of the pancreas are unremarkable.    No ascites.      Impression    IMPRESSION:  1. Large shadowing nonmobile gallstone in the gallbladder neck. No  other sonographic evidence for  cholecystitis.  2. Otherwise unremarkable right upper quadrant ultrasound.    JAY FAROOQ MD         SYSTEM ID:  [id number]         This note was created using voice recognition software. Undetected word substitutions or other errors may have occurred.     Time spent with the patient with greater that 50% of the time in discussion was 30 minutes.     Joe Renteria MD    Please route or send letter to:  Primary Care Provider (PCP)      Again, thank you for allowing me to participate in the care of your patient.        Sincerely,        Joe Renteria MD

## 2024-09-30 NOTE — PROGRESS NOTES
Assessment and Plan:    Vanesa Owen is a 54 year old female seen in consultation for Abdominal pain, right upper quadrant at the request of Gabi Sotelo PA-C.    It is my impression that Vanesa has symptomatic cholelithiasis.   I have offered her a robotic-assisted laparoscopic cholecystectomy with possible intraoperative cholangiogram.      We have discussed the indication, alternatives, risks and expected recovery.  Specifically we have discussed incisions, scarring, postoperative infections, anesthesia, bleeding, blood transfusion, open conversion, common bile duct injury, injury to intra-abdominal organs, adhesions that can lead to bowel obstruction, retained common bile duct stone, bile leak, DVT, PE, hernia, post cholecystectomy diarrhea, postoperative dietary restrictions and physical limitations.  We have discussed the recommended interventions and treatments for these complications.  We also discussed the potential for development of acute cholecystitis while awaiting surgery. This may present as persistent pain, nausea, emesis, fevers, chills. If these symptoms develop, patient to seek care in emergency department for evaluation of possible urgent cholecystectomy. Patient expressed understanding and all questions have been answered to the best of my ability.         Comorbidities:  None    We will schedule surgery at the patient's convenience.      Chief complaint:  Abdominal pain, right upper quadrant    HPI:  Vanesa Owen is a 54 year old female who presents with intermittent right upper quadrant pain for several months.  The pain is not associated with eating any type of food.  Positive for associated symptoms of nausea.  Negative for associated symptoms of vomiting, fever, chills, and sweats.  She does not have a history of jaundice or dark urine.  She  has not had pancreatitis in the past.        Past Medical History:   has a past medical history of Anxiety, Depressive  disorder, Herpes simplex with other ophthalmic complications, and UTI (urinary tract infection).    Past Surgical History:  Past Surgical History:   Procedure Laterality Date    COLONOSCOPY WITH CO2 INSUFFLATION N/A 4/13/2023    Procedure: COLONOSCOPY, WITH CO2 INSUFFLATION;  Surgeon: Catrachita Bernstein DO;  Location: [facility] OR    EXCIS VAGINAL CYST/TUMOR      SLING TRANSVAGINAL N/A 08/23/2016    Procedure: SLING TRANSVAGINAL;  Surgeon: Nam Sommers MD;  Location: [facility]    TONSILLECTOMY & ADENOIDECTOMY         Social History:  Social History     Socioeconomic History    Marital status:      Spouse name: Not on file    Number of children: Not on file    Years of education: Not on file    Highest education level: Not on file   Occupational History    Not on file   Tobacco Use    Smoking status: Never    Smokeless tobacco: Never   Vaping Use    Vaping status: Never Used   Substance and Sexual Activity    Alcohol use: Yes     Comment: rare    Drug use: No    Sexual activity: Yes     Partners: Male     Birth control/protection: I.U.D.     Comment: mirena   Other Topics Concern    Parent/sibling w/ CABG, MI or angioplasty before 65F 55M? No   Social History Narrative    Not on file     Social Determinants of Health     Financial Resource Strain: Low Risk  (2/14/2024)    Financial Resource Strain     Within the past 12 months, have you or your family members you live with been unable to get utilities (heat, electricity) when it was really needed?: No   Food Insecurity: Low Risk  (2/14/2024)    Food Insecurity     Within the past 12 months, did you worry that your food would run out before you got money to buy more?: No     Within the past 12 months, did the food you bought just not last and you didn t have money to get more?: No   Transportation Needs: Low Risk  (2/14/2024)    Transportation Needs     Within the past 12 months, has lack of transportation kept you from medical appointments, getting your medicines,  non-medical meetings or appointments, work, or from getting things that you need?: No   Physical Activity: Unknown (2/14/2024)    Exercise Vital Sign     Days of Exercise per Week: 2 days     Minutes of Exercise per Session: Not on file   Stress: No Stress Concern Present (2/14/2024)    [employer] - Occupational Stress Questionnaire     Feeling of Stress : Only a little   Social Connections: Unknown (2/14/2024)    Social Connection and Isolation Panel [NHANES]     Frequency of Communication with Friends and Family: Not on file     Frequency of Social Gatherings with Friends and Family: Never     Attends [employer] Services: Not on file     Active Member of Clubs or Organizations: Not on file     Attends Club or Organization Meetings: Not on file     Marital Status: Not on file   Interpersonal Safety: Low Risk  (2/14/2024)    Interpersonal Safety     Do you feel physically and emotionally safe where you currently live?: Yes     Within the past 12 months, have you been hit, slapped, kicked or otherwise physically hurt by someone?: No     Within the past 12 months, have you been humiliated or emotionally abused in other ways by your partner or ex-partner?: No   Housing Stability: Low Risk  (2/14/2024)    Housing Stability     Do you have housing? : Yes     Are you worried about losing your housing?: No        Family History:  Family History   Problem Relation Age of Onset    Thyroid Disease Mother     Cerebrovascular Disease Mother         stroke triggered by a fib    Asthma Mother     Depression Mother     Glaucoma Mother     Cancer Father     Depression Father 58    Cerebrovascular Disease Father         stroke triggered by an injury    Asthma Brother     Diabetes Maternal Grandmother         Type II     Hypertension Maternal Grandmother     Alcohol/Drug Maternal Grandmother     Alcohol/Drug Maternal Grandfather     Circulatory Paternal Grandmother     Glaucoma Paternal Grandmother      "Breast Cancer Maternal Aunt     Macular Degeneration No family hx of      Family history reviewed and is not pertinent    Review of Systems:  The 10 point review of systems is negative other than noted in the HPI and above.    Physical Exam:  Vitals: /72   Pulse 90   Temp 98.1  F (36.7  C) (Tympanic)   Ht 1.626 m (5' 4\")   Wt 88.9 kg (196 lb)   SpO2 97%   BMI 33.64 kg/m    BMI= Body mass index is 33.64 kg/m .  General - Well developed, well nourished female in no apparent distress  HEENT:  Head normocephalic and atraumatic, pupils equal and round, conjunctivae clear, no scleral icterus, mucous membranes moist, external ears and nose normal  Neck: Supple without thyromegaly or masses  Lymphatic: No cervical, or supraclavicular lymphadenopathy  Pulmonary: Clear to auscultation bilaterally  Abdomen:   soft, obese, non-distended with no tenderness noted diffusely and Mohan's sign is absent. no masses palpated.  Extremities: Warm without edema  Musculoskeletal:  Normal station and gait  Neurologic: alert, speech is clear, moves all extremities with good strength  Psychiatric: Mood and affect appropriate  Skin: Without lesions, rashes or juandice    Relevant labs:    WBC -   Lab Results   Component Value Date    WBC 5.0 07/22/2020       HgB -   Lab Results   Component Value Date    HGB 13.7 07/22/2020       Plt-   Lab Results   Component Value Date     07/22/2020       Liver Function Studies -   Recent Labs   Lab Test 08/14/24  1054   PROTTOTAL 6.8   ALBUMIN 4.6   BILITOTAL 0.2   ALKPHOS 79   AST 14   ALT 10       Lipase-   Lab Results   Component Value Date    LIPASE 42 08/14/2024           Imaging:  All imaging studies reviewed by me.    Ultrasound RUQ: positive cholelithiasis, negative gallbladder wall thickening, negative ductal dilatation, negative pericholecystic fluid, negative sonographic Mohan's sign.    Recent Results (from the past 744 hour(s))   MA Screen Bilateral w/Wilfredo    Narrative    " BILATERAL FULL FIELD DIGITAL SCREENING MAMMOGRAM WITH TOMOSYNTHESIS    Performed on: 9/12/24    Compared to: 02/01/2023, 11/22/2019, 07/26/2018, and 07/25/2016    Technique:  This study was evaluated with the assistance of Computer-Aided   Detection.  Breast Tomosynthesis was used in interpretation.    Findings: The breasts are heterogeneously dense, which may obscure small   masses.  There is no radiographic evidence of malignancy.     Impression    IMPRESSION: ACR BI-RADS Category 1: Negative    BREAST CANCER SCREENING RECOMMENDATION: Routine yearly mammography   beginning at age 40 or as discussed with your provider.    The results and recommendations of this examination will be communicated   to the patient.        Jordi Zavala MD     US Abdomen Limited    Narrative    US ABDOMEN LIMITED 9/26/2024 10:04 AM    CLINICAL HISTORY: Right upper quadrant pain.  TECHNIQUE: Limited abdominal ultrasound.  COMPARISON: 7/8/2013.    FINDINGS:  GALLBLADDER: Large shadowing nonmobile gallstone within the  gallbladder neck. No gallbladder wall thickening or pericholecystic  fluid. Negative sonographic Mohan's sign.    BILE DUCTS: There is no biliary dilatation. The common duct measures 2  mm. Portions of the common duct could not be visualized due to  overlying bowel gas.    LIVER: Unremarkable. No evidence for hepatic steatosis. No focal  hepatic masses.    RIGHT KIDNEY: Unremarkable. No hydronephrosis.    PANCREAS: The visualized portions of the pancreas are unremarkable.    No ascites.      Impression    IMPRESSION:  1. Large shadowing nonmobile gallstone in the gallbladder neck. No  other sonographic evidence for cholecystitis.  2. Otherwise unremarkable right upper quadrant ultrasound.    JAY FAROOQ MD         SYSTEM ID:  [id number]         This note was created using voice recognition software. Undetected word substitutions or other errors may have occurred.     Time spent with the patient with greater that  50% of the time in discussion was 30 minutes.     Joe Renteria MD    Please route or send letter to:  Primary Care Provider (PCP)

## 2024-11-06 ENCOUNTER — MYC REFILL (OUTPATIENT)
Dept: FAMILY MEDICINE | Facility: CLINIC | Age: 54
End: 2024-11-06
Payer: COMMERCIAL

## 2024-11-06 DIAGNOSIS — F33.41 RECURRENT MAJOR DEPRESSIVE DISORDER, IN PARTIAL REMISSION (H): ICD-10-CM

## 2024-11-06 DIAGNOSIS — E66.01 CLASS 3 SEVERE OBESITY WITH SERIOUS COMORBIDITY AND BODY MASS INDEX (BMI) OF 40.0 TO 44.9 IN ADULT, UNSPECIFIED OBESITY TYPE (H): ICD-10-CM

## 2024-11-06 DIAGNOSIS — E66.813 CLASS 3 SEVERE OBESITY WITH SERIOUS COMORBIDITY AND BODY MASS INDEX (BMI) OF 40.0 TO 44.9 IN ADULT, UNSPECIFIED OBESITY TYPE (H): ICD-10-CM

## 2024-11-07 ENCOUNTER — OFFICE VISIT (OUTPATIENT)
Dept: ENDOCRINOLOGY | Facility: CLINIC | Age: 54
End: 2024-11-07
Payer: COMMERCIAL

## 2024-11-07 ENCOUNTER — OFFICE VISIT (OUTPATIENT)
Dept: FAMILY MEDICINE | Facility: CLINIC | Age: 54
End: 2024-11-07
Payer: COMMERCIAL

## 2024-11-07 VITALS
OXYGEN SATURATION: 99 % | SYSTOLIC BLOOD PRESSURE: 114 MMHG | BODY MASS INDEX: 31.92 KG/M2 | HEIGHT: 64 IN | HEART RATE: 91 BPM | RESPIRATION RATE: 16 BRPM | DIASTOLIC BLOOD PRESSURE: 66 MMHG | WEIGHT: 187 LBS | TEMPERATURE: 98.1 F

## 2024-11-07 VITALS
OXYGEN SATURATION: 96 % | BODY MASS INDEX: 31.86 KG/M2 | WEIGHT: 186.6 LBS | DIASTOLIC BLOOD PRESSURE: 80 MMHG | SYSTOLIC BLOOD PRESSURE: 128 MMHG | HEART RATE: 104 BPM | HEIGHT: 64 IN

## 2024-11-07 DIAGNOSIS — E66.01 CLASS 3 SEVERE OBESITY WITH SERIOUS COMORBIDITY AND BODY MASS INDEX (BMI) OF 40.0 TO 44.9 IN ADULT, UNSPECIFIED OBESITY TYPE (H): Primary | ICD-10-CM

## 2024-11-07 DIAGNOSIS — H11.32 SUBCONJUNCTIVAL HEMORRHAGE OF LEFT EYE: ICD-10-CM

## 2024-11-07 DIAGNOSIS — F32.1 MODERATE MAJOR DEPRESSION (H): ICD-10-CM

## 2024-11-07 DIAGNOSIS — Z01.818 PREOP GENERAL PHYSICAL EXAM: Primary | ICD-10-CM

## 2024-11-07 DIAGNOSIS — E66.813 CLASS 3 SEVERE OBESITY WITH SERIOUS COMORBIDITY AND BODY MASS INDEX (BMI) OF 40.0 TO 44.9 IN ADULT, UNSPECIFIED OBESITY TYPE (H): ICD-10-CM

## 2024-11-07 DIAGNOSIS — E66.01 CLASS 3 SEVERE OBESITY WITH SERIOUS COMORBIDITY AND BODY MASS INDEX (BMI) OF 40.0 TO 44.9 IN ADULT, UNSPECIFIED OBESITY TYPE (H): ICD-10-CM

## 2024-11-07 DIAGNOSIS — K80.20 GALLSTONES: ICD-10-CM

## 2024-11-07 DIAGNOSIS — E66.813 CLASS 3 SEVERE OBESITY WITH SERIOUS COMORBIDITY AND BODY MASS INDEX (BMI) OF 40.0 TO 44.9 IN ADULT, UNSPECIFIED OBESITY TYPE (H): Primary | ICD-10-CM

## 2024-11-07 LAB
ALBUMIN SERPL BCG-MCNC: 4.4 G/DL (ref 3.5–5.2)
ALP SERPL-CCNC: 68 U/L (ref 40–150)
ALT SERPL W P-5'-P-CCNC: 13 U/L (ref 0–50)
ANION GAP SERPL CALCULATED.3IONS-SCNC: 10 MMOL/L (ref 7–15)
AST SERPL W P-5'-P-CCNC: 17 U/L (ref 0–45)
BILIRUB SERPL-MCNC: 0.3 MG/DL
BUN SERPL-MCNC: 11.8 MG/DL (ref 6–20)
CALCIUM SERPL-MCNC: 9.1 MG/DL (ref 8.8–10.4)
CHLORIDE SERPL-SCNC: 105 MMOL/L (ref 98–107)
CREAT SERPL-MCNC: 0.81 MG/DL (ref 0.51–0.95)
EGFRCR SERPLBLD CKD-EPI 2021: 87 ML/MIN/1.73M2
ERYTHROCYTE [DISTWIDTH] IN BLOOD BY AUTOMATED COUNT: 12.2 % (ref 10–15)
GLUCOSE SERPL-MCNC: 86 MG/DL (ref 70–99)
HCO3 SERPL-SCNC: 26 MMOL/L (ref 22–29)
HCT VFR BLD AUTO: 36.4 % (ref 35–47)
HGB BLD-MCNC: 12.5 G/DL (ref 11.7–15.7)
MCH RBC QN AUTO: 32.8 PG (ref 26.5–33)
MCHC RBC AUTO-ENTMCNC: 34.3 G/DL (ref 31.5–36.5)
MCV RBC AUTO: 96 FL (ref 78–100)
PLATELET # BLD AUTO: 220 10E3/UL (ref 150–450)
POTASSIUM SERPL-SCNC: 4.3 MMOL/L (ref 3.4–5.3)
PROT SERPL-MCNC: 6.7 G/DL (ref 6.4–8.3)
RBC # BLD AUTO: 3.81 10E6/UL (ref 3.8–5.2)
SODIUM SERPL-SCNC: 141 MMOL/L (ref 135–145)
WBC # BLD AUTO: 4.4 10E3/UL (ref 4–11)

## 2024-11-07 PROCEDURE — 90471 IMMUNIZATION ADMIN: CPT | Performed by: PHYSICIAN ASSISTANT

## 2024-11-07 PROCEDURE — 36415 COLL VENOUS BLD VENIPUNCTURE: CPT | Performed by: PHYSICIAN ASSISTANT

## 2024-11-07 PROCEDURE — 85027 COMPLETE CBC AUTOMATED: CPT | Performed by: PHYSICIAN ASSISTANT

## 2024-11-07 PROCEDURE — 80053 COMPREHEN METABOLIC PANEL: CPT | Performed by: PHYSICIAN ASSISTANT

## 2024-11-07 PROCEDURE — 99214 OFFICE O/P EST MOD 30 MIN: CPT | Mod: 25 | Performed by: PHYSICIAN ASSISTANT

## 2024-11-07 PROCEDURE — 90636 HEP A/HEP B VACC ADULT IM: CPT | Performed by: PHYSICIAN ASSISTANT

## 2024-11-07 RX ORDER — SEMAGLUTIDE 1.7 MG/.75ML
1.7 INJECTION, SOLUTION SUBCUTANEOUS WEEKLY
Qty: 3 ML | Refills: 3 | Status: SHIPPED | OUTPATIENT
Start: 2024-11-07

## 2024-11-07 RX ORDER — BUPROPION HYDROCHLORIDE 150 MG/1
150 TABLET ORAL EVERY MORNING
Qty: 90 TABLET | Refills: 2 | OUTPATIENT
Start: 2024-11-07

## 2024-11-07 ASSESSMENT — PAIN SCALES - GENERAL: PAINLEVEL_OUTOF10: NO PAIN (0)

## 2024-11-07 NOTE — PROGRESS NOTES
Preoperative Evaluation  [facility] [place]  [address] [address]  [place] [address]  Phone: [phone]  Primary Provider: Gabi Sotelo PA-C  Pre-op Performing Provider: Gabi Sotelo PA-C  Nov 7, 2024 11/6/2024   Surgical Information   What procedure is being done? CHOLECYSTECTOMY, ROBOT-ASSISTED, LAPAROSCOPIC, USING DA TOYIN XI    Facility or Hospital where procedure/surgery will be performed: [facility]    Who is doing the procedure / surgery? Joe Renteria MD    Date of surgery / procedure: 11/20/24    Time of surgery / procedure: Not sure yet    Where do you plan to recover after surgery? at home with family        Patient-reported     Fax number for surgical facility: Note does not need to be faxed, will be available electronically in Epic.    Assessment & Plan     The proposed surgical procedure is considered INTERMEDIATE risk.    Preop general physical exam  Cleared for surgery.  - CBC with platelets  - Comprehensive metabolic panel (BMP + Alb, Alk Phos, ALT, AST, Total. Bili, TP)    Gallstones  Has h/o gallstones in the past and recently had a flare up (weight loss/GLP1 agonist likely contributed to this).  Plans for gallbladder removal and I do not suspect further issues.     Subconjunctival hemorrhage of left eye  Has had a few episodes of unprovoked subconjunctival hemorrhages in the past.  Father has similar issues.  She has had an eye exam and glaucoma screen was negative.  Will check a CBC.     Moderate major depression (H)  Stable.     Class 3 severe obesity with serious comorbidity and body mass index (BMI) of 40.0 to 44.9 in adult, unspecified obesity type (H)  Continue to work with specialist.  She is back on the 1 mg Wegovy and is tolerating this fine.  She will do another dose of wegovy today and then hold for at least 7 days prior to surgery.             - No identified additional risk factors other than previously  addressed    Antiplatelet or Anticoagulation Medication Instructions   - Patient is on no antiplatelet or anticoagulation medications.    Additional Medication Instructions   - SSRIs, SNRIs, TCAs, Antipsychotics: Continue without modification.    - GLP-1 Injectable (exenitide, liraglutide, semaglutide, dulaglutide, etc.): DO NOT TAKE 7 days before surgery     Recommendation  Approval given to proceed with proposed procedure, without further diagnostic evaluation.      Twinrix given today.   Consider Shingles and covid booster at pharmacy (had covid in July, therefore she can consider a booster anytime at this point, we typically wait 3 months after infection).     Yunior Alexis is a 54 year old, presenting for the following:  Pre-Op Exam    Ann Marie MCDONALD       11/7/2024     8:59 AM   Additional Questions   Roomed by Peggy   Accompanied by Self         11/7/2024     8:59 AM   Patient Reported Additional Medications   Patient reports taking the following new medications NA     HPI related to upcoming procedure: Gallstones       Has been on 1 mg Wegovy, was feeling fine at this dose.      Has had a few subconjunctival hemorrhages.   Has had a normal eye exam done in the past (normal eye pressures).         11/6/2024   Pre-Op Questionnaire   Have you ever had a heart attack or stroke? No    Have you ever had surgery on your heart or blood vessels, such as a stent placement, a coronary artery bypass, or surgery on an artery in your head, neck, heart, or legs? No    Do you have chest pain with activity? No    Do you have a history of heart failure? No    Do you currently have a cold, bronchitis or symptoms of other infection? No    Do you have a cough, shortness of breath, or wheezing? No    Do you or anyone in your family have previous history of blood clots? (!) YES - Pts Father     Do you or does anyone in your family have a serious bleeding problem such as prolonged bleeding following surgeries or cuts? (!)  YES - Pts father is on blood thinners    Have you ever had problems with anemia or been told to take iron pills? No    Have you had any abnormal blood loss such as black, tarry or bloody stools, or abnormal vaginal bleeding? No    Have you ever had a blood transfusion? No    Are you willing to have a blood transfusion if it is medically needed before, during, or after your surgery? Yes    Have you or any of your relatives ever had problems with anesthesia? No    Do you have sleep apnea, excessive snoring or daytime drowsiness? No    Do you have any artifical heart valves or other implanted medical devices like a pacemaker, defibrillator, or continuous glucose monitor? No    Do you have artificial joints? No    Are you allergic to latex? No        Patient-reported     Health Care Directive  Patient does not have a Health Care Directive: did not discuss    Preoperative Review of    reviewed - controlled substances reflected in medication list.  Previous meds noted, not currently on any pain meds.     Status of Chronic Conditions:  See problem list for active medical problems.  Problems all longstanding and stable, except as noted/documented.  See ROS for pertinent symptoms related to these conditions.    Patient Active Problem List    Diagnosis Date Noted    Borderline high cholesterol 02/28/2024     Priority: Medium    Family history of arteriosclerotic cardiovascular disease 02/28/2024     Priority: Medium    H/O methicillin resistant Staphylococcus aureus 02/28/2024     Priority: Medium    Acute tear medial meniscus, left, subsequent encounter 02/28/2024     Priority: Medium    Primary osteoarthritis involving multiple joints 10/30/2023     Priority: Medium    Trigger finger, acquired 10/30/2023     Priority: Medium    Hearing loss of left ear, unspecified hearing loss type 12/08/2022     Priority: Medium    Iris nevus, right 07/17/2020     Priority: Medium    Pain in joint, ankle and foot, right 10/23/2018      Priority: Medium    Gastroesophageal reflux disease without esophagitis 09/19/2018     Priority: Medium    Hip pain, left 11/15/2017     Priority: Medium    Class 3 severe obesity with serious comorbidity and body mass index (BMI) of 40.0 to 44.9 in adult (H) 05/19/2015     Priority: Medium    CARDIOVASCULAR SCREENING; LDL GOAL LESS THAN 160 10/31/2010     Priority: Medium    genital herpes, mainly cervical.  06/17/2010     Priority: Medium     August 12, 2011  initial outbreak severe, now on suppressive daily therapy. No reoccurrence. Will continue, one year refill.   O update changed this record. Please review for accuracy      Lumbar back pain 04/09/2010     Priority: Medium     April 9, 2010 probable SI joint dysfunction. No improvement with PT, SI joint steroid injection, now seeing chiropractor.   June 17, 2010 no improvement, will refer to [facility] for another series of cortisone injections. Using daily ibuprofen, will check renal function.   July 1, 2010 no improvement. Will refer to Dr. Oconnell.   August 12, 2011 no improvement, will obtain MRI.       Vitamin D deficiency 04/05/2010     Priority: Medium     June 17, 2010 taking oral supplements, 5000 units daily, recheck vitamin D level in August.   August 23, 2011 normal level in September 2010. Continue maintenance dosing.   (Problem list name updated by automated process. Provider to review and confirm.)      IUD (intrauterine device) in place 08/25/2008     Priority: Medium     Mirena placed 6/14/2018      Moderate major depression (H) 05/21/2008     Priority: Medium     August 12, 2011 doing well on zoloft, 200 mg daily. Follow up 6 months.          [facility] 05/04/2012     Priority: Low     EMERGENCY CARE PLAN  August 8, 2013: No current Care Coordination follow up planned. Please refer if Care Coordination services are needed.    Presenting Problem Signs and Symptoms Treatment Plan   Questions or concerns   during clinic hours   I will call my clinic  directly:  [address] [address]  [phone].   Questions or concerns outside clinic hours   I will call the 24 hour nurse line at   [phone] or [phone].   Need to schedule an appointment   I will call the 24 hour scheduling team at [phone] or my clinic directly at [phone].    Same day treatment     I will call my clinic first, nurse line if after hours, urgent care and express care if needed.     Clinic care coordination services (regular clinic hours)     I will call a clinic care coordinator directly:     Babak Patel RN  Mon, Tues, Fri - [phone]  Wed, Thurs - [phone]    Yolande Ahn :    [phone]    Or call my clinic at [phone] and ask to speak with care coordination.     Crisis Services: Behavioral or Mental Health  [facility] 24 Hour Phone Line  [phone]    [facility] 24 Hour Crisis Services  [phone]    [facility]al Health Providers) [facility] [phone]    [facility]   [phone]         Emergency treatment -- Immediately    [facility] [address]               Past Medical History:   Diagnosis Date    Anxiety     Depressive disorder     Herpes simplex with other ophthalmic complications     UTI (urinary tract infection)      Past Surgical History:   Procedure Laterality Date    COLONOSCOPY WITH CO2 INSUFFLATION N/A 4/13/2023    Procedure: COLONOSCOPY, WITH CO2 INSUFFLATION;  Surgeon: Catrachita Bernstein DO;  Location:  OR    EXCIS VAGINAL CYST/TUMOR      SLING TRANSVAGINAL N/A 08/23/2016    Procedure: SLING TRANSVAGINAL;  Surgeon: Nam Sommers MD;  Location: [facility] OR    TONSILLECTOMY & ADENOIDECTOMY       Current Outpatient Medications   Medication Sig Dispense Refill    adapalene (DIFFERIN) 0.3 % external gel       atorvastatin (LIPITOR) 10 MG tablet Take 1 tablet (10 mg) by mouth daily 90 tablet 1    azelaic acid (FINACIA) 15 % external gel       buPROPion (WELLBUTRIN XL) 150 MG 24  hr tablet Take 1 tablet (150 mg) by mouth every morning 90 tablet 2    busPIRone (BUSPAR) 5 MG tablet TAKE ONE TABLET BY MOUTH TWICE A  tablet 1    clindamycin (CLEOCIN T) 1 % external lotion Apply topically every morning      famotidine (PEPCID) 20 MG tablet Take 1 tablet (20 mg) by mouth 2 times daily as needed (breakthrough acid reflux) 60 tablet 11    fluticasone (FLONASE) 50 MCG/ACT nasal spray INSTILL ONE SPRAY INTO BOTH NOSTRILS DAILY 16 g 3    levonorgestrel (MIRENA) 20 MCG/DAY IUD 1 each (20 mcg) by Intrauterine route continuous      MULTI-VITAMIN OR TABS 1 TABLET DAILY      omeprazole (PRILOSEC) 20 MG DR capsule TAKE ONE CAPSULE BY MOUTH ONCE DAILY 30 TO 60 MINUTES BEFORE A MEAL 90 capsule 0    Semaglutide-Weight Management (WEGOVY) 1 MG/0.5ML pen Inject 1 mg subcutaneously once a week Start after 4 weeks of 0.5 mg Wegovy, if tolerating 2 mL 3    sertraline (ZOLOFT) 100 MG tablet Take 2 tablets (200 mg) by mouth daily. 180 tablet 0    tretinoin (RETIN-A) 0.025 % external cream          Allergies   Allergen Reactions    Sulfa Antibiotics Rash        Social History     Tobacco Use    Smoking status: Never    Smokeless tobacco: Never   Substance Use Topics    Alcohol use: Yes     Comment: rare     Family History   Problem Relation Age of Onset    Thyroid Disease Mother     Cerebrovascular Disease Mother         stroke triggered by a fib    Asthma Mother     Depression Mother     Glaucoma Mother     Cancer Father     Depression Father 58    Cerebrovascular Disease Father         stroke triggered by an injury    Asthma Brother     Diabetes Maternal Grandmother         Type II     Hypertension Maternal Grandmother     Alcohol/Drug Maternal Grandmother     Alcohol/Drug Maternal Grandfather     Circulatory Paternal Grandmother     Glaucoma Paternal Grandmother     Breast Cancer Maternal Aunt     Macular Degeneration No family hx of      History   Drug Use No             Review of Systems  Constitutional,  "neuro, ENT, endocrine, pulmonary, cardiac, gastrointestinal, genitourinary, musculoskeletal, integument and psychiatric systems are negative, except as otherwise noted.    Objective    /66 (BP Location: Right arm, Patient Position: Chair, Cuff Size: Adult Regular)   Pulse 91   Temp 98.1  F (36.7  C) (Tympanic)   Resp 16   Ht 1.626 m (5' 4\")   Wt 84.8 kg (187 lb)   SpO2 99%   BMI 32.10 kg/m     Estimated body mass index is 32.1 kg/m  as calculated from the following:    Height as of this encounter: 1.626 m (5' 4\").    Weight as of this encounter: 84.8 kg (187 lb).  Physical Exam  GENERAL: alert and no distress  EYES: Eyes grossly normal to inspection, PERRL and left conjunctivae with subconjunctival hemorrhage, right conjunctiva normal and sclerae normal  HENT: ear canals and TM's normal, nose and mouth without ulcers or lesions  NECK: no adenopathy, no asymmetry, masses, or scars  RESP: lungs clear to auscultation - no rales, rhonchi or wheezes  CV: regular rate and rhythm, normal S1 S2, no S3 or S4, no murmur, click or rub, no peripheral edema  ABDOMEN: soft, nontender, no hepatosplenomegaly, no masses and bowel sounds normal  MS: no gross musculoskeletal defects noted, no edema  SKIN: no suspicious lesions or rashes  NEURO: Normal strength and tone, mentation intact and speech normal  PSYCH: mentation appears normal, affect normal/bright    Recent Labs   Lab Test 08/14/24  1054 02/16/24  1210    140   POTASSIUM 4.8 4.8   CR 0.78 0.78        Diagnostics  Labs pending at this time.  Results will be reviewed when available.   No EKG required, no history of coronary heart disease, significant arrhythmia, peripheral arterial disease or other structural heart disease.    Revised Cardiac Risk Index (RCRI)  The patient has the following serious cardiovascular risks for perioperative complications:   - No serious cardiac risks = 0 points     RCRI Interpretation: 0 points: Class I (very low risk - 0.4% " complication rate)         Signed Electronically by: Gabi Sotelo PA-C  A copy of this evaluation report is provided to the requesting physician.

## 2024-11-07 NOTE — NURSING NOTE
"(   Chief Complaint   Patient presents with    Follow Up     Return MW    )    (   )  ( Height: 162.6 cm (5' 4\") )  (   )  (   )  (   )  (   )  (   )  (   )  (   )    ( BP: 128/80 )  (   )  (   )  (   )  ( Pulse: 104 )  (   )  ( SpO2: 96 % )    (   Patient Active Problem List   Diagnosis    Moderate major depression (H)    IUD (intrauterine device) in place    Vitamin D deficiency    Lumbar back pain    genital herpes, mainly cervical.     CARDIOVASCULAR SCREENING; LDL GOAL LESS THAN 160    [facility]    Class 3 severe obesity with serious comorbidity and body mass index (BMI) of 40.0 to 44.9 in adult (H)    Hip pain, left    Gastroesophageal reflux disease without esophagitis    Pain in joint, ankle and foot, right    Iris nevus, right    Hearing loss of left ear, unspecified hearing loss type    Primary osteoarthritis involving multiple joints    Trigger finger, acquired    Borderline high cholesterol    Family history of arteriosclerotic cardiovascular disease    H/O methicillin resistant Staphylococcus aureus    Acute tear medial meniscus, left, subsequent encounter    )  (   Current Outpatient Medications   Medication Sig Dispense Refill    adapalene (DIFFERIN) 0.3 % external gel       atorvastatin (LIPITOR) 10 MG tablet Take 1 tablet (10 mg) by mouth daily 90 tablet 1    azelaic acid (FINACIA) 15 % external gel       buPROPion (WELLBUTRIN XL) 150 MG 24 hr tablet Take 1 tablet (150 mg) by mouth every morning 90 tablet 2    busPIRone (BUSPAR) 5 MG tablet TAKE ONE TABLET BY MOUTH TWICE A  tablet 1    clindamycin (CLEOCIN T) 1 % external lotion Apply topically every morning      famotidine (PEPCID) 20 MG tablet Take 1 tablet (20 mg) by mouth 2 times daily as needed (breakthrough acid reflux) 60 tablet 11    fluticasone (FLONASE) 50 MCG/ACT nasal spray INSTILL ONE SPRAY INTO BOTH NOSTRILS DAILY 16 g 3    levonorgestrel (MIRENA) 20 MCG/DAY IUD 1 each (20 mcg) by Intrauterine route continuous      MULTI-VITAMIN OR " TABS 1 TABLET DAILY      omeprazole (PRILOSEC) 20 MG DR capsule TAKE ONE CAPSULE BY MOUTH ONCE DAILY 30 TO 60 MINUTES BEFORE A MEAL 90 capsule 0    Semaglutide-Weight Management (WEGOVY) 1 MG/0.5ML pen Inject 1 mg subcutaneously once a week Start after 4 weeks of 0.5 mg Wegovy, if tolerating 2 mL 3    sertraline (ZOLOFT) 100 MG tablet Take 2 tablets (200 mg) by mouth daily. 180 tablet 0    tretinoin (RETIN-A) 0.025 % external cream       )  ( Diabetes Eval:    )    ( Pain Eval:  No Pain (0) )    ( Wound Eval:       )    (   History   Smoking Status    Never   Smokeless Tobacco    Never    )    ( Signed By:  Lakia Villarreal; November 7, 2024; 12:23 PM )

## 2024-11-07 NOTE — PROGRESS NOTES
Return Medical Weight Management Note     Vanesa Owen  MRN:  [id number]  :  1970  MAIKOL:  2024    Dear Gabi Sotelo PA-C,    I had the pleasure of seeing your patient Vanesa Owen. She is a 54 year old female who I am continuing to see for treatment of obesity related to:        2024    11:32 AM   --   I have the following health issues associated with obesity High Cholesterol    Osteoarthritis (joint disease)   I have the following symptoms associated with obesity Knee Pain    Depression    Back Pain    Fatigue    Hip Pain       Assessment & Plan   Problem List Items Addressed This Visit       Class 3 severe obesity with serious comorbidity and body mass index (BMI) of 40.0 to 44.9 in adult (H) - Primary    Relevant Medications    Semaglutide-Weight Management (WEGOVY) 1.7 MG/0.75ML pen    Other Relevant Orders    Adult Bariatrics and Weight Management Clinic Follow-Up Order      Restart Wegovy 1.0mg for at least 2 weeks after surgery. If no side effects. Dose increase to Wegovy 1.7mg once weekly. Will send in 3 month supply in December if tolerating 1.7mg dose. Is interested in compounded semaglutide once no longer available   Joana Swann in 3 months     INTERVAL HISTORY:  New MWM on 4/15/2024  Was started on Ozempic and then transitioned to Wegovy - starting BMI 40.21 (2022). Was able to lose 40lbs, but has sense gained 10lbs with discontinuation of wegovy 3 months ago due to insurance coverage.    employee       Insurance will no longer cover next year. She is really disappointed in this as she is really happy on this medication. She is finally seeing good results and does not want to get off of it. Does not remember the last time she was at this weight.     Anti-obesity medication history    Current:   Wegovy 1.0mg - did not stop it as advised as she did not notice any changes with continuation. Has stopped it now for surgery on . No side effects. Weight loss  has slowed down. Has seen increase in cravings. Would like to dose increase once restarted.     Past/Failed/contraindicated:   Phentermine - side effects of tachycardia.         CURRENT WEIGHT:   186 lbs 9.6 oz    Initial Weight (lbs): 200 lbs  Last Visits Weight: 84.8 kg (187 lb)  Cumulative weight loss (lbs): 13.4  Weight Loss Percentage: 6.7%  Waist Circumference (cm): 102 cm    Wt Readings from Last 5 Encounters:   11/07/24 84.6 kg (186 lb 9.6 oz)   11/07/24 84.8 kg (187 lb)   09/30/24 88.9 kg (196 lb)   08/14/24 88.9 kg (196 lb)   08/05/24 90.3 kg (199 lb)             11/6/2024     8:06 PM   Changes and Difficulties   I have made the following changes to my diet since my last visit: Less sugar, stopped regular coke   With regards to my diet, I am still struggling with: vegggies, I need to cook more   I have made the following changes to my activity/exercise since my last visit: Got a puppy! Out several times a day!   With regards to my activity/exercise, I am still struggling with: taking time to exercise, gym, I need to get a Treadmill         MEDICATIONS:   Current Outpatient Medications   Medication Sig Dispense Refill    adapalene (DIFFERIN) 0.3 % external gel       atorvastatin (LIPITOR) 10 MG tablet Take 1 tablet (10 mg) by mouth daily 90 tablet 1    azelaic acid (FINACIA) 15 % external gel       buPROPion (WELLBUTRIN XL) 150 MG 24 hr tablet Take 1 tablet (150 mg) by mouth every morning 90 tablet 2    busPIRone (BUSPAR) 5 MG tablet TAKE ONE TABLET BY MOUTH TWICE A  tablet 1    clindamycin (CLEOCIN T) 1 % external lotion Apply topically every morning      famotidine (PEPCID) 20 MG tablet Take 1 tablet (20 mg) by mouth 2 times daily as needed (breakthrough acid reflux) 60 tablet 11    fluticasone (FLONASE) 50 MCG/ACT nasal spray INSTILL ONE SPRAY INTO BOTH NOSTRILS DAILY 16 g 3    levonorgestrel (MIRENA) 20 MCG/DAY IUD 1 each (20 mcg) by Intrauterine route continuous      MULTI-VITAMIN OR TABS 1  "TABLET DAILY      omeprazole (PRILOSEC) 20 MG DR capsule TAKE ONE CAPSULE BY MOUTH ONCE DAILY 30 TO 60 MINUTES BEFORE A MEAL 90 capsule 0    Semaglutide-Weight Management (WEGOVY) 1 MG/0.5ML pen Inject 1 mg subcutaneously once a week Start after 4 weeks of 0.5 mg Wegovy, if tolerating 2 mL 3    Semaglutide-Weight Management (WEGOVY) 1.7 MG/0.75ML pen Inject 1.7 mg subcutaneously once a week. 3 mL 3    sertraline (ZOLOFT) 100 MG tablet Take 2 tablets (200 mg) by mouth daily. 180 tablet 0    tretinoin (RETIN-A) 0.025 % external cream              11/6/2024     8:06 PM   Weight Loss Medication History Reviewed With Patient   Which weight loss medications are you currently taking on a regular basis? None   If you are not taking a weight loss medication that was prescribed to you, please indicate why: My insurance does not cover the cost    Other   Are you having any side effects from the weight loss medication that we have prescribed you? No         Objective    /80 (BP Location: Left arm, Patient Position: Sitting, Cuff Size: Adult Regular)   Pulse 104   Ht 1.626 m (5' 4\")   Wt 84.6 kg (186 lb 9.6 oz)   SpO2 96%   BMI 32.03 kg/m      PHYSICAL EXAM:  GENERAL: alert and no distress  EYES: Eyes grossly normal to inspection.  No discharge or erythema, or obvious scleral/conjunctival abnormalities.  RESP: No audible wheeze, cough, or visible cyanosis.    SKIN: Visible skin clear. No significant rash, abnormal pigmentation or lesions.  NEURO: Cranial nerves grossly intact.  Mentation and speech appropriate for age.  PSYCH: Appropriate affect, tone, and pace of words        Sincerely,    Joana Rockwell PA-C      32 minutes spent by me on the date of the encounter doing chart review, history and exam, documentation and further activities per the note    The longitudinal plan of care for the diagnosis(es)/condition(s) as documented were addressed during this visit. Due to the added complexity in care, I will " continue to support Vanesa in the subsequent management and with ongoing continuity of care.

## 2024-11-07 NOTE — LETTER
2024       RE: Vanesa Owen  [id number] [place] [address]     Dear Colleague,    Thank you for referring your patient, Vanesa Owen, to the [facility] [place] at [facility] [place]. Please see a copy of my visit note below.      Return Medical Weight Management Note     Vanesa Owen  MRN:  [id number]  :  1970  MAIKOL:  2024    Dear Gabi Sotelo PA-C,    I had the pleasure of seeing your patient Vanesa Owen. She is a 54 year old female who I am continuing to see for treatment of obesity related to:        2024    11:32 AM   --   I have the following health issues associated with obesity High Cholesterol    Osteoarthritis (joint disease)   I have the following symptoms associated with obesity Knee Pain    Depression    Back Pain    Fatigue    Hip Pain       Assessment & Plan  Problem List Items Addressed This Visit       Class 3 severe obesity with serious comorbidity and body mass index (BMI) of 40.0 to 44.9 in adult (H) - Primary    Relevant Medications    Semaglutide-Weight Management (WEGOVY) 1.7 MG/0.75ML pen    Other Relevant Orders    Adult Bariatrics and Weight Management Clinic Follow-Up Order      Restart Wegovy 1.0mg for at least 2 weeks after surgery. If no side effects. Dose increase to Wegovy 1.7mg once weekly. Will send in 3 month supply in December if tolerating 1.7mg dose. Is interested in compounded semaglutide once no longer available   Joana Swann in 3 months     INTERVAL HISTORY:  New MWM on 4/15/2024  Was started on Ozempic and then transitioned to Wegovy - starting BMI 40.21 (2022). Was able to lose 40lbs, but has sense gained 10lbs with discontinuation of wegovy 3 months ago due to insurance coverage.    employee       Insurance will no longer cover next year. She is really disappointed in this as she is really happy on this medication. She is  finally seeing good results and does not want to get off of it. Does not remember the last time she was at this weight.     Anti-obesity medication history    Current:   Wegovy 1.0mg - did not stop it as advised as she did not notice any changes with continuation. Has stopped it now for surgery on 11/20. No side effects. Weight loss has slowed down. Has seen increase in cravings. Would like to dose increase once restarted.     Past/Failed/contraindicated:   Phentermine - side effects of tachycardia.         CURRENT WEIGHT:   186 lbs 9.6 oz    Initial Weight (lbs): 200 lbs  Last Visits Weight: 84.8 kg (187 lb)  Cumulative weight loss (lbs): 13.4  Weight Loss Percentage: 6.7%  Waist Circumference (cm): 102 cm    Wt Readings from Last 5 Encounters:   11/07/24 84.6 kg (186 lb 9.6 oz)   11/07/24 84.8 kg (187 lb)   09/30/24 88.9 kg (196 lb)   08/14/24 88.9 kg (196 lb)   08/05/24 90.3 kg (199 lb)             11/6/2024     8:06 PM   Changes and Difficulties   I have made the following changes to my diet since my last visit: Less sugar, stopped regular coke   With regards to my diet, I am still struggling with: vegggies, I need to cook more   I have made the following changes to my activity/exercise since my last visit: Got a puppy! Out several times a day!   With regards to my activity/exercise, I am still struggling with: taking time to exercise, gym, I need to get a Treadmill         MEDICATIONS:   Current Outpatient Medications   Medication Sig Dispense Refill     adapalene (DIFFERIN) 0.3 % external gel        atorvastatin (LIPITOR) 10 MG tablet Take 1 tablet (10 mg) by mouth daily 90 tablet 1     azelaic acid (FINACIA) 15 % external gel        buPROPion (WELLBUTRIN XL) 150 MG 24 hr tablet Take 1 tablet (150 mg) by mouth every morning 90 tablet 2     busPIRone (BUSPAR) 5 MG tablet TAKE ONE TABLET BY MOUTH TWICE A  tablet 1     clindamycin (CLEOCIN T) 1 % external lotion Apply topically every morning        "famotidine (PEPCID) 20 MG tablet Take 1 tablet (20 mg) by mouth 2 times daily as needed (breakthrough acid reflux) 60 tablet 11     fluticasone (FLONASE) 50 MCG/ACT nasal spray INSTILL ONE SPRAY INTO BOTH NOSTRILS DAILY 16 g 3     levonorgestrel (MIRENA) 20 MCG/DAY IUD 1 each (20 mcg) by Intrauterine route continuous       MULTI-VITAMIN OR TABS 1 TABLET DAILY       omeprazole (PRILOSEC) 20 MG DR capsule TAKE ONE CAPSULE BY MOUTH ONCE DAILY 30 TO 60 MINUTES BEFORE A MEAL 90 capsule 0     Semaglutide-Weight Management (WEGOVY) 1 MG/0.5ML pen Inject 1 mg subcutaneously once a week Start after 4 weeks of 0.5 mg Wegovy, if tolerating 2 mL 3     Semaglutide-Weight Management (WEGOVY) 1.7 MG/0.75ML pen Inject 1.7 mg subcutaneously once a week. 3 mL 3     sertraline (ZOLOFT) 100 MG tablet Take 2 tablets (200 mg) by mouth daily. 180 tablet 0     tretinoin (RETIN-A) 0.025 % external cream              11/6/2024     8:06 PM   Weight Loss Medication History Reviewed With Patient   Which weight loss medications are you currently taking on a regular basis? None   If you are not taking a weight loss medication that was prescribed to you, please indicate why: My insurance does not cover the cost    Other   Are you having any side effects from the weight loss medication that we have prescribed you? No         Objective   /80 (BP Location: Left arm, Patient Position: Sitting, Cuff Size: Adult Regular)   Pulse 104   Ht 1.626 m (5' 4\")   Wt 84.6 kg (186 lb 9.6 oz)   SpO2 96%   BMI 32.03 kg/m      PHYSICAL EXAM:  GENERAL: alert and no distress  EYES: Eyes grossly normal to inspection.  No discharge or erythema, or obvious scleral/conjunctival abnormalities.  RESP: No audible wheeze, cough, or visible cyanosis.    SKIN: Visible skin clear. No significant rash, abnormal pigmentation or lesions.  NEURO: Cranial nerves grossly intact.  Mentation and speech appropriate for age.  PSYCH: Appropriate affect, tone, and pace of " words        Sincerely,    Joana Rockwell PA-C      32 minutes spent by me on the date of the encounter doing chart review, history and exam, documentation and further activities per the note    The longitudinal plan of care for the diagnosis(es)/condition(s) as documented were addressed during this visit. Due to the added complexity in care, I will continue to support Vanesa in the subsequent management and with ongoing continuity of care.      Again, thank you for allowing me to participate in the care of your patient.      Sincerely,    Joana Rockwell PA-C

## 2024-11-07 NOTE — PATIENT INSTRUCTIONS
How to Take Your Medication Before Surgery  Preoperative Medication Instructions   Antiplatelet or Anticoagulation Medication Instructions   - Patient is on no antiplatelet or anticoagulation medications.    Additional Medication Instructions   - SSRIs, SNRIs, TCAs, Antipsychotics: Continue without modification.    - GLP-1 Injectable (exenitide, liraglutide, semaglutide, dulaglutide, etc.): DO NOT TAKE 7 days before surgery        Patient Education   Preparing for Your Surgery  For Adults  Getting started  In most cases, a nurse will call to review your health history and instructions. They will give you an arrival time based on your scheduled surgery time. Please be ready to share:  Your doctor's clinic name and phone number  Your medical, surgical, and anesthesia history  A list of allergies and sensitivities  A list of medicines, including herbal treatments and over-the-counter drugs  Whether the patient has a legal guardian (ask how to send us the papers in advance)  Note: You may not receive a call if you were seen at our PAC (Preoperative Assessment Center).  Please tell us if you're pregnant--or if there's any chance you might be pregnant. Some surgeries may injure a fetus (unborn baby), so they require a pregnancy test. Surgeries that are safe for a fetus don't always need a test, and you can choose whether to have one.   Preparing for surgery  Within 10 to 30 days of surgery: Have a pre-op exam (sometimes called an H&P, or History and Physical). This can be done at a clinic or pre-operative center.  If you're having a , you may not need this exam. Talk to your care team.  At your pre-op exam, talk to your care team about all medicines you take. (This includes CBD oil and any drugs, such as THC, marijuana, and other forms of cannabis.) If you need to stop any medicine before surgery, ask when to start taking it again.  This is for your safety. Many medicines and drugs can make you bleed too much  during surgery. Some change how well surgery (anesthesia) drugs work.  Call your insurance company to let them know you're having surgery. (If you don't have insurance, call [phone].)  Call your clinic if there's any change in your health. This includes a scrape or scratch near the surgery site, or any signs of a cold (sore throat, runny nose, cough, rash, fever).  Eating and drinking guidelines  For your safety: Unless your surgeon tells you otherwise, follow the guidelines below.  Eat and drink as normal until 8 hours before you arrive for surgery. After that, no food or milk. You can spit out gum when you arrive.  Drink clear liquids until 2 hours before you arrive. These are liquids you can see through, like water, Gatorade, and Propel Water. They also include plain black coffee and tea (no cream or milk).  No alcohol for 24 hours before you arrive. The night before surgery, stop any drinks that contain THC.  If your care team tells you to take medicine on the morning of surgery, it's okay to take it with a sip of water. No other medicines or drugs are allowed (including CBD oil)--follow your care team's instructions.  If you have questions the day of surgery, call your hospital or surgery center.   Preventing infection  Shower or bathe the night before and the morning of surgery. Follow the instructions your clinic gave you. (If no instructions, use regular soap.)  Don't shave or clip hair near your surgery site. We'll remove the hair if needed.  Don't smoke or vape the morning of surgery. No chewing tobacco for 6 hours before you arrive. A nicotine patch is okay. You may spit out nicotine gum when you arrive.  For some surgeries, the surgeon will tell you to fully quit smoking and nicotine.  We will make every effort to keep you safe from infection. We will:  Clean our hands often with soap and water (or an alcohol-based hand rub).  Clean the skin at your surgery site with a special soap that kills  germs.  Give you a special gown to keep you warm. (Cold raises the risk of infection.)  Wear hair covers, masks, gowns, and gloves during surgery.  Give antibiotic medicine, if prescribed. Not all surgeries need this medicine.  What to bring on the day of surgery  Photo ID and insurance card  Copy of your health care directive, if you have one  Glasses and hearing aids (bring cases)  You can't wear contacts during surgery  Inhaler and eye drops, if you use them (tell us about these when you arrive)  CPAP machine or breathing device, if you use them  A few personal items, if spending the night  If you have . . .  A pacemaker, ICD (cardiac defibrillator), or other implant: Bring the ID card.  An implanted stimulator: Bring the remote control.  A legal guardian: Bring a copy of the certified (court-stamped) guardianship papers.  Please remove any jewelry, including body piercings. Leave jewelry and other valuables at home.  If you're going home the day of surgery  You must have a responsible adult drive you home. They should stay with you overnight as well.  If you don't have someone to stay with you, and you aren't safe to go home alone, we may keep you overnight. Insurance often won't pay for this.  After surgery  If it's hard to control your pain or you need more pain medicine, please call your surgeon's office.  Questions?   If you have any questions for your care team, list them here:   ____________________________________________________________________________________________________________________________________________________________________________________________________________________________________________________________  For informational purposes only. Not to replace the advice of your health care provider. Copyright   2003, 2019 [facility]. All rights reserved. Clinically reviewed by Gary Shell MD. [employer] [id number] - REV 08/24.

## 2024-11-07 NOTE — H&P (VIEW-ONLY)
Preoperative Evaluation  [facility] [place]  [address] [facility]  [place] [address]  Phone: [phone]  Primary Provider: Gabi Sotelo PA-C  Pre-op Performing Provider: Gabi Sotelo PA-C  Nov 7, 2024 11/6/2024   Surgical Information   What procedure is being done? CHOLECYSTECTOMY, ROBOT-ASSISTED, LAPAROSCOPIC, USING DA TOYIN XI    Facility or Hospital where procedure/surgery will be performed: [facility]    Who is doing the procedure / surgery? Joe Renteria MD    Date of surgery / procedure: 11/20/24    Time of surgery / procedure: Not sure yet    Where do you plan to recover after surgery? at home with family        Patient-reported     Fax number for surgical facility: Note does not need to be faxed, will be available electronically in Epic.    Assessment & Plan     The proposed surgical procedure is considered INTERMEDIATE risk.    Preop general physical exam  Cleared for surgery.  - CBC with platelets  - Comprehensive metabolic panel (BMP + Alb, Alk Phos, ALT, AST, Total. Bili, TP)    Gallstones  Has h/o gallstones in the past and recently had a flare up (weight loss/GLP1 agonist likely contributed to this).  Plans for gallbladder removal and I do not suspect further issues.     Subconjunctival hemorrhage of left eye  Has had a few episodes of unprovoked subconjunctival hemorrhages in the past.  Father has similar issues.  She has had an eye exam and glaucoma screen was negative.  Will check a CBC.     Moderate major depression (H)  Stable.     Class 3 severe obesity with serious comorbidity and body mass index (BMI) of 40.0 to 44.9 in adult, unspecified obesity type (H)  Continue to work with specialist.  She is back on the 1 mg Wegovy and is tolerating this fine.  She will do another dose of wegovy today and then hold for at least 7 days prior to surgery.             - No identified additional risk factors other than previously  addressed    Antiplatelet or Anticoagulation Medication Instructions   - Patient is on no antiplatelet or anticoagulation medications.    Additional Medication Instructions   - SSRIs, SNRIs, TCAs, Antipsychotics: Continue without modification.    - GLP-1 Injectable (exenitide, liraglutide, semaglutide, dulaglutide, etc.): DO NOT TAKE 7 days before surgery     Recommendation  Approval given to proceed with proposed procedure, without further diagnostic evaluation.      Twinrix given today.   Consider Shingles and covid booster at pharmacy (had covid in July, therefore she can consider a booster anytime at this point, we typically wait 3 months after infection).     Yunior Alexis is a 54 year old, presenting for the following:  Pre-Op Exam    Ann Marie MCDONALD       11/7/2024     8:59 AM   Additional Questions   Roomed by Peggy   Accompanied by Self         11/7/2024     8:59 AM   Patient Reported Additional Medications   Patient reports taking the following new medications NA     HPI related to upcoming procedure: Gallstones       Has been on 1 mg Wegovy, was feeling fine at this dose.      Has had a few subconjunctival hemorrhages.   Has had a normal eye exam done in the past (normal eye pressures).         11/6/2024   Pre-Op Questionnaire   Have you ever had a heart attack or stroke? No    Have you ever had surgery on your heart or blood vessels, such as a stent placement, a coronary artery bypass, or surgery on an artery in your head, neck, heart, or legs? No    Do you have chest pain with activity? No    Do you have a history of heart failure? No    Do you currently have a cold, bronchitis or symptoms of other infection? No    Do you have a cough, shortness of breath, or wheezing? No    Do you or anyone in your family have previous history of blood clots? (!) YES - Pts Father     Do you or does anyone in your family have a serious bleeding problem such as prolonged bleeding following surgeries or cuts? (!)  YES - Pts father is on blood thinners    Have you ever had problems with anemia or been told to take iron pills? No    Have you had any abnormal blood loss such as black, tarry or bloody stools, or abnormal vaginal bleeding? No    Have you ever had a blood transfusion? No    Are you willing to have a blood transfusion if it is medically needed before, during, or after your surgery? Yes    Have you or any of your relatives ever had problems with anesthesia? No    Do you have sleep apnea, excessive snoring or daytime drowsiness? No    Do you have any artifical heart valves or other implanted medical devices like a pacemaker, defibrillator, or continuous glucose monitor? No    Do you have artificial joints? No    Are you allergic to latex? No        Patient-reported     Health Care Directive  Patient does not have a Health Care Directive: did not discuss    Preoperative Review of    reviewed - controlled substances reflected in medication list.  Previous meds noted, not currently on any pain meds.     Status of Chronic Conditions:  See problem list for active medical problems.  Problems all longstanding and stable, except as noted/documented.  See ROS for pertinent symptoms related to these conditions.    Patient Active Problem List    Diagnosis Date Noted    Borderline high cholesterol 02/28/2024     Priority: Medium    Family history of arteriosclerotic cardiovascular disease 02/28/2024     Priority: Medium    H/O methicillin resistant Staphylococcus aureus 02/28/2024     Priority: Medium    Acute tear medial meniscus, left, subsequent encounter 02/28/2024     Priority: Medium    Primary osteoarthritis involving multiple joints 10/30/2023     Priority: Medium    Trigger finger, acquired 10/30/2023     Priority: Medium    Hearing loss of left ear, unspecified hearing loss type 12/08/2022     Priority: Medium    Iris nevus, right 07/17/2020     Priority: Medium    Pain in joint, ankle and foot, right 10/23/2018      Priority: Medium    Gastroesophageal reflux disease without esophagitis 09/19/2018     Priority: Medium    Hip pain, left 11/15/2017     Priority: Medium    Class 3 severe obesity with serious comorbidity and body mass index (BMI) of 40.0 to 44.9 in adult (H) 05/19/2015     Priority: Medium    CARDIOVASCULAR SCREENING; LDL GOAL LESS THAN 160 10/31/2010     Priority: Medium    genital herpes, mainly cervical.  06/17/2010     Priority: Medium     August 12, 2011  initial outbreak severe, now on suppressive daily therapy. No reoccurrence. Will continue, one year refill.   O update changed this record. Please review for accuracy      Lumbar back pain 04/09/2010     Priority: Medium     April 9, 2010 probable SI joint dysfunction. No improvement with PT, SI joint steroid injection, now seeing chiropractor.   June 17, 2010 no improvement, will refer to [facility] for another series of cortisone injections. Using daily ibuprofen, will check renal function.   July 1, 2010 no improvement. Will refer to Dr. Oconnell.   August 12, 2011 no improvement, will obtain MRI.       Vitamin D deficiency 04/05/2010     Priority: Medium     June 17, 2010 taking oral supplements, 5000 units daily, recheck vitamin D level in August.   August 23, 2011 normal level in September 2010. Continue maintenance dosing.   (Problem list name updated by automated process. Provider to review and confirm.)      IUD (intrauterine device) in place 08/25/2008     Priority: Medium     Mirena placed 6/14/2018      Moderate major depression (H) 05/21/2008     Priority: Medium     August 12, 2011 doing well on zoloft, 200 mg daily. Follow up 6 months.          [facility] 05/04/2012     Priority: Low     EMERGENCY CARE PLAN  August 8, 2013: No current Care Coordination follow up planned. Please refer if Care Coordination services are needed.    Presenting Problem Signs and Symptoms Treatment Plan   Questions or concerns   during clinic hours   I will call my clinic  directly:  [address] [address]  [phone].   Questions or concerns outside clinic hours   I will call the 24 hour nurse line at   [phone] or [phone].   Need to schedule an appointment   I will call the 24 hour scheduling team at [phone] or my clinic directly at [phone].    Same day treatment     I will call my clinic first, nurse line if after hours, urgent care and express care if needed.     Clinic care coordination services (regular clinic hours)     I will call a clinic care coordinator directly:     Babak Patel RN  Mon, Tues, Fri - [phone]  Wed, Thurs - [phone]    Yolande Ahn :    [phone]    Or call my clinic at [phone] and ask to speak with care coordination.     Crisis Services: Behavioral or Mental Health  [facility]is [facility] 24 Hour Phone Line  [phone]    [facility] 24 Hour Crisis Services  [phone]    [facility] (Behavioral Health Providers) Network [phone]    [facility]   [phone]         Emergency treatment -- Immediately    CAll 650               Past Medical History:   Diagnosis Date    Anxiety     Depressive disorder     Herpes simplex with other ophthalmic complications     UTI (urinary tract infection)      Past Surgical History:   Procedure Laterality Date    COLONOSCOPY WITH CO2 INSUFFLATION N/A 4/13/2023    Procedure: COLONOSCOPY, WITH CO2 INSUFFLATION;  Surgeon: Catrachita Bernstein DO;  Location:  OR    EXCIS VAGINAL CYST/TUMOR      SLING TRANSVAGINAL N/A 08/23/2016    Procedure: SLING TRANSVAGINAL;  Surgeon: Nam Sommers MD;  Location: [facility] OR    TONSILLECTOMY & ADENOIDECTOMY       Current Outpatient Medications   Medication Sig Dispense Refill    adapalene (DIFFERIN) 0.3 % external gel       atorvastatin (LIPITOR) 10 MG tablet Take 1 tablet (10 mg) by mouth daily 90 tablet 1    azelaic acid (FINACIA) 15 % external gel       buPROPion (WELLBUTRIN XL) 150 MG 24  hr tablet Take 1 tablet (150 mg) by mouth every morning 90 tablet 2    busPIRone (BUSPAR) 5 MG tablet TAKE ONE TABLET BY MOUTH TWICE A  tablet 1    clindamycin (CLEOCIN T) 1 % external lotion Apply topically every morning      famotidine (PEPCID) 20 MG tablet Take 1 tablet (20 mg) by mouth 2 times daily as needed (breakthrough acid reflux) 60 tablet 11    fluticasone (FLONASE) 50 MCG/ACT nasal spray INSTILL ONE SPRAY INTO BOTH NOSTRILS DAILY 16 g 3    levonorgestrel (MIRENA) 20 MCG/DAY IUD 1 each (20 mcg) by Intrauterine route continuous      MULTI-VITAMIN OR TABS 1 TABLET DAILY      omeprazole (PRILOSEC) 20 MG DR capsule TAKE ONE CAPSULE BY MOUTH ONCE DAILY 30 TO 60 MINUTES BEFORE A MEAL 90 capsule 0    Semaglutide-Weight Management (WEGOVY) 1 MG/0.5ML pen Inject 1 mg subcutaneously once a week Start after 4 weeks of 0.5 mg Wegovy, if tolerating 2 mL 3    sertraline (ZOLOFT) 100 MG tablet Take 2 tablets (200 mg) by mouth daily. 180 tablet 0    tretinoin (RETIN-A) 0.025 % external cream          Allergies   Allergen Reactions    Sulfa Antibiotics Rash        Social History     Tobacco Use    Smoking status: Never    Smokeless tobacco: Never   Substance Use Topics    Alcohol use: Yes     Comment: rare     Family History   Problem Relation Age of Onset    Thyroid Disease Mother     Cerebrovascular Disease Mother         stroke triggered by a fib    Asthma Mother     Depression Mother     Glaucoma Mother     Cancer Father     Depression Father 58    Cerebrovascular Disease Father         stroke triggered by an injury    Asthma Brother     Diabetes Maternal Grandmother         Type II     Hypertension Maternal Grandmother     Alcohol/Drug Maternal Grandmother     Alcohol/Drug Maternal Grandfather     Circulatory Paternal Grandmother     Glaucoma Paternal Grandmother     Breast Cancer Maternal Aunt     Macular Degeneration No family hx of      History   Drug Use No             Review of Systems  Constitutional,  "neuro, ENT, endocrine, pulmonary, cardiac, gastrointestinal, genitourinary, musculoskeletal, integument and psychiatric systems are negative, except as otherwise noted.    Objective    /66 (BP Location: Right arm, Patient Position: Chair, Cuff Size: Adult Regular)   Pulse 91   Temp 98.1  F (36.7  C) (Tympanic)   Resp 16   Ht 1.626 m (5' 4\")   Wt 84.8 kg (187 lb)   SpO2 99%   BMI 32.10 kg/m     Estimated body mass index is 32.1 kg/m  as calculated from the following:    Height as of this encounter: 1.626 m (5' 4\").    Weight as of this encounter: 84.8 kg (187 lb).  Physical Exam  GENERAL: alert and no distress  EYES: Eyes grossly normal to inspection, PERRL and left conjunctivae with subconjunctival hemorrhage, right conjunctiva normal and sclerae normal  HENT: ear canals and TM's normal, nose and mouth without ulcers or lesions  NECK: no adenopathy, no asymmetry, masses, or scars  RESP: lungs clear to auscultation - no rales, rhonchi or wheezes  CV: regular rate and rhythm, normal S1 S2, no S3 or S4, no murmur, click or rub, no peripheral edema  ABDOMEN: soft, nontender, no hepatosplenomegaly, no masses and bowel sounds normal  MS: no gross musculoskeletal defects noted, no edema  SKIN: no suspicious lesions or rashes  NEURO: Normal strength and tone, mentation intact and speech normal  PSYCH: mentation appears normal, affect normal/bright    Recent Labs   Lab Test 08/14/24  1054 02/16/24  1210    140   POTASSIUM 4.8 4.8   CR 0.78 0.78        Diagnostics  Labs pending at this time.  Results will be reviewed when available.   No EKG required, no history of coronary heart disease, significant arrhythmia, peripheral arterial disease or other structural heart disease.    Revised Cardiac Risk Index (RCRI)  The patient has the following serious cardiovascular risks for perioperative complications:   - No serious cardiac risks = 0 points     RCRI Interpretation: 0 points: Class I (very low risk - 0.4% " complication rate)         Signed Electronically by: Gabi Sotelo PA-C  A copy of this evaluation report is provided to the requesting physician.

## 2024-11-14 NOTE — PATIENT INSTRUCTIONS
"Sebastian Alexis,   Thank you for allowing us the privilege of caring for you. We hope we provided you with the excellent service you deserve.   Please let us know if there is anything else we can do for you so that we can be sure you are completely satisfied with your care experience.    To ensure the quality of our services you may be receiving a patient satisfaction survey from an independent patient satisfaction monitoring company.    The greatest compliment you can give is a \"Likely to Recommend\"    Your visit was with Joana Rockwell PA-C today.    Instructions per today's visit:     Restart Wegovy 1.0mg for at least 2 weeks after surgery. If no side effects. Dose increase to Wegovy 1.7mg once weekly. Will send in 3 month supply in December if tolerating 1.7mg dose. Is interested in compounded semaglutide once no longer available   Joana Swann in 3 months     ___________________________________________________________________________  Important contact and scheduling information:  Please call our contact center at [phone] to schedule your next appointments.  For any nursing questions or concerns call Sara Burrows LPN at [phone] or Lou Rajan RN at [phone]  Please call during clinic hours Monday through Friday 8:00a - 4:00p if you have questions or you can contact us via [employer] at anytime and we will reply during clinic hours.    Lab results will be communicated through My Chart or letter (if My Chart not used). Please call the clinic if you have not received communication after 1 week or if you have any questions.?  Clinic Fax: [phone]  __________________________________________________________________________    If labs were ordered today:    Please make an appointment to have them drawn at your convenience.     To schedule the Lab Appointment using [employer]:  Select \"Schedule an Appointment\"  Select \"Lab Only\"  For \"A couple of questions\", select \"Other\"  For \"Which locations work for you?, " select the location and set up the appointment    To schedule by phone call [phone] to schedule a lab only appointment at any [facility] lab.  ___________________________________________________________________________  Work with A Health !  Virtual Sessions are Available through [facility] Weight Management Clinics    To learn more, call to schedule a free, Health  Q&A appointment: [phone]     What is Health Coaching?  Do you know what you are supposed to do, but you just aren't doing it?  Then, HEALTH COACHING may help you!   Get unstuck and move forward with the support of a professionally trained [employer] (National Board-Certified Health and ) who uses evidence-based approaches to help you move forward with healthy lifestyle changes in the areas of weight loss, stress management and overall well-being.    Health Coaches help you identify goals that will work best for you. Health Coaches provide support and encouragement with overcoming barriers and help you to find inspiration and motivation to lead a healthy lifestyle.    Option one:  Health Coaching 3-Pack; Three, 30-minute Health Coaching Visits, for $99  Visits are done virtually (phone or video)  This is a self pay service; we do not accept insurance for birgit coaching.    Option two:   The 24 week Plan; 11 Health Coaching Visits, and a 7 months subscription to [employer]-- on-demand fitness, nutrition and mindfulness classes, for $499 (employee discounts may be available). Participants will also meet regularly with a weight management Medical Provider and a Registered/Licensed Dietician.  This is a self-pay service; we do not accept insurance for health coaching.    To Schedule a free Health  Q&A appointment to learn more,  call [phone].  ____________________________________________________________________   Health [facility]  Healthy Lifestyle Group    Healthy  "Lifestyle Group  This is a 60 minute virtual coaching group for those who want to lead a healthier lifestyle. Come together to set goals and overcome barriers in a supportive group environment. We will address the four pillars of health--nutrition, exercise, sleep and emotional well-being.  This group is highly recommended for those who are participating in the 24 week Healthy Lifestyle Plan and our Health Coaching sessions.    WHEN: This group meets the first Friday of the month, 12:30 PM - 1:30 PM online, via a zoom meeting.      FACILITATOR: Led by National Board Certified Health and , Antoinette Mcmahon, [facility].    TO REGISTER: Please call the Call Center at [phone] to register. You will get an appointment to attend in [employer]. Fifteen minutes prior to the meeting, complete the e-check in and you will get the link to join the meeting.  There is no charge to attend this group and space is limited.      2023 and 2024 Meeting Topics and Dates:    November 3: Introduction to Mindfulness (Learn simple and effective mindfulness practices and how it can benefit you)    December 8: Let's Talk (guided discussion on our wins and challenges)    January 5: New Years Vision: Manifest your Best 2024! (Guided imagery,  journaling and discussion)    February 2: Let's Talk    March 1: 10 Percent Happier by Zenon Dalton (Book Bites; a guided discussion on the nuggets of wisdom from favorite wellness books; no need to read the book but highly encouraged)    April 5: Let's Talk    May 3: \"Essentialism; The Disciplined Pursuit of Less by Alvaro Mendoza (book bites discussion)    June 7: Let's Talk    July 5: NO MEETING, off for the 4th of July Holiday    August 2: The Blue Zones, Secrets for Living a Longer Life by Zenon Carballo (book bites discussion)      If you would like bariatric surgery specific support group info please let your care team know.         Thank you,   [facility] " Team

## 2024-11-15 ENCOUNTER — TELEPHONE (OUTPATIENT)
Dept: SURGERY | Facility: CLINIC | Age: 54
End: 2024-11-15
Payer: COMMERCIAL

## 2024-11-15 NOTE — TELEPHONE ENCOUNTER
Forms signed and  Fax to [phone] and [phone]     1 copy sent to scanning, 1 copy in drawer.       Catrachita Angel   Clinic Station [place]   [employer] [facility]  [phone]

## 2024-11-15 NOTE — TELEPHONE ENCOUNTER
Reason for Call:  Form, our goal is to have forms completed with 72 hours, however, some forms may require a visit or additional information.    Type of letter, form or note:  FMLA    Who is the form from?: Patient    Where did the form come from: form was faxed in    What clinic location was the form placed at?: [facility] (ENT, Neurology, Rheumatology, Surgery, Urology, Vascular Surgery)    Where the form was placed: Given to physician    What number is listed as a contact on the form?: [phone]       Additional comments: Fax to [phone] and [phone] when completed.  Workability form in purple folder above desk.  Catrachita Angel   Clinic Station    [facility]  [phone]      Call taken on 11/15/2024 at 1:24 PM by Bibiana Angel MA

## 2024-11-18 ENCOUNTER — TELEPHONE (OUTPATIENT)
Dept: ENDOCRINOLOGY | Facility: CLINIC | Age: 54
End: 2024-11-18
Payer: COMMERCIAL

## 2024-11-18 ENCOUNTER — ANESTHESIA EVENT (OUTPATIENT)
Dept: SURGERY | Facility: CLINIC | Age: 54
End: 2024-11-18
Payer: COMMERCIAL

## 2024-11-18 NOTE — TELEPHONE ENCOUNTER
Sent [employer] (1st Attempt) and Patient Contacted for the patient to call back and schedule the following:    Appointment type: Return Weight Management  Appointment mode: In Person or Virtual Visit  Provider: Joana Rockwell PA-C  Return date: Approx. 2/7/25  Specialty phone number: [phone]    Additional Notes: Patient will return call when she has access to her calendar.

## 2024-11-18 NOTE — ANESTHESIA PREPROCEDURE EVALUATION
Anesthesia Pre-Procedure Evaluation    Patient: Vanesa Owen   MRN: [id number] : 1970        Procedure : Procedure(s):  CHOLECYSTECTOMY, ROBOT-ASSISTED, LAPAROSCOPIC, USING DA TOYIN XI          Past Medical History:   Diagnosis Date    Anxiety     Depressive disorder     Herpes simplex with other ophthalmic complications     UTI (urinary tract infection)       Past Surgical History:   Procedure Laterality Date    COLONOSCOPY WITH CO2 INSUFFLATION N/A 2023    Procedure: COLONOSCOPY, WITH CO2 INSUFFLATION;  Surgeon: Catrachita Bernstein DO;  Location: [facility] OR    EXCIS VAGINAL CYST/TUMOR      SLING TRANSVAGINAL N/A 2016    Procedure: SLING TRANSVAGINAL;  Surgeon: Nam Sommers MD;  Location: [facility]    TONSILLECTOMY & ADENOIDECTOMY        Allergies   Allergen Reactions    Sulfa Antibiotics Rash      Social History     Tobacco Use    Smoking status: Never    Smokeless tobacco: Never   Substance Use Topics    Alcohol use: Yes     Comment: rare      Wt Readings from Last 1 Encounters:   24 84.6 kg (186 lb 9.6 oz)        Anesthesia Evaluation   Pt has had prior anesthetic. Type: General and MAC.        ROS/MED HX  ENT/Pulmonary:  - neg pulmonary ROS     Neurologic:  - neg neurologic ROS     Cardiovascular:     (+) Dyslipidemia - -   -  - -                                      METS/Exercise Tolerance:     Hematologic:  - neg hematologic  ROS     Musculoskeletal:   (+)  arthritis,             GI/Hepatic:     (+) GERD, Asymptomatic on medication,                  Renal/Genitourinary:       Endo:     (+)               Obesity,       Psychiatric/Substance Use:     (+) psychiatric history anxiety and depression       Infectious Disease:       Malignancy:       Other:  - neg other ROS          Physical Exam    Airway        Mallampati: III   TM distance: > 3 FB   Neck ROM: full   Mouth opening: > 3 cm    Respiratory Devices and Support         Dental       (+) Minor Abnormalities - some fillings,  "tiny chips      Cardiovascular   cardiovascular exam normal       Rhythm and rate: regular and normal     Pulmonary   pulmonary exam normal        breath sounds clear to auscultation           OUTSIDE LABS:  CBC:   Lab Results   Component Value Date    WBC 4.4 11/07/2024    WBC 5.0 07/22/2020    HGB 12.5 11/07/2024    HGB 13.7 07/22/2020    HCT 36.4 11/07/2024    HCT 39.5 07/22/2020     11/07/2024     07/22/2020     BMP:   Lab Results   Component Value Date     11/07/2024     08/14/2024    POTASSIUM 4.3 11/07/2024    POTASSIUM 4.8 08/14/2024    CHLORIDE 105 11/07/2024    CHLORIDE 104 08/14/2024    CO2 26 11/07/2024    CO2 25 08/14/2024    BUN 11.8 11/07/2024    BUN 15.6 08/14/2024    CR 0.81 11/07/2024    CR 0.78 08/14/2024    GLC 86 11/07/2024    GLC 90 08/14/2024     COAGS: No results found for: \"PTT\", \"INR\", \"FIBR\"  POC:   Lab Results   Component Value Date    HCG Negative 05/23/2014     HEPATIC:   Lab Results   Component Value Date    ALBUMIN 4.4 11/07/2024    PROTTOTAL 6.7 11/07/2024    ALT 13 11/07/2024    AST 17 11/07/2024    ALKPHOS 68 11/07/2024    BILITOTAL 0.3 11/07/2024     OTHER:   Lab Results   Component Value Date    HUI 9.1 11/07/2024    LIPASE 42 08/14/2024    AMYLASE 57 08/14/2024    TSH 1.26 02/16/2024       Anesthesia Plan    ASA Status:  2    NPO Status:  NPO Appropriate    Anesthesia Type: General.     - Airway: ETT   Induction: Propofol.   Maintenance: TIVA.        Consents    Anesthesia Plan(s) and associated risks, benefits, and realistic alternatives discussed. Questions answered and patient/representative(s) expressed understanding.     - Discussed: Risks, Benefits and Alternatives for BOTH SEDATION and the PROCEDURE were discussed     - Discussed with:  Patient            Postoperative Care    Pain management: IV analgesics, Oral pain medications, Multi-modal analgesia.   PONV prophylaxis: Ondansetron (or other 5HT-3), Dexamethasone or Solumedrol " "    Comments:               KIRSTIN Holly CRNA    I have reviewed the pertinent notes and labs in the chart from the past 30 days and (re)examined the patient.  Any updates or changes from those notes are reflected in this note.                         # Obesity: Estimated body mass index is 32.03 kg/m  as calculated from the following:    Height as of 11/7/24: 1.626 m (5' 4\").    Weight as of 11/7/24: 84.6 kg (186 lb 9.6 oz).             "

## 2024-11-20 ENCOUNTER — HOSPITAL ENCOUNTER (OUTPATIENT)
Facility: CLINIC | Age: 54
Discharge: HOME | End: 2024-11-20
Attending: STUDENT IN AN ORGANIZED HEALTH CARE EDUCATION/TRAINING PROGRAM | Admitting: STUDENT IN AN ORGANIZED HEALTH CARE EDUCATION/TRAINING PROGRAM
Payer: COMMERCIAL

## 2024-11-20 ENCOUNTER — ANESTHESIA (OUTPATIENT)
Dept: SURGERY | Facility: CLINIC | Age: 54
End: 2024-11-20
Payer: COMMERCIAL

## 2024-11-20 VITALS
RESPIRATION RATE: 16 BRPM | TEMPERATURE: 97.2 F | SYSTOLIC BLOOD PRESSURE: 112 MMHG | OXYGEN SATURATION: 94 % | DIASTOLIC BLOOD PRESSURE: 76 MMHG | HEART RATE: 89 BPM

## 2024-11-20 DIAGNOSIS — K80.20 CALCULUS OF GALLBLADDER WITHOUT CHOLECYSTITIS WITHOUT OBSTRUCTION: Primary | ICD-10-CM

## 2024-11-20 PROCEDURE — 360N000080 HC SURGERY LEVEL 7, PER MIN: Performed by: STUDENT IN AN ORGANIZED HEALTH CARE EDUCATION/TRAINING PROGRAM

## 2024-11-20 PROCEDURE — 250N000011 HC RX IP 250 OP 636: Performed by: STUDENT IN AN ORGANIZED HEALTH CARE EDUCATION/TRAINING PROGRAM

## 2024-11-20 PROCEDURE — 710N000012 HC RECOVERY PHASE 2, PER MINUTE: Performed by: STUDENT IN AN ORGANIZED HEALTH CARE EDUCATION/TRAINING PROGRAM

## 2024-11-20 PROCEDURE — 999N000141 HC STATISTIC PRE-PROCEDURE NURSING ASSESSMENT: Performed by: STUDENT IN AN ORGANIZED HEALTH CARE EDUCATION/TRAINING PROGRAM

## 2024-11-20 PROCEDURE — 250N000013 HC RX MED GY IP 250 OP 250 PS 637

## 2024-11-20 PROCEDURE — 250N000009 HC RX 250: Performed by: NURSE ANESTHETIST, CERTIFIED REGISTERED

## 2024-11-20 PROCEDURE — 88304 TISSUE EXAM BY PATHOLOGIST: CPT | Mod: TC | Performed by: STUDENT IN AN ORGANIZED HEALTH CARE EDUCATION/TRAINING PROGRAM

## 2024-11-20 PROCEDURE — 258N000003 HC RX IP 258 OP 636

## 2024-11-20 PROCEDURE — 47562 LAPAROSCOPIC CHOLECYSTECTOMY: CPT | Performed by: STUDENT IN AN ORGANIZED HEALTH CARE EDUCATION/TRAINING PROGRAM

## 2024-11-20 PROCEDURE — 258N000003 HC RX IP 258 OP 636: Performed by: NURSE ANESTHETIST, CERTIFIED REGISTERED

## 2024-11-20 PROCEDURE — 271N000001 HC OR GENERAL SUPPLY NON-STERILE: Performed by: STUDENT IN AN ORGANIZED HEALTH CARE EDUCATION/TRAINING PROGRAM

## 2024-11-20 PROCEDURE — 272N000001 HC OR GENERAL SUPPLY STERILE: Performed by: STUDENT IN AN ORGANIZED HEALTH CARE EDUCATION/TRAINING PROGRAM

## 2024-11-20 PROCEDURE — 710N000009 HC RECOVERY PHASE 1, LEVEL 1, PER MIN: Performed by: STUDENT IN AN ORGANIZED HEALTH CARE EDUCATION/TRAINING PROGRAM

## 2024-11-20 PROCEDURE — 370N000017 HC ANESTHESIA TECHNICAL FEE, PER MIN: Performed by: STUDENT IN AN ORGANIZED HEALTH CARE EDUCATION/TRAINING PROGRAM

## 2024-11-20 PROCEDURE — 250N000011 HC RX IP 250 OP 636: Performed by: NURSE ANESTHETIST, CERTIFIED REGISTERED

## 2024-11-20 PROCEDURE — 250N000013 HC RX MED GY IP 250 OP 250 PS 637: Performed by: STUDENT IN AN ORGANIZED HEALTH CARE EDUCATION/TRAINING PROGRAM

## 2024-11-20 PROCEDURE — 250N000009 HC RX 250: Performed by: STUDENT IN AN ORGANIZED HEALTH CARE EDUCATION/TRAINING PROGRAM

## 2024-11-20 RX ORDER — PROPOFOL 10 MG/ML
INJECTION, EMULSION INTRAVENOUS CONTINUOUS PRN
Status: DISCONTINUED | OUTPATIENT
Start: 2024-11-20 | End: 2024-11-20

## 2024-11-20 RX ORDER — SODIUM CHLORIDE, SODIUM LACTATE, POTASSIUM CHLORIDE, CALCIUM CHLORIDE 600; 310; 30; 20 MG/100ML; MG/100ML; MG/100ML; MG/100ML
INJECTION, SOLUTION INTRAVENOUS CONTINUOUS
Status: DISCONTINUED | OUTPATIENT
Start: 2024-11-20 | End: 2024-11-20 | Stop reason: HOSPADM

## 2024-11-20 RX ORDER — ACETAMINOPHEN 325 MG/1
975 TABLET ORAL ONCE
Status: COMPLETED | OUTPATIENT
Start: 2024-11-20 | End: 2024-11-20

## 2024-11-20 RX ORDER — LIDOCAINE 40 MG/G
CREAM TOPICAL
Status: DISCONTINUED | OUTPATIENT
Start: 2024-11-20 | End: 2024-11-20 | Stop reason: HOSPADM

## 2024-11-20 RX ORDER — ACETAMINOPHEN 325 MG/1
650 TABLET ORAL EVERY 4 HOURS PRN
Qty: 50 TABLET | Refills: 0 | Status: SHIPPED | OUTPATIENT
Start: 2024-11-20

## 2024-11-20 RX ORDER — OXYCODONE HYDROCHLORIDE 5 MG/1
5-10 TABLET ORAL EVERY 4 HOURS PRN
Qty: 10 TABLET | Refills: 0 | Status: SHIPPED | OUTPATIENT
Start: 2024-11-20

## 2024-11-20 RX ORDER — PROPOFOL 10 MG/ML
INJECTION, EMULSION INTRAVENOUS PRN
Status: DISCONTINUED | OUTPATIENT
Start: 2024-11-20 | End: 2024-11-20

## 2024-11-20 RX ORDER — AMOXICILLIN 250 MG
1-2 CAPSULE ORAL 2 TIMES DAILY
Qty: 30 TABLET | Refills: 0 | Status: SHIPPED | OUTPATIENT
Start: 2024-11-20

## 2024-11-20 RX ORDER — ALBUTEROL SULFATE 0.83 MG/ML
2.5 SOLUTION RESPIRATORY (INHALATION) EVERY 4 HOURS PRN
Status: DISCONTINUED | OUTPATIENT
Start: 2024-11-20 | End: 2024-11-20 | Stop reason: HOSPADM

## 2024-11-20 RX ORDER — LIDOCAINE HYDROCHLORIDE 20 MG/ML
INJECTION, SOLUTION INFILTRATION; PERINEURAL PRN
Status: DISCONTINUED | OUTPATIENT
Start: 2024-11-20 | End: 2024-11-20

## 2024-11-20 RX ORDER — MEPERIDINE HYDROCHLORIDE 25 MG/ML
12.5 INJECTION INTRAMUSCULAR; INTRAVENOUS; SUBCUTANEOUS EVERY 5 MIN PRN
Status: DISCONTINUED | OUTPATIENT
Start: 2024-11-20 | End: 2024-11-20 | Stop reason: HOSPADM

## 2024-11-20 RX ORDER — OXYCODONE HYDROCHLORIDE 5 MG/1
5 TABLET ORAL
Status: CANCELLED | OUTPATIENT
Start: 2024-11-20

## 2024-11-20 RX ORDER — BUPIVACAINE HYDROCHLORIDE 5 MG/ML
INJECTION, SOLUTION PERINEURAL PRN
Status: DISCONTINUED | OUTPATIENT
Start: 2024-11-20 | End: 2024-11-20 | Stop reason: HOSPADM

## 2024-11-20 RX ORDER — FENTANYL CITRATE 50 UG/ML
50 INJECTION, SOLUTION INTRAMUSCULAR; INTRAVENOUS EVERY 5 MIN PRN
Status: DISCONTINUED | OUTPATIENT
Start: 2024-11-20 | End: 2024-11-20 | Stop reason: HOSPADM

## 2024-11-20 RX ORDER — ACETAMINOPHEN 325 MG/1
650 TABLET ORAL
Status: CANCELLED | OUTPATIENT
Start: 2024-11-20

## 2024-11-20 RX ORDER — CEFAZOLIN SODIUM/WATER 2 G/20 ML
2 SYRINGE (ML) INTRAVENOUS
Status: COMPLETED | OUTPATIENT
Start: 2024-11-20 | End: 2024-11-20

## 2024-11-20 RX ORDER — HEPARIN SODIUM 5000 [USP'U]/.5ML
5000 INJECTION, SOLUTION INTRAVENOUS; SUBCUTANEOUS
Status: COMPLETED | OUTPATIENT
Start: 2024-11-20 | End: 2024-11-20

## 2024-11-20 RX ORDER — HYDROMORPHONE HCL IN WATER/PF 6 MG/30 ML
0.2 PATIENT CONTROLLED ANALGESIA SYRINGE INTRAVENOUS EVERY 5 MIN PRN
Status: DISCONTINUED | OUTPATIENT
Start: 2024-11-20 | End: 2024-11-20 | Stop reason: HOSPADM

## 2024-11-20 RX ORDER — KETOROLAC TROMETHAMINE 30 MG/ML
INJECTION, SOLUTION INTRAMUSCULAR; INTRAVENOUS PRN
Status: DISCONTINUED | OUTPATIENT
Start: 2024-11-20 | End: 2024-11-20

## 2024-11-20 RX ORDER — DIPHENHYDRAMINE HCL 25 MG
25 CAPSULE ORAL EVERY 6 HOURS PRN
Status: DISCONTINUED | OUTPATIENT
Start: 2024-11-20 | End: 2024-11-20 | Stop reason: HOSPADM

## 2024-11-20 RX ORDER — NALOXONE HYDROCHLORIDE 0.4 MG/ML
0.1 INJECTION, SOLUTION INTRAMUSCULAR; INTRAVENOUS; SUBCUTANEOUS
Status: DISCONTINUED | OUTPATIENT
Start: 2024-11-20 | End: 2024-11-20 | Stop reason: HOSPADM

## 2024-11-20 RX ORDER — ACETAMINOPHEN 325 MG/1
975 TABLET ORAL ONCE
Status: DISCONTINUED | OUTPATIENT
Start: 2024-11-20 | End: 2024-11-20

## 2024-11-20 RX ORDER — ONDANSETRON 2 MG/ML
4 INJECTION INTRAMUSCULAR; INTRAVENOUS EVERY 30 MIN PRN
Status: DISCONTINUED | OUTPATIENT
Start: 2024-11-20 | End: 2024-11-20 | Stop reason: HOSPADM

## 2024-11-20 RX ORDER — ONDANSETRON 4 MG/1
4 TABLET, ORALLY DISINTEGRATING ORAL EVERY 30 MIN PRN
Status: DISCONTINUED | OUTPATIENT
Start: 2024-11-20 | End: 2024-11-20 | Stop reason: HOSPADM

## 2024-11-20 RX ORDER — LABETALOL HYDROCHLORIDE 5 MG/ML
10 INJECTION, SOLUTION INTRAVENOUS
Status: DISCONTINUED | OUTPATIENT
Start: 2024-11-20 | End: 2024-11-20 | Stop reason: HOSPADM

## 2024-11-20 RX ORDER — FENTANYL CITRATE 50 UG/ML
INJECTION, SOLUTION INTRAMUSCULAR; INTRAVENOUS PRN
Status: DISCONTINUED | OUTPATIENT
Start: 2024-11-20 | End: 2024-11-20

## 2024-11-20 RX ORDER — GABAPENTIN 300 MG/1
300 CAPSULE ORAL
Status: COMPLETED | OUTPATIENT
Start: 2024-11-20 | End: 2024-11-20

## 2024-11-20 RX ORDER — HYDROMORPHONE HCL IN WATER/PF 6 MG/30 ML
0.4 PATIENT CONTROLLED ANALGESIA SYRINGE INTRAVENOUS EVERY 5 MIN PRN
Status: DISCONTINUED | OUTPATIENT
Start: 2024-11-20 | End: 2024-11-20 | Stop reason: HOSPADM

## 2024-11-20 RX ORDER — LIDOCAINE HYDROCHLORIDE AND EPINEPHRINE 10; 10 MG/ML; UG/ML
INJECTION, SOLUTION INFILTRATION; PERINEURAL PRN
Status: DISCONTINUED | OUTPATIENT
Start: 2024-11-20 | End: 2024-11-20 | Stop reason: HOSPADM

## 2024-11-20 RX ORDER — MAGNESIUM SULFATE HEPTAHYDRATE 40 MG/ML
INJECTION, SOLUTION INTRAVENOUS PRN
Status: DISCONTINUED | OUTPATIENT
Start: 2024-11-20 | End: 2024-11-20

## 2024-11-20 RX ORDER — DEXAMETHASONE SODIUM PHOSPHATE 4 MG/ML
4 INJECTION, SOLUTION INTRA-ARTICULAR; INTRALESIONAL; INTRAMUSCULAR; INTRAVENOUS; SOFT TISSUE
Status: DISCONTINUED | OUTPATIENT
Start: 2024-11-20 | End: 2024-11-20 | Stop reason: HOSPADM

## 2024-11-20 RX ORDER — ONDANSETRON 2 MG/ML
INJECTION INTRAMUSCULAR; INTRAVENOUS PRN
Status: DISCONTINUED | OUTPATIENT
Start: 2024-11-20 | End: 2024-11-20

## 2024-11-20 RX ORDER — FENTANYL CITRATE 50 UG/ML
25 INJECTION, SOLUTION INTRAMUSCULAR; INTRAVENOUS EVERY 5 MIN PRN
Status: DISCONTINUED | OUTPATIENT
Start: 2024-11-20 | End: 2024-11-20 | Stop reason: HOSPADM

## 2024-11-20 RX ORDER — INDOCYANINE GREEN AND WATER 25 MG
2.5 KIT INJECTION ONCE
Status: COMPLETED | OUTPATIENT
Start: 2024-11-20 | End: 2024-11-20

## 2024-11-20 RX ORDER — DIPHENHYDRAMINE HYDROCHLORIDE 50 MG/ML
25 INJECTION INTRAMUSCULAR; INTRAVENOUS EVERY 6 HOURS PRN
Status: DISCONTINUED | OUTPATIENT
Start: 2024-11-20 | End: 2024-11-20 | Stop reason: HOSPADM

## 2024-11-20 RX ORDER — DEXAMETHASONE SODIUM PHOSPHATE 4 MG/ML
INJECTION, SOLUTION INTRA-ARTICULAR; INTRALESIONAL; INTRAMUSCULAR; INTRAVENOUS; SOFT TISSUE PRN
Status: DISCONTINUED | OUTPATIENT
Start: 2024-11-20 | End: 2024-11-20

## 2024-11-20 RX ORDER — IBUPROFEN 600 MG/1
600 TABLET, FILM COATED ORAL EVERY 6 HOURS PRN
Qty: 30 TABLET | Refills: 0 | Status: SHIPPED | OUTPATIENT
Start: 2024-11-20

## 2024-11-20 RX ORDER — GLYCOPYRROLATE 0.2 MG/ML
INJECTION, SOLUTION INTRAMUSCULAR; INTRAVENOUS PRN
Status: DISCONTINUED | OUTPATIENT
Start: 2024-11-20 | End: 2024-11-20

## 2024-11-20 RX ORDER — CEFAZOLIN SODIUM/WATER 2 G/20 ML
2 SYRINGE (ML) INTRAVENOUS SEE ADMIN INSTRUCTIONS
Status: DISCONTINUED | OUTPATIENT
Start: 2024-11-20 | End: 2024-11-20 | Stop reason: HOSPADM

## 2024-11-20 RX ADMIN — FENTANYL CITRATE 50 MCG: 50 INJECTION INTRAMUSCULAR; INTRAVENOUS at 07:36

## 2024-11-20 RX ADMIN — DEXMEDETOMIDINE HYDROCHLORIDE 4 MCG: 100 INJECTION, SOLUTION INTRAVENOUS at 08:55

## 2024-11-20 RX ADMIN — FENTANYL CITRATE 50 MCG: 50 INJECTION INTRAMUSCULAR; INTRAVENOUS at 07:55

## 2024-11-20 RX ADMIN — GABAPENTIN 300 MG: 300 CAPSULE ORAL at 06:26

## 2024-11-20 RX ADMIN — PHENYLEPHRINE HYDROCHLORIDE 100 MCG: 10 INJECTION INTRAVENOUS at 08:10

## 2024-11-20 RX ADMIN — DEXMEDETOMIDINE HYDROCHLORIDE 8 MCG: 100 INJECTION, SOLUTION INTRAVENOUS at 08:23

## 2024-11-20 RX ADMIN — Medication 200 MG: at 09:01

## 2024-11-20 RX ADMIN — Medication 2 G: at 07:22

## 2024-11-20 RX ADMIN — ROCURONIUM BROMIDE 50 MG: 50 INJECTION, SOLUTION INTRAVENOUS at 07:37

## 2024-11-20 RX ADMIN — LIDOCAINE HYDROCHLORIDE 100 MG: 20 INJECTION, SOLUTION INFILTRATION; PERINEURAL at 07:36

## 2024-11-20 RX ADMIN — PHENYLEPHRINE HYDROCHLORIDE 100 MCG: 10 INJECTION INTRAVENOUS at 08:21

## 2024-11-20 RX ADMIN — MIDAZOLAM 2 MG: 1 INJECTION INTRAMUSCULAR; INTRAVENOUS at 07:29

## 2024-11-20 RX ADMIN — ACETAMINOPHEN 975 MG: 325 TABLET ORAL at 06:26

## 2024-11-20 RX ADMIN — DEXMEDETOMIDINE HYDROCHLORIDE 8 MCG: 100 INJECTION, SOLUTION INTRAVENOUS at 08:49

## 2024-11-20 RX ADMIN — SODIUM CHLORIDE, POTASSIUM CHLORIDE, SODIUM LACTATE AND CALCIUM CHLORIDE: 600; 310; 30; 20 INJECTION, SOLUTION INTRAVENOUS at 06:26

## 2024-11-20 RX ADMIN — PROPOFOL 200 MG: 10 INJECTION, EMULSION INTRAVENOUS at 07:36

## 2024-11-20 RX ADMIN — ONDANSETRON 4 MG: 2 INJECTION INTRAMUSCULAR; INTRAVENOUS at 09:43

## 2024-11-20 RX ADMIN — HEPARIN SODIUM 5000 UNITS: 10000 INJECTION, SOLUTION INTRAVENOUS; SUBCUTANEOUS at 07:40

## 2024-11-20 RX ADMIN — FENTANYL CITRATE 50 MCG: 50 INJECTION INTRAMUSCULAR; INTRAVENOUS at 08:37

## 2024-11-20 RX ADMIN — PHENYLEPHRINE HYDROCHLORIDE 100 MCG: 10 INJECTION INTRAVENOUS at 08:01

## 2024-11-20 RX ADMIN — SODIUM CHLORIDE, POTASSIUM CHLORIDE, SODIUM LACTATE AND CALCIUM CHLORIDE: 600; 310; 30; 20 INJECTION, SOLUTION INTRAVENOUS at 07:29

## 2024-11-20 RX ADMIN — MAGNESIUM SULFATE HEPTAHYDRATE 2 G: 40 INJECTION, SOLUTION INTRAVENOUS at 07:52

## 2024-11-20 RX ADMIN — KETOROLAC TROMETHAMINE 15 MG: 30 INJECTION, SOLUTION INTRAMUSCULAR at 09:01

## 2024-11-20 RX ADMIN — ROCURONIUM BROMIDE 20 MG: 50 INJECTION, SOLUTION INTRAVENOUS at 08:09

## 2024-11-20 RX ADMIN — PHENYLEPHRINE HYDROCHLORIDE 100 MCG: 10 INJECTION INTRAVENOUS at 08:39

## 2024-11-20 RX ADMIN — PROCHLORPERAZINE EDISYLATE 5 MG: 5 INJECTION INTRAMUSCULAR; INTRAVENOUS at 09:56

## 2024-11-20 RX ADMIN — INDOCYANINE GREEN AND WATER 2.5 MG: KIT at 06:40

## 2024-11-20 RX ADMIN — PROPOFOL 200 MCG/KG/MIN: 10 INJECTION, EMULSION INTRAVENOUS at 07:36

## 2024-11-20 RX ADMIN — ONDANSETRON 4 MG: 2 INJECTION INTRAMUSCULAR; INTRAVENOUS at 08:55

## 2024-11-20 RX ADMIN — GLYCOPYRROLATE 0.2 MG: 0.2 INJECTION, SOLUTION INTRAMUSCULAR; INTRAVENOUS at 08:02

## 2024-11-20 RX ADMIN — DEXAMETHASONE SODIUM PHOSPHATE 4 MG: 4 INJECTION, SOLUTION INTRA-ARTICULAR; INTRALESIONAL; INTRAMUSCULAR; INTRAVENOUS; SOFT TISSUE at 07:45

## 2024-11-20 ASSESSMENT — ACTIVITIES OF DAILY LIVING (ADL)
ADLS_ACUITY_SCORE: 0

## 2024-11-20 NOTE — ANESTHESIA CARE TRANSFER NOTE
Patient: Vanesa Owen    Procedure: Procedure(s):  CHOLECYSTECTOMY, ROBOT-ASSISTED, LAPAROSCOPIC, USING DA TOYIN XI       Diagnosis: Gallstones [K80.20]  Diagnosis Additional Information: No value filed.    Anesthesia Type:   General     Note:    Oropharynx: oropharynx clear of all foreign objects  Level of Consciousness: awake  Oxygen Supplementation: face mask  Level of Supplemental Oxygen (L/min / FiO2): 8  Independent Airway: airway patency satisfactory and stable  Dentition: dentition unchanged  Vital Signs Stable: post-procedure vital signs reviewed and stable  Report to RN Given: handoff report given  Patient transferred to: PACU    Handoff Report: Identifed the Patient, Identified the Reponsible Provider, Reviewed the pertinent medical history, Discussed the surgical course, Reviewed Intra-OP anesthesia mangement and issues during anesthesia, Set expectations for post-procedure period and Allowed opportunity for questions and acknowledgement of understanding      Vitals:  Vitals Value Taken Time   /62 11/20/24 1015   Temp 36.2  C (97.2  F) 11/20/24 1015   Pulse 73 11/20/24 1018   Resp 16 11/20/24 1018   SpO2 96 % 11/20/24 1018   Vitals shown include unfiled device data.    Electronically Signed By: KIRSTIN Ayoub CRNA  November 20, 2024  10:50 AM

## 2024-11-20 NOTE — DISCHARGE INSTRUCTIONS
Same Day Surgery Discharge Instructions  Special Precautions After Surgery - Adult    It is not unusual to feel lightheaded or faint, up to 24 hours after surgery or while taking pain medication.  If you have these symptoms; sit for a few minutes before standing and have someone assist you when getting up.  You should rest and relax for the next 24 hours and must have someone stay with you for at least 24 hours after your discharge.  DO NOT DRIVE any vehicle or operate mechanical equipment for 24 hours following the end of your surgery.  DO NOT DRIVE while taking narcotic pain medications that have been prescribed by your physician.  If you had a limb operated on, you must be able to use it fully to drive.  DO NOT drink alcoholic beverages for 24 hours following surgery or while taking prescription pain medication.  Drink clear liquids (apple juice, ginger ale, broth, 7-Up, etc.).  Progress to your regular diet as you feel able.  Any questions call your physician and do not make important decisions for 24 hours.    Nausea and Vomiting: Nausea and vomiting can occur any time after receiving anesthesia. If you experience nausea and vomiting we encourage you to move to a clear liquid diet and advance your diet as tolerated. If nausea and vomiting do not improve within 12 hours please call the surgeon or present to the Emergency department.     Break-through Bleeding: If your experience bleeding from your surgical site apply pressure and additional dressing per nurse instruction. For simple problems such as a saturated dressing, you may need to reinforce the dressing with more gauze and tape and put slight pressure on the site. If bleeding does not subside contact the surgeon or present to the Emergency Department.    Post-op Infection: If you develop a fever of 100.4 or greater, have pus like drainage, redness, swelling or severe pain at the surgical site not alleviated with pain medications; please  contact the surgeon or present to the Emergency Department.     Medications:  Acetaminophen (Tylenol):  Next dose: 12:30 pm  Ibuprofen (Motrin, Advil):  Next dose: 3 pm.  Follow the instructions on the bottle.  __________________________________________________________________________________________________________________________________  IMPORTANT NUMBERS:    [facility] [place] Main Number:  [phone], [phone]  Pharmacy:  [phone]  Same Day Surgery:  [phone], for general post-op questions call Monday - Thursday until 8:30 p.m., Fridays until 6:00 p.m.   Mental Health Mobile Crisis line: [phone]                                                                        General Surgery:  [phone]

## 2024-11-20 NOTE — ANESTHESIA POSTPROCEDURE EVALUATION
Patient: Vanesa Owen    Procedure: Procedure(s):  CHOLECYSTECTOMY, ROBOT-ASSISTED, LAPAROSCOPIC, USING DA TOYIN XI       Anesthesia Type:  General    Note:  Disposition: Outpatient   Postop Pain Control: Uneventful            Sign Out: Well controlled pain   PONV: No   Neuro/Psych: Uneventful            Sign Out: Acceptable/Baseline neuro status   Airway/Respiratory: Uneventful            Sign Out: Acceptable/Baseline resp. status   CV/Hemodynamics: Uneventful            Sign Out: Acceptable CV status; No obvious hypovolemia; No obvious fluid overload   Other NRE: NONE   DID A NON-ROUTINE EVENT OCCUR? No           Last vitals:  Vitals Value Taken Time   /62 11/20/24 1015   Temp 36.2  C (97.2  F) 11/20/24 1015   Pulse 73 11/20/24 1018   Resp 16 11/20/24 1018   SpO2 96 % 11/20/24 1018   Vitals shown include unfiled device data.    Electronically Signed By: KIRSTIN Ayoub CRNA  November 20, 2024  10:51 AM

## 2024-11-20 NOTE — ANESTHESIA PROCEDURE NOTES
Airway       Patient location during procedure: OR       Procedure Start/Stop Times: 11/20/2024 7:39 AM  Staff -        Performed By: CRNA and SRNAIndications and Patient Condition       Indications for airway management: rula-procedural and airway protection       Induction type:intravenous       Mask difficulty assessment: 1 - vent by mask    Final Airway Details       Final airway type: endotracheal airway       Successful airway: Oral  Endotracheal Airway Details        ETT size (mm): 7.0       Cuffed: yes       Cuff volume (mL): 8       Successful intubation technique: video laryngoscopy       VL Blade Size: Whalen 3       Grade View of Cords: 2       Adjucts: stylet       Position: Right       Measured from: gums/teeth       Secured at (cm): 21       Bite block used: None    Post intubation assessment        Placement verified by: capnometry, equal breath sounds and chest rise        Number of attempts at approach: 1       Number of other approaches attempted: 0       Secured with: tape       Ease of procedure: easy       Dentition: Intact and Unchanged    Medication(s) Administered   Medication Administration Time: 11/20/2024 7:39 AM

## 2024-11-20 NOTE — INTERVAL H&P NOTE
"I have reviewed the surgical (or preoperative) H&P that is linked to this encounter, and examined the patient. There are no significant changes    Clinical Conditions Present on Arrival:  Clinically Significant Risk Factors Present on Admission                       # Obesity: Estimated body mass index is 32.03 kg/m  as calculated from the following:    Height as of 11/7/24: 1.626 m (5' 4\").    Weight as of 11/7/24: 84.6 kg (186 lb 9.6 oz).       "

## 2024-11-20 NOTE — OP NOTE
OPERATIVE REPORT - STAFF        Vanesa Owen   : 1970   Sex: female   MRN: [id number]  2024 9:06 AM         Procedures Performed  Robotic-assisted cholecystectomy    Indications: Vanesa Owen was seen in surgery clinic for evaluation of symptomatic cholelithiasis. After a discussion with the patient regarding therapeutic options, risks, and benefits, a robotic-assisted cholecystectomy was recommended. Patient was amenable to the proposed plan    Pre-operative Diagnosis: symptomatic cholelithiasis    Post-operative Diagnosis: same    Surgeon(s):  Joe Renteria MD  Assistant:  Alexandra Hernandes PA-C - Expert assistance was necessary for retraction and manipulation of the laparoscope during the procedure    Anesthesia: General    Procedure Details  The patient was seen in the Holding Room. The risks, benefits, indications, potential complications, treatment options, and expected outcomes were discussed with the patient. The possibilities of reaction to medication, bleeding, infection, the need for additional procedures, failure to diagnose a condition, and creating a complication requiring transfusion or further operation were discussed with the patient. The patient concurred with the proposed plan, giving informed consent.  The site of surgery was properly noted/marked. The patient was taken to the operating room, identified as Vanesa Owen and the procedure verified as robotic-assisted cholecystectomy. A Time Out was held and the above information confirmed.    The patient was placed in the supine position and general anesthetic was administered. Pre-operative antibiotics were administered.  The abdomen was prepped and draped in standard fashion.       An incision was made in the LUQ and, using a 5-mm optical entry trocar was placed under direct visualization to enter the abdomen. Following the smooth establishment of pneumoperitoneum, the abdomen was surveyed and no bleeding or  injury was identified. A total of three 8-mm robotic ports were placed under direct visualization - one to the left of the umbilicus and two right flank working ports. The 5 mm port was then upsized to an 8-mm robotic port. The patient was placed in reverse trendelenburg and the bed was airplaned to the patient's left. The robot was then docked.       Graspers were inserted and the gallbladder was grasped and retracted. A robotic hook cautery was used to dissect out the cystic duct and the cystic artery. This was done in such a manner that the cystic duct and artery were seen to course directly into the gallbladder and both structures were completely free from the liver bed. Critical view pictured below. With the critical view achieved, a clip applier was used to place surgical clips across both the cystic duct and the artery. Two clips were placed proximally and one distally on the duct. One clip was placed proximally on the cystic artery and the distal artery was ligated using bipolar cautery. The robotic hook cautery was used to transect the cystic duct and artery.  The gallbladder was then dissected free from the gallbladder bed using electrocautery. The clips were reinspected and seen to be in excellent position. There was no evidence of bleeding or bile leakage from either the cystic duct stump, cystic artery stumps or the gallbladder bed itself.      At this point the gallbladder was placed within an endocatch bag via the left upper quadrant port. The gallbladder bed was again inspected and hemostasis was assured. The gallbladder was then withdrawn from the abdomen and passed of the field as a specimen. The left upper quadrant port was closed intra-corporeally using a running 3-0 V loc suture. The robot was undocked.     The abdomen was desuflated and all ports were then removed with no evidence of bleeding. All port sites were then closed at the skin using interrupted 4-0 Monocryl subcuticular sutures.  Dermabond was then applied, and the patient was awakened and extubated by Anesthesia and transported to Postanesthesia Care Unit (PACU) in good condition    Instrument, sponge, and needle counts were correct at closure and at the conclusion of the case.    Findings: cholecystectomy performed without complication    Estimated Blood Loss:  5 ml        Drains: None        Total IV Fluids:  See anesthesia        Specimens: gallbladder    Complications:  None        Disposition: PACU          Joe Renteria MD

## 2024-11-21 LAB
PATH REPORT.COMMENTS IMP SPEC: NORMAL
PATH REPORT.COMMENTS IMP SPEC: NORMAL
PATH REPORT.FINAL DX SPEC: NORMAL
PATH REPORT.GROSS SPEC: NORMAL
PATH REPORT.MICROSCOPIC SPEC OTHER STN: NORMAL
PATH REPORT.RELEVANT HX SPEC: NORMAL
PHOTO IMAGE: NORMAL

## 2024-11-21 PROCEDURE — 88304 TISSUE EXAM BY PATHOLOGIST: CPT | Mod: 26 | Performed by: PATHOLOGY

## 2024-12-02 ENCOUNTER — TRANSFERRED RECORDS (OUTPATIENT)
Dept: HEALTH INFORMATION MANAGEMENT | Facility: CLINIC | Age: 54
End: 2024-12-02
Payer: COMMERCIAL

## 2024-12-05 ENCOUNTER — TRANSFERRED RECORDS (OUTPATIENT)
Dept: HEALTH INFORMATION MANAGEMENT | Facility: CLINIC | Age: 54
End: 2024-12-05
Payer: COMMERCIAL

## 2024-12-24 ENCOUNTER — TRANSFERRED RECORDS (OUTPATIENT)
Dept: HEALTH INFORMATION MANAGEMENT | Facility: CLINIC | Age: 54
End: 2024-12-24
Payer: COMMERCIAL

## 2024-12-27 ENCOUNTER — OFFICE VISIT (OUTPATIENT)
Dept: FAMILY MEDICINE | Facility: CLINIC | Age: 54
End: 2024-12-27
Payer: COMMERCIAL

## 2024-12-27 VITALS
HEIGHT: 63 IN | OXYGEN SATURATION: 96 % | SYSTOLIC BLOOD PRESSURE: 115 MMHG | DIASTOLIC BLOOD PRESSURE: 78 MMHG | RESPIRATION RATE: 20 BRPM | HEART RATE: 105 BPM | WEIGHT: 190 LBS | TEMPERATURE: 97.6 F | BODY MASS INDEX: 33.66 KG/M2

## 2024-12-27 DIAGNOSIS — B96.89 BACTERIAL VAGINOSIS: ICD-10-CM

## 2024-12-27 DIAGNOSIS — N76.0 BACTERIAL VAGINOSIS: ICD-10-CM

## 2024-12-27 DIAGNOSIS — B37.31 YEAST INFECTION OF THE VAGINA: ICD-10-CM

## 2024-12-27 DIAGNOSIS — R30.0 DYSURIA: Primary | ICD-10-CM

## 2024-12-27 LAB
ALBUMIN UR-MCNC: NEGATIVE MG/DL
APPEARANCE UR: CLEAR
BILIRUB UR QL STRIP: NEGATIVE
CLUE CELLS: PRESENT
COLOR UR AUTO: YELLOW
GLUCOSE UR STRIP-MCNC: NEGATIVE MG/DL
HGB UR QL STRIP: NEGATIVE
KETONES UR STRIP-MCNC: NEGATIVE MG/DL
LEUKOCYTE ESTERASE UR QL STRIP: NEGATIVE
NITRATE UR QL: NEGATIVE
PH UR STRIP: 5 [PH] (ref 5–7)
SP GR UR STRIP: <=1.005 (ref 1–1.03)
TRICHOMONAS, WET PREP: ABNORMAL
UROBILINOGEN UR STRIP-ACNC: 0.2 E.U./DL
WBC'S/HIGH POWER FIELD, WET PREP: ABNORMAL
YEAST, WET PREP: PRESENT

## 2024-12-27 PROCEDURE — G2211 COMPLEX E/M VISIT ADD ON: HCPCS

## 2024-12-27 PROCEDURE — 99214 OFFICE O/P EST MOD 30 MIN: CPT

## 2024-12-27 PROCEDURE — 87210 SMEAR WET MOUNT SALINE/INK: CPT

## 2024-12-27 PROCEDURE — 81003 URINALYSIS AUTO W/O SCOPE: CPT

## 2024-12-27 RX ORDER — METRONIDAZOLE 500 MG/1
500 TABLET ORAL 2 TIMES DAILY
Qty: 14 TABLET | Refills: 0 | Status: SHIPPED | OUTPATIENT
Start: 2024-12-27 | End: 2025-01-03

## 2024-12-27 RX ORDER — FLUCONAZOLE 150 MG/1
150 TABLET ORAL ONCE
Qty: 1 TABLET | Refills: 0 | Status: SHIPPED | OUTPATIENT
Start: 2024-12-27 | End: 2024-12-27

## 2024-12-27 ASSESSMENT — PAIN SCALES - GENERAL: PAINLEVEL_OUTOF10: MILD PAIN (2)

## 2024-12-27 NOTE — PROGRESS NOTES
Assessment & Plan     Dysuria  - No signs of UTI on UA. Discussed that vaginal infections can cause urinary symptoms as well. Will treat BV and yeast infection. Standing order for repeat UA placed in case symptoms do not improve.   - UA Macroscopic with reflex to Microscopic and Culture - Lab Collect  - Wet prep - lab collect  - UA Macroscopic with reflex to Microscopic and Culture - Lab Collect    Bacterial vaginosis  - Discussed potential causes and prevention of BV, will treat with 7 day course of metronidazole.   - Wet prep - Clinic Collect  - metroNIDAZOLE (FLAGYL) 500 MG tablet  Dispense: 14 tablet; Refill: 0    Yeast infection of the vagina  - Treatment sent to pharmacy. Advised to take after completion of metronidazole for BV.   - fluconazole (DIFLUCAN) 150 MG tablet  Dispense: 1 tablet; Refill: 0    Return to care if symptoms worsen or fail to improve.     Yunior Alexis is a 54 year old, presenting for the following health issues:  UTI (Going more often and burring since Marcelino )      12/27/2024     1:55 PM   Additional Questions   Roomed by weston   Accompanied by self     UTI    History of Present Illness       Reason for visit:  Uti?  Symptom onset:  1-3 days ago  Symptoms include:  Frequent urination r/o uti  Symptom intensity:  Moderate  Symptom progression:  Staying the same  Had these symptoms before:  Yes  Has tried/received treatment for these symptoms:  Yes  Previous treatment was successful:  Yes  Prior treatment description:  Antibiotics  What makes it worse:  No  What makes it better:  No   She is taking medications regularly.     Symptoms started [place]. Increased urinary frequency, urgency. No vaginal discharge.    Review of Systems  Constitutional, HEENT, cardiovascular, pulmonary, gi and gu systems are negative, except as otherwise noted.      Objective    /78 (BP Location: Left arm, Patient Position: Chair, Cuff Size: Adult Regular)   Pulse 105   Temp 97.6  F  "(36.4  C) (Tympanic)   Resp 20   Ht 1.6 m (5' 3\")   Wt 86.2 kg (190 lb)   SpO2 96%   BMI 33.66 kg/m    Body mass index is 33.66 kg/m .    Physical Exam   GENERAL: alert and no distress  NECK: no adenopathy, no asymmetry, masses, or scars  RESP: Normal work of breathing, no cough or audible wheezing  PSYCH: mentation appears normal, affect normal/bright    Results for orders placed or performed in visit on 12/27/24   UA Macroscopic with reflex to Microscopic and Culture - Lab Collect     Status: Normal    Specimen: Urine, Clean Catch   Result Value Ref Range    Color Urine Yellow Colorless, Straw, Light Yellow, Yellow    Appearance Urine Clear Clear    Glucose Urine Negative Negative mg/dL    Bilirubin Urine Negative Negative    Ketones Urine Negative Negative mg/dL    Specific Gravity Urine <=1.005 1.003 - 1.035    Blood Urine Negative Negative    pH Urine 5.0 5.0 - 7.0    Protein Albumin Urine Negative Negative mg/dL    Urobilinogen Urine 0.2 0.2, 1.0 E.U./dL    Nitrite Urine Negative Negative    Leukocyte Esterase Urine Negative Negative    Narrative    Microscopic not indicated   Wet prep - lab collect     Status: Abnormal    Specimen: Vagina; Swab   Result Value Ref Range    Trichomonas Absent Absent    Yeast Present (A) Absent    Clue Cells Present (A) Absent    WBCs/high power field 3+ (A) None       Signed Electronically by: KIRSTIN Earyl CNP    "

## 2025-01-15 ENCOUNTER — PATIENT OUTREACH (OUTPATIENT)
Dept: CARE COORDINATION | Facility: CLINIC | Age: 55
End: 2025-01-15
Payer: COMMERCIAL

## 2025-01-29 ENCOUNTER — PATIENT OUTREACH (OUTPATIENT)
Dept: CARE COORDINATION | Facility: CLINIC | Age: 55
End: 2025-01-29
Payer: COMMERCIAL

## 2025-01-29 ENCOUNTER — MYC REFILL (OUTPATIENT)
Dept: FAMILY MEDICINE | Facility: CLINIC | Age: 55
End: 2025-01-29
Payer: COMMERCIAL

## 2025-01-29 DIAGNOSIS — E78.9 BORDERLINE HIGH CHOLESTEROL: ICD-10-CM

## 2025-01-29 DIAGNOSIS — K31.89 GASTRIC HYPERACIDITY: ICD-10-CM

## 2025-01-29 DIAGNOSIS — F32.1 MODERATE MAJOR DEPRESSION (H): ICD-10-CM

## 2025-01-29 DIAGNOSIS — E66.01 CLASS 3 SEVERE OBESITY WITH SERIOUS COMORBIDITY AND BODY MASS INDEX (BMI) OF 40.0 TO 44.9 IN ADULT, UNSPECIFIED OBESITY TYPE (H): ICD-10-CM

## 2025-01-29 DIAGNOSIS — F33.41 RECURRENT MAJOR DEPRESSIVE DISORDER, IN PARTIAL REMISSION: ICD-10-CM

## 2025-01-29 DIAGNOSIS — F41.9 ANXIETY: ICD-10-CM

## 2025-01-29 DIAGNOSIS — F34.1 DYSTHYMIA: ICD-10-CM

## 2025-01-29 DIAGNOSIS — E66.813 CLASS 3 SEVERE OBESITY WITH SERIOUS COMORBIDITY AND BODY MASS INDEX (BMI) OF 40.0 TO 44.9 IN ADULT, UNSPECIFIED OBESITY TYPE (H): ICD-10-CM

## 2025-01-29 DIAGNOSIS — Z82.49 FAMILY HISTORY OF ARTERIOSCLEROTIC CARDIOVASCULAR DISEASE: ICD-10-CM

## 2025-01-29 RX ORDER — ATORVASTATIN CALCIUM 10 MG/1
10 TABLET, FILM COATED ORAL DAILY
Qty: 90 TABLET | Refills: 2 | Status: SHIPPED | OUTPATIENT
Start: 2025-01-29

## 2025-01-29 RX ORDER — BUPROPION HYDROCHLORIDE 150 MG/1
150 TABLET ORAL EVERY MORNING
Qty: 90 TABLET | Refills: 2 | OUTPATIENT
Start: 2025-01-29

## 2025-01-30 RX ORDER — BUSPIRONE HYDROCHLORIDE 5 MG/1
TABLET ORAL
Qty: 180 TABLET | Refills: 1 | Status: SHIPPED | OUTPATIENT
Start: 2025-01-30

## 2025-01-30 RX ORDER — SERTRALINE HYDROCHLORIDE 100 MG/1
200 TABLET, FILM COATED ORAL DAILY
Qty: 180 TABLET | Refills: 1 | Status: SHIPPED | OUTPATIENT
Start: 2025-01-30

## 2025-02-07 ENCOUNTER — TRANSFERRED RECORDS (OUTPATIENT)
Dept: HEALTH INFORMATION MANAGEMENT | Facility: CLINIC | Age: 55
End: 2025-02-07
Payer: COMMERCIAL

## 2025-02-13 ENCOUNTER — OFFICE VISIT (OUTPATIENT)
Dept: OPTOMETRY | Facility: CLINIC | Age: 55
End: 2025-02-13
Payer: COMMERCIAL

## 2025-02-13 DIAGNOSIS — Z01.00 EXAMINATION OF EYES AND VISION: Primary | ICD-10-CM

## 2025-02-13 DIAGNOSIS — H52.223 REGULAR ASTIGMATISM OF BOTH EYES: ICD-10-CM

## 2025-02-13 DIAGNOSIS — H52.4 PRESBYOPIA: ICD-10-CM

## 2025-02-13 DIAGNOSIS — H04.123 DRY EYE SYNDROME OF BOTH EYES: ICD-10-CM

## 2025-02-13 DIAGNOSIS — L71.8 OCULAR ROSACEA: ICD-10-CM

## 2025-02-13 DIAGNOSIS — D31.41 IRIS NEVUS, RIGHT: ICD-10-CM

## 2025-02-13 ASSESSMENT — KERATOMETRY
OD_K1POWER_DIOPTERS: 43.75
OS_K2POWER_DIOPTERS: 44.00
OD_AXISANGLE2_DEGREES: 051
OD_K2POWER_DIOPTERS: 44.50
OD_AXISANGLE_DEGREES: 141
OS_K1POWER_DIOPTERS: 43.50
OS_AXISANGLE2_DEGREES: 078
OS_AXISANGLE_DEGREES: 168

## 2025-02-13 ASSESSMENT — REFRACTION_MANIFEST
OD_SPHERE: -1.50
OS_ADD: +2.25
OS_SPHERE: -0.50
OD_ADD: +2.25
OD_CYLINDER: +1.25
OS_AXIS: 165
OD_AXIS: 160
OS_CYLINDER: +1.25

## 2025-02-13 ASSESSMENT — VISUAL ACUITY
OD_SC: 20/30
OD_SC+: -2
OS_SC+: -2
OS_SC: 20/25
OD_CC: 20/20
OS_CC: 20/20
METHOD: SNELLEN - LINEAR
OS_CC+: -1
OD_CC+: -1
CORRECTION_TYPE: GLASSES
OD_CC: 20/20
OS_CC: 20/20

## 2025-02-13 ASSESSMENT — REFRACTION_WEARINGRX
OS_ADD: +2.25
OS_AXIS: 165
OD_AXIS: 160
OS_CYLINDER: +1.25
OD_CYLINDER: +1.25
OD_ADD: +2.25
OS_SPHERE: -0.50
OD_SPHERE: -1.50

## 2025-02-13 ASSESSMENT — TONOMETRY
OD_IOP_MMHG: 16.7
IOP_METHOD: ICARE
OS_IOP_MMHG: 17.2

## 2025-02-13 ASSESSMENT — EXTERNAL EXAM - LEFT EYE: OS_EXAM: ROSACEA

## 2025-02-13 ASSESSMENT — EXTERNAL EXAM - RIGHT EYE: OD_EXAM: ROSACEA

## 2025-02-13 ASSESSMENT — CUP TO DISC RATIO
OD_RATIO: 0.2
OS_RATIO: 0.2

## 2025-02-13 ASSESSMENT — SLIT LAMP EXAM - LIDS
COMMENTS: DERMATOCHALASIS- NOT VIS SIG, MEIBOMIAN GLAND DYSFUNCTION
COMMENTS: DERMATOCHALASIS- NOT VIS SIG, MEIBOMIAN GLAND DYSFUNCTION

## 2025-02-13 ASSESSMENT — CONF VISUAL FIELD
OS_INFERIOR_TEMPORAL_RESTRICTION: 0
OS_NORMAL: 1
OS_SUPERIOR_NASAL_RESTRICTION: 0
OD_INFERIOR_NASAL_RESTRICTION: 0
OD_SUPERIOR_NASAL_RESTRICTION: 0
OD_SUPERIOR_TEMPORAL_RESTRICTION: 0
OD_INFERIOR_TEMPORAL_RESTRICTION: 0
OS_SUPERIOR_TEMPORAL_RESTRICTION: 0
OD_NORMAL: 1
OS_INFERIOR_NASAL_RESTRICTION: 0

## 2025-02-13 NOTE — PATIENT INSTRUCTIONS
Eyeglass prescription given.    Referral to the [facility] eye clinic for baseline photos.     Heat to the eyes daily for 10-15 minutes nightly with warm washcloth or reusable gel masks from the pharmacy or  SAVO heat masks can be purchased at Amazon.     Artificial tears- 1 drop both eyes once before bedtime and once in the morning then 2 x day as needed.       We Love Eyes- tea tree oil eyelid  foaming cleanser to cleanse eyelids 2 x day or eye make up oil at night.  [email or web address] for amazon .com.     Return in 1 year for a complete eye exam or sooner if needed.    Joel Buck, OD    The affects of the dilating drops last for 4- 6 hours.  You will be more sensitive to light and vision will be blurry up close.  Do not drive if you do not feel comfortable.  Mydriatic sunglasses were given if needed.    There is a combination of three treatments which can greatly improve symptoms of dry eyes.     Artificial tears  Heat (eyes closed)  Eyelid and eyelash cleansing (eyes closed)     Use one drop of artificial tears both eyes 4 x daily.  Once in the morning, lunch, dinner and bedtime. Continue to use the drops regardless if your eyes are comfortable or not.  Artificial tears work best as a preventative and not as well after your eyes are starting to bother you.  It may take 4- 6 weeks of using the drops before you notice improvement.  If after that time you are still having problems schedule an appointment for an evaluation and discussion of different treatments such as Restasis or Xiidra.  Dry eyes are a chronic condition and you may have more symptoms at certain times of the year.    Excess tearing can be due to the right tears not working properly or a blockage in the tear drainage system.  You can try using artificial tears 1 drop both eyes 4 x day.  If the excess tearing is bothersome after 4-6 weeks of treatment then we can send you for further testing.  This would entail a referral to our  oculoplastic specialist Dr. Anival Cortés at the [facility]-[phone].    Recommended brands are:    Systane Complete  Systane Ultra  Systane Balance  Refresh Advanced Optive  Refresh Relieva  Blink    Recommended brands for contact lens wearers are:    Systane contacts  Refresh contacts  Blink contacts    If you are using drops more than 4 x day or have sensitivities to preservatives I recommend non preserved artificial tears.  These come in 1 use vials.  They can be used every 1-2 hours.  Do not reuse the vials.    Recommended brands are:    Refresh Optive Martin-3  Systane- preservative free  Refresh-  preservative free  Blink- preservative free    Gels or ointment can be used at night.    Recommended brands are:    Systane Gel  Refresh Gel  Blink Gel  Genteal Gel    Systane night time (ointment)  Refresh Celluvisc  Refresh PM (ointment)    Optase dry eye spray.  Spray to eyelids 3-4 x daily.  This can be purchased on Amazon.      Visine, Clear Eyes or Murine (drops that get the red out) can irritate the eyes and cause a rebound effect where the eyes become more red and you end up using more drops.  Avoid drops containing tetrahydrozoline, naphazoline, phenylephrine, oxymetazoline.      OTC Lumify is a newer product that gives immediate redness relief without the rebound effect.  Use as needed to take the redness out.    Artificial tears may be used with other drops (such as allergy, glaucoma, antibiotics) around the same time.  Be sure to wait 5 minutes in between drops.    Heat to the eyelids can also improve your symptoms of dry eyes.  Pee heat masks can be purchased at Amazon to be used nightly for 10-15 minutes.  Other options are gel masks that can be put in the microwave and purchased at most pharmacies.      Tea Tree Oil eyelid cleansers recommended are Ocusoft Oust foam cleanser to cleanse eyelids/lashes at night and in the am. Other options are Blephadex or Cliradex eyelid wipes.  KEEP EYES  CLOSED when using these products.  These can be purchased on amazon.com   A good product for make up remover with tea tree oil is LightCyber.  This can be found at www.[email or web address].com or Globant.    Other good eyelid cleansers have hypochlorous which removes excess bacteria and is safe around the eyes. Products are Avenova, Ocusoft Hypochlor or Heyedrate. Spray solution onto cotton pad, close eyes and gently apply to eyelids and eyelashes using side to side motion.  You can also KEEP EYES CLOSED spray and rub into eyelashes.  You do not need to rinse it off. Use morning and evening. These products can be found on Amazon.  You can check with your local pharmacy and see if they can order if for you if they don't have it.    Other brands of eyelid cleansing wipes are:    Ocusoft wipes  Systane wipes    A great eye make up line is [email or web address]      Optometry Providers       Clinic Locations                                 Telephone Number   Dr. Lindsay Mata [place]    [facility] [phone]     [facility] Hours:                [facility] Hours:       [facility] Hours:   [address]   [address]     [address] [place] [address]   [place] [address]    [place] [address]  Phone: [phone]                    Phone: [phone]     Phone: [phone]                      Monday 8:00-6:00                          Monday 8:00-6:00                          Monday 8:00-6:00              Tuesday 8:00-6:00                          Tuesday 8:00-6:00                          Tuesday 8:00-6:00              Wednesday 8:00-6:00                  Wednesday 8:00-6:00                   Wednesday 8:00-6:00      Thursday 8:00-6:00                        Thursday 8:00-6:00                          Thursday 8:00-6:00            Friday 8:00-5:00                              Friday 8:00-5:00                              Friday 8:00-5:00    [facility] Hours:   [address] [place] [address]  [phone]    Monday 9:00-6:00  Tuesday 9:00-6:00  Wednesday 9:00-6:00  Thursday 9:00-6:00  Friday 9:00-5:00  As always, Thank you for trusting us with your health care needs!

## 2025-02-13 NOTE — PROGRESS NOTES
Chief Complaint   Patient presents with    Annual Eye Exam      Last Eye Exam: 8-2-2023  Dilated Previously: Yes    What are you currently using to see?  glasses    Distance Vision Acuity: Satisfied with vision    Near Vision Acuity: Not satisfied     Eye Comfort: good  Do you use eye drops? : Yes: systane as needed  Occupation or Hobbies: nicu nurse     History of iris nevus right eye - was referred for photos last year. This did not happen- patient would like referral again.    History of Acne Rosacea- has been seen by dermatologist      Cristiane Gleason Optometric Assistant, A.B.O.C.      Medical, surgical and family histories reviewed and updated 2/13/2025.       OBJECTIVE: See Ophthalmology exam    ASSESSMENT:    ICD-10-CM    1. Examination of eyes and vision  Z01.00 EYE EXAM (SIMPLE-NONBILLABLE)      2. Presbyopia  H52.4 REFRACTION      3. Regular astigmatism of both eyes  H52.223 REFRACTION      4. Iris nevus, right  D31.41 EYE EXAM (SIMPLE-NONBILLABLE)     Adult Eye  Referral      5. Ocular rosacea  L71.8       6. Dry eye syndrome of both eyes  H04.123 EYE EXAM (SIMPLE-NONBILLABLE)          PLAN:     Patient Instructions   Eyeglass prescription given.    Referral to the [facility] eye clinic for baseline photos.     Heat to the eyes daily for 10-15 minutes nightly with warm washcloth or reusable gel masks from the pharmacy or  Nintex heat masks can be purchased at Amazon.     Artificial tears- 1 drop both eyes once before bedtime and once in the morning then 2 x day as needed.       We Love Eyes- tea tree oil eyelid  foaming cleanser to cleanse eyelids 2 x day or eye make up oil at night.  [email or web address] for amazon .com.     Return in 1 year for a complete eye exam or sooner if needed.    Joel Buck, OD

## 2025-02-13 NOTE — LETTER
2/13/2025      Vanesa Owen  [address] [address]      Dear Colleague,    Thank you for referring your patient, Vanesa Owen, to the [facility]. Please see a copy of my visit note below.    Chief Complaint   Patient presents with     Annual Eye Exam      Last Eye Exam: 8-2-2023  Dilated Previously: Yes    What are you currently using to see?  glasses    Distance Vision Acuity: Satisfied with vision    Near Vision Acuity: Not satisfied     Eye Comfort: good  Do you use eye drops? : Yes: systane as needed  Occupation or Hobbies: nicu nurse     History of iris nevus right eye - was referred for photos last year. This did not happen- patient would like referral again.    History of Acne Rosacea- has been seen by dermatologist      Cristiane Gleason Optometric Assistant, A.B.O.C.      Medical, surgical and family histories reviewed and updated 2/13/2025.       OBJECTIVE: See Ophthalmology exam    ASSESSMENT:    ICD-10-CM    1. Examination of eyes and vision  Z01.00 EYE EXAM (SIMPLE-NONBILLABLE)      2. Presbyopia  H52.4 REFRACTION      3. Regular astigmatism of both eyes  H52.223 REFRACTION      4. Iris nevus, right  D31.41 EYE EXAM (SIMPLE-NONBILLABLE)     Adult Eye  Referral      5. Ocular rosacea  L71.8       6. Dry eye syndrome of both eyes  H04.123 EYE EXAM (SIMPLE-NONBILLABLE)          PLAN:     Patient Instructions   Eyeglass prescription given.    Referral to the [facility] eye clinic for baseline photos.     Heat to the eyes daily for 10-15 minutes nightly with warm washcloth or reusable gel masks from the pharmacy or  Pee heat masks can be purchased at Amazon.     Artificial tears- 1 drop both eyes once before bedtime and once in the morning then 2 x day as needed.       We Love Eyes- tea tree oil eyelid  foaming cleanser to cleanse eyelids 2 x day or eye make up oil at night.  [email or web address] for amazon .com.     Return in 1  year for a complete eye exam or sooner if needed.    Joel Buck, OD            Again, thank you for allowing me to participate in the care of your patient.        Sincerely,        Joel Buck, OD    Electronically signed

## 2025-02-17 ENCOUNTER — VIRTUAL VISIT (OUTPATIENT)
Dept: ENDOCRINOLOGY | Facility: CLINIC | Age: 55
End: 2025-02-17
Payer: COMMERCIAL

## 2025-02-17 VITALS — WEIGHT: 175 LBS | BODY MASS INDEX: 29.88 KG/M2 | HEIGHT: 64 IN

## 2025-02-17 DIAGNOSIS — E66.813 CLASS 3 SEVERE OBESITY WITH SERIOUS COMORBIDITY AND BODY MASS INDEX (BMI) OF 40.0 TO 44.9 IN ADULT, UNSPECIFIED OBESITY TYPE (H): Primary | ICD-10-CM

## 2025-02-17 DIAGNOSIS — E66.01 CLASS 3 SEVERE OBESITY WITH SERIOUS COMORBIDITY AND BODY MASS INDEX (BMI) OF 40.0 TO 44.9 IN ADULT, UNSPECIFIED OBESITY TYPE (H): Primary | ICD-10-CM

## 2025-02-17 PROCEDURE — 98005 SYNCH AUDIO-VIDEO EST LOW 20: CPT

## 2025-02-17 PROCEDURE — G2211 COMPLEX E/M VISIT ADD ON: HCPCS | Mod: 95

## 2025-02-17 ASSESSMENT — PAIN SCALES - GENERAL: PAINLEVEL_OUTOF10: MILD PAIN (3)

## 2025-02-17 NOTE — LETTER
2025       RE: Vanesa Owen  [address]  [place] [address]     Dear Colleague,    Thank you for referring your patient, Vanesa Owen, to the [facility]NAG[facility]INIC [place] [facility]. Please see a copy of my visit note below.    Virtual Visit Details    Type of service:  Video Visit     Originating Location (pt. Location): Home    Distant Location (provider location):  On-site  Platform used for Video Visit: [facility] Medical Weight Management Note     Vanesa Owen  MRN:  [id number]  :  1970  MAIKOL:  2025    Dear Gabi Sotelo PA-C,    I had the pleasure of seeing your patient Vanesa Owen. She is a 54 year old female who I am continuing to see for treatment of obesity related to:        2024    11:32 AM   --   I have the following health issues associated with obesity High Cholesterol    Osteoarthritis (joint disease)   I have the following symptoms associated with obesity Knee Pain    Depression    Back Pain    Fatigue    Hip Pain       Assessment & Plan  Problem List Items Addressed This Visit       Class 3 severe obesity with serious comorbidity and body mass index (BMI) of 40.0 to 44.9 in adult (H) - Primary    Relevant Medications    COMPOUNDED NON-CONTROLLED SUBSTANCE (CMPD RX) - PHARMACY TO MIX COMPOUNDED MEDICATION        Transition to compounded semaglutide 2.4mg once weekly.   Suzie Bright [facility] in 2 months   Joana Swann PA-C in 4 mongh       INTERVAL HISTORY:  New MWM on 4/15/2024  Was started on Ozempic and then transitioned to Wegovy - starting BMI 40.21 (2022). Was able to lose 40lbs, but has sinse gained 10lbs with discontinuation of wegovy 3 months ago due to insurance coverage.   [employer] employee - restarted Wegovy         Overall is really happy with her weight. Has a goal weight around 150lbs.     S/p cheyenne went well. Pain has resolved overall.      Anti-obesity medication history    Current:   Wegovy 1.7mg - has 2 weeks left. Has been helpful, been good with dose increase. No side effects. Is getting hungrier by the end of the week.     Is unsure what she would like to do in terms of transitioning off of GLP-1. Is overall very frustrated is no longer covering this medication since it has been such a good fit.     Past/Failed/contraindicated:   Phentermine - was on for around 1-2 months. Side effects of tachycardia and was discontinued.       CURRENT WEIGHT:   175 lbs 0 oz    Initial Weight (lbs): 227 lbs  Last Visits Weight: 84.6 kg (186 lb 9.6 oz)  Cumulative weight loss (lbs): 52  Weight Loss Percentage: 22.91%    Wt Readings from Last 5 Encounters:   02/17/25 79.4 kg (175 lb)   12/27/24 86.2 kg (190 lb)   12/06/24 84.8 kg (187 lb)   11/07/24 84.6 kg (186 lb 9.6 oz)   11/07/24 84.8 kg (187 lb)             2/17/2025     2:17 PM   Changes and Difficulties   I have made the following changes to my diet since my last visit: no sugar cola's, less sweets   With regards to my diet, I am still struggling with: getting my veggies and fruits in   I have made the following changes to my activity/exercise since my last visit: I bought a treadmill!   With regards to my activity/exercise, I am still struggling with: Making myself a priority.         MEDICATIONS:   Current Outpatient Medications   Medication Sig Dispense Refill     COMPOUNDED NON-CONTROLLED SUBSTANCE (CMPD RX) - PHARMACY TO MIX COMPOUNDED MEDICATION Compounded semaglutide 2.4mg subcutaneous injection once weekly. Ok to compound due to shortages 1 mL 1     acetaminophen (TYLENOL) 325 MG tablet Take 2 tablets (650 mg) by mouth every 4 hours as needed for mild pain. 50 tablet 0     adapalene (DIFFERIN) 0.3 % external gel        atorvastatin (LIPITOR) 10 MG tablet Take 1 tablet (10 mg) by mouth daily. 90 tablet 2     buPROPion (WELLBUTRIN XL) 150 MG 24 hr tablet Take 1 tablet (150 mg) by mouth every  "morning 90 tablet 2     busPIRone (BUSPAR) 5 MG tablet TAKE ONE TABLET BY MOUTH TWICE A  tablet 1     famotidine (PEPCID) 20 MG tablet Take 1 tablet (20 mg) by mouth 2 times daily as needed (breakthrough acid reflux) 60 tablet 11     fluticasone (FLONASE) 50 MCG/ACT nasal spray INSTILL ONE SPRAY INTO BOTH NOSTRILS DAILY 16 g 3     ibuprofen (ADVIL/MOTRIN) 600 MG tablet Take 1 tablet (600 mg) by mouth every 6 hours as needed for other (mild and/or inflammatory pain). 30 tablet 0     levonorgestrel (MIRENA) 20 MCG/DAY IUD 1 each (20 mcg) by Intrauterine route continuous       MULTI-VITAMIN OR TABS 1 TABLET DAILY       omeprazole (PRILOSEC) 20 MG DR capsule TAKE ONE CAPSULE BY MOUTH ONCE DAILY 30 TO 60 MINUTES BEFORE A MEAL 90 capsule 1     Semaglutide-Weight Management (WEGOVY) 1.7 MG/0.75ML pen Inject 1.7 mg subcutaneously once a week. 3 mL 3     sertraline (ZOLOFT) 100 MG tablet Take 2 tablets (200 mg) by mouth daily. 180 tablet 1     tretinoin (RETIN-A) 0.025 % external cream              2/17/2025     2:17 PM   Weight Loss Medication History Reviewed With Patient   Which weight loss medications are you currently taking on a regular basis? Wegovy   Are you having any side effects from the weight loss medication that we have prescribed you? No         Objective   Ht 1.626 m (5' 4.02\")   Wt 79.4 kg (175 lb)   BMI 30.02 kg/m        PHYSICAL EXAM:  GENERAL: alert and no distress  EYES: Eyes grossly normal to inspection.  No discharge or erythema, or obvious scleral/conjunctival abnormalities.  RESP: No audible wheeze, cough, or visible cyanosis.    SKIN: Visible skin clear. No significant rash, abnormal pigmentation or lesions.  NEURO: Cranial nerves grossly intact.  Mentation and speech appropriate for age.  PSYCH: Appropriate affect, tone, and pace of words        Sincerely,    Joana Rockwell PA-C      26 minutes spent by me on the date of the encounter doing chart review, history and exam, documentation " and further activities per the note    The longitudinal plan of care for the diagnosis(es)/condition(s) as documented were addressed during this visit. Due to the added complexity in care, I will continue to support Vanesa in the subsequent management and with ongoing continuity of care.      Again, thank you for allowing me to participate in the care of your patient.      Sincerely,    Joana Rockwell PA-C

## 2025-02-17 NOTE — PROGRESS NOTES
Virtual Visit Details    Type of service:  Video Visit     Originating Location (pt. Location): Home    Distant Location (provider location):  On-site  Platform used for Video Visit: [facility] Medical Weight Management Note     Vanesa Owen  MRN:  [id number]  :  1970  MAIKOL:  2025    Dear Gabi Sotelo PA-C,    I had the pleasure of seeing your patient Vanesa Owen. She is a 54 year old female who I am continuing to see for treatment of obesity related to:        2024    11:32 AM   --   I have the following health issues associated with obesity High Cholesterol    Osteoarthritis (joint disease)   I have the following symptoms associated with obesity Knee Pain    Depression    Back Pain    Fatigue    Hip Pain       Assessment & Plan   Problem List Items Addressed This Visit       Class 3 severe obesity with serious comorbidity and body mass index (BMI) of 40.0 to 44.9 in adult (H) - Primary    Relevant Medications    COMPOUNDED NON-CONTROLLED SUBSTANCE (CMPD RX) - PHARMACY TO MIX COMPOUNDED MEDICATION        Transition to compounded semaglutide 2.4mg once weekly.   Suzie Bright [facility] in 2 months   Joana Swann PA-C in 4 mongh       INTERVAL HISTORY:  New MWM on 4/15/2024  Was started on Ozempic and then transitioned to Wegovy - starting BMI 40.21 (2022). Was able to lose 40lbs, but has sinse gained 10lbs with discontinuation of wegovy 3 months ago due to insurance coverage.   [employer] employee - restarted Wegovy         Overall is really happy with her weight. Has a goal weight around 150lbs.     S/p cheyenne went well. Pain has resolved overall.     Anti-obesity medication history    Current:   Wegovy 1.7mg - has 2 weeks left. Has been helpful, been good with dose increase. No side effects. Is getting hungrier by the end of the week.     Is unsure what she would like to do in terms of transitioning off of GLP-1. Is overall very frustrated is no longer covering this medication  since it has been such a good fit.     Past/Failed/contraindicated:   Phentermine - was on for around 1-2 months. Side effects of tachycardia and was discontinued.       CURRENT WEIGHT:   175 lbs 0 oz    Initial Weight (lbs): 227 lbs  Last Visits Weight: 84.6 kg (186 lb 9.6 oz)  Cumulative weight loss (lbs): 52  Weight Loss Percentage: 22.91%    Wt Readings from Last 5 Encounters:   02/17/25 79.4 kg (175 lb)   12/27/24 86.2 kg (190 lb)   12/06/24 84.8 kg (187 lb)   11/07/24 84.6 kg (186 lb 9.6 oz)   11/07/24 84.8 kg (187 lb)             2/17/2025     2:17 PM   Changes and Difficulties   I have made the following changes to my diet since my last visit: no sugar cola's, less sweets   With regards to my diet, I am still struggling with: getting my veggies and fruits in   I have made the following changes to my activity/exercise since my last visit: I bought a treadmill!   With regards to my activity/exercise, I am still struggling with: Making myself a priority.         MEDICATIONS:   Current Outpatient Medications   Medication Sig Dispense Refill    COMPOUNDED NON-CONTROLLED SUBSTANCE (CMPD RX) - PHARMACY TO MIX COMPOUNDED MEDICATION Compounded semaglutide 2.4mg subcutaneous injection once weekly. Ok to compound due to shortages 1 mL 1    acetaminophen (TYLENOL) 325 MG tablet Take 2 tablets (650 mg) by mouth every 4 hours as needed for mild pain. 50 tablet 0    adapalene (DIFFERIN) 0.3 % external gel       atorvastatin (LIPITOR) 10 MG tablet Take 1 tablet (10 mg) by mouth daily. 90 tablet 2    buPROPion (WELLBUTRIN XL) 150 MG 24 hr tablet Take 1 tablet (150 mg) by mouth every morning 90 tablet 2    busPIRone (BUSPAR) 5 MG tablet TAKE ONE TABLET BY MOUTH TWICE A  tablet 1    famotidine (PEPCID) 20 MG tablet Take 1 tablet (20 mg) by mouth 2 times daily as needed (breakthrough acid reflux) 60 tablet 11    fluticasone (FLONASE) 50 MCG/ACT nasal spray INSTILL ONE SPRAY INTO BOTH NOSTRILS DAILY 16 g 3    ibuprofen  "(ADVIL/MOTRIN) 600 MG tablet Take 1 tablet (600 mg) by mouth every 6 hours as needed for other (mild and/or inflammatory pain). 30 tablet 0    levonorgestrel (MIRENA) 20 MCG/DAY IUD 1 each (20 mcg) by Intrauterine route continuous      MULTI-VITAMIN OR TABS 1 TABLET DAILY      omeprazole (PRILOSEC) 20 MG DR capsule TAKE ONE CAPSULE BY MOUTH ONCE DAILY 30 TO 60 MINUTES BEFORE A MEAL 90 capsule 1    Semaglutide-Weight Management (WEGOVY) 1.7 MG/0.75ML pen Inject 1.7 mg subcutaneously once a week. 3 mL 3    sertraline (ZOLOFT) 100 MG tablet Take 2 tablets (200 mg) by mouth daily. 180 tablet 1    tretinoin (RETIN-A) 0.025 % external cream              2/17/2025     2:17 PM   Weight Loss Medication History Reviewed With Patient   Which weight loss medications are you currently taking on a regular basis? Wegovy   Are you having any side effects from the weight loss medication that we have prescribed you? No         Objective    Ht 1.626 m (5' 4.02\")   Wt 79.4 kg (175 lb)   BMI 30.02 kg/m        PHYSICAL EXAM:  GENERAL: alert and no distress  EYES: Eyes grossly normal to inspection.  No discharge or erythema, or obvious scleral/conjunctival abnormalities.  RESP: No audible wheeze, cough, or visible cyanosis.    SKIN: Visible skin clear. No significant rash, abnormal pigmentation or lesions.  NEURO: Cranial nerves grossly intact.  Mentation and speech appropriate for age.  PSYCH: Appropriate affect, tone, and pace of words        Sincerely,    Joana Rockwell PA-C      26 minutes spent by me on the date of the encounter doing chart review, history and exam, documentation and further activities per the note    The longitudinal plan of care for the diagnosis(es)/condition(s) as documented were addressed during this visit. Due to the added complexity in care, I will continue to support Vanesa in the subsequent management and with ongoing continuity of care.  "

## 2025-02-17 NOTE — PATIENT INSTRUCTIONS
Visit Plan:     Transition to compounded semaglutide 2.4mg once weekly.   Suzie Bright AISSATOU in 2 months   Joana Swann PA-C in 4 mongh     Compound Semaglutide at [facility]  [facility] is offering compounded semaglutide during the time of Wegovy/Ozempic national shortage. Semaglutide is the generic name of Wegovy (for Weight Management) and Ozempic (for Type 2 Diabetes). [facility] is following the highest standards for sterility and compounding practices. Compounding of semaglutide is legal for as long as Wegovy/Ozempic are on the FDA's drug shortage list. When Wegovy/Ozempic are taken off the FDA's shortage list, compounding semaglutide will no longer be available/legal. Pharmacy can provide 1 last refill up to 1 month from resolution of shortage date on FDA drug shortage list. When this occurs, patients will have to turn to acquiring Wegovy via its available manufactured pen, look into alternative weight loss medication(s), or stop the medication. Due to high demand of compound semaglutide, orders may take 1-2 weeks to obtain from time of prescribing.     As with any weight loss medication(s), there is a risk of weight regain should you stop semaglutide. It is important to be aware of this risk should you stop compounded semaglutide with no plans to transition back to an alternative injectable option. The use of semaglutide as a weight management medication, is intended for long term use.     Obtaining Medication From Pharmacy:   Automated call will contact patient when pharmacy has received RX. Patient must call the Compounding Pharmacy back to speak directly to team member prior to shipping medication to verify patient name, product, shipping, and cost. During this call, pharmacy technician will verify if needles/syringes will be needed and can be added to order for $5 additional cost per 10 unit syringe packs. Vials of compounded semaglutide will be mailed from the  "compounding pharmacy to your home in a refrigerated box. The vial you will be given is a multi-use vial, meaning that you will use the same vial during the next 28 days for each of your injections. Use a new syringe for each injection. The syringe that will be used to draw up your dose is a \"unit\" syringe, meaning your dose will have an associated \"mg\" and \"units\". Please see your prescription and the below information to verify the dose you should be injecting in UNITS prior to injection.     Storage:  Keep your medication stored in the refrigerator. The vials are to be discarded 28 days after opening (even if there is remaining medication in the vial).     Do not pre-fill syringes from the multi-use vial for future use. Sterility cannot be verified. You should only be drawing up the amount needed for the injection that day, then placing vial back into refrigerator for storage for next injection.     Common Side Effects:  Side effect profile is the same as Wegovy. Monitor for nausea, diarrhea, constipation, headache, indigestion, tiredness (fatigue), stomach upset or abdominal pain. Less commonly, semaglutide can cause low blood sugar (symptoms: shaky, dizzy, sweaty, agitation). Please reach out to the care team should you feel like this is occurring. It is important to ensure that you are eating consistent meals and not skipping meals. Ensure you are getting at least 64 oz water daily. If any side effects become unmanageable, contact the care team immediately. See Wegovy package insert for rare but serious side effects, contraindications and warnings.     Dosing:  Every 4 weeks you will have the opportunity/option to increase your dose. However, therapy is individualized for each patient. You should discuss any potential dose changes with your provider first.     Doses available for compound semaglutide: 0.25 mg, 0.5 mg, 1 mg, 1.5 mg, 1.7 mg, 2 mg, 2.4 mg and 2.5 mg.     As previously stated, you will draw up the " "associated \"unit\" to the your \"mg\" dose prescribed. Please see your provider's recommendations, your prescription and the below table to verify the number of \"units\" you should be drawing up in syringe for your dose. Example, if you are prescribed compound semaglutide 0.5 mg, you will draw up 10 units in the syringe. Use a new syringe for each injection.     *If you are having nausea or other side effects to where you are hesitant to move up to the next dose, stay at the same dose you are on for an additional 2-4 weeks to see if side effect(s) improve/resolve. Make sure to take this time to hydrate and utilizing smaller more consistent meals, such as 4-6 small meals per day.  It may be advantageous to stay at the same dose if you are seeing good efficacy (both on and off the scale) and having minimal/manageable side effects. If you do not have additional refills on that dose's prescription, please reach out to the clinic.    Mg to Unit Conversion Chart for Reference:         Administration:  Follow the instructions to fill the syringe with medicine. Instructions can be accessed at [email or web address].pdf or by scanning this QR code-    Follow the instructions to inject your medication. Instructions can be accessed at [email or web address].pdf  or by scanning this QR code-      Syringe Disposal:   Place the used syringe in a sharps container. You can buy a sharps container at your local pharmacy.   If you don't have a sharps container, you can use a plastic detergent container with a lid.   Visit Learning About Safe Needle Use and Disposal (healthwise.net) and safeneedledisposal.org for more information.     Cost:   $230 per month for doses 1 mg or less (one 1 mL vial), $370 per month for doses higher than 1 mg (two 1 mL vials)   Optional $5 per 10 pack unit needle/syringe, no additional RX required    [facility] Phone:  [phone]    States to which [facility] is able to " ship to: [place], [place], [place], [place], [place], [place]

## 2025-02-17 NOTE — NURSING NOTE
Current patient location: [address] [place] [id number]    Is the patient currently in the state of [place]? YES    Visit mode: VIDEO    If the visit is dropped, the patient can be reconnected by:VIDEO VISIT: Text to cell phone:   Telephone Information:   Mobile [phone]       Will anyone else be joining the visit? NO  (If patient encounters technical issues they should call [phone]    Are changes needed to the allergy or medication list? No    Are refills needed on medications prescribed by this physician? NO    Rooming Documentation:  Questionnaire(s) completed    Reason for visit: RECHECK    Carla KRISHNA

## 2025-02-21 ENCOUNTER — TELEPHONE (OUTPATIENT)
Dept: FAMILY MEDICINE | Facility: CLINIC | Age: 55
End: 2025-02-21

## 2025-02-21 NOTE — TELEPHONE ENCOUNTER
Received call from patient. Patient is calling as she states she is planning on coming in to have UA/Wet Prep completed as she has standing orders. Patient was seen in [facility] on 12/27 for UTI. Patient had provider she had seen place standing orders in case symptoms are unresoled.    Patient requesting ordering provider to further review and advise once completed. Patient requesting this to be addressed prior to weekend if possible.     Routing to ordering provider to further advise once resulted.    MELISSA RosenN RN  [facility]

## 2025-03-02 DIAGNOSIS — B37.31 YEAST INFECTION OF THE VAGINA: ICD-10-CM

## 2025-03-03 RX ORDER — FLUCONAZOLE 150 MG/1
150 TABLET ORAL ONCE
Qty: 1 TABLET | Refills: 0 | OUTPATIENT
Start: 2025-03-03 | End: 2025-03-03

## 2025-03-03 NOTE — TELEPHONE ENCOUNTER
I see 2/18/25 MyChart encounter in which patient reported vaginal symptoms.   Triage RN advised a virtual or E visit for this.    Per 2/21 phone encounter, patient has standing orders for UA/wet prep.  However, I see those orders are signed by a Kelly Marrero CNP.    See that provider's result note:      Sebastian Alexis,     I apologize for the delay in your lab interpretation as I was out of the office Thursday and Friday. You do not have a urinary tract infection, and your yeast and bacterial vaginosis infections have been successfully treated. No treatment is needed.     KIRSTIN Early CNP   Written by KIRSTIN Early CNP on 2/24/2025  8:24 AM CST    Did the patient request the fluconazole?        Attempted to call patient at home/mobile number, no answer, left message on voicemail; patient was instructed to return call to [facility] at [phone].    Capri FRANCOIS RN  [facility] Triage

## 2025-03-03 NOTE — TELEPHONE ENCOUNTER
Patient returning call- relayed message.  Pt not needing anythign at this time.      Mely, RN    Triage Nurse  [facility]

## 2025-03-15 SDOH — HEALTH STABILITY: PHYSICAL HEALTH: ON AVERAGE, HOW MANY DAYS PER WEEK DO YOU ENGAGE IN MODERATE TO STRENUOUS EXERCISE (LIKE A BRISK WALK)?: 1 DAY

## 2025-03-15 SDOH — HEALTH STABILITY: PHYSICAL HEALTH: ON AVERAGE, HOW MANY MINUTES DO YOU ENGAGE IN EXERCISE AT THIS LEVEL?: 20 MIN

## 2025-03-15 ASSESSMENT — SOCIAL DETERMINANTS OF HEALTH (SDOH): HOW OFTEN DO YOU GET TOGETHER WITH FRIENDS OR RELATIVES?: ONCE A WEEK

## 2025-03-17 ENCOUNTER — OFFICE VISIT (OUTPATIENT)
Dept: FAMILY MEDICINE | Facility: CLINIC | Age: 55
End: 2025-03-17
Payer: COMMERCIAL

## 2025-03-17 ENCOUNTER — VIRTUAL VISIT (OUTPATIENT)
Dept: PHARMACY | Facility: CLINIC | Age: 55
End: 2025-03-17
Payer: COMMERCIAL

## 2025-03-17 VITALS
RESPIRATION RATE: 16 BRPM | HEIGHT: 63 IN | DIASTOLIC BLOOD PRESSURE: 80 MMHG | WEIGHT: 179 LBS | BODY MASS INDEX: 31.71 KG/M2 | SYSTOLIC BLOOD PRESSURE: 118 MMHG | TEMPERATURE: 97.9 F | HEART RATE: 83 BPM | OXYGEN SATURATION: 98 %

## 2025-03-17 VITALS — WEIGHT: 176 LBS | BODY MASS INDEX: 31.18 KG/M2 | HEIGHT: 63 IN

## 2025-03-17 DIAGNOSIS — F32.1 MODERATE MAJOR DEPRESSION (H): ICD-10-CM

## 2025-03-17 DIAGNOSIS — R20.2 PARESTHESIA: ICD-10-CM

## 2025-03-17 DIAGNOSIS — K21.9 GASTROESOPHAGEAL REFLUX DISEASE WITHOUT ESOPHAGITIS: ICD-10-CM

## 2025-03-17 DIAGNOSIS — F41.9 ANXIETY: ICD-10-CM

## 2025-03-17 DIAGNOSIS — R92.333 HETEROGENEOUSLY DENSE TISSUE OF BOTH BREASTS ON MAMMOGRAPHY: ICD-10-CM

## 2025-03-17 DIAGNOSIS — E66.813 CLASS 3 SEVERE OBESITY WITH SERIOUS COMORBIDITY AND BODY MASS INDEX (BMI) OF 40.0 TO 44.9 IN ADULT, UNSPECIFIED OBESITY TYPE (H): ICD-10-CM

## 2025-03-17 DIAGNOSIS — R10.31 ABDOMINAL PAIN, RIGHT LOWER QUADRANT: ICD-10-CM

## 2025-03-17 DIAGNOSIS — Z97.5 IUD (INTRAUTERINE DEVICE) IN PLACE: ICD-10-CM

## 2025-03-17 DIAGNOSIS — E66.01 CLASS 3 SEVERE OBESITY WITH SERIOUS COMORBIDITY AND BODY MASS INDEX (BMI) OF 40.0 TO 44.9 IN ADULT, UNSPECIFIED OBESITY TYPE (H): ICD-10-CM

## 2025-03-17 DIAGNOSIS — Z00.00 ROUTINE GENERAL MEDICAL EXAMINATION AT A HEALTH CARE FACILITY: Primary | ICD-10-CM

## 2025-03-17 DIAGNOSIS — E78.9 BORDERLINE HIGH CHOLESTEROL: ICD-10-CM

## 2025-03-17 DIAGNOSIS — K31.89 GASTRIC HYPERACIDITY: ICD-10-CM

## 2025-03-17 DIAGNOSIS — F33.41 RECURRENT MAJOR DEPRESSIVE DISORDER, IN PARTIAL REMISSION: ICD-10-CM

## 2025-03-17 DIAGNOSIS — F34.1 DYSTHYMIA: ICD-10-CM

## 2025-03-17 DIAGNOSIS — M25.551 HIP PAIN, RIGHT: ICD-10-CM

## 2025-03-17 DIAGNOSIS — Z12.31 ENCOUNTER FOR SCREENING MAMMOGRAM FOR BREAST CANCER: ICD-10-CM

## 2025-03-17 PROCEDURE — 90471 IMMUNIZATION ADMIN: CPT | Performed by: PHYSICIAN ASSISTANT

## 2025-03-17 PROCEDURE — 90472 IMMUNIZATION ADMIN EACH ADD: CPT | Performed by: PHYSICIAN ASSISTANT

## 2025-03-17 PROCEDURE — 3074F SYST BP LT 130 MM HG: CPT | Performed by: PHYSICIAN ASSISTANT

## 2025-03-17 PROCEDURE — G2211 COMPLEX E/M VISIT ADD ON: HCPCS | Performed by: PHYSICIAN ASSISTANT

## 2025-03-17 PROCEDURE — 99214 OFFICE O/P EST MOD 30 MIN: CPT | Mod: 25 | Performed by: PHYSICIAN ASSISTANT

## 2025-03-17 PROCEDURE — 99396 PREV VISIT EST AGE 40-64: CPT | Mod: 25 | Performed by: PHYSICIAN ASSISTANT

## 2025-03-17 PROCEDURE — 3079F DIAST BP 80-89 MM HG: CPT | Performed by: PHYSICIAN ASSISTANT

## 2025-03-17 PROCEDURE — 90677 PCV20 VACCINE IM: CPT | Performed by: PHYSICIAN ASSISTANT

## 2025-03-17 PROCEDURE — 90750 HZV VACC RECOMBINANT IM: CPT | Performed by: PHYSICIAN ASSISTANT

## 2025-03-17 RX ORDER — OMEPRAZOLE 20 MG/1
CAPSULE, DELAYED RELEASE ORAL
Qty: 90 CAPSULE | Refills: 1 | Status: SHIPPED | OUTPATIENT
Start: 2025-03-17

## 2025-03-17 RX ORDER — BUSPIRONE HYDROCHLORIDE 5 MG/1
TABLET ORAL
Qty: 180 TABLET | Refills: 1 | Status: SHIPPED | OUTPATIENT
Start: 2025-03-17

## 2025-03-17 RX ORDER — FAMOTIDINE 20 MG/1
20 TABLET, FILM COATED ORAL 2 TIMES DAILY PRN
Qty: 60 TABLET | Refills: 11 | Status: SHIPPED | OUTPATIENT
Start: 2025-03-17

## 2025-03-17 RX ORDER — BUPROPION HYDROCHLORIDE 150 MG/1
150 TABLET ORAL EVERY MORNING
Qty: 90 TABLET | Refills: 3 | Status: SHIPPED | OUTPATIENT
Start: 2025-03-17

## 2025-03-17 RX ORDER — SERTRALINE HYDROCHLORIDE 100 MG/1
200 TABLET, FILM COATED ORAL DAILY
Qty: 180 TABLET | Refills: 1 | Status: SHIPPED | OUTPATIENT
Start: 2025-03-17

## 2025-03-17 ASSESSMENT — PAIN SCALES - GENERAL: PAINLEVEL_OUTOF10: NO PAIN (0)

## 2025-03-17 NOTE — PROGRESS NOTES
Preventive Care Visit  [facility] PILY Sotelo PA-C, Family Medicine  Mar 17, 2025      Assessment & Plan     Routine general medical examination at a health care facility      HEALTH CARE MAINTENANCE              Reviewed USPTF recommendations and anticipatory guidance.              See orders.     Reviewed vaccines, shingles and pneumonia shots given today (second dose of shingles advised in 2-6 months).     Recommend covid shot.       IUD (intrauterine device) in place  IUD in place, she wonders if this can be removed before the 8 year if she is menopausal.  I suggest checking FSH, as we do not have a menstrual cycle to guide where she is with her hormone levels.   - US Pelvic Complete with Transvaginal; Future  - Follicle stimulating hormone; Future    Class 3 severe obesity with serious comorbidity and body mass index (BMI) of 40.0 to 44.9 in adult, unspecified obesity type (H)  Doing well with weight loss efforts and tolerating medication without any significant side effects.    Wt Readings from Last 4 Encounters:   03/17/25 81.2 kg (179 lb)   03/17/25 79.8 kg (176 lb)   02/17/25 79.4 kg (175 lb)   12/27/24 86.2 kg (190 lb)       Continues to work on lifestyle changes for weight loss including healthy diet and regular exercise.      Working with weight loss specialist on GLP1 agonist.     - buPROPion (WELLBUTRIN XL) 150 MG 24 hr tablet; Take 1 tablet (150 mg) by mouth every morning.    Recurrent major depressive disorder, in partial remission  Will leave wellbutrin as is.   - buPROPion (WELLBUTRIN XL) 150 MG 24 hr tablet; Take 1 tablet (150 mg) by mouth every morning.    Gastroesophageal reflux disease without esophagitis  Stable.   - famotidine (PEPCID) 20 MG tablet; Take 1 tablet (20 mg) by mouth 2 times daily as needed (breakthrough acid reflux).    Heterogeneously dense tissue of both breasts on mammography  Recommend annual screening.   - MA Screen Bilateral w/Wilfredo;  "Future    Encounter for screening mammogram for breast cancer  I discussed in detail with Vanesa Owen the importance of breast cancer screening through monthly self breast exams and annual breast exams in the clinic.    - MA Screen Bilateral w/Wilfredo; Future    Borderline high cholesterol  Will monitor levels.  On statin, will recheck levels.   - Lipid panel reflex to direct LDL Fasting; Future    Abdominal pain, right lower quadrant  I suggest further evaluation with a pelvic US.   - US Pelvic Complete with Transvaginal; Future    Hip pain, right  Has been working with Chiropractor and doing home physical therapy exercises.     Paresthesia  Will check labs and go from there.   - Comprehensive metabolic panel (BMP + Alb, Alk Phos, ALT, AST, Total. Bili, TP); Future  - CBC with platelets; Future    Moderate major depression (H)  Continue with wellbutrin and zoloft as is.     Patient has been advised of split billing requirements and indicates understanding: Yes    The longitudinal plan of care for the diagnosis(es)/condition(s) as documented were addressed during this visit. Due to the added complexity in care, I will continue to support Vanesa in the subsequent management and with ongoing continuity of care.      BMI  Estimated body mass index is 32.22 kg/m  as calculated from the following:    Height as of this encounter: 1.588 m (5' 2.5\").    Weight as of this encounter: 81.2 kg (179 lb).   Weight management plan: Discussed healthy diet and exercise guidelines    Counseling  Appropriate preventive services were addressed with this patient via screening, questionnaire, or discussion as appropriate for fall prevention, nutrition, physical activity, Tobacco-use cessation, social engagement, weight loss and cognition.  Checklist reviewing preventive services available has been given to the patient.  Reviewed patient's diet, addressing concerns and/or questions.   She is at risk for lack of exercise and has " been provided with information to increase physical activity for the benefit of her well-being.   The patient was instructed to see the dentist every 6 months.           Yunior Alexis is a 55 year old, presenting for the following:  Physical        3/17/2025     3:02 PM   Additional Questions   Roomed by Peggy   Accompanied by Self         3/17/2025     3:02 PM   Patient Reported Additional Medications   Patient reports taking the following new medications NA          HPI       Patient with history of Depression arrived for Annual Physical, not due for PAP but has IUD, undressing for exam. PHQ9 completed online.    Patient is not fasting for lab work.     Depression   How are you doing with your depression since your last visit? Stable   Are you having other symptoms that might be associated with depression? No  Have you had a significant life event?  No   Are you feeling anxious or having panic attacks?   No  Do you have any concerns with your use of alcohol or other drugs? No    Does have a right SI joint issue present for a while, needs to pop in and out once every 2 days.  Sometimes will notice it at bedtime.    Has tried chiropractic therapy with improvement.  Has done physical therapy and does home exercises.      Also with a right lower pelvic pain that comes and goes.  Had abdominal US done 9/26/24 which showed gallstones, she is s/p cholecystectomy.  Still with right lower pelvic pain that comes and goes.  Has an IUD in place, on year 7 out of 8.     Social History     Tobacco Use    Smoking status: Never     Passive exposure: Never    Smokeless tobacco: Never   Vaping Use    Vaping status: Never Used   Substance Use Topics    Alcohol use: Yes     Comment: rare    Drug use: No         2/28/2024     8:44 AM 8/14/2024    10:12 AM 3/17/2025     2:41 PM   PHQ   PHQ-9 Total Score 0 0 0    Q9: Thoughts of better off dead/self-harm past 2 weeks Not at all Not at all Not at all       Patient-reported          12/9/2020     3:31 PM 8/23/2021    11:38 AM 5/10/2023    10:51 AM   VIKASH-7 SCORE   Total Score   0 (minimal anxiety)   Total Score 5 4 0         3/17/2025     2:41 PM   Last PHQ-9   1.  Little interest or pleasure in doing things 0   2.  Feeling down, depressed, or hopeless 0   3.  Trouble falling or staying asleep, or sleeping too much 0   4.  Feeling tired or having little energy 0   5.  Poor appetite or overeating 0   6.  Feeling bad about yourself 0   7.  Trouble concentrating 0   8.  Moving slowly or restless 0   Q9: Thoughts of better off dead/self-harm past 2 weeks 0   PHQ-9 Total Score 0        Patient-reported         5/10/2023    10:51 AM   VIKASH-7    1. Feeling nervous, anxious, or on edge 0    2. Not being able to stop or control worrying 0    3. Worrying too much about different things 0    4. Trouble relaxing 0    5. Being so restless that it is hard to sit still 0    6. Becoming easily annoyed or irritable 0    7. Feeling afraid, as if something awful might happen 0    VIKASH-7 Total Score 0   If you checked any problems, how difficult have they made it for you to do your work, take care of things at home, or get along with other people? Not difficult at all        Proxy-reported       Suicide Assessment Five-step Evaluation and Treatment (SAFE-T)      Advance Care Planning  Patient does not have a Health Care Directive: Discussed advance care planning with patient; however, patient declined at this time.      3/15/2025   General Health   How would you rate your overall physical health? Good   Feel stress (tense, anxious, or unable to sleep) Only a little   (!) STRESS CONCERN      3/15/2025   Nutrition   Three or more servings of calcium each day? (!) NO   Diet: Regular (no restrictions)   How many servings of fruit and vegetables per day? (!) 0-1   How many sweetened beverages each day? 0-1         3/15/2025   Exercise   Days per week of moderate/strenous exercise 1 day   Average minutes spent exercising  at this level 20 min   (!) EXERCISE CONCERN      3/15/2025   Social Factors   Frequency of gathering with friends or relatives Once a week   Worry food won't last until get money to buy more No   Food not last or not have enough money for food? No   Do you have housing? (Housing is defined as stable permanent housing and does not include staying ouside in a car, in a tent, in an abandoned building, in an overnight shelter, or couch-surfing.) Yes   Are you worried about losing your housing? No   Lack of transportation? No   Unable to get utilities (heat,electricity)? No         3/17/2025   Fall Risk   Gait Speed Test (Document in seconds) 3.25   Gait Speed Test Interpretation Less than or equal to 5.00 seconds - PASS          3/15/2025   Dental   Dentist two times every year? (!) NO           2/14/2024   TB Screening   Were you born outside of the [place]? No         Today's PHQ-9 Score:       3/17/2025     2:41 PM   PHQ-9 SCORE   PHQ-9 Total Score MyChart 0   PHQ-9 Total Score 0        Patient-reported         3/15/2025   Substance Use   Alcohol more than 3/day or more than 7/wk No   Do you use any other substances recreationally? No     Social History     Tobacco Use    Smoking status: Never     Passive exposure: Never    Smokeless tobacco: Never   Vaping Use    Vaping status: Never Used   Substance Use Topics    Alcohol use: Yes     Comment: rare    Drug use: No           9/12/2024   LAST FHS-7 RESULTS   1st degree relative breast or ovarian cancer No   Any relative bilateral breast cancer No   Any male have breast cancer No   Any ONE woman have BOTH breast AND ovarian cancer No   Any woman with breast cancer before 50yrs No   2 or more relatives with breast AND/OR ovarian cancer Yes   2 or more relatives with breast AND/OR bowel cancer No        Mammogram Screening - Mammogram every 1-2 years updated in Health Maintenance based on mutual decision making        3/15/2025   STI Screening   New sexual partner(s) since  last STI/HIV test? No     History of abnormal Pap smear: No - age 30- 64 PAP with HPV every 5 years recommended        Latest Ref Rng & Units 2/14/2024     5:01 PM 7/22/2020    11:48 AM 7/22/2020    11:45 AM   PAP / HPV   PAP  Negative for Intraepithelial Lesion or Malignancy (NILM)      PAP (Historical)    NIL    HPV 16 DNA Negative Negative  Negative     HPV 18 DNA Negative Negative  Negative     Other HR HPV Negative Negative  Negative       ASCVD Risk   The 10-year ASCVD risk score (Bartolome HANSEN, et al., 2019) is: 1.1%    Values used to calculate the score:      Age: 55 years      Sex: Female      Is Non- : No      Diabetic: No      Tobacco smoker: No      Systolic Blood Pressure: 118 mmHg      Is BP treated: No      HDL Cholesterol: 54 mg/dL      Total Cholesterol: 162 mg/dL           Reviewed and updated as needed this visit by Provider                    Past Medical History:   Diagnosis Date    Anxiety     Depressive disorder     Herpes simplex with other ophthalmic complications     UTI (urinary tract infection)      Past Surgical History:   Procedure Laterality Date    COLONOSCOPY WITH CO2 INSUFFLATION N/A 04/13/2023    Procedure: COLONOSCOPY, WITH CO2 INSUFFLATION;  Surgeon: Catrachita Bernstein DO;  Location: [facility] OR    EXCIS VAGINAL CYST/TUMOR      LAPAROSCOPIC CHOLECYSTECTOMY N/A 11/20/2024    Procedure: CHOLECYSTECTOMY, ROBOT-ASSISTED, LAPAROSCOPIC, USING DA TOYIN XI;  Surgeon: Joe Renteria MD;  Location: [facility] OR    ORTHOPEDIC SURGERY      meniscus    SLING TRANSVAGINAL N/A 08/23/2016    Procedure: SLING TRANSVAGINAL;  Surgeon: Nam Sommers MD;  Location: [facility] OR    TONSILLECTOMY & ADENOIDECTOMY       Labs reviewed in EPIC      Review of Systems  Constitutional, neuro, ENT, endocrine, pulmonary, cardiac, gastrointestinal, genitourinary, musculoskeletal, integument and psychiatric systems are negative, except as otherwise noted.     Objective    Exam  /80 (BP  "Location: Left arm, Patient Position: Chair, Cuff Size: Adult Regular)   Pulse 83   Temp 97.9  F (36.6  C) (Tympanic)   Resp 16   Ht 1.588 m (5' 2.5\")   Wt 81.2 kg (179 lb)   SpO2 98%   BMI 32.22 kg/m     Estimated body mass index is 32.22 kg/m  as calculated from the following:    Height as of this encounter: 1.588 m (5' 2.5\").    Weight as of this encounter: 81.2 kg (179 lb).    Physical Exam  GENERAL: alert and no distress  EYES: Eyes grossly normal to inspection, PERRL and conjunctivae and sclerae normal  HENT: ear canals and TM's normal, nose and mouth without ulcers or lesions  NECK: no adenopathy, no asymmetry, masses, or scars  RESP: lungs clear to auscultation - no rales, rhonchi or wheezes  BREAST: normal without masses, tenderness or nipple discharge and no palpable axillary masses or adenopathy  CV: regular rate and rhythm, normal S1 S2, no S3 or S4, no murmur, click or rub, no peripheral edema  ABDOMEN: soft, nontender, no hepatosplenomegaly, no masses and bowel sounds normal  MS: no gross musculoskeletal defects noted, no edema  SKIN: no suspicious lesions or rashes  NEURO: Normal strength and tone, mentation intact and speech normal  PSYCH: mentation appears normal, affect normal/bright  Genitalia:  Normal external female genitalia, no lesions noted.  Vagina and Cervix without discharge or lesions.  No adnexal or uterine masses noted.  IUD string noted at cervical os.         Signed Electronically by: Gabi Sotelo PA-C    Answers submitted by the patient for this visit:  Patient Health Questionnaire (Submitted on 3/17/2025)  If you checked off any problems, how difficult have these problems made it for you to do your work, take care of things at home, or get along with other people?: Not difficult at all  PHQ9 TOTAL SCORE: 0    "

## 2025-03-17 NOTE — PATIENT INSTRUCTIONS
Recommendations from MTM Pharmacist visit:                                                    MTM (medication therapy management) is a service provided by a clinical pharmacist designed to help you get the most of out of your medicines.  You may be sent a phone or email survey evaluating today's visit.  Please provide feedback you have for the service he received today if you are able.      Continue compounded semaglutide 2.4 mg weekly (2 more refills sent to [facility] today).    To help with tolerability and effectiveness of compounded semaglutide :  Eat 3 meals with protein focus daily to help with nausea. If you forget to eat, you may feel nausea as a hunger cue.  Focus on getting protein in first with each meal and snack.   A good starting goal is 60 g protein daily (track this, especially if at weight loss plateau). Once you consistently are getting 60g daily, try getting 90 g protein daily.  See list below of protein-rich food ideas  Drink plenty of water - goal 64 oz throughout the day  You may try Metamucil, Benefiber, or Citrucel to help feel more full (less nausea) and have softer, more consistent bowel movements.  To optimize weight management - work on incorporating resistance training/weight lifting to build muscle and improve overall metabolism of adipose tissue.      Options for continuing GLP1/GIP agonist in 2025:  Wegovy or Zepbound pens out of pocket cost (>$1000/month cash price without savings card)   Zepbound  Cost of any dose with savings card (link below to sign up): $650/month with card at any pharmacy   [email or web address]://[email or web address] - Medicaid, Medicare or other government insurances cannot use savings cards  Wegovy   Cost of any dose with savings card (link below to sign up): $650/month with card at any pharmacy   https://www.Supertec/coverage-and-savings/save-on-wegovy.html - Medicaid, Medicare or other government insurances  "cannot use savings cards  Cost for any dose through [facility]: $499/month - only for uninsured or have commercial insurance. This is [facility]'s mail order pharmacy:   https://[email or web address] Parts [email or web address]/products/wegovy/get-product.html    Compounded semaglutide (same active ingredient as Wegovy) from [facility] ($230/month for doses of 1mg or less; $370/month for doses higher than 1mg)  This is an available option only for as long as Wegovy is on FDA shortage list - Wegovy has been removed from FDA shortage list; [facility] will be compounding until 4/22/25.   Additional $5 per 10 pack of for administrations supplies (syringe/needles, etc)     Zepbound Vials through [facility] - vials only available in 2.5 mg, 5 mg, 7.5mg, 10 mg doses  [email or web address]  $349/month for 2.5 mg vials  $499/month for 5 mg vials   7.5 mg and 10 mg vials now available for $499/month with regular refills (cost increases if refills not consistently filled at least every 45 days - see more info at [facility] website)  Additional $5 per month for administrations supplies (syringe/needles, etc)       Zepbound Vials Through [facility]    Zepbound is now available as single use vials at the 2.5 mg, 5 mg, 7.5 mg and 10 mg only. The single-dose vials are only sold in packages of 4 vials and only offered at cash price from [facility]. The prescription for the vials will be sent to the pharmacy directly. There are no plans from the manufacture to offer higher dosing in vials. They will either email or text you when "[employer] has obtained the prescription to start the process to mail the prescription to you. You need to answer the questions from the email or text to complete the mailing order. You will need to say \"yes\" to obtaining the administration supplies (needle/syringes and alcohol wipes) when " "purchasing the Zepbound vials from [employer] - they will ask you about this when you start your order with them.     Zepbound Vial Dosing   Initiate 2.5 mg subcutaneously once weekly for at least 4 weeks. Can further dose escalate to 5 mg once weekly for at least 4 weeks, then option to further increase to 7.5 mg once weekly for 4 weeks, then option to further increase to 10 mg once weekly.   When to escalate dosing is individualized for each patient and should be discussed between provider and patient. If you are feeling clinically effective response with little to no side effects, would recommend staying at the dose you are at.     Zepbound Vial Administration Video:   Go to the below link and select \"vial\" tab above video. Written instructions with pictures also available on website below video.   [email or web address]://[email or web address]/how-to-use    Zepbound Storage and Stability:   Make sure that when you get the prescription that you store the Zepbound vials in the refrigerator (good until expiration date on vial under refrigerated temperature). Once it is time to do your injection, remove only the single-dose vial needed for your injection and keep other vials in the refrigerator until needed. Each single dose vial is good at room temperature for up to 21 days if necessary. If stored at room temperature, do not return to refrigerator. Discard vial if not used in 21 days after removing from refrigerator.     Zepbound Common Side Effects:   Nausea, diarrhea, constipation, headache, tiredness (fatigue), dizziness, stomach upset/pain. Less commonly, Wegovy can cause low blood sugar (symptoms: shaky, dizzy, sweaty, agitation). Please reach out to the care team should you feel like this is occurring. It is important to ensure that you are eating consistent meals and not skipping meals. Ensure you are getting at least 64 oz water daily.     Cost:   $349/4 single-dose 2.5 mg vials + $5 for administration supplies (4 " "needles/syringes, alcohol wipes)  $499/4 single-dose 5 mg vials + $5 for administration supplies (4 needles/syringes, alcohol wipes)  $499/4 single-dose 7.5 mg vials + $5 for administration supplies (4 needles/syringes, alcohol wipes) - so long as you fill every 45 day. If you fill outside of every 45 days, cost is $699 for 4 vials  $499/4 single-dose 10 mg vials + $5 for administration supplies (4 needles/syringes, alcohol wipes) - so long as you fill every 45 day. If you fill outside of every 45 days, cost is $699 for 4 vials    More Information:   [email or web address]    Follow-up:   Appointments in Next Year      Mar 17, 2025 3:00 PM  (Arrive by 2:40 PM)  Adult Preventative Visit with Gabi Sotelo PA-C  [facility] ([facility]) [phone]     May 07, 2025 11:00 AM  Pharmacist Visit with Suzie Bright RPH  [facility] Multiple Specialty [place] ([facility] ) [phone]     Jul 21, 2025 2:30 PM  (Arrive by 2:15 PM)  Return Weight Management Visit with Joana Rockwell PA-C  [facility] [place] ([facility] ) [phone]                It was great speaking with you today.  I value your experience and would be very thankful for your time in providing feedback in our clinic survey. In the next few days, you may receive an email or text message from [employer] with a link to a survey related to your  clinical pharmacist.\"     To schedule another MTM appointment, please call the clinic directly ([facility] Phone Number: [phone] (schedules for [facility] and [facility] - providers, dietitians, health coaches) or you may call the MTM scheduling line at [phone] or toll-free at [phone].     My Clinical Pharmacist's contact information:                                              "         Please feel free to contact me with any questions or concerns you have.      Suzie Bright, Pharm D., MPH    Medication Therapy Management Pharmacist   [facility]          Meal Replacement Shake Options:   *Protein Shake Criteria: no more than 210 Calories, at least 20 grams of protein, and less than 10 grams of sugar   Premier Protein (160 Calories, 30 g protein)  Slim Fast Advanced Nutrition (180 Calories, 20 g protein)  Muscle Milk, lactose-free, 17 oz bottle (210 Calories, 30 g protein)  Integrated Supplements, no artificial sugars (110 Calories, 20 g protein)  Boost/Ensure Max (160 calories, 30 gm protein)   [facility] Protein Shakes (160-230 calories, 26-42 gm protein)  Aldi's [facility] Protein Powder (180 calories, 30 gm protein)   Orgain Protein Shakes (130-160 calories, 20-26 gm protein)     Meal Replacement Bar Options:  Quest Protein Bars (190 Calories, 20 g protein)  Built Bar (170 Calories, 15-20 g protein)  One Protein Bar (210 calories, 20 g protein)  [place] Signature Protein Bar ([employer]co) (190 Calories, 21 g protein)  Pure Protein Bars (180 Calories, 21 g protein)    Low Calorie Frozen Meal:  Healthy Choice Power Bowls  Lean Cuisine  Smart Ones  Eh Salcido      ---------------------------------------------------------------  Tips to Increase the Protein in Your Diet  You may need more protein in your diet to help you heal from an illness, surgery or wound. Extra protein can also help you gain weight. Here are some ideas for adding high-protein foods to your meals.  Meat and fish  Add chopped cooked meat to vegetables, salads, casseroles, soups, sauces and biscuit dough.  Use in omelets, soufflés, quiches, sandwich fillings and chicken or turkey stuffing.  Wrap in pie crust or biscuit dough to make a turnover.  Add to stuffed baked potatoes.  Make a dip with diced meat or flaked fish mixed with sour cream and spices.  Chopped, hard-cooked  eggs  Add to salads.  Use for snacks and sandwich filling.  High-protein milk  To make high-protein milk, mix 1 quart whole milk with 1 cup powdered milk.  Add to cream soups, mashed potatoes, scrambled eggs, cereals and dried eggnog mix.  Use as an ingredient in puddings, custards, hot chocolate, milk shakes and pancakes.  Powdered milk  If you don't have any high-protein milk on hand, you can use powdered milk. Add 3 tablespoons to:  gravies, sauces, cream soups, mayonnaise  casseroles, meat patties, meatloaf, tuna salad, deviled ham  scalloped or mashed potatoes, creamed spinach  scrambled eggs, egg salad  cereals  yogurt, milk drinks, ice cream, frozen desserts, puddings, custards.  Add 4 to 6 tablespoons powdered milk to make:  cream sauces  breads, muffins, pancakes, waffles, cookies, cakes  cream pies, frostings, cake fillings  fruit cobblers, bread or rice pudding, gelatin desserts.  For high-protein eggnog, add 3 to 6 tablespoons powdered milk to prepared eggnog.  Hard or soft cheese  Melt on sandwiches, breads, tortillas, hamburgers, hot dogs, other meats, vegetables, eggs and pies.  Grate into soups, chili, sauces, casseroles, vegetables, potatoes, rice, noodles or meatloaf.  Eat with toast or crackers, or melt for beth dip.  Cottage cheese or ricotta cheese  Mix with or scoop on top of fruits and vegetables.  Add to casseroles, lasagna, spaghetti, noodles and egg dishes (omelets, scrambled eggs, soufflés).  Use in gelatin, pudding-type desserts, cheesecake and pancake batter.  Use to stuff crepes, pasta shells or manicotti.  Fruit yogurt  Blend with fruits for a fruit smoothie.  Use as a dip for fruits and vegetables.  Scoop on top of pancakes or waffles.  Tofu  Blend silken tofu with fruits and juices for a smoothie.  Add chunks of firm tofu to soups and stews, or crumble into meatloaf.  Blend dried onion soup mix into soft or silken tofu for dip.  Use pureed silken tofu for part of the [facility],  sour cream, cream cheese or ricotta cheese called for in recipes.  Beans  Use cooked beans or peas in soups, casseroles, pasta, tacos and burritos.  Nuts and seeds  Note: For children under 3, discuss with the child's care team.  Use in casseroles, breads, muffins, pancakes, cookies and waffles.  Sprinkle on fruits, cereals, ice cream, yogurt, vegetables and salads.  Mix with raisins, dried fruits and chocolate chips for a snack.  Nut butters  Note: For children under 3, discuss with the child's care team.  Spread on sandwiches, toast, muffins, crackers, waffles, pancakes and fruit slices.  Use as a dip for raw vegetables.  Blend with milk drinks, or swirl through ice cream, yogurt or hot cereal.  Nutrition supplements (nutrition bars, drinks and powders)  Add powders to milk drinks and desserts.  Mix with ice cream, milk and fruit for a high-protein milk shake.    For informational purposes only. Not to replace the advice of your health care provider. Clinically reviewed by Amy Faulkner RD, NARINDER, and the Clinical Nutrition Service Line. Copyright   2005 [facility]. All rights reserved. [employer] 876983 - REV 04/24.      -----------------------------------------------------------------------------------------------------------------  Learning About High-Protein Foods  What foods are high in protein?     The foods you eat contain nutrients, such as vitamins and minerals. Protein is a nutrient. Your body needs the right amount to stay healthy and work as it should. You can use the list below to help you make choices about which foods to eat.  Here are some examples of foods that are high in protein.  Dairy and dairy alternatives  Cheese  Milk  Soy milk  Yogurt (especially Greek)  Meat  Beef  Chicken  Ham  Lamb  Lunch meat  Pork  Sausage  Turkey  Other protein foods  Hemp seeds!  Beans (black, garbanzo, kidney, lima)  Eggs  Hummus  Lentils  Nuts  Peanut butter and other nut  "butters  Peas  Soybeans  Tofu  Veggie or soy pillo (Check the nutrition label for the amount of protein in each serving.)  Seafood  Anchovies  Cod  Crab  Halibut  [place]  Sardines  Shrimp  Tilapia  [place]  Tuna  Protein supplements  Bars (Check the nutrition label for the amount of protein in each serving.)  Drinks  Powders  Work with your doctor to find out how much of this nutrient you need. Depending on your health, you may need more or less of it in your diet.  Where can you learn more?  Go to https://www.BE[employer] Technology.net/patiented  Enter P335 in the search box to learn more about \"Learning About High-Protein Foods.\"  Current as of: September 20, 2023               Content Version: 14.0    3902-2408 [employer].   Care instructions adapted under license by your healthcare professional. If you have questions about a medical condition or this instruction, always ask your healthcare professional. Healthwise, [employer] disclaims any warranty or liability for your use of this information.         "

## 2025-03-17 NOTE — PROGRESS NOTES
Medication Therapy Management (MTM) Encounter    ASSESSMENT:                            Medication Adherence/Access: R/[facility] insurance requirements     Weight management :   Due to [place] [facility] [facility] insurance discontinuing coverage of GLP1 agonists for Weight Management in 2025 year, much of today's discussion was spent discussing next steps including alternative injectable options - paying out of pocket w/ savings card cost, compound product through [place] [facility], and others. From this, patient wishing to transition to Zepbound vial through [facility]. But will have to discuuss out of pocket costs with  first - requesting refills of compounded semaglutide from [facility] until end of program in April then meet again with Medication Therapy Management in early May to possibly transition to Zepbound vials.      Suspect patient not meeting nutrition goals to optimize GLP1/GIP agonist - Discussed dietary and behavioral modifications.       PLAN:                            Continue compounded semaglutide 2.4 mg weekly (2 more refills sent to [facility] today).    To help with tolerability and effectiveness of compounded semaglutide :  Eat 3 meals with protein focus daily to help with nausea. If you forget to eat, you may feel nausea as a hunger cue.  Focus on getting protein in first with each meal and snack.   A good starting goal is 60 g protein daily (track this, especially if at weight loss plateau). Once you consistently are getting 60g daily, try getting 90 g protein daily.  See list below of protein-rich food ideas  Drink plenty of water - goal 64 oz throughout the day  You may try Metamucil, Benefiber, or Citrucel to help feel more full (less nausea) and have softer, more consistent bowel movements.  To optimize weight management - work on incorporating resistance training/weight lifting to build muscle and  improve overall metabolism of adipose tissue.        Follow-up:   Appointments in Next Year      Mar 17, 2025 3:00 PM  (Arrive by 2:40 PM)  Adult Preventative Visit with Gabi Sotelo PA-C  [facility] ([facility]) [phone]     May 07, 2025 11:00 AM  Pharmacist Visit with Suzie Bright RPH  [facility] ([facility] ) [phone]     Jul 21, 2025 2:30 PM  (Arrive by 2:15 PM)  Return Weight Management Visit with Joana Rockwell PA-C  [facility] [place] ([facility] ) [phone]            SUBJECTIVE/OBJECTIVE:                          Vanesa Owen is a 55 year old female seen for a follow-up visit.       Reason for visit: Medication Therapy Management weight management .    Allergies/ADRs: Reviewed in chart  Past Medical History: Reviewed in chart  Tobacco: She reports that she has never smoked. She has never been exposed to tobacco smoke. She has never used smokeless tobacco.  Alcohol: rare    Medication Adherence/Access: Medication barriers: obtaining medication from insurance. [facility] insurance requirements changing -not covering GLP1/GIP agonist for weight management in 2025. Very frustrated by this - paying out of pocket for compounded semaglutide right now - wondering what is next now that not able to get after 4/22/25?    Weight Management   Class II Obesity (BMI 35 to 39.9):   Wegovy 2.5 mg x 2 weeks   Bupropion XL 150mg once daily (mental health)    Patient met with Joana Rockwell PA-C 2/17/25 for return Medical weight management visit. Visit with Gabi Shea dietician -4/24/24      Happy with Wegovy overall, no symptoms of abdominal pain (see history below- cholecystitis suspected before, pancreatic enzymes within normal limits; gall bladder removed - symptoms resolved) with Wegovy. Pleased with appetite reduction  "- smaller portion sizes. Had been up to 2mg Ozempic in the past and while had better craving and food noise management, noted more nausea and tolerating Wegovy slow titration much better - no nausea.    Nutrition/Eating Habits: not always getting 3 meals in per day - working on this with protein at every meal (not tracking)    Water: 5 x 8 oz per day    Working on goals with [facility] Dietician:  1) Aim to consume at least 60-90 grams of protein daily.   2) Increase fiber containing foods in diet, try to aim for 2-3 servings of fruits/vegetables daily.      Exercise/Activity: has treadmill at home - working on increasing this more and adding weight lifting     Med history:  Patient denies personal or family history of MEN Type2, MTC, Pancreatitis; using IUD for contraception.      History:   Previously reported Occasional very brief, sharp right sided pain (patient unsure if related to Wegovy as prior to starting Wegovy, difficult for patient to remember)- starting to wonder if related to eating, not noted to change/improve with moving gas. Decided to monitor and this has improved, still having very occasional sharp pain, but less frequent. Mother had gall bladder removed in the past and wonders if could be related this this? Planning to see PCP to  work up.    Initial Weight: 200 lb            Current weight today: 176 lbs 0 oz  Cumulative Weight Loss: -24 lb, -12% from baseline    Wt Readings from Last 4 Encounters:   03/17/25 176 lb (79.8 kg)   02/17/25 175 lb (79.4 kg)   12/27/24 190 lb (86.2 kg)   12/06/24 187 lb (84.8 kg)     Estimated body mass index is 31.18 kg/m  as calculated from the following:    Height as of this encounter: 5' 3\" (1.6 m).    Weight as of this encounter: 176 lb (79.8 kg).        Today's Vitals: Ht 5' 3\" (1.6 m)   Wt 176 lb (79.8 kg)   BMI 31.18 kg/m    ----------------      I spent 28 minutes with this patient today. All changes were made via collaborative " practice agreement with Joana Rockwell.     A summary of these recommendations was sent via [employer].    Suzie Bright, Pharm D., MPH    Medication Therapy Management Pharmacist   [facility]      Telemedicine Visit Details  The patient's medications can be safely assessed via a telemedicine encounter.  Type of service:  Video Conference via AmWell  Originating Location (pt. Location): Home    Distant Location (provider location):  Off-site  Start Time:  8:36 AM  End Time:  9:04 AM     Medication Therapy Recommendations  No medication therapy recommendations to display

## 2025-03-17 NOTE — PATIENT INSTRUCTIONS
Patient Education   Preventive Care Advice   This is general advice given by our system to help you stay healthy. However, your care team may have specific advice just for you. Please talk to your care team about your preventive care needs.  Nutrition  Eat 5 or more servings of fruits and vegetables each day.  Try wheat bread, brown rice and whole grain pasta (instead of white bread, rice, and pasta).  Get enough calcium and vitamin D. Check the label on foods and aim for 100% of the RDA (recommended daily allowance).  Lifestyle  Exercise at least 150 minutes each week  (30 minutes a day, 5 days a week).  Do muscle strengthening activities 2 days a week. These help control your weight and prevent disease.  No smoking.  Wear sunscreen to prevent skin cancer.  Have a dental exam and cleaning every 6 months.  Yearly exams  See your health care team every year to talk about:  Any changes in your health.  Any medicines your care team has prescribed.  Preventive care, family planning, and ways to prevent chronic diseases.  Shots (vaccines)   HPV shots (up to age 26), if you've never had them before.  Hepatitis B shots (up to age 59), if you've never had them before.  COVID-19 shot: Get this shot when it's due.  Flu shot: Get a flu shot every year.  Tetanus shot: Get a tetanus shot every 10 years.  Pneumococcal, hepatitis A, and RSV shots: Ask your care team if you need these based on your risk.  Shingles shot (for age 50 and up)  General health tests  Diabetes screening:  Starting at age 35, Get screened for diabetes at least every 3 years.  If you are younger than age 35, ask your care team if you should be screened for diabetes.  Cholesterol test: At age 39, start having a cholesterol test every 5 years, or more often if advised.  Bone density scan (DEXA): At age 50, ask your care team if you should have this scan for osteoporosis (brittle bones).  Hepatitis C: Get tested at least once in your life.  STIs (sexually  transmitted infections)  Before age 24: Ask your care team if you should be screened for STIs.  After age 24: Get screened for STIs if you're at risk. You are at risk for STIs (including HIV) if:  You are sexually active with more than one person.  You don't use condoms every time.  You or a partner was diagnosed with a sexually transmitted infection.  If you are at risk for HIV, ask about PrEP medicine to prevent HIV.  Get tested for HIV at least once in your life, whether you are at risk for HIV or not.  Cancer screening tests  Cervical cancer screening: If you have a cervix, begin getting regular cervical cancer screening tests starting at age 21.  Breast cancer scan (mammogram): If you've ever had breasts, begin having regular mammograms starting at age 40. This is a scan to check for breast cancer.  Colon cancer screening: It is important to start screening for colon cancer at age 45.  Have a colonoscopy test every 10 years (or more often if you're at risk) Or, ask your provider about stool tests like a FIT test every year or Cologuard test every 3 years.  To learn more about your testing options, visit:   .  For help making a decision, visit:   https://bit.ly/xq26259.  Prostate cancer screening test: If you have a prostate, ask your care team if a prostate cancer screening test (PSA) at age 55 is right for you.  Lung cancer screening: If you are a current or former smoker ages 50 to 80, ask your care team if ongoing lung cancer screenings are right for you.  For informational purposes only. Not to replace the advice of your health care provider. Copyright   2023 [facility]. All rights reserved. Clinically reviewed by the [facility]. [employer] [id number] - REV 01/24.  Preventing Falls: Care Instructions  Injuries and health problems such as trouble walking or poor eyesight can increase your risk of falling. So can some medicines. But there are things you can do to help  "prevent falls. You can exercise to get stronger. You can also arrange your home to make it safer.    Talk to your doctor about the medicines you take. Ask if any of them increase the risk of falls and whether they can be changed or stopped.   Try to exercise regularly. It can help improve your strength and balance. This can help lower your risk of falling.         Practice fall safety and prevention.   Wear low-heeled shoes that fit well and give your feet good support. Talk to your doctor if you have foot problems that make this hard.  Carry a cellphone or wear a medical alert device that you can use to call for help.  Use stepladders instead of chairs to reach high objects. Don't climb if you're at risk for falls. Ask for help, if needed.  Wear the correct eyeglasses, if you need them.        Make your home safer.   Remove rugs, cords, clutter, and furniture from walkways.  Keep your house well lit. Use night-lights in hallways and bathrooms.  Install and use sturdy handrails on stairways.  Wear nonskid footwear, even inside. Don't walk barefoot or in socks without shoes.        Be safe outside.   Use handrails, curb cuts, and ramps whenever possible.  Keep your hands free by using a shoulder bag or backpack.  Try to walk in well-lit areas. Watch out for uneven ground, changes in pavement, and debris.  Be careful in the winter. Walk on the grass or gravel when sidewalks are slippery. Use de-icer on steps and walkways. Add non-slip devices to shoes.    Put grab bars and nonskid mats in your shower or tub and near the toilet. Try to use a shower chair or bath bench when bathing.   Get into a tub or shower by putting in your weaker leg first. Get out with your strong side first. Have a phone or medical alert device in the bathroom with you.   Where can you learn more?  Go to https://www.[employer]wise.net/patiented  Enter G117 in the search box to learn more about \"Preventing Falls: Care Instructions.\"  Current as of: " July 31, 2024  Content Version: 14.4    4813-0359 [employer].   Care instructions adapted under license by your healthcare professional. If you have questions about a medical condition or this instruction, always ask your healthcare professional. [employer] disclaims any warranty or liability for your use of this information.

## 2025-03-17 NOTE — Clinical Note
Compounded semaglutide going well at 2.4 mg weekly. Will focus on lifestyle modifications and likely to transition to Zepbound vials when no longer able to get compounded semaglutide from [facility].  Has visit with me to discuss this in early may. Refillis of compounded semaglutide sent today to get maybe 2 more refills, hopefully.  Suzie

## 2025-03-17 NOTE — NURSING NOTE
Current patient location: [address] [place] [id number]    Is the patient currently in the state of [place]? YES    Visit mode: VIDEO    If the visit is dropped, the patient can be reconnected by:VIDEO VISIT: Text to cell phone:   Telephone Information:   Mobile [phone]       Will anyone else be joining the visit? NO  (If patient encounters technical issues they should call [phone]    Are changes needed to the allergy or medication list? No    Are refills needed on medications prescribed by this physician? NO    Rooming Documentation:  Questionnaire(s) completed    Reason for visit: Medication Therapy Management    Manfred KRISHNA

## 2025-03-18 NOTE — TELEPHONE ENCOUNTER
"I spoke with jose. She feels her medication is not working well any more and needs an adjustment or something chanhges. 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 It isn't one single thing desi't making me anxious. Its issues with my Dad, work and just life in general. I am off Friday as well so if there is a cancelation I can come in them too. I have to work Monday.\"    Patient currently taking 200 mg of Zoloft per day. She is scheduled next Tuesday for an appointment . Norma Cat RN      " [Negative] : Heme/Lymph

## 2025-04-24 ENCOUNTER — LAB (OUTPATIENT)
Dept: LAB | Facility: CLINIC | Age: 55
End: 2025-04-24
Payer: COMMERCIAL

## 2025-04-24 DIAGNOSIS — Z97.5 IUD (INTRAUTERINE DEVICE) IN PLACE: ICD-10-CM

## 2025-04-24 DIAGNOSIS — R20.2 PARESTHESIA: ICD-10-CM

## 2025-04-24 DIAGNOSIS — E78.9 BORDERLINE HIGH CHOLESTEROL: ICD-10-CM

## 2025-04-24 LAB
ERYTHROCYTE [DISTWIDTH] IN BLOOD BY AUTOMATED COUNT: 12 % (ref 10–15)
HCT VFR BLD AUTO: 39.6 % (ref 35–47)
HGB BLD-MCNC: 13.6 G/DL (ref 11.7–15.7)
MCH RBC QN AUTO: 32.5 PG (ref 26.5–33)
MCHC RBC AUTO-ENTMCNC: 34.3 G/DL (ref 31.5–36.5)
MCV RBC AUTO: 95 FL (ref 78–100)
PLATELET # BLD AUTO: 249 10E3/UL (ref 150–450)
RBC # BLD AUTO: 4.18 10E6/UL (ref 3.8–5.2)
WBC # BLD AUTO: 5 10E3/UL (ref 4–11)

## 2025-04-28 ENCOUNTER — ANCILLARY PROCEDURE (OUTPATIENT)
Dept: ULTRASOUND IMAGING | Facility: CLINIC | Age: 55
End: 2025-04-28
Attending: PHYSICIAN ASSISTANT
Payer: COMMERCIAL

## 2025-04-28 DIAGNOSIS — R10.31 ABDOMINAL PAIN, RIGHT LOWER QUADRANT: ICD-10-CM

## 2025-04-28 DIAGNOSIS — Z97.5 IUD (INTRAUTERINE DEVICE) IN PLACE: ICD-10-CM

## 2025-04-28 PROCEDURE — 76830 TRANSVAGINAL US NON-OB: CPT | Mod: TC | Performed by: RADIOLOGY

## 2025-04-28 PROCEDURE — 76856 US EXAM PELVIC COMPLETE: CPT | Mod: TC | Performed by: RADIOLOGY

## 2025-05-07 ENCOUNTER — VIRTUAL VISIT (OUTPATIENT)
Dept: PHARMACY | Facility: CLINIC | Age: 55
End: 2025-05-07
Payer: COMMERCIAL

## 2025-05-07 VITALS — BODY MASS INDEX: 31.1 KG/M2 | WEIGHT: 169 LBS | HEIGHT: 62 IN

## 2025-05-07 DIAGNOSIS — E66.811 OBESITY, CLASS I, BMI 30-34.9: Primary | ICD-10-CM

## 2025-05-07 ASSESSMENT — PAIN SCALES - GENERAL: PAINLEVEL_OUTOF10: NO PAIN (0)

## 2025-05-07 NOTE — PROGRESS NOTES
Medication Therapy Management (MTM) Encounter    ASSESSMENT:                            Medication Adherence/Access: Education provided on risk of compounding pharmacies compounding semaglutide and Zepbound at this time given no longer legal to do so.  Do not recommend using at this time given no longer legal to do so.  Do not recommend using any compounded sources at this time due to risk of safety and not well understood.     Further education provided on safe and legitimate sources of obtaining GLP-1 -weighing risk/benefit/cost patient elected to pursue Zepbound vial     Weight management :   Given GLP-1 agonist work well for patient and weight management, recommend to continue this at this time.  Given some concern for malnutrition and not optimizing medication given patient appetite suppressed to level where difficult to meet 3 meals a day, recommend slightly lower dose of Zepbound to help meet nutritional needs and also lower cost.    Completed teaching of administration of Zepbound vial. Discussed mechanism of action, side effects, warnings, and efficacy. Discussed appropriate storage and stability. Answered patient questions.           PLAN:                            Recommend discarding semaglutide procured from online pharmacy -I am worried this is not a safe medication for you.    Instead, we will start Zepbound 2.5 mg vials once weekly from [facility] direct pharmacy.  See info below on how to obtain this medication.    Work on the following lifestyle modifications to help optimize overall wellbeing and medication efficacy and tolerability:    Helpful Nutritional/Eating Behavior Recommendations When On Injectable GLP-1 Agonist  Water intake: Hydrate, hydrate, hydrate!!   64-96 oz water daily (this does not include coffee/caffeine containing beverages)  Ensure adequate protein intake   This is to ensure your body has the building blocks necessary for adequate muscle maintenance and decrease likelihood of  "severity of muscle loss  Ensure getting in protein with each meal -   Protein can come from come from meat, dairy products, nuts, some vegetables, and certain grains and beans  Examples of high quality or \"complete\" proteins: chicken, turkey, lean ground beef, salmon, tuna, certain beans and lentils  Ensure adequate fiber intake   At least 21 grams per day for women   At least 30 grams per day for men   A food that has 3 or more gram fiber is considered a \"good fiber choice\"   Consider starting multivitamin containing calcium and vitamin D if food intake more limited   Examples: Centrum, Nature Made, One-A-Day  To minimize side effects:   Smaller more frequent meals   Do not skip meals  Avoid foods that may worsen acid reflux, heartburn, bowel movements (fatty, rich, greasy, or acidic foods)   Stop eating when feeling satiated (~80% full)   Ensure having consistent bowel movements (every day or every 2 days at minimum)  If you are having straining, hard stools or having bowel movement every 3rd day or less, reach out to the care team      Follow-up: [facility] with Suzie Bright PharmD within the first 6 to 8 weeks of starting Zepbound vail  to check on safety and efficacy.  And follow-up with virtual visit with Suzie Bright PharmD this fall as decided with Joana Rockwell PA-C       Appointments in Next Year      Jul 21, 2025 2:30 PM  (Arrive by 2:15 PM)  Return Weight Management Visit with Joana Rockwell PA-C  [facility] [place] ([facility] [place] ) [phone]            SUBJECTIVE/OBJECTIVE:                          Vaensa Owen is a 55 year old female seen for a follow-up visit.       Reason for visit: Medication Therapy Management -weight management .    Allergies/ADRs: Reviewed in chart  Past Medical History: Reviewed in chart  Tobacco: She reports that she has never smoked. She has never been exposed to tobacco smoke. She has never used " smokeless tobacco.  Alcohol: rare    Medication Adherence/Access: Medication barriers: obtaining medication from insurance. [employer]/[facility] insurance requirements no longer covering GLP1 for obesity     Weight Management   Class II Obesity (BMI 35 to 39.9):   Semaglutide 2.5 mg   Bupropion XL 150mg once daily (mental health)    Patient met with Joana Rockwell PA-C 2/17/25 for return Medical weight management visit. Visit with Gabi Shea dietician -4/24/24    Feeing some fatigue and nausea on day of injection.     Happy with Wegovy overall, no symptoms of abdominal pain (see history below- cholecystitis suspected before, pancreatic enzymes within normal limits; gall bladder removed - symptoms resolved) with Wegovy. Pleased with appetite reduction - smaller portion sizes.     Frustrated with cost of GLP-1 out-of-pocket, however feels that this is an important medication for her given it has worked well.  Did purchase a compounded formulation recently that she is not sure on safety of.  Has B12 in it so believes this is a way around compounding regulations.  Wonders if this is safe to take?    Nutrition/Eating Habits: working to increase protein intake - adding protein.  Not meeting goal of 3 meals per day most days and other recommendations from dietitian.    Water: 5 x 8 oz per day    Working on goals with [facility]:  1) Aim to consume at least 60-90 grams of protein daily.   2) Increase fiber containing foods in diet, try to aim for 2-3 servings of fruits/vegetables daily.      Exercise/Activity: has treadmill at home - working on increasing this more and adding weight lifting     Med history:  Patient denies personal or family history of MEN Type2, MTC, Pancreatitis; using IUD for contraception.      History:   Previously reported Occasional very brief, sharp right sided pain (patient unsure if related to Wegovy as prior to starting Wegovy, difficult for patient to remember)-  "starting to wonder if related to eating, not noted to change/improve with moving gas. Decided to monitor and this has improved, still having very occasional sharp pain, but less frequent. Mother had gall bladder removed in the past and wonders if could be related this this? Planning to see PCP to  work up.    Initial Weight: 200 lb              Current weight today: 169 lbs 0 oz  Cumulative Weight Loss: -31 lb, -15.5% from baseline    Wt Readings from Last 4 Encounters:   05/07/25 169 lb (76.7 kg)   03/17/25 179 lb (81.2 kg)   03/17/25 176 lb (79.8 kg)   02/17/25 175 lb (79.4 kg)     Estimated body mass index is 30.91 kg/m  as calculated from the following:    Height as of this encounter: 5' 2\" (1.575 m).    Weight as of this encounter: 169 lb (76.7 kg).        Today's Vitals: Ht 5' 2\" (1.575 m)   Wt 169 lb (76.7 kg)   BMI 30.91 kg/m    ----------------      I spent 20 minutes with this patient today. All changes were made via collaborative practice agreement with Joana Rockwell.     A summary of these recommendations was sent via [employer].    Suzie Bright, Pharm D., MPH    Medication Therapy Management Pharmacist   [facility]      Telemedicine Visit Details  The patient's medications can be safely assessed via a telemedicine encounter.  Type of service:  Video Conference via [employer]  Originating Location (pt. Location): Home    Distant Location (provider location):  Off-site  Start Time: 11:01 AM  End Time: 11:21 AM     Medication Therapy Recommendations  Obesity, Class I, BMI 30-34.9   1 Current Medication: COMPOUNDED NON-CONTROLLED SUBSTANCE (CMPD RX) - PHARMACY TO MIX COMPOUNDED MEDICATION (Discontinued)   Current Medication Sig: Compounded semaglutide 2.4mg subcutaneous injection once weekly. Ok to compound due to shortages   Rationale: Medication product not available - Adherence - Adherence   Recommendation: Change Medication   Status: Accepted per CPA   Identified " Date: 5/7/2025 Completed Date: 5/7/2025

## 2025-05-07 NOTE — NURSING NOTE
Current patient location: [address] [place] [id number]    Is the patient currently in the state of [place]? YES    Visit mode: VIDEO    If the visit is dropped, the patient can be reconnected by:VIDEO VISIT: Text to cell phone:   Telephone Information:   Mobile [phone]       Will anyone else be joining the visit? NO  (If patient encounters technical issues they should call [phone]    Are changes needed to the allergy or medication list? No    Are refills needed on medications prescribed by this physician? Discuss with provider    Rooming Documentation:  Questionnaire(s) not pre-assigned    Reason for visit: Medication Therapy Management    Manfred KRISHNA

## 2025-05-07 NOTE — Clinical Note
Given significant appetite reduction with Wegovy, and with goal of optimizing cost, we are going to try Zepbound 2.5 mg vial and really whole Lifestyle modifications.  She has seen you in July.

## 2025-05-08 NOTE — PATIENT INSTRUCTIONS
"Recommendations from MTM Pharmacist visit:                                                    MTM (medication therapy management) is a service provided by a clinical pharmacist designed to help you get the most of out of your medicines.  You may be sent a phone or email survey evaluating today's visit.  Please provide feedback you have for the service he received today if you are able.        Recommend discarding semaglutide procured from online pharmacy -I am worried this is not a safe medication for you.    Instead, we will start Zepbound 2.5 mg vials once weekly from [facility] direct pharmacy.  See info below on how to obtain this medication.    Work on the following lifestyle modifications to help optimize overall wellbeing and medication efficacy and tolerability:    Helpful Nutritional/Eating Behavior Recommendations When On Injectable GLP-1 Agonist  Water intake: Hydrate, hydrate, hydrate!!   64-96 oz water daily (this does not include coffee/caffeine containing beverages)  Ensure adequate protein intake   This is to ensure your body has the building blocks necessary for adequate muscle maintenance and decrease likelihood of severity of muscle loss  Ensure getting in protein with each meal -   Protein can come from come from meat, dairy products, nuts, some vegetables, and certain grains and beans  Examples of high quality or \"complete\" proteins: chicken, turkey, lean ground beef, salmon, tuna, certain beans and lentils  Ensure adequate fiber intake   At least 21 grams per day for women   At least 30 grams per day for men   A food that has 3 or more gram fiber is considered a \"good fiber choice\"   Consider starting multivitamin containing calcium and vitamin D if food intake more limited   Examples: Centrum, Nature Made, One-A-Day  To minimize side effects:   Smaller more frequent meals   Do not skip meals  Avoid foods that may worsen acid reflux, heartburn, bowel movements (fatty, rich, greasy, or acidic " "foods)   Stop eating when feeling satiated (~80% full)   Ensure having consistent bowel movements (every day or every 2 days at minimum)  If you are having straining, hard stools or having bowel movement every 3rd day or less, reach out to the care team      Follow-up: Via MyChart with Suzie Bright PharmD within the first 6 to 8 weeks of starting Zepbound vail  to check on safety and efficacy.  And follow-up with virtual visit with Suzie Bright PharmD this fall as decided with Joana Rockwell PA-C       Appointments in Next Year      Jul 21, 2025 2:30 PM  (Arrive by 2:15 PM)  Return Weight Management Visit with Joana Rockwell PA-C  [facility] [place] ([facility] [place] ) [phone]            Zepbound Vials Through [facility]    Zepbound is now available as single use vials at the 2.5 mg, 5 mg, 7.5 mg and 10 mg only. The single-dose vials are only sold in packages of 4 vials and only offered at cash price from [facility]. The prescription for the vials will be sent to the pharmacy directly. There are no plans from the manufacture to offer higher dosing in vials. They will either email or text you when [facility] has obtained the prescription to start the process to mail the prescription to you. You need to answer the questions from the email or text to complete the mailing order. You will need to say \"yes\" to obtaining the administration supplies (needle/syringes and alcohol wipes) when purchasing the Zepbound vials from [facility] - they will ask you about this when you start your order with them.     Zepbound Vial Dosing   Initiate 2.5 mg subcutaneously once weekly for at least 4 weeks. Can further dose escalate to 5 mg once weekly for at least 4 weeks, then option to further increase to 7.5 mg once weekly for 4 weeks, then option to further increase to 10 mg once weekly.   When to escalate dosing " "is individualized for each patient and should be discussed between provider and patient. If you are feeling clinically effective response with little to no side effects, would recommend staying at the dose you are at.     Zepbound Vial Administration Video:   Go to the below link and select \"vial\" tab above video. Written instructions with pictures also available on website below video.   [email or web address]    Zepbound Storage and Stability:   Make sure that when you get the prescription that you store the Zepbound vials in the refrigerator (good until expiration date on vial under refrigerated temperature). Once it is time to do your injection, remove only the single-dose vial needed for your injection and keep other vials in the refrigerator until needed. Each single dose vial is good at room temperature for up to 21 days if necessary. If stored at room temperature, do not return to refrigerator. Discard vial if not used in 21 days after removing from refrigerator.     Zepbound Common Side Effects:   Nausea, diarrhea, constipation, headache, tiredness (fatigue), dizziness, stomach upset/pain. Less commonly, Zepbound can cause low blood sugar (symptoms: shaky, dizzy, sweaty, agitation). Please reach out to the care team should you feel like this is occurring. It is important to ensure that you are eating consistent meals and not skipping meals. Ensure you are getting at least 64 oz water daily.     Cost:   $349/4 single-dose 2.5 mg vials + $5 for administration supplies (4 needles/syringes, alcohol wipes)  $499/4 single-dose 5 mg vials + $5 for administration supplies (4 needles/syringes, alcohol wipes)  $499/4 single-dose 7.5 mg vials + $5 for administration supplies (4 needles/syringes, alcohol wipes) - so long as you fill every 45 day. If you fill outside of every 45 days, cost is $699 for 4 vials  $499/4 single-dose 10 mg vials + $5 for administration supplies (4 needles/syringes, alcohol " "wipes) - so long as you fill every 45 day. If you fill outside of every 45 days, cost is $699 for 4 vials    More Information:   [email or web address]      It was great speaking with you today.  I value your experience and would be very thankful for your time in providing feedback in our clinic survey. In the next few days, you may receive an email or text message from [facility] with a link to a survey related to your  clinical pharmacist.\"     To schedule another MT appointment, please call the clinic directly ([facility] Phone Number: [phone] (schedules for [facility] and [facility] - providers, dietitians, health coaches) or you may call the MTM scheduling line at [phone] or toll-free at [phone].     My Clinical Pharmacist's contact information:                                                      Please feel free to contact me with any questions or concerns you have.      Suzie Bright, Pharm D., MPH    Medication Therapy Management Pharmacist   [facility]      Try doing the \"Healthy Eating Plate\" method at home by following these rules: Start with a 9-inch dinner plate.         Meal Replacement Shake Options:   *Protein Shake Criteria: no more than 210 Calories, at least 20 grams of protein, and less than 10 grams of sugar   Premier Protein (160 Calories, 30 g protein)  Slim Fast Advanced Nutrition (180 Calories, 20 g protein)  Muscle Milk, lactose-free, 17 oz bottle (210 Calories, 30 g protein)  Integrated Supplements, no artificial sugars (110 Calories, 20 g protein)  Boost/Ensure Max (160 calories, 30 gm protein)   Fairlife Protein Shakes (160-230 calories, 26-42 gm protein)  Aldi's Elevation Protein Powder (180 calories, 30 gm protein)   Orgain Protein Shakes (130-160 calories, 20-26 gm protein)     Meal Replacement Bar Options:  Quest Protein Bars (190 Calories, 20 g " protein)  Built Bar (170 Calories, 15-20 g protein)  One Protein Bar (210 calories, 20 g protein)  [facility] Protein Bar ([employer]) (190 Calories, 21 g protein)  Pure Protein Bars (180 Calories, 21 g protein)    Low Calorie Frozen Meal:  Healthy Choice Power Bowls  Lean Emiliaine  Smart Ones  Eh Salcido      ---------------------------------------------------------------  Tips to Increase the Protein in Your Diet  You may need more protein in your diet to help you heal from an illness, surgery or wound. Extra protein can also help you gain weight. Here are some ideas for adding high-protein foods to your meals.  Meat and fish  Add chopped cooked meat to vegetables, salads, casseroles, soups, sauces and biscuit dough.  Use in omelets, soufflés, quiches, sandwich fillings and chicken or turkey stuffing.  Wrap in pie crust or biscuit dough to make a turnover.  Add to stuffed baked potatoes.  Make a dip with diced meat or flaked fish mixed with sour cream and spices.  Chopped, hard-cooked eggs  Add to salads.  Use for snacks and sandwich filling.  High-protein milk  To make high-protein milk, mix 1 quart whole milk with 1 cup powdered milk.  Add to cream soups, mashed potatoes, scrambled eggs, cereals and dried eggnog mix.  Use as an ingredient in puddings, custards, hot chocolate, milk shakes and pancakes.  Powdered milk  If you don't have any high-protein milk on hand, you can use powdered milk. Add 3 tablespoons to:  gravies, sauces, cream soups, mayonnaise  casseroles, meat patties, meatloaf, tuna salad, deviled ham  scalloped or mashed potatoes, creamed spinach  scrambled eggs, egg salad  cereals  yogurt, milk drinks, ice cream, frozen desserts, puddings, custards.  Add 4 to 6 tablespoons powdered milk to make:  cream sauces  breads, muffins, pancakes, waffles, cookies, cakes  cream pies, frostings, cake fillings  fruit cobblers, bread or rice pudding, gelatin desserts.  For high-protein eggnog, add 3  to 6 tablespoons powdered milk to prepared eggnog.  Hard or soft cheese  Melt on sandwiches, breads, tortillas, hamburgers, hot dogs, other meats, vegetables, eggs and pies.  Grate into soups, chili, sauces, casseroles, vegetables, potatoes, rice, noodles or meatloaf.  Eat with toast or crackers, or melt for beth dip.  Cottage cheese or ricotta cheese  Mix with or scoop on top of fruits and vegetables.  Add to casseroles, lasagna, spaghetti, noodles and egg dishes (omelets, scrambled eggs, soufflés).  Use in gelatin, pudding-type desserts, cheesecake and pancake batter.  Use to stuff crepes, pasta shells or manicotti.  Fruit yogurt  Blend with fruits for a fruit smoothie.  Use as a dip for fruits and vegetables.  Scoop on top of pancakes or waffles.  Tofu  Blend silken tofu with fruits and juices for a smoothie.  Add chunks of firm tofu to soups and stews, or crumble into meatloaf.  Blend dried onion soup mix into soft or silken tofu for dip.  Use pureed silken tofu for part of the mayonnaise, sour cream, cream cheese or ricotta cheese called for in recipes.  Beans  Use cooked beans or peas in soups, casseroles, pasta, tacos and burritos.  Nuts and seeds  Note: For children under 3, discuss with the child's care team.  Use in casseroles, breads, muffins, pancakes, cookies and waffles.  Sprinkle on fruits, cereals, ice cream, yogurt, vegetables and salads.  Mix with raisins, dried fruits and chocolate chips for a snack.  Nut butters  Note: For children under 3, discuss with the child's care team.  Spread on sandwiches, toast, muffins, crackers, waffles, pancakes and fruit slices.  Use as a dip for raw vegetables.  Blend with milk drinks, or swirl through ice cream, yogurt or hot cereal.  Nutrition supplements (nutrition bars, drinks and powders)  Add powders to milk drinks and desserts.  Mix with ice cream, milk and fruit for a high-protein milk shake.    For informational purposes only. Not to replace the advice of  "your health care provider. Clinically reviewed by Amy Faulkner, GABY, NARINDER, and the Clinical Nutrition Service Line. Copyright   2005 [facility]. All rights reserved. [employer] 914586 - REV 04/24.      -----------------------------------------------------------------------------------------------------------------  Learning About High-Protein Foods  What foods are high in protein?     The foods you eat contain nutrients, such as vitamins and minerals. Protein is a nutrient. Your body needs the right amount to stay healthy and work as it should. You can use the list below to help you make choices about which foods to eat.  Here are some examples of foods that are high in protein.  Dairy and dairy alternatives  Cheese  Milk  Soy milk  Yogurt (especially Greek)  Meat  Beef  Chicken  Ham  Lamb  Lunch meat  Pork  Sausage  Turkey  Other protein foods  Hemp seeds!  Beans (black, garbanzo, kidney, lima)  Eggs  Hummus  Lentils  Nuts  Peanut butter and other nut butters  Peas  Soybeans  Tofu  Veggie or soy pillo (Check the nutrition label for the amount of protein in each serving.)  Seafood  Anchovies  Cod  Crab  Halibut  [place]  Sardines  Shrimp  Tilapia  Brandon  Tuna  Protein supplements  Bars (Check the nutrition label for the amount of protein in each serving.)  Drinks  Powders  Work with your doctor to find out how much of this nutrient you need. Depending on your health, you may need more or less of it in your diet.  Where can you learn more?  Go to [email or web address]://[email or web address].net/patiented  Enter P335 in the search box to learn more about \"Learning About High-Protein Foods.\"  Current as of: September 20, 2023               Content Version: 14.0    5202-1178 [employer].   Care instructions adapted under license by your healthcare professional. If you have questions about a medical condition or this instruction, always ask your healthcare professional. [employer] disclaims any " warranty or liability for your use of this information.

## 2025-07-03 ENCOUNTER — OFFICE VISIT (OUTPATIENT)
Dept: ENDOCRINOLOGY | Facility: CLINIC | Age: 55
End: 2025-07-03
Payer: COMMERCIAL

## 2025-07-03 VITALS
OXYGEN SATURATION: 96 % | HEART RATE: 87 BPM | DIASTOLIC BLOOD PRESSURE: 68 MMHG | WEIGHT: 165.6 LBS | BODY MASS INDEX: 30.47 KG/M2 | SYSTOLIC BLOOD PRESSURE: 93 MMHG | HEIGHT: 62 IN

## 2025-07-03 DIAGNOSIS — E66.811 CLASS 1 OBESITY WITH SERIOUS COMORBIDITY AND BODY MASS INDEX (BMI) OF 30.0 TO 30.9 IN ADULT, UNSPECIFIED OBESITY TYPE: Primary | ICD-10-CM

## 2025-07-03 RX ORDER — NALTREXONE HYDROCHLORIDE 50 MG/1
TABLET, FILM COATED ORAL
Qty: 30 TABLET | Refills: 2 | Status: SHIPPED | OUTPATIENT
Start: 2025-07-03

## 2025-07-03 ASSESSMENT — PAIN SCALES - GENERAL: PAINLEVEL_OUTOF10: NO PAIN (0)

## 2025-07-03 NOTE — NURSING NOTE
"(   Chief Complaint   Patient presents with    RECHECK     Return medical weight management.     )    ( Weight: 75.1 kg (165 lb 9.6 oz) )  ( Height: 157.5 cm (5' 2\") )  ( BMI (Calculated): 30.29 )  (   )  ( Cumulative weight loss (lbs): 34.4 )  ( Last Visits Weight: 79.4 kg (175 lb) )  ( Wt change since last visit (lbs): -9.4 )  ( Waist Circumference (cm): 110.5 cm )  (   )    ( BP: 93/68 )  (   )  (   )  (   )  ( Pulse: 87 )  (   )  ( SpO2: 96 % )    (   Patient Active Problem List   Diagnosis    Moderate major depression (H)    IUD (intrauterine device) in place    Vitamin D deficiency    Lumbar back pain    genital herpes, mainly cervical.     CARDIOVASCULAR SCREENING; LDL GOAL LESS THAN 160    [facility]    Class 3 severe obesity with serious comorbidity and body mass index (BMI) of 40.0 to 44.9 in adult (H)    Hip pain, left    Gastroesophageal reflux disease without esophagitis    Pain in joint, ankle and foot, right    Iris nevus, right    Hearing loss of left ear, unspecified hearing loss type    Primary osteoarthritis involving multiple joints    Trigger finger, acquired    Borderline high cholesterol    Family history of arteriosclerotic cardiovascular disease    H/O methicillin resistant Staphylococcus aureus    Acute tear medial meniscus, left, subsequent encounter    )  (   Current Outpatient Medications   Medication Sig Dispense Refill    adapalene (DIFFERIN) 0.3 % external gel       atorvastatin (LIPITOR) 10 MG tablet Take 1 tablet (10 mg) by mouth daily. 90 tablet 2    buPROPion (WELLBUTRIN XL) 150 MG 24 hr tablet Take 1 tablet (150 mg) by mouth every morning. 90 tablet 3    busPIRone (BUSPAR) 5 MG tablet TAKE ONE TABLET BY MOUTH TWICE A  tablet 1    famotidine (PEPCID) 20 MG tablet Take 1 tablet (20 mg) by mouth 2 times daily as needed (breakthrough acid reflux). 60 tablet 11    fluticasone (FLONASE) 50 MCG/ACT nasal spray INSTILL ONE SPRAY INTO BOTH NOSTRILS DAILY 16 g 3    levonorgestrel (MIRENA) " 20 MCG/DAY IUD 1 each (20 mcg) by Intrauterine route continuous      MULTI-VITAMIN OR TABS 1 TABLET DAILY      omeprazole (PRILOSEC) 20 MG DR capsule TAKE ONE CAPSULE BY MOUTH ONCE DAILY 30 TO 60 MINUTES BEFORE A MEAL 90 capsule 1    sertraline (ZOLOFT) 100 MG tablet Take 2 tablets (200 mg) by mouth daily. 180 tablet 1    tirzepatide-weight management (ZEPBOUND) 2.5 MG/0.5ML vial Inject 0.5 mLs (2.5 mg) subcutaneously once a week. 2 mL 5    tretinoin (RETIN-A) 0.025 % external cream prn      )  ( Diabetes Eval:    )    ( Pain Eval:  No Pain (0) )    ( Wound Eval:       )    (   History   Smoking Status    Never   Smokeless Tobacco    Never    )    ( Signed By:  Marci Ferrell, EMT July 3, 2025; 11:05 AM )

## 2025-07-03 NOTE — ASSESSMENT & PLAN NOTE
Since last visit has started Zepbound 2.5 mg vials.  She has been on this for around 2 months.  Has no side effects.  She is unsure if it has been helpful, but has seen around a 10 pound weight loss since our last visit in February.  So, discussed that it might be more helpful than she realizes.    She continues to have a lot of cravings after dinner.  Work has been really stressful also has been hard to have consistent meals throughout the day.  Discussed trying to eat during the day and having a while well-rounded dinner.    All she can do this Zepbound cash pay for now, this is not a long-term solution. Can discuss stopping Zepbound in the future.  She would like to lose another 10-15 more pounds.    With her having increasing cravings discussed other ailments today. Discussed starting topiramate.  However, she is concerned about brain fog side effects and sedation at night.  Can consider in the future.  Not taking opioids.  She does have baseline dizziness from COVID, this has been better over the past year.  She will monitor this closely.

## 2025-07-03 NOTE — PATIENT INSTRUCTIONS
"Visit Plan:     Start Naltrexone 50mg - take 1/2 tablet daily for 7 days, then increase to 1 tablet daily.   Continue Zepbound 2.5mg vials. No refills needed  Suzie Bright RPH in 2 months   Joana Swann PA-C in 4 months     NALTREXONE    We are considering starting Naltrexone. Start with 1/2 tab 1-2 hours prior to the time you have the most trouble with cravings or extra hunger. If you are doing well you may switch to a whole tablet taken at the same time period.     WARNING: This medication blocks the action of opioid type pain medications. If you routinely take any medication like Codeine, Oxycontin,Percocet,Morphine,Dilaudid or Methodone, do not take this until you have talked with weight management staff. If you are planning surgery you should stop Naltrexone 4 days prior to the surgery. If you have an injury that requires pain medication, make sure the health care staff knows you take Naltrexone.     Naltrexone is a medication that is used most often to help people who are troubled by dependence on prescription pain killers or alcohol. It has also been found to help with weight loss. Although it's not currently FDA approved for weight loss, it has been used safely for a number of years to help people who are carrying extra weight.     Just how Naltrexone helps with weight loss has not been exactly determined.  It seems to work by quieting down brain signals related to strong food cravings. Many of our patients use the word \"addiction\" to describe their feelings and constant thoughts about food. It makes sense then to treat the feeling of dependence on food, outside of real hunger, with a medication designed to help with other sorts of dependence.     Our patients on Naltrexone find that they:    >feel less interest in food   >think less about food and eating and have more time to think of other things   >find it easier to push the plate away   >have an easier time eating less    For some of our patients, these " feelings are very immediate. Other patients, don't feel much of a change but find they've lost weight. Like all weight loss medications, Naltrexone works best when you help it work. This means:  1. Having less tempting high calorie (fattening) food around the house or office. (For people with strong cravings this is very important.)   2. Staying away from situations or people that may trigger your cravings .   3. Eating out only one time or less each week.  4. Eating your meals at a table with the TV or computer off.    Side-effects. Naltrexone is generally well tolerated. The main side-effect we see is  nausea or a woozy feeling. A small number of people feel quite ill. Most people have a mild reaction and some people have no reaction at all.  The good news is that this feeling does go away.     In order to avoid nausea, please start the medication with half a pill for the first few days. Go on to a full pill if you are feeling well.      If you  are nauseated on 1/2 a pill it is okay to cut back to 1/4 pill ( a very small amount). Take this for a couple of days and work your way back up to a 1/2 pill and then a whole pill. Taking the medication at night or with food  to start also may help prevent the feeling of nausea.       For any questions or concerns please send a [employer] message to our team or call our weight management call center at [phone] during regular business hours. For questions during evenings or weekends your messages will be addressed during the next business day.  For emergencies please call 911 or seek immediate medical care.     (Do not stop taking it if you don't think it's working. For some people it works without them knowing it.)      Please refer to the pharmacy insert for more information on side-effects. Since many pharmacists are not familiar with the use of naltrexone in weight loss, calling the nurse at [phone] will get you the most accurate information.  In order to get  refills of this or any medication we prescribe you must be seen in the medical weight mgmt clinic every 2-3 months. Please have your pharmacy fax a refill request to [phone].

## 2025-07-03 NOTE — PROGRESS NOTES
Return Medical Weight Management Note     Vanesa Owen  MRN:  [id number]  :  1970  MAIKOL:  7/3/2025    Dear Gabi Sotelo PA-C,    I had the pleasure of seeing your patient Vanesa Owen. She is a 55 year old female who I am continuing to see for treatment of obesity related to:        2024    11:32 AM   --   I have the following health issues associated with obesity High Cholesterol    Osteoarthritis (joint disease)   I have the following symptoms associated with obesity Knee Pain    Depression    Back Pain    Fatigue    Hip Pain       Assessment & Plan   Problem List Items Addressed This Visit       Class 1 obesity with serious comorbidity and body mass index (BMI) of 30.0 to 30.9 in adult, unspecified obesity type - Primary    Since last visit has started Zepbound 2.5 mg vials.  She has been on this for around 2 months.  Has no side effects.  She is unsure if it has been helpful, but has seen around a 10 pound weight loss since our last visit in February.  So, discussed that it might be more helpful than she realizes.    She continues to have a lot of cravings after dinner.  Work has been really stressful also has been hard to have consistent meals throughout the day.  Discussed trying to eat during the day and having a while well-rounded dinner.    All she can do this Zepbound cash pay for now, this is not a long-term solution. Can discuss stopping Zepbound in the future.  She would like to lose another 10-15 more pounds.    With her having increasing cravings discussed other ailments today. Discussed starting topiramate.  However, she is concerned about brain fog side effects and sedation at night.  Can consider in the future.  Not taking opioids.  She does have baseline dizziness from COVID, this has been better over the past year.  She will monitor this closely.         Relevant Medications    naltrexone (DEPADE/REVIA) 50 MG tablet        Start Naltrexone 50mg - take 1/2  tablet daily for 7 days, then increase to 1 tablet daily.   Continue Zepbound 2.5mg vials. No refills needed  Suzie Bright RPH in 2 months   Joana Swann PA-C in 4 months       INTERVAL HISTORY:  New MWM on 4/15/2024  Was started on Ozempic and then transitioned to Wegovy - starting BMI 40.21 (11/23/2022). Was able to lose 40lbs, but has sinse gained 10lbs with discontinuation of wegovy 3 months ago due to insurance coverage.   [employer] employee - restarted Wegovy   Last seen by me on 1/2025 - insurance no longer covers weight loss medications. Started compounded semaglutide      Is really happy with her results. Feeling more comfortable. Would like to get to 150lb.     Anti-obesity medication history    Current:   Zepbound 2.5mg - has been on this dose for 2 months. Unsure if it is helpful.     Past/Failed/contraindicated:   Phentermine - was on for around 1-2 months. Side effects of tachycardia and was discontinued.        Recent diet changes: has been harder at work. Having a protein bar for lunch. Eating 2 meals a day, with 1 snack. Food has been harder with so much stress. Drinking 16oz of water, diet soda (1 can).   B - protein shake   L - protein bar   D - shrimp friend rice, protein bar, eat out, left overs when eating out, tacos.       Recent stressors: a lot of stress with working. Worried about striking.         CURRENT WEIGHT:   165 lbs 9.6 oz    Initial Weight (lbs): 200 lbs  Last Visits Weight: 79.4 kg (175 lb)  Cumulative weight loss (lbs): 34.4  Weight Loss Percentage: 17.2%  Waist Circumference (cm): 110.5 cm    Wt Readings from Last 5 Encounters:   07/03/25 75.1 kg (165 lb 9.6 oz)   05/07/25 76.7 kg (169 lb)   03/17/25 81.2 kg (179 lb)   03/17/25 79.8 kg (176 lb)   02/17/25 79.4 kg (175 lb)             7/3/2025    10:50 AM   Changes and Difficulties   I have made the following changes to my diet since my last visit: more water and more protein   With regards to my diet, I am still struggling with: balanced  consistant meals   I have made the following changes to my activity/exercise since my last visit: walking more   With regards to my activity/exercise, I am still struggling with: length of time i give myself for exercise         MEDICATIONS:   Current Outpatient Medications   Medication Sig Dispense Refill    adapalene (DIFFERIN) 0.3 % external gel       atorvastatin (LIPITOR) 10 MG tablet Take 1 tablet (10 mg) by mouth daily. 90 tablet 2    buPROPion (WELLBUTRIN XL) 150 MG 24 hr tablet Take 1 tablet (150 mg) by mouth every morning. 90 tablet 3    busPIRone (BUSPAR) 5 MG tablet TAKE ONE TABLET BY MOUTH TWICE A  tablet 1    famotidine (PEPCID) 20 MG tablet Take 1 tablet (20 mg) by mouth 2 times daily as needed (breakthrough acid reflux). 60 tablet 11    fluticasone (FLONASE) 50 MCG/ACT nasal spray INSTILL ONE SPRAY INTO BOTH NOSTRILS DAILY 16 g 3    levonorgestrel (MIRENA) 20 MCG/DAY IUD 1 each (20 mcg) by Intrauterine route continuous      MULTI-VITAMIN OR TABS 1 TABLET DAILY      naltrexone (DEPADE/REVIA) 50 MG tablet Take 1/2 tablet once daily 1-2 hours prior to worst cravings for 1 week, then increase to 1 tablet daily as directed if tolerating 30 tablet 2    omeprazole (PRILOSEC) 20 MG DR capsule TAKE ONE CAPSULE BY MOUTH ONCE DAILY 30 TO 60 MINUTES BEFORE A MEAL 90 capsule 1    sertraline (ZOLOFT) 100 MG tablet Take 2 tablets (200 mg) by mouth daily. 180 tablet 1    tirzepatide-weight management (ZEPBOUND) 2.5 MG/0.5ML vial Inject 0.5 mLs (2.5 mg) subcutaneously once a week. 2 mL 5    tretinoin (RETIN-A) 0.025 % external cream prn             7/3/2025    10:50 AM   Weight Loss Medication History Reviewed With Patient   Which weight loss medications are you currently taking on a regular basis? Bupropion   Are you having any side effects from the weight loss medication that we have prescribed you? Yes   If you are having side effects please describe: maybe a little diziness         Objective    BP 93/68  "(BP Location: Left arm, Patient Position: Sitting, Cuff Size: Adult Regular)   Pulse 87   Ht 1.575 m (5' 2\")   Wt 75.1 kg (165 lb 9.6 oz)   SpO2 96%   BMI 30.29 kg/m        PHYSICAL EXAM:  GENERAL: alert and no distress  EYES: Eyes grossly normal to inspection.  No discharge or erythema, or obvious scleral/conjunctival abnormalities.  RESP: No audible wheeze, cough, or visible cyanosis.    SKIN: Visible skin clear. No significant rash, abnormal pigmentation or lesions.  NEURO: Cranial nerves grossly intact.  Mentation and speech appropriate for age.  PSYCH: Appropriate affect, tone, and pace of words        Sincerely,    Joana Rockwell PA-C      45 minutes spent by me on the date of the encounter doing chart review, history and exam, documentation and further activities per the note    The longitudinal plan of care for the diagnosis(es)/condition(s) as documented were addressed during this visit. Due to the added complexity in care, I will continue to support Vanesa in the subsequent management and with ongoing continuity of care.  "

## 2025-07-03 NOTE — LETTER
7/3/2025       RE: Vanesa Owen  [id number] [facility] [address]     Dear Colleague,    Thank you for referring your patient, Vanesa Owen, to the [facility] [place] at [facility]. Please see a copy of my visit note below.      Return Medical Weight Management Note     Vanesa Owen  MRN:  [id number]  :  1970  MAIKOL:  7/3/2025    Dear Gabi Sotelo PA-C,    I had the pleasure of seeing your patient Vanesa Owen. She is a 55 year old female who I am continuing to see for treatment of obesity related to:        2024    11:32 AM   --   I have the following health issues associated with obesity High Cholesterol    Osteoarthritis (joint disease)   I have the following symptoms associated with obesity Knee Pain    Depression    Back Pain    Fatigue    Hip Pain       Assessment & Plan  Problem List Items Addressed This Visit       Class 1 obesity with serious comorbidity and body mass index (BMI) of 30.0 to 30.9 in adult, unspecified obesity type - Primary    Since last visit has started Zepbound 2.5 mg vials.  She has been on this for around 2 months.  Has no side effects.  She is unsure if it has been helpful, but has seen around a 10 pound weight loss since our last visit in February.  So, discussed that it might be more helpful than she realizes.    She continues to have a lot of cravings after dinner.  Work has been really stressful also has been hard to have consistent meals throughout the day.  Discussed trying to eat during the day and having a while well-rounded dinner.    All she can do this Zepbound cash pay for now, this is not a long-term solution. Can discuss stopping Zepbound in the future.  She would like to lose another 10-15 more pounds.    With her having increasing cravings discussed other ailments today. Discussed starting topiramate.  However, she is concerned about  brain fog side effects and sedation at night.  Can consider in the future.  Not taking opioids.  She does have baseline dizziness from COVID, this has been better over the past year.  She will monitor this closely.         Relevant Medications    naltrexone (DEPADE/REVIA) 50 MG tablet        Start Naltrexone 50mg - take 1/2 tablet daily for 7 days, then increase to 1 tablet daily.   Continue Zepbound 2.5mg vials. No refills needed  Suzie Bright RPH in 2 months   Joana Swann PA-C in 4 months       INTERVAL HISTORY:  New MWM on 4/15/2024  Was started on Ozempic and then transitioned to Wegovy - starting BMI 40.21 (11/23/2022). Was able to lose 40lbs, but has sinse gained 10lbs with discontinuation of wegovy 3 months ago due to insurance coverage.   [employer] employee - restarted Wegovy   Last seen by me on 1/2025 - insurance no longer covers weight loss medications. Started compounded semaglutide      Is really happy with her results. Feeling more comfortable. Would like to get to 150lb.     Anti-obesity medication history    Current:   Zepbound 2.5mg - has been on this dose for 2 months. Unsure if it is helpful.     Past/Failed/contraindicated:   Phentermine - was on for around 1-2 months. Side effects of tachycardia and was discontinued.        Recent diet changes: has been harder at work. Having a protein bar for lunch. Eating 2 meals a day, with 1 snack. Food has been harder with so much stress. Drinking 16oz of water, diet soda (1 can).   B - protein shake   L - protein bar   D - shrimp friend rice, protein bar, eat out, left overs when eating out, tacos.       Recent stressors: a lot of stress with working. Worried about striking.         CURRENT WEIGHT:   165 lbs 9.6 oz    Initial Weight (lbs): 200 lbs  Last Visits Weight: 79.4 kg (175 lb)  Cumulative weight loss (lbs): 34.4  Weight Loss Percentage: 17.2%  Waist Circumference (cm): 110.5 cm    Wt Readings from Last 5 Encounters:   07/03/25 75.1 kg (165 lb 9.6 oz)    05/07/25 76.7 kg (169 lb)   03/17/25 81.2 kg (179 lb)   03/17/25 79.8 kg (176 lb)   02/17/25 79.4 kg (175 lb)             7/3/2025    10:50 AM   Changes and Difficulties   I have made the following changes to my diet since my last visit: more water and more protein   With regards to my diet, I am still struggling with: balanced consistant meals   I have made the following changes to my activity/exercise since my last visit: walking more   With regards to my activity/exercise, I am still struggling with: length of time i give myself for exercise         MEDICATIONS:   Current Outpatient Medications   Medication Sig Dispense Refill     adapalene (DIFFERIN) 0.3 % external gel        atorvastatin (LIPITOR) 10 MG tablet Take 1 tablet (10 mg) by mouth daily. 90 tablet 2     buPROPion (WELLBUTRIN XL) 150 MG 24 hr tablet Take 1 tablet (150 mg) by mouth every morning. 90 tablet 3     busPIRone (BUSPAR) 5 MG tablet TAKE ONE TABLET BY MOUTH TWICE A  tablet 1     famotidine (PEPCID) 20 MG tablet Take 1 tablet (20 mg) by mouth 2 times daily as needed (breakthrough acid reflux). 60 tablet 11     fluticasone (FLONASE) 50 MCG/ACT nasal spray INSTILL ONE SPRAY INTO BOTH NOSTRILS DAILY 16 g 3     levonorgestrel (MIRENA) 20 MCG/DAY IUD 1 each (20 mcg) by Intrauterine route continuous       MULTI-VITAMIN OR TABS 1 TABLET DAILY       naltrexone (DEPADE/REVIA) 50 MG tablet Take 1/2 tablet once daily 1-2 hours prior to worst cravings for 1 week, then increase to 1 tablet daily as directed if tolerating 30 tablet 2     omeprazole (PRILOSEC) 20 MG DR capsule TAKE ONE CAPSULE BY MOUTH ONCE DAILY 30 TO 60 MINUTES BEFORE A MEAL 90 capsule 1     sertraline (ZOLOFT) 100 MG tablet Take 2 tablets (200 mg) by mouth daily. 180 tablet 1     tirzepatide-weight management (ZEPBOUND) 2.5 MG/0.5ML vial Inject 0.5 mLs (2.5 mg) subcutaneously once a week. 2 mL 5     tretinoin (RETIN-A) 0.025 % external cream prn             7/3/2025    10:50 AM  "  Weight Loss Medication History Reviewed With Patient   Which weight loss medications are you currently taking on a regular basis? Bupropion   Are you having any side effects from the weight loss medication that we have prescribed you? Yes   If you are having side effects please describe: maybe a little diziness         Objective   BP 93/68 (BP Location: Left arm, Patient Position: Sitting, Cuff Size: Adult Regular)   Pulse 87   Ht 1.575 m (5' 2\")   Wt 75.1 kg (165 lb 9.6 oz)   SpO2 96%   BMI 30.29 kg/m        PHYSICAL EXAM:  GENERAL: alert and no distress  EYES: Eyes grossly normal to inspection.  No discharge or erythema, or obvious scleral/conjunctival abnormalities.  RESP: No audible wheeze, cough, or visible cyanosis.    SKIN: Visible skin clear. No significant rash, abnormal pigmentation or lesions.  NEURO: Cranial nerves grossly intact.  Mentation and speech appropriate for age.  PSYCH: Appropriate affect, tone, and pace of words        Sincerely,    Joana Rockwell PA-C      45 minutes spent by me on the date of the encounter doing chart review, history and exam, documentation and further activities per the note    The longitudinal plan of care for the diagnosis(es)/condition(s) as documented were addressed during this visit. Due to the added complexity in care, I will continue to support Vanesa in the subsequent management and with ongoing continuity of care.    Again, thank you for allowing me to participate in the care of your patient.      Sincerely,    Joana Rockwell PA-C    "

## 2025-08-13 ENCOUNTER — PATIENT OUTREACH (OUTPATIENT)
Dept: CARE COORDINATION | Facility: CLINIC | Age: 55
End: 2025-08-13
Payer: COMMERCIAL

## 2025-08-25 ENCOUNTER — MYC MEDICAL ADVICE (OUTPATIENT)
Dept: FAMILY MEDICINE | Facility: CLINIC | Age: 55
End: 2025-08-25

## 2025-08-25 ENCOUNTER — OFFICE VISIT (OUTPATIENT)
Dept: FAMILY MEDICINE | Facility: CLINIC | Age: 55
End: 2025-08-25
Payer: COMMERCIAL

## 2025-08-25 ENCOUNTER — LAB (OUTPATIENT)
Dept: LAB | Facility: CLINIC | Age: 55
End: 2025-08-25
Payer: COMMERCIAL

## 2025-08-25 ENCOUNTER — TELEPHONE (OUTPATIENT)
Dept: FAMILY MEDICINE | Facility: CLINIC | Age: 55
End: 2025-08-25

## 2025-08-25 VITALS
WEIGHT: 167.4 LBS | HEART RATE: 102 BPM | OXYGEN SATURATION: 97 % | DIASTOLIC BLOOD PRESSURE: 74 MMHG | HEIGHT: 62 IN | TEMPERATURE: 97.9 F | SYSTOLIC BLOOD PRESSURE: 102 MMHG | BODY MASS INDEX: 30.8 KG/M2 | RESPIRATION RATE: 16 BRPM

## 2025-08-25 DIAGNOSIS — R39.9 URINARY SYMPTOM OR SIGN: Primary | ICD-10-CM

## 2025-08-25 DIAGNOSIS — R39.9 URINARY SYMPTOM OR SIGN: ICD-10-CM

## 2025-08-25 LAB
ALBUMIN UR-MCNC: NEGATIVE MG/DL
APPEARANCE UR: CLEAR
BACTERIAL VAGINOSIS VAG-IMP: NEGATIVE
BILIRUB UR QL STRIP: NEGATIVE
CANDIDA DNA VAG QL NAA+PROBE: NOT DETECTED
CANDIDA GLABRATA / CANDIDA KRUSEI DNA: NOT DETECTED
CLUE CELLS: ABNORMAL
COLOR UR AUTO: YELLOW
GLUCOSE UR STRIP-MCNC: NEGATIVE MG/DL
HGB UR QL STRIP: NEGATIVE
KETONES UR STRIP-MCNC: NEGATIVE MG/DL
LEUKOCYTE ESTERASE UR QL STRIP: NEGATIVE
NITRATE UR QL: NEGATIVE
PH UR STRIP: 6 [PH] (ref 5–7)
SP GR UR STRIP: 1.01 (ref 1–1.03)
T VAGINALIS DNA VAG QL NAA+PROBE: NOT DETECTED
TRICHOMONAS, WET PREP: ABNORMAL
UROBILINOGEN UR STRIP-ACNC: 0.2 E.U./DL
WBC'S/HIGH POWER FIELD, WET PREP: ABNORMAL
YEAST, WET PREP: ABNORMAL

## 2025-08-25 PROCEDURE — 81003 URINALYSIS AUTO W/O SCOPE: CPT | Performed by: PHYSICIAN ASSISTANT

## 2025-08-25 PROCEDURE — 81515 NFCT DS BV&VAGINITIS DNA ALG: CPT

## 2025-08-25 PROCEDURE — 99214 OFFICE O/P EST MOD 30 MIN: CPT | Performed by: PHYSICIAN ASSISTANT

## 2025-08-25 PROCEDURE — 87086 URINE CULTURE/COLONY COUNT: CPT | Performed by: PHYSICIAN ASSISTANT

## 2025-08-25 PROCEDURE — 3078F DIAST BP <80 MM HG: CPT | Performed by: PHYSICIAN ASSISTANT

## 2025-08-25 PROCEDURE — 87210 SMEAR WET MOUNT SALINE/INK: CPT | Performed by: PHYSICIAN ASSISTANT

## 2025-08-25 PROCEDURE — 3074F SYST BP LT 130 MM HG: CPT | Performed by: PHYSICIAN ASSISTANT

## 2025-08-25 PROCEDURE — 1126F AMNT PAIN NOTED NONE PRSNT: CPT | Performed by: PHYSICIAN ASSISTANT

## 2025-08-25 RX ORDER — CEFADROXIL 500 MG/1
1000 CAPSULE ORAL 2 TIMES DAILY
Qty: 28 CAPSULE | Refills: 0 | Status: SHIPPED | OUTPATIENT
Start: 2025-08-25 | End: 2025-09-01

## 2025-08-25 ASSESSMENT — PAIN SCALES - GENERAL: PAINLEVEL_OUTOF10: NO PAIN (0)

## 2025-08-27 DIAGNOSIS — B00.9 HERPES SIMPLEX VIRUS INFECTION: Primary | ICD-10-CM

## 2025-08-27 LAB — BACTERIA UR CULT: NORMAL

## 2025-08-27 RX ORDER — VALACYCLOVIR HYDROCHLORIDE 500 MG/1
500 TABLET, FILM COATED ORAL 2 TIMES DAILY
Qty: 6 TABLET | Refills: 0 | Status: SHIPPED | OUTPATIENT
Start: 2025-08-27 | End: 2025-08-30

## (undated) DEVICE — Device

## (undated) DEVICE — DAVINCI XI DRAPE ARM 470015

## (undated) DEVICE — DECANTER VIAL 2006S

## (undated) DEVICE — SOL NACL 0.9% IRRIG 1000ML BOTTLE 07138-09

## (undated) DEVICE — FILTER LAPROSHIELD SMOKE EVAC LSF1

## (undated) DEVICE — ANTIFOG SOLUTION SEE SHARP 150M TROCAR SWABS 30978

## (undated) DEVICE — SU MONOCRYL 4-0 PS-2 18" UND Y496G

## (undated) DEVICE — DRAPE U SPLIT 74X120" 29440

## (undated) DEVICE — PREP CHLORAPREP 26ML TINTED ORANGE  260815

## (undated) DEVICE — SU STRATAFIX PDS PLUS 3-0 SPIRAL SH 15CM SXPP1B420

## (undated) DEVICE — GOWN XLG DISP 9545

## (undated) DEVICE — KIT PATIENT POSITIONING PIGAZZI LATEX FREE 40580

## (undated) DEVICE — SYR 30ML SLIP TIP W/O NDL 302833

## (undated) DEVICE — GLOVE BIOGEL PI MICRO INDICATOR UNDERGLOVE SZ 7.5 48975

## (undated) DEVICE — DAVINCI XI DRAPE COLUMN 470341

## (undated) DEVICE — PAD CHUX UNDERPAD 23X24" 7136

## (undated) DEVICE — KIT ENDO FIRST STEP DISINFECTANT 200ML W/POUCH EP-4

## (undated) DEVICE — DAVINCI XI SEAL UNIVERSAL 5-12MM 470500

## (undated) DEVICE — CLIP ENDO HEMO-LOC GREEN MED/LG 544230

## (undated) DEVICE — SU DERMABOND ADVANCED .7ML DNX12

## (undated) DEVICE — DAVINCI XI OBTURATOR BLADELESS 8MM 470359

## (undated) DEVICE — GLOVE BIOGEL PI MICRO SZ 7.0 48570

## (undated) DEVICE — ENDO TROCAR FIRST ENTRY KII FIOS Z-THRD 05X100MM CTF03

## (undated) DEVICE — STOCKING SLEEVE COMPRESSION CALF MED

## (undated) DEVICE — SYR 20ML SLIP TIP

## (undated) DEVICE — DRAPE TIBURON GENERAL ENDOSCOPY 9458

## (undated) RX ORDER — DEXMEDETOMIDINE HYDROCHLORIDE 100 UG/ML
INJECTION, SOLUTION INTRAVENOUS
Status: DISPENSED
Start: 2024-11-20

## (undated) RX ORDER — PROPOFOL 10 MG/ML
INJECTION, EMULSION INTRAVENOUS
Status: DISPENSED
Start: 2024-11-20

## (undated) RX ORDER — FENTANYL CITRATE 50 UG/ML
INJECTION, SOLUTION INTRAMUSCULAR; INTRAVENOUS
Status: DISPENSED
Start: 2024-11-20

## (undated) RX ORDER — FENTANYL CITRATE 50 UG/ML
INJECTION, SOLUTION INTRAMUSCULAR; INTRAVENOUS
Status: DISPENSED
Start: 2023-04-13

## (undated) RX ORDER — CEFAZOLIN SODIUM/WATER 2 G/20 ML
SYRINGE (ML) INTRAVENOUS
Status: DISPENSED
Start: 2024-11-20

## (undated) RX ORDER — LIDOCAINE HYDROCHLORIDE AND EPINEPHRINE 10; 10 MG/ML; UG/ML
INJECTION, SOLUTION INFILTRATION; PERINEURAL
Status: DISPENSED
Start: 2024-11-20

## (undated) RX ORDER — ACETAMINOPHEN 325 MG/1
TABLET ORAL
Status: DISPENSED
Start: 2024-11-20

## (undated) RX ORDER — ONDANSETRON 2 MG/ML
INJECTION INTRAMUSCULAR; INTRAVENOUS
Status: DISPENSED
Start: 2024-11-20

## (undated) RX ORDER — BUPIVACAINE HYDROCHLORIDE 5 MG/ML
INJECTION, SOLUTION EPIDURAL; INTRACAUDAL
Status: DISPENSED
Start: 2024-11-20

## (undated) RX ORDER — ONDANSETRON 2 MG/ML
INJECTION INTRAMUSCULAR; INTRAVENOUS
Status: DISPENSED
Start: 2023-04-13

## (undated) RX ORDER — MAGNESIUM SULFATE HEPTAHYDRATE 40 MG/ML
INJECTION, SOLUTION INTRAVENOUS
Status: DISPENSED
Start: 2024-11-20

## (undated) RX ORDER — GABAPENTIN 300 MG/1
CAPSULE ORAL
Status: DISPENSED
Start: 2024-11-20

## (undated) RX ORDER — HEPARIN SODIUM 5000 [USP'U]/.5ML
INJECTION, SOLUTION INTRAVENOUS; SUBCUTANEOUS
Status: DISPENSED
Start: 2024-11-20

## (undated) RX ORDER — SIMETHICONE 40MG/0.6ML
SUSPENSION, DROPS(FINAL DOSAGE FORM)(ML) ORAL
Status: DISPENSED
Start: 2023-04-13

## (undated) RX ORDER — DEXAMETHASONE SODIUM PHOSPHATE 4 MG/ML
INJECTION, SOLUTION INTRA-ARTICULAR; INTRALESIONAL; INTRAMUSCULAR; INTRAVENOUS; SOFT TISSUE
Status: DISPENSED
Start: 2024-11-20

## (undated) RX ORDER — KETOROLAC TROMETHAMINE 30 MG/ML
INJECTION, SOLUTION INTRAMUSCULAR; INTRAVENOUS
Status: DISPENSED
Start: 2024-11-20

## (undated) RX ORDER — GLYCOPYRROLATE 0.2 MG/ML
INJECTION, SOLUTION INTRAMUSCULAR; INTRAVENOUS
Status: DISPENSED
Start: 2024-11-20